# Patient Record
Sex: MALE | Race: WHITE | HISPANIC OR LATINO | Employment: FULL TIME | URBAN - METROPOLITAN AREA
[De-identification: names, ages, dates, MRNs, and addresses within clinical notes are randomized per-mention and may not be internally consistent; named-entity substitution may affect disease eponyms.]

---

## 2019-01-26 ENCOUNTER — APPOINTMENT (OUTPATIENT)
Dept: RADIOLOGY | Facility: MEDICAL CENTER | Age: 29
DRG: 003 | End: 2019-01-26
Payer: COMMERCIAL

## 2019-01-26 ENCOUNTER — APPOINTMENT (OUTPATIENT)
Dept: RADIOLOGY | Facility: MEDICAL CENTER | Age: 29
DRG: 003 | End: 2019-01-26
Attending: SURGERY
Payer: COMMERCIAL

## 2019-01-26 ENCOUNTER — HOSPITAL ENCOUNTER (INPATIENT)
Facility: MEDICAL CENTER | Age: 29
LOS: 26 days | DRG: 003 | End: 2019-02-21
Attending: EMERGENCY MEDICINE | Admitting: SURGERY
Payer: COMMERCIAL

## 2019-01-26 DIAGNOSIS — T14.90XA TRAUMA: ICD-10-CM

## 2019-01-26 DIAGNOSIS — S06.5XAA SUBDURAL HEMATOMA (HCC): ICD-10-CM

## 2019-01-26 PROBLEM — J95.821 ACUTE RESPIRATORY FAILURE FOLLOWING TRAUMA AND SURGERY (HCC): Status: ACTIVE | Noted: 2019-01-26

## 2019-01-26 PROBLEM — Z53.09 CONTRAINDICATION TO DEEP VEIN THROMBOSIS (DVT) PROPHYLAXIS: Status: ACTIVE | Noted: 2019-01-26

## 2019-01-26 PROBLEM — E87.20 LACTIC ACIDOSIS: Status: ACTIVE | Noted: 2019-01-26

## 2019-01-26 PROBLEM — T17.908A ASPIRATION INTO AIRWAY: Status: ACTIVE | Noted: 2019-01-26

## 2019-01-26 LAB
ABO GROUP BLD: NORMAL
ACTION RANGE TRIGGERED IACRT: NO
ACTION RANGE TRIGGERED IACRT: YES
ALBUMIN SERPL BCP-MCNC: 4.6 G/DL (ref 3.2–4.9)
ALBUMIN/GLOB SERPL: 1.7 G/DL
ALP SERPL-CCNC: 58 U/L (ref 30–99)
ALT SERPL-CCNC: 31 U/L (ref 2–50)
ANION GAP SERPL CALC-SCNC: 17 MMOL/L (ref 0–11.9)
ANION GAP SERPL CALC-SCNC: 8 MMOL/L (ref 0–11.9)
APTT PPP: 24.3 SEC (ref 24.7–36)
AST SERPL-CCNC: 43 U/L (ref 12–45)
BASE EXCESS BLDA CALC-SCNC: -5 MMOL/L (ref -4–3)
BASE EXCESS BLDA CALC-SCNC: -5 MMOL/L (ref -4–3)
BASE EXCESS BLDA CALC-SCNC: -6 MMOL/L (ref -4–3)
BILIRUB SERPL-MCNC: 1.3 MG/DL (ref 0.1–1.5)
BLD GP AB SCN SERPL QL: NORMAL
BODY TEMPERATURE: ABNORMAL CENTIGRADE
BODY TEMPERATURE: ABNORMAL DEGREES
BODY TEMPERATURE: ABNORMAL DEGREES
BUN SERPL-MCNC: 17 MG/DL (ref 8–22)
BUN SERPL-MCNC: 22 MG/DL (ref 8–22)
CALCIUM SERPL-MCNC: 8 MG/DL (ref 8.5–10.5)
CALCIUM SERPL-MCNC: 8.3 MG/DL (ref 8.5–10.5)
CFT BLD TEG: 4.6 MIN (ref 5–10)
CHLORIDE SERPL-SCNC: 107 MMOL/L (ref 96–112)
CHLORIDE SERPL-SCNC: 109 MMOL/L (ref 96–112)
CLOT ANGLE BLD TEG: 68 DEGREES (ref 53–72)
CLOT LYSIS 30M P MA LENFR BLD TEG: 0 % (ref 0–8)
CO2 BLDA-SCNC: 21 MMOL/L (ref 20–33)
CO2 BLDA-SCNC: 23 MMOL/L (ref 20–33)
CO2 SERPL-SCNC: 16 MMOL/L (ref 20–33)
CO2 SERPL-SCNC: 21 MMOL/L (ref 20–33)
CREAT SERPL-MCNC: 0.86 MG/DL (ref 0.5–1.4)
CREAT SERPL-MCNC: 0.99 MG/DL (ref 0.5–1.4)
CT.EXTRINSIC BLD ROTEM: 1.6 MIN (ref 1–3)
ERYTHROCYTE [DISTWIDTH] IN BLOOD BY AUTOMATED COUNT: 42.7 FL (ref 35.9–50)
ETHANOL BLD-MCNC: 0.01 G/DL
GLOBULIN SER CALC-MCNC: 2.7 G/DL (ref 1.9–3.5)
GLUCOSE BLD-MCNC: 100 MG/DL (ref 65–99)
GLUCOSE BLD-MCNC: 121 MG/DL (ref 65–99)
GLUCOSE SERPL-MCNC: 119 MG/DL (ref 65–99)
GLUCOSE SERPL-MCNC: 182 MG/DL (ref 65–99)
HCO3 BLDA-SCNC: 20.1 MMOL/L (ref 17–25)
HCO3 BLDA-SCNC: 21 MMOL/L (ref 17–25)
HCO3 BLDA-SCNC: 21.8 MMOL/L (ref 17–25)
HCT VFR BLD AUTO: 43.9 % (ref 42–52)
HGB BLD-MCNC: 14.5 G/DL (ref 14–18)
HOROWITZ INDEX BLDA+IHG-RTO: 295 MM[HG]
HOROWITZ INDEX BLDA+IHG-RTO: 65 MM[HG]
INR PPP: 1.08 (ref 0.87–1.13)
INST. QUALIFIED PATIENT IIQPT: YES
INST. QUALIFIED PATIENT IIQPT: YES
LACTATE BLD-SCNC: 1.6 MMOL/L (ref 0.5–2)
LACTATE BLD-SCNC: 3.9 MMOL/L (ref 0.5–2)
MCF BLD TEG: 70 MM (ref 50–70)
MCH RBC QN AUTO: 30.7 PG (ref 27–33)
MCHC RBC AUTO-ENTMCNC: 33 G/DL (ref 33.7–35.3)
MCV RBC AUTO: 93 FL (ref 81.4–97.8)
O2/TOTAL GAS SETTING VFR VENT: 60 %
O2/TOTAL GAS SETTING VFR VENT: 60 %
PA AA BLD-ACNC: 26.1 %
PA ADP BLD-ACNC: 34.2 %
PCO2 BLDA: 37.4 MMHG (ref 26–37)
PCO2 BLDA: 43.9 MMHG (ref 26–37)
PCO2 BLDA: 47.5 MMHG (ref 26–37)
PCO2 TEMP ADJ BLDA: 37.2 MMHG (ref 26–37)
PCO2 TEMP ADJ BLDA: 43.9 MMHG (ref 26–37)
PH BLDA: 7.26 [PH] (ref 7.4–7.5)
PH BLDA: 7.3 [PH] (ref 7.4–7.5)
PH BLDA: 7.34 [PH] (ref 7.4–7.5)
PH TEMP ADJ BLDA: 7.3 [PH] (ref 7.4–7.5)
PH TEMP ADJ BLDA: 7.34 [PH] (ref 7.4–7.5)
PLATELET # BLD AUTO: 311 K/UL (ref 164–446)
PMV BLD AUTO: 9.7 FL (ref 9–12.9)
PO2 BLDA: 177 MMHG (ref 64–87)
PO2 BLDA: 39 MMHG (ref 64–87)
PO2 BLDA: 41.1 MMHG (ref 64–87)
PO2 TEMP ADJ BLDA: 176 MMHG (ref 64–87)
PO2 TEMP ADJ BLDA: 39 MMHG (ref 64–87)
POTASSIUM SERPL-SCNC: 4.3 MMOL/L (ref 3.6–5.5)
POTASSIUM SERPL-SCNC: 4.7 MMOL/L (ref 3.6–5.5)
PROT SERPL-MCNC: 7.3 G/DL (ref 6–8.2)
PROTHROMBIN TIME: 14.1 SEC (ref 12–14.6)
RBC # BLD AUTO: 4.72 M/UL (ref 4.7–6.1)
RH BLD: NORMAL
SAO2 % BLDA: 100 % (ref 93–99)
SAO2 % BLDA: 66 % (ref 93–99)
SAO2 % BLDA: 68 % (ref 93–99)
SODIUM SERPL-SCNC: 138 MMOL/L (ref 135–145)
SODIUM SERPL-SCNC: 140 MMOL/L (ref 135–145)
SPECIMEN DRAWN FROM PATIENT: ABNORMAL
SPECIMEN DRAWN FROM PATIENT: ABNORMAL
TEG ALGORITHM TGALG: ABNORMAL
TRIGL SERPL-MCNC: 103 MG/DL (ref 0–149)
WBC # BLD AUTO: 21.7 K/UL (ref 4.8–10.8)

## 2019-01-26 PROCEDURE — 4A103BD MONITORING OF INTRACRANIAL PRESSURE, PERCUTANEOUS APPROACH: ICD-10-PCS | Performed by: NEUROLOGICAL SURGERY

## 2019-01-26 PROCEDURE — 500246 HCHG CATH, ICP SUBDURAL: Performed by: NEUROLOGICAL SURGERY

## 2019-01-26 PROCEDURE — 86850 RBC ANTIBODY SCREEN: CPT

## 2019-01-26 PROCEDURE — 00H032Z INSERTION OF MONITORING DEVICE INTO BRAIN, PERCUTANEOUS APPROACH: ICD-10-PCS | Performed by: NEUROLOGICAL SURGERY

## 2019-01-26 PROCEDURE — A9270 NON-COVERED ITEM OR SERVICE: HCPCS | Performed by: NEUROLOGICAL SURGERY

## 2019-01-26 PROCEDURE — 72170 X-RAY EXAM OF PELVIS: CPT

## 2019-01-26 PROCEDURE — 160041 HCHG SURGERY MINUTES - EA ADDL 1 MIN LEVEL 4: Performed by: NEUROLOGICAL SURGERY

## 2019-01-26 PROCEDURE — 73560 X-RAY EXAM OF KNEE 1 OR 2: CPT | Mod: LT

## 2019-01-26 PROCEDURE — 72125 CT NECK SPINE W/O DYE: CPT

## 2019-01-26 PROCEDURE — 83605 ASSAY OF LACTIC ACID: CPT

## 2019-01-26 PROCEDURE — 85384 FIBRINOGEN ACTIVITY: CPT

## 2019-01-26 PROCEDURE — 700102 HCHG RX REV CODE 250 W/ 637 OVERRIDE(OP): Performed by: NEUROLOGICAL SURGERY

## 2019-01-26 PROCEDURE — 700111 HCHG RX REV CODE 636 W/ 250 OVERRIDE (IP): Performed by: SURGERY

## 2019-01-26 PROCEDURE — 501445 HCHG STAPLER, SKIN DISP: Performed by: NEUROLOGICAL SURGERY

## 2019-01-26 PROCEDURE — 85347 COAGULATION TIME ACTIVATED: CPT

## 2019-01-26 PROCEDURE — 500075 HCHG BLADE, CLIPPER NEURO: Performed by: NEUROLOGICAL SURGERY

## 2019-01-26 PROCEDURE — 700102 HCHG RX REV CODE 250 W/ 637 OVERRIDE(OP): Performed by: NURSE PRACTITIONER

## 2019-01-26 PROCEDURE — 85576 BLOOD PLATELET AGGREGATION: CPT

## 2019-01-26 PROCEDURE — 82962 GLUCOSE BLOOD TEST: CPT

## 2019-01-26 PROCEDURE — A9270 NON-COVERED ITEM OR SERVICE: HCPCS

## 2019-01-26 PROCEDURE — G0390 TRAUMA RESPONS W/HOSP CRITI: HCPCS

## 2019-01-26 PROCEDURE — 80307 DRUG TEST PRSMV CHEM ANLYZR: CPT

## 2019-01-26 PROCEDURE — 501838 HCHG SUTURE GENERAL: Performed by: NEUROLOGICAL SURGERY

## 2019-01-26 PROCEDURE — 80048 BASIC METABOLIC PNL TOTAL CA: CPT

## 2019-01-26 PROCEDURE — 700111 HCHG RX REV CODE 636 W/ 250 OVERRIDE (IP): Performed by: NEUROLOGICAL SURGERY

## 2019-01-26 PROCEDURE — 700117 HCHG RX CONTRAST REV CODE 255: Performed by: EMERGENCY MEDICINE

## 2019-01-26 PROCEDURE — 85730 THROMBOPLASTIN TIME PARTIAL: CPT

## 2019-01-26 PROCEDURE — 00C40ZZ EXTIRPATION OF MATTER FROM INTRACRANIAL SUBDURAL SPACE, OPEN APPROACH: ICD-10-PCS | Performed by: NEUROLOGICAL SURGERY

## 2019-01-26 PROCEDURE — 70450 CT HEAD/BRAIN W/O DYE: CPT

## 2019-01-26 PROCEDURE — 110454 HCHG SHELL REV 250: Performed by: NEUROLOGICAL SURGERY

## 2019-01-26 PROCEDURE — 80053 COMPREHEN METABOLIC PANEL: CPT

## 2019-01-26 PROCEDURE — 700102 HCHG RX REV CODE 250 W/ 637 OVERRIDE(OP)

## 2019-01-26 PROCEDURE — 99291 CRITICAL CARE FIRST HOUR: CPT

## 2019-01-26 PROCEDURE — 160048 HCHG OR STATISTICAL LEVEL 1-5: Performed by: NEUROLOGICAL SURGERY

## 2019-01-26 PROCEDURE — 160009 HCHG ANES TIME/MIN: Performed by: NEUROLOGICAL SURGERY

## 2019-01-26 PROCEDURE — 5A1955Z RESPIRATORY VENTILATION, GREATER THAN 96 CONSECUTIVE HOURS: ICD-10-PCS | Performed by: SURGERY

## 2019-01-26 PROCEDURE — 71260 CT THORAX DX C+: CPT

## 2019-01-26 PROCEDURE — 700105 HCHG RX REV CODE 258: Performed by: NURSE PRACTITIONER

## 2019-01-26 PROCEDURE — 37799 UNLISTED PX VASCULAR SURGERY: CPT

## 2019-01-26 PROCEDURE — 770022 HCHG ROOM/CARE - ICU (200)

## 2019-01-26 PROCEDURE — 00U20JZ SUPPLEMENT DURA MATER WITH SYNTHETIC SUBSTITUTE, OPEN APPROACH: ICD-10-PCS | Performed by: NEUROLOGICAL SURGERY

## 2019-01-26 PROCEDURE — 84478 ASSAY OF TRIGLYCERIDES: CPT

## 2019-01-26 PROCEDURE — 700111 HCHG RX REV CODE 636 W/ 250 OVERRIDE (IP): Performed by: NURSE PRACTITIONER

## 2019-01-26 PROCEDURE — 73590 X-RAY EXAM OF LOWER LEG: CPT | Mod: LT

## 2019-01-26 PROCEDURE — 700101 HCHG RX REV CODE 250

## 2019-01-26 PROCEDURE — 85027 COMPLETE CBC AUTOMATED: CPT

## 2019-01-26 PROCEDURE — 94760 N-INVAS EAR/PLS OXIMETRY 1: CPT

## 2019-01-26 PROCEDURE — 303105 HCHG CATHETER EXTRA

## 2019-01-26 PROCEDURE — 160029 HCHG SURGERY MINUTES - 1ST 30 MINS LEVEL 4: Performed by: NEUROLOGICAL SURGERY

## 2019-01-26 PROCEDURE — 82803 BLOOD GASES ANY COMBINATION: CPT

## 2019-01-26 PROCEDURE — 51702 INSERT TEMP BLADDER CATH: CPT

## 2019-01-26 PROCEDURE — 94002 VENT MGMT INPAT INIT DAY: CPT

## 2019-01-26 PROCEDURE — 500889 HCHG PACK, NEURO: Performed by: NEUROLOGICAL SURGERY

## 2019-01-26 PROCEDURE — 86901 BLOOD TYPING SEROLOGIC RH(D): CPT

## 2019-01-26 PROCEDURE — 71045 X-RAY EXAM CHEST 1 VIEW: CPT

## 2019-01-26 PROCEDURE — 86900 BLOOD TYPING SEROLOGIC ABO: CPT

## 2019-01-26 PROCEDURE — 85610 PROTHROMBIN TIME: CPT

## 2019-01-26 PROCEDURE — 700111 HCHG RX REV CODE 636 W/ 250 OVERRIDE (IP)

## 2019-01-26 PROCEDURE — 72131 CT LUMBAR SPINE W/O DYE: CPT

## 2019-01-26 PROCEDURE — 72128 CT CHEST SPINE W/O DYE: CPT

## 2019-01-26 PROCEDURE — 700105 HCHG RX REV CODE 258: Performed by: NEUROLOGICAL SURGERY

## 2019-01-26 PROCEDURE — 500440 HCHG DRESSING, STERILE ROLL (KERLIX): Performed by: NEUROLOGICAL SURGERY

## 2019-01-26 DEVICE — GRAFT DURAMATRIX ONLAY PLUS 3IN X 3IN OR 7.5CM X 7.5CM: Type: IMPLANTABLE DEVICE | Status: FUNCTIONAL

## 2019-01-26 RX ORDER — AMOXICILLIN 250 MG
1 CAPSULE ORAL NIGHTLY
Status: DISCONTINUED | OUTPATIENT
Start: 2019-01-26 | End: 2019-01-27

## 2019-01-26 RX ORDER — AMOXICILLIN 250 MG
1 CAPSULE ORAL
Status: DISCONTINUED | OUTPATIENT
Start: 2019-01-26 | End: 2019-01-26

## 2019-01-26 RX ORDER — BUPIVACAINE HYDROCHLORIDE AND EPINEPHRINE 5; 5 MG/ML; UG/ML
INJECTION, SOLUTION EPIDURAL; INTRACAUDAL; PERINEURAL
Status: DISCONTINUED | OUTPATIENT
Start: 2019-01-26 | End: 2019-01-26 | Stop reason: HOSPADM

## 2019-01-26 RX ORDER — FAMOTIDINE 20 MG/1
20 TABLET, FILM COATED ORAL 2 TIMES DAILY
Status: DISCONTINUED | OUTPATIENT
Start: 2019-01-26 | End: 2019-01-27

## 2019-01-26 RX ORDER — ACETAMINOPHEN 500 MG
1000 TABLET ORAL EVERY 6 HOURS
Status: DISCONTINUED | OUTPATIENT
Start: 2019-01-26 | End: 2019-01-26

## 2019-01-26 RX ORDER — DEXTROSE MONOHYDRATE 25 G/50ML
25 INJECTION, SOLUTION INTRAVENOUS
Status: DISCONTINUED | OUTPATIENT
Start: 2019-01-26 | End: 2019-02-11

## 2019-01-26 RX ORDER — ACETAMINOPHEN 500 MG
1000 TABLET ORAL EVERY 6 HOURS
Status: DISCONTINUED | OUTPATIENT
Start: 2019-01-27 | End: 2019-01-26

## 2019-01-26 RX ORDER — BISACODYL 10 MG
10 SUPPOSITORY, RECTAL RECTAL
Status: DISCONTINUED | OUTPATIENT
Start: 2019-01-26 | End: 2019-01-26

## 2019-01-26 RX ORDER — POLYETHYLENE GLYCOL 3350 17 G/17G
1 POWDER, FOR SOLUTION ORAL 2 TIMES DAILY PRN
Status: DISCONTINUED | OUTPATIENT
Start: 2019-01-26 | End: 2019-01-27

## 2019-01-26 RX ORDER — ONDANSETRON 2 MG/ML
4 INJECTION INTRAMUSCULAR; INTRAVENOUS EVERY 4 HOURS PRN
Status: DISCONTINUED | OUTPATIENT
Start: 2019-01-26 | End: 2019-02-21 | Stop reason: HOSPADM

## 2019-01-26 RX ORDER — CEFAZOLIN SODIUM 1 G/3ML
1 INJECTION, POWDER, FOR SOLUTION INTRAMUSCULAR; INTRAVENOUS
Status: DISCONTINUED | OUTPATIENT
Start: 2019-01-26 | End: 2019-01-31

## 2019-01-26 RX ORDER — DEXAMETHASONE SODIUM PHOSPHATE 4 MG/ML
4 INJECTION, SOLUTION INTRA-ARTICULAR; INTRALESIONAL; INTRAMUSCULAR; INTRAVENOUS; SOFT TISSUE
Status: DISCONTINUED | OUTPATIENT
Start: 2019-01-26 | End: 2019-02-06

## 2019-01-26 RX ORDER — DOCUSATE SODIUM 100 MG/1
100 CAPSULE, LIQUID FILLED ORAL 2 TIMES DAILY
Status: DISCONTINUED | OUTPATIENT
Start: 2019-01-26 | End: 2019-01-26

## 2019-01-26 RX ORDER — BISACODYL 10 MG
10 SUPPOSITORY, RECTAL RECTAL
Status: DISCONTINUED | OUTPATIENT
Start: 2019-01-26 | End: 2019-02-21 | Stop reason: HOSPADM

## 2019-01-26 RX ORDER — OXYCODONE HYDROCHLORIDE 5 MG/1
2.5 TABLET ORAL
Status: DISCONTINUED | OUTPATIENT
Start: 2019-01-26 | End: 2019-01-27

## 2019-01-26 RX ORDER — AMOXICILLIN 250 MG
1 CAPSULE ORAL
Status: DISCONTINUED | OUTPATIENT
Start: 2019-01-26 | End: 2019-01-27

## 2019-01-26 RX ORDER — ENEMA 19; 7 G/133ML; G/133ML
1 ENEMA RECTAL
Status: DISCONTINUED | OUTPATIENT
Start: 2019-01-26 | End: 2019-01-26

## 2019-01-26 RX ORDER — POLYETHYLENE GLYCOL 3350 17 G/17G
1 POWDER, FOR SOLUTION ORAL 2 TIMES DAILY
Status: DISCONTINUED | OUTPATIENT
Start: 2019-01-26 | End: 2019-01-26

## 2019-01-26 RX ORDER — MORPHINE SULFATE 4 MG/ML
1-4 INJECTION, SOLUTION INTRAMUSCULAR; INTRAVENOUS
Status: DISCONTINUED | OUTPATIENT
Start: 2019-01-26 | End: 2019-01-28

## 2019-01-26 RX ORDER — 3% SODIUM CHLORIDE 3 G/100ML
500 INJECTION, SOLUTION INTRAVENOUS CONTINUOUS
Status: DISCONTINUED | OUTPATIENT
Start: 2019-01-26 | End: 2019-02-06

## 2019-01-26 RX ORDER — ONDANSETRON 2 MG/ML
4 INJECTION INTRAMUSCULAR; INTRAVENOUS EVERY 4 HOURS PRN
Status: DISCONTINUED | OUTPATIENT
Start: 2019-01-26 | End: 2019-01-26

## 2019-01-26 RX ORDER — HYDRALAZINE HYDROCHLORIDE 20 MG/ML
10 INJECTION INTRAMUSCULAR; INTRAVENOUS
Status: DISCONTINUED | OUTPATIENT
Start: 2019-01-26 | End: 2019-02-17

## 2019-01-26 RX ORDER — DOCUSATE SODIUM 50 MG/5ML
100 LIQUID ORAL 2 TIMES DAILY
Status: DISCONTINUED | OUTPATIENT
Start: 2019-01-26 | End: 2019-02-21 | Stop reason: HOSPADM

## 2019-01-26 RX ORDER — LABETALOL HYDROCHLORIDE 5 MG/ML
10 INJECTION, SOLUTION INTRAVENOUS
Status: DISCONTINUED | OUTPATIENT
Start: 2019-01-26 | End: 2019-01-26

## 2019-01-26 RX ORDER — MORPHINE SULFATE 10 MG/ML
2 INJECTION, SOLUTION INTRAMUSCULAR; INTRAVENOUS
Status: DISCONTINUED | OUTPATIENT
Start: 2019-01-26 | End: 2019-01-28

## 2019-01-26 RX ORDER — OXYCODONE HYDROCHLORIDE 5 MG/1
5 TABLET ORAL
Status: DISCONTINUED | OUTPATIENT
Start: 2019-01-26 | End: 2019-01-27

## 2019-01-26 RX ORDER — LORAZEPAM 2 MG/ML
INJECTION INTRAMUSCULAR
Status: COMPLETED
Start: 2019-01-26 | End: 2019-01-26

## 2019-01-26 RX ORDER — LORAZEPAM 2 MG/ML
1 INJECTION INTRAMUSCULAR
Status: DISCONTINUED | OUTPATIENT
Start: 2019-01-26 | End: 2019-02-06

## 2019-01-26 RX ORDER — SCOLOPAMINE TRANSDERMAL SYSTEM 1 MG/1
1 PATCH, EXTENDED RELEASE TRANSDERMAL
Status: DISCONTINUED | OUTPATIENT
Start: 2019-01-26 | End: 2019-02-06

## 2019-01-26 RX ORDER — CLONIDINE HYDROCHLORIDE 0.1 MG/1
0.1 TABLET ORAL EVERY 4 HOURS PRN
Status: DISCONTINUED | OUTPATIENT
Start: 2019-01-26 | End: 2019-01-27

## 2019-01-26 RX ORDER — SODIUM CHLORIDE 9 MG/ML
INJECTION, SOLUTION INTRAVENOUS CONTINUOUS
Status: DISCONTINUED | OUTPATIENT
Start: 2019-01-26 | End: 2019-02-18

## 2019-01-26 RX ORDER — AMOXICILLIN 250 MG
1 CAPSULE ORAL NIGHTLY
Status: DISCONTINUED | OUTPATIENT
Start: 2019-01-26 | End: 2019-01-26

## 2019-01-26 RX ORDER — ACETAMINOPHEN 500 MG
1000 TABLET ORAL EVERY 6 HOURS
Status: DISPENSED | OUTPATIENT
Start: 2019-01-26 | End: 2019-01-31

## 2019-01-26 RX ORDER — ENEMA 19; 7 G/133ML; G/133ML
1 ENEMA RECTAL
Status: DISCONTINUED | OUTPATIENT
Start: 2019-01-26 | End: 2019-02-21 | Stop reason: HOSPADM

## 2019-01-26 RX ADMIN — ACETAMINOPHEN 1000 MG: 500 TABLET ORAL at 23:03

## 2019-01-26 RX ADMIN — SODIUM CHLORIDE 500 MG: 9 INJECTION, SOLUTION INTRAVENOUS at 18:07

## 2019-01-26 RX ADMIN — MORPHINE SULFATE 2 MG: 10 INJECTION INTRAVENOUS at 22:16

## 2019-01-26 RX ADMIN — FAMOTIDINE 20 MG: 10 INJECTION INTRAVENOUS at 18:03

## 2019-01-26 RX ADMIN — IOHEXOL 100 ML: 350 INJECTION, SOLUTION INTRAVENOUS at 14:13

## 2019-01-26 RX ADMIN — SODIUM CHLORIDE: 9 INJECTION, SOLUTION INTRAVENOUS at 23:53

## 2019-01-26 RX ADMIN — PROPOFOL 60 MCG/KG/MIN: 10 INJECTION, EMULSION INTRAVENOUS at 22:16

## 2019-01-26 RX ADMIN — SODIUM CHLORIDE: 9 INJECTION, SOLUTION INTRAVENOUS at 14:55

## 2019-01-26 RX ADMIN — SODIUM CHLORIDE 500 ML: 234 INJECTION, SOLUTION INTRAVENOUS at 18:26

## 2019-01-26 RX ADMIN — LORAZEPAM 1 MG: 2 INJECTION INTRAMUSCULAR; INTRAVENOUS at 17:45

## 2019-01-26 RX ADMIN — PROPOFOL 40 MCG/KG/MIN: 10 INJECTION, EMULSION INTRAVENOUS at 14:42

## 2019-01-26 RX ADMIN — SODIUM CHLORIDE 500 MG: 9 INJECTION, SOLUTION INTRAVENOUS at 15:12

## 2019-01-26 RX ADMIN — PROPOFOL 60 MCG/KG/MIN: 10 INJECTION, EMULSION INTRAVENOUS at 18:02

## 2019-01-26 NOTE — ASSESSMENT & PLAN NOTE
Left sided holohemispheric subdural hematoma measuring approximately 9.4 mm in maximum thickness. 10 mm of left-to-right midline shift.   1/26 To OR for emergent craniotomy and evacuation of hematoma.   1/28 CT x 2 stable  2/2 MRI brainstem complete   2/19 Follow up CT head with partial herniation of the left hemisphere, herniation appears somewhat decreased  2/25 Repeat heat CT  Tentative bone flap replacement on Wednesday 2/27/19  On Ivelisse Huntley MD. Neurosurgery.

## 2019-01-26 NOTE — ASSESSMENT & PLAN NOTE
Systemic anticoagulation contraindicated secondary to elevated bleeding risk.  1/27 and 2/4  Trauma screening bilateral lower extremity venous duplex negative for above knee DVT.  1/29 Pharmacological DVT prophalxis, Lovenox initiated.

## 2019-01-26 NOTE — PROGRESS NOTES
Father of patient called RN. Mother and father of the patient are traveling in [place] and will be on a flight back to [place] today.     Father of patient Eliazar Childress [phone].   Mother of patient Brenda Abarca [phone].     RN updated father that patient will be emergently going to surgery.
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
## 2019-01-26 NOTE — PROGRESS NOTES
Patient arrived to S-ICU on monitor. Patient transferred to trauma bed utilizing c-spine/log roll precautions. Dr. Huntley at bedside. Per Dr. Huntley he will be taking the patient to the OR for an emergent crani.     2 RN skin assessment completed. Blanchable redness noted to bilateral knees. No other skin concerns noted at this time. Mepilex placed on sacrum.

## 2019-01-26 NOTE — ASSESSMENT & PLAN NOTE
Intubated in field  2/3 Perc trach / APRV mode  / Folan  2/14 Tolerating T- Piece   2/16 Downsize trach to 6.0 cuffless, non fenestrated.    2/21 Potential decannulation   Capping trials
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
## 2019-01-26 NOTE — CARE PLAN
Problem: Knowledge Deficit  Goal: Knowledge of disease process/condition, treatment plan, diagnostic tests, and medications will improve    Intervention: Explain information regarding disease process/condition, treatment plan, diagnostic tests, and medications and document in education  Updated father on POC. Encouraged father to call back with any and all questions.       Problem: Pain Management  Goal: Pain level will decrease to patient's comfort goal    Intervention: Follow pain managment plan developed in collaboration with patient and Interdisciplinary Team  Assessed for pain and medicated per the MAR.

## 2019-01-26 NOTE — ASSESSMENT & PLAN NOTE
[place] Witnessed seizure like activity. GCS 3. No evidence of acute trauma.  Trauma Red Activation.   Mor Roa MD, Trauma/General Surgery.

## 2019-01-26 NOTE — ASSESSMENT & PLAN NOTE
Admission imaging with bilateral, left greater than right medial dependent consolidation could be due to aspiration pneumonitis or atelectasis.  Aggressive pulmonary hygiene and serial chest radiography.

## 2019-01-27 ENCOUNTER — APPOINTMENT (OUTPATIENT)
Dept: RADIOLOGY | Facility: MEDICAL CENTER | Age: 29
DRG: 003 | End: 2019-01-27
Attending: NEUROLOGICAL SURGERY
Payer: COMMERCIAL

## 2019-01-27 ENCOUNTER — APPOINTMENT (OUTPATIENT)
Dept: RADIOLOGY | Facility: MEDICAL CENTER | Age: 29
DRG: 003 | End: 2019-01-27
Attending: NURSE PRACTITIONER
Payer: COMMERCIAL

## 2019-01-27 ENCOUNTER — APPOINTMENT (OUTPATIENT)
Dept: RADIOLOGY | Facility: MEDICAL CENTER | Age: 29
DRG: 003 | End: 2019-01-27
Attending: SURGERY
Payer: COMMERCIAL

## 2019-01-27 LAB
ABO GROUP BLD: NORMAL
ACTION RANGE TRIGGERED IACRT: NO
ALBUMIN SERPL BCP-MCNC: 3.6 G/DL (ref 3.2–4.9)
ALBUMIN/GLOB SERPL: 1.7 G/DL
ALP SERPL-CCNC: 43 U/L (ref 30–99)
ALT SERPL-CCNC: 23 U/L (ref 2–50)
ANION GAP SERPL CALC-SCNC: 10 MMOL/L (ref 0–11.9)
ANION GAP SERPL CALC-SCNC: 6 MMOL/L (ref 0–11.9)
ANION GAP SERPL CALC-SCNC: 8 MMOL/L (ref 0–11.9)
ANION GAP SERPL CALC-SCNC: 9 MMOL/L (ref 0–11.9)
AST SERPL-CCNC: 23 U/L (ref 12–45)
BASE EXCESS BLDA CALC-SCNC: 2 MMOL/L (ref -4–3)
BASOPHILS # BLD AUTO: 0.1 % (ref 0–1.8)
BASOPHILS # BLD: 0.01 K/UL (ref 0–0.12)
BILIRUB SERPL-MCNC: 1.2 MG/DL (ref 0.1–1.5)
BODY TEMPERATURE: ABNORMAL DEGREES
BUN SERPL-MCNC: 14 MG/DL (ref 8–22)
BUN SERPL-MCNC: 14 MG/DL (ref 8–22)
BUN SERPL-MCNC: 15 MG/DL (ref 8–22)
BUN SERPL-MCNC: 16 MG/DL (ref 8–22)
CALCIUM SERPL-MCNC: 7.2 MG/DL (ref 8.5–10.5)
CALCIUM SERPL-MCNC: 7.9 MG/DL (ref 8.5–10.5)
CALCIUM SERPL-MCNC: 8 MG/DL (ref 8.5–10.5)
CALCIUM SERPL-MCNC: 8.2 MG/DL (ref 8.5–10.5)
CFT BLD TEG: 5.5 MIN (ref 5–10)
CHLORIDE SERPL-SCNC: 109 MMOL/L (ref 96–112)
CHLORIDE SERPL-SCNC: 110 MMOL/L (ref 96–112)
CHLORIDE SERPL-SCNC: 111 MMOL/L (ref 96–112)
CHLORIDE SERPL-SCNC: 118 MMOL/L (ref 96–112)
CLOT ANGLE BLD TEG: 68.1 DEGREES (ref 53–72)
CLOT LYSIS 30M P MA LENFR BLD TEG: 0.5 % (ref 0–8)
CO2 BLDA-SCNC: 27 MMOL/L (ref 20–33)
CO2 SERPL-SCNC: 22 MMOL/L (ref 20–33)
CO2 SERPL-SCNC: 23 MMOL/L (ref 20–33)
CREAT SERPL-MCNC: 0.71 MG/DL (ref 0.5–1.4)
CREAT SERPL-MCNC: 0.71 MG/DL (ref 0.5–1.4)
CREAT SERPL-MCNC: 0.77 MG/DL (ref 0.5–1.4)
CREAT SERPL-MCNC: 0.78 MG/DL (ref 0.5–1.4)
CT.EXTRINSIC BLD ROTEM: 1.5 MIN (ref 1–3)
EOSINOPHIL # BLD AUTO: 0 K/UL (ref 0–0.51)
EOSINOPHIL NFR BLD: 0 % (ref 0–6.9)
ERYTHROCYTE [DISTWIDTH] IN BLOOD BY AUTOMATED COUNT: 42.7 FL (ref 35.9–50)
GLOBULIN SER CALC-MCNC: 2.1 G/DL (ref 1.9–3.5)
GLUCOSE BLD-MCNC: 121 MG/DL (ref 65–99)
GLUCOSE BLD-MCNC: 91 MG/DL (ref 65–99)
GLUCOSE BLD-MCNC: 94 MG/DL (ref 65–99)
GLUCOSE BLD-MCNC: 94 MG/DL (ref 65–99)
GLUCOSE BLD-MCNC: 98 MG/DL (ref 65–99)
GLUCOSE SERPL-MCNC: 110 MG/DL (ref 65–99)
GLUCOSE SERPL-MCNC: 114 MG/DL (ref 65–99)
GLUCOSE SERPL-MCNC: 122 MG/DL (ref 65–99)
GLUCOSE SERPL-MCNC: 136 MG/DL (ref 65–99)
HCO3 BLDA-SCNC: 25.9 MMOL/L (ref 17–25)
HCT VFR BLD AUTO: 33.8 % (ref 42–52)
HGB BLD-MCNC: 11.2 G/DL (ref 14–18)
HOROWITZ INDEX BLDA+IHG-RTO: 437 MM[HG]
IMM GRANULOCYTES # BLD AUTO: 0.08 K/UL (ref 0–0.11)
IMM GRANULOCYTES NFR BLD AUTO: 0.4 % (ref 0–0.9)
INR PPP: 1.2 (ref 0.87–1.13)
INST. QUALIFIED PATIENT IIQPT: YES
LYMPHOCYTES # BLD AUTO: 1.48 K/UL (ref 1–4.8)
LYMPHOCYTES NFR BLD: 7.8 % (ref 22–41)
MCF BLD TEG: 68 MM (ref 50–70)
MCH RBC QN AUTO: 30.5 PG (ref 27–33)
MCHC RBC AUTO-ENTMCNC: 33.1 G/DL (ref 33.7–35.3)
MCV RBC AUTO: 92.1 FL (ref 81.4–97.8)
MONOCYTES # BLD AUTO: 1.77 K/UL (ref 0–0.85)
MONOCYTES NFR BLD AUTO: 9.3 % (ref 0–13.4)
NEUTROPHILS # BLD AUTO: 15.68 K/UL (ref 1.82–7.42)
NEUTROPHILS NFR BLD: 82.4 % (ref 44–72)
NRBC # BLD AUTO: 0 K/UL
NRBC BLD-RTO: 0 /100 WBC
O2/TOTAL GAS SETTING VFR VENT: 30 %
PA AA BLD-ACNC: 0 %
PA ADP BLD-ACNC: 0 %
PCO2 BLDA: 35.3 MMHG (ref 26–37)
PCO2 TEMP ADJ BLDA: 36.5 MMHG (ref 26–37)
PH BLDA: 7.47 [PH] (ref 7.4–7.5)
PH TEMP ADJ BLDA: 7.46 [PH] (ref 7.4–7.5)
PLATELET # BLD AUTO: 287 K/UL (ref 164–446)
PMV BLD AUTO: 9.8 FL (ref 9–12.9)
PO2 BLDA: 131 MMHG (ref 64–87)
PO2 TEMP ADJ BLDA: 135 MMHG (ref 64–87)
POTASSIUM SERPL-SCNC: 3.6 MMOL/L (ref 3.6–5.5)
POTASSIUM SERPL-SCNC: 3.7 MMOL/L (ref 3.6–5.5)
POTASSIUM SERPL-SCNC: 3.8 MMOL/L (ref 3.6–5.5)
POTASSIUM SERPL-SCNC: 4.5 MMOL/L (ref 3.6–5.5)
PROT SERPL-MCNC: 5.7 G/DL (ref 6–8.2)
PROTHROMBIN TIME: 15.4 SEC (ref 12–14.6)
RBC # BLD AUTO: 3.67 M/UL (ref 4.7–6.1)
RH BLD: NORMAL
SAO2 % BLDA: 99 % (ref 93–99)
SODIUM SERPL-SCNC: 141 MMOL/L (ref 135–145)
SODIUM SERPL-SCNC: 141 MMOL/L (ref 135–145)
SODIUM SERPL-SCNC: 143 MMOL/L (ref 135–145)
SODIUM SERPL-SCNC: 147 MMOL/L (ref 135–145)
SPECIMEN DRAWN FROM PATIENT: ABNORMAL
TEG ALGORITHM TGALG: NORMAL
WBC # BLD AUTO: 19 K/UL (ref 4.8–10.8)

## 2019-01-27 PROCEDURE — A9270 NON-COVERED ITEM OR SERVICE: HCPCS | Performed by: SURGERY

## 2019-01-27 PROCEDURE — 70450 CT HEAD/BRAIN W/O DYE: CPT

## 2019-01-27 PROCEDURE — 700102 HCHG RX REV CODE 250 W/ 637 OVERRIDE(OP): Performed by: SURGERY

## 2019-01-27 PROCEDURE — 99291 CRITICAL CARE FIRST HOUR: CPT | Mod: 25 | Performed by: SURGERY

## 2019-01-27 PROCEDURE — 93970 EXTREMITY STUDY: CPT

## 2019-01-27 PROCEDURE — 02HV33Z INSERTION OF INFUSION DEVICE INTO SUPERIOR VENA CAVA, PERCUTANEOUS APPROACH: ICD-10-PCS | Performed by: SURGERY

## 2019-01-27 PROCEDURE — 700105 HCHG RX REV CODE 258: Performed by: NEUROLOGICAL SURGERY

## 2019-01-27 PROCEDURE — 85610 PROTHROMBIN TIME: CPT

## 2019-01-27 PROCEDURE — 36556 INSERT NON-TUNNEL CV CATH: CPT

## 2019-01-27 PROCEDURE — 700111 HCHG RX REV CODE 636 W/ 250 OVERRIDE (IP): Performed by: NEUROLOGICAL SURGERY

## 2019-01-27 PROCEDURE — 71045 X-RAY EXAM CHEST 1 VIEW: CPT

## 2019-01-27 PROCEDURE — 700112 HCHG RX REV CODE 229: Performed by: NEUROLOGICAL SURGERY

## 2019-01-27 PROCEDURE — 700111 HCHG RX REV CODE 636 W/ 250 OVERRIDE (IP): Performed by: NURSE PRACTITIONER

## 2019-01-27 PROCEDURE — 770022 HCHG ROOM/CARE - ICU (200)

## 2019-01-27 PROCEDURE — 700105 HCHG RX REV CODE 258: Performed by: NURSE PRACTITIONER

## 2019-01-27 PROCEDURE — 82962 GLUCOSE BLOOD TEST: CPT

## 2019-01-27 PROCEDURE — 94003 VENT MGMT INPAT SUBQ DAY: CPT

## 2019-01-27 PROCEDURE — 93970 EXTREMITY STUDY: CPT | Mod: 26 | Performed by: SURGERY

## 2019-01-27 PROCEDURE — 85347 COAGULATION TIME ACTIVATED: CPT

## 2019-01-27 PROCEDURE — A9270 NON-COVERED ITEM OR SERVICE: HCPCS | Performed by: NEUROLOGICAL SURGERY

## 2019-01-27 PROCEDURE — 700105 HCHG RX REV CODE 258: Performed by: SURGERY

## 2019-01-27 PROCEDURE — 85576 BLOOD PLATELET AGGREGATION: CPT | Mod: 91

## 2019-01-27 PROCEDURE — 80048 BASIC METABOLIC PNL TOTAL CA: CPT | Mod: 91

## 2019-01-27 PROCEDURE — 80053 COMPREHEN METABOLIC PANEL: CPT

## 2019-01-27 PROCEDURE — 36556 INSERT NON-TUNNEL CV CATH: CPT | Performed by: SURGERY

## 2019-01-27 PROCEDURE — 37799 UNLISTED PX VASCULAR SURGERY: CPT

## 2019-01-27 PROCEDURE — 700102 HCHG RX REV CODE 250 W/ 637 OVERRIDE(OP): Performed by: NEUROLOGICAL SURGERY

## 2019-01-27 PROCEDURE — 700111 HCHG RX REV CODE 636 W/ 250 OVERRIDE (IP): Performed by: SURGERY

## 2019-01-27 PROCEDURE — 85384 FIBRINOGEN ACTIVITY: CPT

## 2019-01-27 PROCEDURE — 82803 BLOOD GASES ANY COMBINATION: CPT

## 2019-01-27 PROCEDURE — 85025 COMPLETE CBC W/AUTO DIFF WBC: CPT

## 2019-01-27 RX ORDER — 3% SODIUM CHLORIDE 3 G/100ML
INJECTION, SOLUTION INTRAVENOUS ONCE
Status: COMPLETED | OUTPATIENT
Start: 2019-01-27 | End: 2019-01-27

## 2019-01-27 RX ORDER — POLYETHYLENE GLYCOL 3350 17 G/17G
1 POWDER, FOR SOLUTION ORAL 2 TIMES DAILY PRN
Status: DISCONTINUED | OUTPATIENT
Start: 2019-01-27 | End: 2019-02-21 | Stop reason: HOSPADM

## 2019-01-27 RX ORDER — SODIUM CHLORIDE 9 MG/ML
1000 INJECTION, SOLUTION INTRAVENOUS ONCE
Status: COMPLETED | OUTPATIENT
Start: 2019-01-27 | End: 2019-01-27

## 2019-01-27 RX ORDER — CEFAZOLIN SODIUM 2 G/100ML
2 INJECTION, SOLUTION INTRAVENOUS EVERY 8 HOURS
Status: DISCONTINUED | OUTPATIENT
Start: 2019-01-27 | End: 2019-02-03

## 2019-01-27 RX ORDER — OXYCODONE HYDROCHLORIDE 5 MG/1
5 TABLET ORAL
Status: DISCONTINUED | OUTPATIENT
Start: 2019-01-27 | End: 2019-02-21 | Stop reason: HOSPADM

## 2019-01-27 RX ORDER — CLONIDINE HYDROCHLORIDE 0.1 MG/1
0.1 TABLET ORAL EVERY 4 HOURS PRN
Status: DISCONTINUED | OUTPATIENT
Start: 2019-01-27 | End: 2019-02-21 | Stop reason: HOSPADM

## 2019-01-27 RX ORDER — FAMOTIDINE 20 MG/1
20 TABLET, FILM COATED ORAL 2 TIMES DAILY
Status: DISCONTINUED | OUTPATIENT
Start: 2019-01-27 | End: 2019-02-03

## 2019-01-27 RX ORDER — AMOXICILLIN 250 MG
1 CAPSULE ORAL
Status: DISCONTINUED | OUTPATIENT
Start: 2019-01-27 | End: 2019-02-21 | Stop reason: HOSPADM

## 2019-01-27 RX ORDER — CEFAZOLIN SODIUM 2 G/100ML
2 INJECTION, SOLUTION INTRAVENOUS EVERY 8 HOURS
Status: DISCONTINUED | OUTPATIENT
Start: 2019-01-27 | End: 2019-01-27

## 2019-01-27 RX ORDER — OXYCODONE HYDROCHLORIDE 5 MG/1
2.5 TABLET ORAL
Status: DISCONTINUED | OUTPATIENT
Start: 2019-01-27 | End: 2019-02-21 | Stop reason: HOSPADM

## 2019-01-27 RX ORDER — AMOXICILLIN 250 MG
1 CAPSULE ORAL NIGHTLY
Status: DISCONTINUED | OUTPATIENT
Start: 2019-01-27 | End: 2019-01-30

## 2019-01-27 RX ADMIN — MORPHINE SULFATE 4 MG: 4 INJECTION INTRAVENOUS at 21:06

## 2019-01-27 RX ADMIN — MORPHINE SULFATE 4 MG: 4 INJECTION INTRAVENOUS at 01:19

## 2019-01-27 RX ADMIN — PROPOFOL 60 MCG/KG/MIN: 10 INJECTION, EMULSION INTRAVENOUS at 17:29

## 2019-01-27 RX ADMIN — MORPHINE SULFATE 4 MG: 4 INJECTION INTRAVENOUS at 15:48

## 2019-01-27 RX ADMIN — ACETAMINOPHEN 1000 MG: 500 TABLET ORAL at 17:17

## 2019-01-27 RX ADMIN — MORPHINE SULFATE 4 MG: 4 INJECTION INTRAVENOUS at 08:49

## 2019-01-27 RX ADMIN — DOCUSATE SODIUM 100 MG: 50 LIQUID ORAL at 05:46

## 2019-01-27 RX ADMIN — PROPOFOL 60 MCG/KG/MIN: 10 INJECTION, EMULSION INTRAVENOUS at 21:05

## 2019-01-27 RX ADMIN — SODIUM CHLORIDE 1000 ML: 9 INJECTION, SOLUTION INTRAVENOUS at 14:03

## 2019-01-27 RX ADMIN — PROPOFOL 70 MCG/KG/MIN: 10 INJECTION, EMULSION INTRAVENOUS at 04:59

## 2019-01-27 RX ADMIN — Medication 1 TABLET: at 21:06

## 2019-01-27 RX ADMIN — OXYCODONE HYDROCHLORIDE 5 MG: 5 TABLET ORAL at 22:28

## 2019-01-27 RX ADMIN — MORPHINE SULFATE 4 MG: 4 INJECTION INTRAVENOUS at 22:46

## 2019-01-27 RX ADMIN — ACETAMINOPHEN 1000 MG: 500 TABLET ORAL at 05:46

## 2019-01-27 RX ADMIN — MORPHINE SULFATE 4 MG: 4 INJECTION INTRAVENOUS at 23:56

## 2019-01-27 RX ADMIN — PROPOFOL 40 MCG/KG/MIN: 10 INJECTION, EMULSION INTRAVENOUS at 12:06

## 2019-01-27 RX ADMIN — FAMOTIDINE 20 MG: 20 TABLET ORAL at 17:17

## 2019-01-27 RX ADMIN — SODIUM CHLORIDE 500 ML: 234 INJECTION, SOLUTION INTRAVENOUS at 14:32

## 2019-01-27 RX ADMIN — MORPHINE SULFATE 4 MG: 4 INJECTION INTRAVENOUS at 11:13

## 2019-01-27 RX ADMIN — MORPHINE SULFATE 4 MG: 4 INJECTION INTRAVENOUS at 12:29

## 2019-01-27 RX ADMIN — SODIUM CHLORIDE 250 ML: 3 INJECTION, SOLUTION INTRAVENOUS at 14:12

## 2019-01-27 RX ADMIN — PROPOFOL 60 MCG/KG/MIN: 10 INJECTION, EMULSION INTRAVENOUS at 01:20

## 2019-01-27 RX ADMIN — SODIUM CHLORIDE 500 MG: 9 INJECTION, SOLUTION INTRAVENOUS at 17:44

## 2019-01-27 RX ADMIN — DOCUSATE SODIUM 100 MG: 50 LIQUID ORAL at 17:17

## 2019-01-27 RX ADMIN — MORPHINE SULFATE 4 MG: 4 INJECTION INTRAVENOUS at 03:39

## 2019-01-27 RX ADMIN — MORPHINE SULFATE 4 MG: 4 INJECTION INTRAVENOUS at 17:29

## 2019-01-27 RX ADMIN — ACETAMINOPHEN 1000 MG: 500 TABLET ORAL at 11:30

## 2019-01-27 RX ADMIN — SODIUM CHLORIDE: 9 INJECTION, SOLUTION INTRAVENOUS at 21:06

## 2019-01-27 RX ADMIN — ACETAMINOPHEN 1000 MG: 500 TABLET ORAL at 23:17

## 2019-01-27 RX ADMIN — PROPOFOL 60 MCG/KG/MIN: 10 INJECTION, EMULSION INTRAVENOUS at 08:35

## 2019-01-27 RX ADMIN — FAMOTIDINE 20 MG: 10 INJECTION INTRAVENOUS at 05:46

## 2019-01-27 RX ADMIN — CEFAZOLIN SODIUM 2 G: 2 INJECTION, SOLUTION INTRAVENOUS at 21:06

## 2019-01-27 RX ADMIN — CEFAZOLIN SODIUM 2 G: 2 INJECTION, SOLUTION INTRAVENOUS at 13:27

## 2019-01-27 RX ADMIN — SODIUM CHLORIDE 500 MG: 9 INJECTION, SOLUTION INTRAVENOUS at 05:46
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
## 2019-01-27 NOTE — PROGRESS NOTES
Patient returned from OR on monitor. Patient settled. Crani site dressing clean, dry, intact. SMITA drain present. Kymberly monitor zeroed.     Skin assessed. No changes.

## 2019-01-27 NOTE — PROGRESS NOTES
Neurosurgery Progress Note    Subjective:  Remains intubated, sedated    Exam:  Bandage c/d/i  Left scalp full not tense  ICP 0-16 mm Hg      BP  Min: 101/70  Max: 162/83  Pulse  Av.2  Min: 58  Max: 100  Resp  Av.1  Min: 18  Max: 38  Temp  Av.7 °C (98.1 °F)  Min: 36.7 °C (98 °F)  Max: 36.7 °C (98.1 °F)  Monitored Temp 2  Av.6 °C (99.7 °F)  Min: 36.7 °C (98.1 °F)  Max: 38.4 °C (101.1 °F)  SpO2  Av.5 %  Min: 97 %  Max: 100 %    ICP  Av.9 MM HG  Min: 0 MM HG  Max: 16 MM HG    Recent Labs      19   1333  19   0540   WBC  21.7*  19.0*   RBC  4.72  3.67*   HEMOGLOBIN  14.5  11.2*   HEMATOCRIT  43.9  33.8*   MCV  93.0  92.1   MCH  30.7  30.5   MCHC  33.0*  33.1*   RDW  42.7  42.7   PLATELETCT  311  287   MPV  9.7  9.8     Recent Labs      19   1825  19   2340  19   0540   SODIUM  138  141  143   POTASSIUM  4.7  4.5  3.8   CHLORIDE  109  109  111   CO2  21  22  23   GLUCOSE  119*  136*  122*   BUN  17  14  14   CREATININE  0.86  0.78  0.71   CALCIUM  8.0*  8.0*  7.9*     Recent Labs      19   1333  19   0540   APTT  24.3*   --    INR  1.08  1.20*     Recent Labs      19   1333   REACTMIN  4.6*   CLOTKINET  1.6   CLOTANGL  68.0   MAXCLOTS  70.0   MUQ96ZNT  0.0   PRCINADP  34.2   PRCINAA  26.1       Intake/Output       19 0700 - 19 0659 19 07 - 19 0659      3273-7518 0380-0078 Total 3087-8871 9883-2785 Total       Intake    P.O.  --  0 0  --  -- --    P.O. -- 0 0 -- -- --    I.V.  500  1640.5 2140.5  303.7  -- 303.7    Pre-Hospital Volume 500 -- 500 -- -- --    Trauma Resuscitation Volume 0 -- 0 -- -- --    Cardene Volume -- 0 0 -- -- --    Propofol Volume -- 344.9 344.9 56.7 -- 56.7    Volume (mL) (NS infusion) -- 1200 1200 200 -- 200    Volume (mL) (dextrose 50% (D50W) injection 25 mL) -- 0 0 -- -- --    Volume (mL) (3% sodium chloride (HYPERTONIC SALINE) 500mL infusion 500 mL) -- 95.7 95.7 47 -- 47    Blood  0  -- 0  --   -- --    PRBC Total Volume (Non-Barcoded) 0 -- 0 -- -- --    FFP Total Volume (Non-Barcoded) 0 -- 0 -- -- --    Platelets Total Volume (Non-Barcoded) 0 -- 0 -- -- --    Cryoprecipitate (Pooled) Total Volume (Non-Barcoded) 0 -- 0 -- -- --    Cryoprecipitate (Single) Total Volume (Non-Barcoded) 0 -- 0 -- -- --    Other  30  110 140  --  -- --    Medications (PO/Enteral Liquids) 30 110 140 -- -- --    IV Piggyback  --  293.3 293.3  --  -- --    Volume (mL) (levETIRAcetam (KEPPRA) 500 mg in  mL IVPB) -- 100 100 -- -- --    Volume (mL) (levETIRAcetam (KEPPRA) 500 mg in  mL IVPB) -- 193.3 193.3 -- -- --    Volume (mL) (sodium chloride 200 mEq in bottle/bag empty 50 mL ivpb) -- 0 0 -- -- --    Total Intake 530 2043.9 2573.9 303.7 -- 303.7       Output    Urine  1100  865 1965  --  -- --    Urine Void (mL) 600 -- 600 -- -- --    Output (mL) (Urethral Catheter 16 Fr.)  -- -- --    Drains  60  140 200  --  -- --    Output (mL) (Closed/Suction Drain Medial Scalp Long Arcos 10 Fr.) 60 140 200 -- -- --    Other  0  -- 0  --  -- --    Pre-Hospital Output 0 -- 0 -- -- --    Trauma Resuscitation Output 0 -- 0 -- -- --    Stool  --  -- --  --  -- --    Number of Times Stooled 0 x 0 x 0 x -- -- --    Emesis/NG output  100  50 150  --  -- --    Output (mL) (Enteral Tube 01/26/19 Orogastric 16 Fr. Oral) 100 50 150 -- -- --    Blood  15  -- 15  --  -- --    Est. Blood Loss (mL) 15 -- 15 -- -- --    Total Output 1275 1055 2330 -- -- --       Net I/O     -745 988.9 243.9 303.7 -- 303.7            Intake/Output Summary (Last 24 hours) at 01/27/19 1050  Last data filed at 01/27/19 0800   Gross per 24 hour   Intake          2877.54 ml   Output             2330 ml   Net           547.54 ml            • ceFAZolin  2 g Q8HRS   • Pharmacy  1 Each PHARMACY TO DOSE   • Respiratory Care per Protocol   Continuous RT   • Pharmacy Consult Request  1 Each PHARMACY TO DOSE   • NS   Continuous   • morphine injection  1-4 mg Q  HOUR PRN   • MD Alert...PROPOFOL CRITICAL CARE PROTOCOL  1 Each PRN   • famotidine  20 mg BID    Or   • famotidine  20 mg BID   • insulin regular  1-6 Units Q6HRS    And   • glucose 4 g  16 g Q15 MIN PRN    And   • dextrose 50%  25 mL Q15 MIN PRN   • propofol  0-80 mcg/kg/min Continuous   • Pharmacy Consult Request  1 Each PHARMACY TO DOSE   • oxyCODONE immediate-release  2.5 mg Q3HRS PRN   • oxyCODONE immediate-release  5 mg Q3HRS PRN   • MD ALERT...DO NOT ADMINISTER NSAIDS or ASPIRIN unless ORDERED By Neurosurgery  1 Each PRN   • ondansetron  4 mg Q4HRS PRN   • dexamethasone  4 mg Once PRN   • scopolamine  1 Patch Q72HRS PRN   • hydrALAZINE  10 mg Q HOUR PRN   • cloNIDine  0.1 mg Q4HRS PRN   • niCARdipine infusion  0-15 mg/hr Continuous   • levETIRAcetam (KEPPRA) IV  500 mg Q12HRS   • senna-docusate  1 Tab Nightly   • senna-docusate  1 Tab Q24HRS PRN   • polyethylene glycol/lytes  1 Packet BID PRN   • magnesium hydroxide  30 mL QDAY PRN   • bisacodyl  10 mg Q24HRS PRN   • fleet  1 Each Once PRN   • morphine injection  2 mg Q3HRS PRN   • ceFAZolin  1 g Intra-Op Once PRN   • 3% sodium chloride  500 mL Continuous   • sodium chloride 23.4% ivpb  200 mEq Q6HRS PRN   • acetaminophen  1,000 mg Q6HRS   • docusate sodium 100mg/10mL  100 mg BID   • LORazepam  1 mg Q2HRS PRN       Assessment and Plan:  Hospital day #1 L [facility]  POD #1 left hemicraniectomy [facility] evacuation  Prophylactic anticoagulation: no         Start date/time: tbd  ICPs controlled at this time  Continue medical management

## 2019-01-27 NOTE — PROGRESS NOTES
Radiology called up report a small apical pneumo noted on X-Ray s/p central line placement. MD aware.

## 2019-01-27 NOTE — CARE PLAN
Problem: Venous Thromboembolism (VTW)/Deep Vein Thrombosis (DVT) Prevention:  Goal: Patient will participate in Venous Thrombosis (VTE)/Deep Vein Thrombosis (DVT)Prevention Measures    Intervention: Ensure patient wears graduated elastic stockings (CLAU hose) and/or SCDs, if ordered, when in bed or chair (Remove at least once per shift for skin check)  Pt will wear SCDs for extent of time in ICU except when performing bedbath/skin check

## 2019-01-27 NOTE — PROGRESS NOTES
MD at bedside to emergently place a central line. Time out called. Consent signed for emergent procedure and on chart. ICP elevated during procedure. Patient returned to resting position and ICP normalized. Medicated per the MAR.

## 2019-01-27 NOTE — PROCEDURES
"Central line Op Note    Date of operation: 1/27/2019    Preoperative diagnosis: acute respiratory failure (518.81)    Postoperative diagnosis: acute respiratory failure (518.81)    Operation performed: Insertion of right internal jugular triple lumen catheter placement.    Surgeon: Dr. Cruz    Anesthesia: local anesthesia, Intravenous analgesia and sedation    Indications: The patient is a 28 y.o. male. Placement of a central line was performed in the SICU    Procedure: Following informed consent, the patient was properly identified and optimally positioned in bed. Maximal barrier precautions were employed. A\" time out\" was initiated. The correct patient, procedure, and operative site was verified. The patient's allergy status was assessed. Procedural modifications were made as required.    The right neck was prepped with chlorhexidine and draped into a sterile field  The patient was placed in a shallow Trendelenburg position. The internal jugular vein was accessed percutaneously and a wire advanced without resistance. A previously flushed triple lumen catheter was passed using sterile Selldinger technique and secured to the skin with silk sutures. All of the ports flushed and aspirated freely. The site was cleaned. An antibacterial disk was placed about the catheter exit site and dressed with a transparent sterile occlusive dressing.    The patient tolerated the procedure well. There were no apparent immediate complications. A stat portable chest radiograph was ordered.    "

## 2019-01-27 NOTE — PROGRESS NOTES
[facility]  Onsite Evalutation    Referral # [id number]    Date 1/26/19 @ 17:30    Thank you for the referral of this patient. A chart review has been completed to determine suitability for organ donation.     Donation is an option.  - Upon meeting criteria for brain death, this patient will be a potential candidate for organ donation, pending further evaluation.   - This patient may meet the criteria for DCD (donation after circulatory death). Further evaluation will be needed should the family decide to withdraw support.   - This patient has been located on an organ donor registry and is a first person authorized donor in the event of their death. A copy of their document of gift has been placed in the hospital chart, it is a legally binding document.     [facility] will continue to follow this case. Guidelines for the management of a potential organ donor have been provided for use at the physician's discretion. Please call us immediately with any problems, questions, or significant changes in the patient's status. Please call us immediately with any plans for brain death evaluation, family meetings or plans to withdraw care.     Organ donation should not be mentioned without prior collaboration with [facility] so that the conversation can be a planned, one-time coordinated event with the health care team.     Call [phone]) with any immediate needs.     Thank you for your continued support of organ and tissue donation.

## 2019-01-27 NOTE — CARE PLAN
Problem: Infection  Goal: Will remain free from infection    Intervention: Implement standard precautions and perform hand washing before and after patient contact  Standard precautions and hand hygiene performed before and after every patient interaction

## 2019-01-27 NOTE — PROGRESS NOTES
Anesthesia post op check    Patient seen and evaluated.  Case discussed with managing RN and Dr Huntley.  Patient remains intubated and sedated with propofol.  The plan is to wean the vent and continue close monitoring of ICPs.  Anesthesia will sign off this patient but remain immediately available should any needs arise.    Naeem Tomas MD

## 2019-01-27 NOTE — OR SURGEON
Immediate Post OP Note    PreOp Diagnosis: CHI/subdural hematoma  PostOp Diagnosis: left CHI/subdural hematoma  Procedure(s):  CRANIOTOMY EVACUATION OF HEMATOMA - Wound Class: Clean    Surgeon(s):  Jazlyn Huntley M.D.    Anesthesiologist/Type of Anesthesia:  Anesthesiologist: Shlomo Carpio M.D./General    Surgical Staff:  Circulator: Joao Vinson R.N.  Scrub Person: Mason Reyes Assist: Ap Edwards P.A.-C.    Specimens removed if any:      Assistants: Ap Edwards PA-C,  Specimen: nil    Estimated Blood Loss: 200 cc    Findings: n/a    Complications: nil      1/26/2019 5:31 PM Jazlyn Huntley M.D.

## 2019-01-27 NOTE — PROGRESS NOTES
"Trauma Progress Note 1/26/2019 8:05 PM    Briefly, this is a 28 y.o. male with subdural hematoma, post-op craniotomy evacuation of hematoma.  Approximate resuscitation given to this point includes: 0 U PRBC, 0 U FFP, 0 U platelets and 0 L crystalloid.    /70   Pulse 82   Temp 36.7 °C (98.1 °F) (Greer)   Resp (!) 23   Ht 1.702 m (5' 7\")   Wt 77.1 kg (170 lb)   SpO2 100%   BMI 26.63 kg/m²     Hemoglobin: 14.5 g/dL  Hematocrit: 43.9 %    Lactic Acid: From 3.9 mmol/L to 1.6 mmol/L.    Arterial Blood Gas:  Recent Labs      01/26/19   1333  01/26/19   1528  01/26/19   1809   [id number]  7.26*   --    --    [id number]  47.5*   --    --    [id number]  41.1*   --    --    [id number]  66.0*   --    --    ARTHCO3  21   --    --    ARTBE  -6*   --    --    ISTATAPH   --   7.303*  7.338*   ISTATAPCO2   --   43.9*  37.4*   ISTATAPO2   --   39*  177*   ISTATATCO2   --   23  21   [id number]   --   68*  100*   ISTATARTHCO3   --   21.8  20.1   ISTATARTBE   --   -5*  -5*   ISTATTEMP   --   98.6 F  98.4 F   ISTATFIO2   --   60  60   ISTATSPEC   --   Arterial  Arterial   ISTATAPHTC   --   7.303*  7.340*   [id number]   --   39*  176*         Recent Labs      01/26/19   1333   APTT  24.3*   INR  1.08      Recent Labs      01/26/19   1333   REACTMIN  4.6*   CLOTKINET  1.6   CLOTANGL  68.0   MAXCLOTS  70.0   [id number]  0.0   PRCINADP  34.2   PRCINAA  26.1         Urine Output: 1100cc since admission    Assessment:   Post-op crani  Full ventilator support, sedated on propofol  Pupils pinpoint, withdraws to pain x4 extremities  ICP -1    End points of resuscitation improving?: Yes    Additional plans:   Neuro checks q1h  Repeat CT head pending  3% gtts with target Na 150-155  23% PRN for ICU greater than 20 for 5 min    "

## 2019-01-27 NOTE — THERAPY
Speech Therapy Contact Note:  Spoke with RN regarding this pt. Per RN, pt currently on vent and not appropriate for swallow or cognitive evaluation at this time. SLP to follow as pt is appropriate.
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
## 2019-01-27 NOTE — PROGRESS NOTES
2 RN skin check performed. Abrasions noted to knees and left shin. Sacral mepilex in place. mepilex placed under C-collar bilaterally. No other new significant findings

## 2019-01-28 ENCOUNTER — APPOINTMENT (OUTPATIENT)
Dept: RADIOLOGY | Facility: MEDICAL CENTER | Age: 29
DRG: 003 | End: 2019-01-28
Attending: NURSE PRACTITIONER
Payer: COMMERCIAL

## 2019-01-28 ENCOUNTER — APPOINTMENT (OUTPATIENT)
Dept: RADIOLOGY | Facility: MEDICAL CENTER | Age: 29
DRG: 003 | End: 2019-01-28
Attending: NEUROLOGICAL SURGERY
Payer: COMMERCIAL

## 2019-01-28 ENCOUNTER — APPOINTMENT (OUTPATIENT)
Dept: RADIOLOGY | Facility: MEDICAL CENTER | Age: 29
DRG: 003 | End: 2019-01-28
Attending: FAMILY MEDICINE
Payer: COMMERCIAL

## 2019-01-28 PROBLEM — E87.20 LACTIC ACIDOSIS: Status: RESOLVED | Noted: 2019-01-26 | Resolved: 2019-01-28

## 2019-01-28 LAB
ACTION RANGE TRIGGERED IACRT: NO
ALBUMIN SERPL BCP-MCNC: 3.2 G/DL (ref 3.2–4.9)
ALBUMIN/GLOB SERPL: 1.5 G/DL
ALP SERPL-CCNC: 37 U/L (ref 30–99)
ALT SERPL-CCNC: 15 U/L (ref 2–50)
ANION GAP SERPL CALC-SCNC: 6 MMOL/L (ref 0–11.9)
ANION GAP SERPL CALC-SCNC: 6 MMOL/L (ref 0–11.9)
ANION GAP SERPL CALC-SCNC: 8 MMOL/L (ref 0–11.9)
AST SERPL-CCNC: 20 U/L (ref 12–45)
BASE EXCESS BLDA CALC-SCNC: -2 MMOL/L (ref -4–3)
BASOPHILS # BLD AUTO: 0.3 % (ref 0–1.8)
BASOPHILS # BLD: 0.04 K/UL (ref 0–0.12)
BILIRUB SERPL-MCNC: 0.6 MG/DL (ref 0.1–1.5)
BODY TEMPERATURE: ABNORMAL DEGREES
BUN SERPL-MCNC: 12 MG/DL (ref 8–22)
BUN SERPL-MCNC: 15 MG/DL (ref 8–22)
BUN SERPL-MCNC: 16 MG/DL (ref 8–22)
CALCIUM SERPL-MCNC: 7.5 MG/DL (ref 8.5–10.5)
CALCIUM SERPL-MCNC: 7.6 MG/DL (ref 8.5–10.5)
CALCIUM SERPL-MCNC: 8.1 MG/DL (ref 8.5–10.5)
CHLORIDE SERPL-SCNC: 113 MMOL/L (ref 96–112)
CHLORIDE SERPL-SCNC: 116 MMOL/L (ref 96–112)
CHLORIDE SERPL-SCNC: 117 MMOL/L (ref 96–112)
CO2 BLDA-SCNC: 22 MMOL/L (ref 20–33)
CO2 SERPL-SCNC: 22 MMOL/L (ref 20–33)
CO2 SERPL-SCNC: 23 MMOL/L (ref 20–33)
CO2 SERPL-SCNC: 24 MMOL/L (ref 20–33)
CREAT SERPL-MCNC: 0.7 MG/DL (ref 0.5–1.4)
CREAT SERPL-MCNC: 0.71 MG/DL (ref 0.5–1.4)
CREAT SERPL-MCNC: 0.72 MG/DL (ref 0.5–1.4)
CRP SERPL HS-MCNC: 10.52 MG/DL (ref 0–0.75)
EOSINOPHIL # BLD AUTO: 0.04 K/UL (ref 0–0.51)
EOSINOPHIL NFR BLD: 0.3 % (ref 0–6.9)
ERYTHROCYTE [DISTWIDTH] IN BLOOD BY AUTOMATED COUNT: 47.1 FL (ref 35.9–50)
GLOBULIN SER CALC-MCNC: 2.1 G/DL (ref 1.9–3.5)
GLUCOSE BLD-MCNC: 100 MG/DL (ref 65–99)
GLUCOSE BLD-MCNC: 121 MG/DL (ref 65–99)
GLUCOSE BLD-MCNC: 88 MG/DL (ref 65–99)
GLUCOSE SERPL-MCNC: 105 MG/DL (ref 65–99)
GLUCOSE SERPL-MCNC: 113 MG/DL (ref 65–99)
GLUCOSE SERPL-MCNC: 118 MG/DL (ref 65–99)
HCO3 BLDA-SCNC: 21.4 MMOL/L (ref 17–25)
HCT VFR BLD AUTO: 31.6 % (ref 42–52)
HGB BLD-MCNC: 10 G/DL (ref 14–18)
HOROWITZ INDEX BLDA+IHG-RTO: 347 MM[HG]
IMM GRANULOCYTES # BLD AUTO: 0.08 K/UL (ref 0–0.11)
IMM GRANULOCYTES NFR BLD AUTO: 0.5 % (ref 0–0.9)
INST. QUALIFIED PATIENT IIQPT: YES
LACTATE BLD-SCNC: 0.9 MMOL/L (ref 0.5–2)
LYMPHOCYTES # BLD AUTO: 2.43 K/UL (ref 1–4.8)
LYMPHOCYTES NFR BLD: 16.3 % (ref 22–41)
MAGNESIUM SERPL-MCNC: 2.1 MG/DL (ref 1.5–2.5)
MCH RBC QN AUTO: 30.7 PG (ref 27–33)
MCHC RBC AUTO-ENTMCNC: 31.6 G/DL (ref 33.7–35.3)
MCV RBC AUTO: 96.9 FL (ref 81.4–97.8)
MONOCYTES # BLD AUTO: 1.29 K/UL (ref 0–0.85)
MONOCYTES NFR BLD AUTO: 8.6 % (ref 0–13.4)
NEUTROPHILS # BLD AUTO: 11.06 K/UL (ref 1.82–7.42)
NEUTROPHILS NFR BLD: 74 % (ref 44–72)
NRBC # BLD AUTO: 0 K/UL
NRBC BLD-RTO: 0 /100 WBC
O2/TOTAL GAS SETTING VFR VENT: 30 %
PCO2 BLDA: 32 MMHG (ref 26–37)
PCO2 TEMP ADJ BLDA: 34.8 MMHG (ref 26–37)
PH BLDA: 7.43 [PH] (ref 7.4–7.5)
PH TEMP ADJ BLDA: 7.4 [PH] (ref 7.4–7.5)
PHOSPHATE SERPL-MCNC: 1.5 MG/DL (ref 2.5–4.5)
PLATELET # BLD AUTO: 227 K/UL (ref 164–446)
PMV BLD AUTO: 10 FL (ref 9–12.9)
PO2 BLDA: 104 MMHG (ref 64–87)
PO2 TEMP ADJ BLDA: 116 MMHG (ref 64–87)
POTASSIUM SERPL-SCNC: 3.4 MMOL/L (ref 3.6–5.5)
POTASSIUM SERPL-SCNC: 3.4 MMOL/L (ref 3.6–5.5)
POTASSIUM SERPL-SCNC: 3.5 MMOL/L (ref 3.6–5.5)
PREALB SERPL-MCNC: 22 MG/DL (ref 18–38)
PROT SERPL-MCNC: 5.3 G/DL (ref 6–8.2)
RBC # BLD AUTO: 3.26 M/UL (ref 4.7–6.1)
SAO2 % BLDA: 98 % (ref 93–99)
SODIUM SERPL-SCNC: 143 MMOL/L (ref 135–145)
SODIUM SERPL-SCNC: 146 MMOL/L (ref 135–145)
SODIUM SERPL-SCNC: 146 MMOL/L (ref 135–145)
SPECIMEN DRAWN FROM PATIENT: ABNORMAL
TRIGL SERPL-MCNC: 331 MG/DL (ref 0–149)
WBC # BLD AUTO: 14.9 K/UL (ref 4.8–10.8)

## 2019-01-28 PROCEDURE — 83735 ASSAY OF MAGNESIUM: CPT

## 2019-01-28 PROCEDURE — 700105 HCHG RX REV CODE 258: Performed by: NEUROLOGICAL SURGERY

## 2019-01-28 PROCEDURE — 700111 HCHG RX REV CODE 636 W/ 250 OVERRIDE (IP): Performed by: NEUROLOGICAL SURGERY

## 2019-01-28 PROCEDURE — 82962 GLUCOSE BLOOD TEST: CPT | Mod: 91

## 2019-01-28 PROCEDURE — 70450 CT HEAD/BRAIN W/O DYE: CPT

## 2019-01-28 PROCEDURE — 95951 EEG: CPT | Mod: 52 | Performed by: PSYCHIATRY & NEUROLOGY

## 2019-01-28 PROCEDURE — 700111 HCHG RX REV CODE 636 W/ 250 OVERRIDE (IP): Performed by: NURSE PRACTITIONER

## 2019-01-28 PROCEDURE — 700112 HCHG RX REV CODE 229: Performed by: NEUROLOGICAL SURGERY

## 2019-01-28 PROCEDURE — 95951 EEG: CPT | Mod: 26,52 | Performed by: PSYCHIATRY & NEUROLOGY

## 2019-01-28 PROCEDURE — 71045 X-RAY EXAM CHEST 1 VIEW: CPT

## 2019-01-28 PROCEDURE — 84134 ASSAY OF PREALBUMIN: CPT | Mod: 91

## 2019-01-28 PROCEDURE — 82803 BLOOD GASES ANY COMBINATION: CPT

## 2019-01-28 PROCEDURE — 770022 HCHG ROOM/CARE - ICU (200)

## 2019-01-28 PROCEDURE — 302154 K THERMIA BLANKET 24X60: Performed by: SURGERY

## 2019-01-28 PROCEDURE — 700102 HCHG RX REV CODE 250 W/ 637 OVERRIDE(OP): Performed by: SURGERY

## 2019-01-28 PROCEDURE — 84478 ASSAY OF TRIGLYCERIDES: CPT

## 2019-01-28 PROCEDURE — 700102 HCHG RX REV CODE 250 W/ 637 OVERRIDE(OP): Performed by: NEUROLOGICAL SURGERY

## 2019-01-28 PROCEDURE — 85025 COMPLETE CBC W/AUTO DIFF WBC: CPT

## 2019-01-28 PROCEDURE — 700111 HCHG RX REV CODE 636 W/ 250 OVERRIDE (IP): Performed by: SURGERY

## 2019-01-28 PROCEDURE — 4A10X4Z MONITORING OF CENTRAL NERVOUS ELECTRICAL ACTIVITY, EXTERNAL APPROACH: ICD-10-PCS | Performed by: PSYCHIATRY & NEUROLOGY

## 2019-01-28 PROCEDURE — 99292 CRITICAL CARE ADDL 30 MIN: CPT | Performed by: SURGERY

## 2019-01-28 PROCEDURE — 80053 COMPREHEN METABOLIC PANEL: CPT

## 2019-01-28 PROCEDURE — A9270 NON-COVERED ITEM OR SERVICE: HCPCS | Performed by: NEUROLOGICAL SURGERY

## 2019-01-28 PROCEDURE — 302132 K THERMIA MOTOR: Performed by: SURGERY

## 2019-01-28 PROCEDURE — 84100 ASSAY OF PHOSPHORUS: CPT

## 2019-01-28 PROCEDURE — 80048 BASIC METABOLIC PNL TOTAL CA: CPT | Mod: 91

## 2019-01-28 PROCEDURE — 99291 CRITICAL CARE FIRST HOUR: CPT | Performed by: SURGERY

## 2019-01-28 PROCEDURE — 83605 ASSAY OF LACTIC ACID: CPT

## 2019-01-28 PROCEDURE — 700105 HCHG RX REV CODE 258: Performed by: NURSE PRACTITIONER

## 2019-01-28 PROCEDURE — 700101 HCHG RX REV CODE 250: Performed by: SURGERY

## 2019-01-28 PROCEDURE — 700105 HCHG RX REV CODE 258: Performed by: SURGERY

## 2019-01-28 PROCEDURE — 700101 HCHG RX REV CODE 250: Performed by: NEUROLOGICAL SURGERY

## 2019-01-28 PROCEDURE — 94003 VENT MGMT INPAT SUBQ DAY: CPT

## 2019-01-28 PROCEDURE — 86140 C-REACTIVE PROTEIN: CPT | Mod: 91

## 2019-01-28 PROCEDURE — 37799 UNLISTED PX VASCULAR SURGERY: CPT

## 2019-01-28 PROCEDURE — A9270 NON-COVERED ITEM OR SERVICE: HCPCS | Performed by: SURGERY

## 2019-01-28 RX ORDER — MORPHINE SULFATE 4 MG/ML
4 INJECTION, SOLUTION INTRAMUSCULAR; INTRAVENOUS
Status: DISCONTINUED | OUTPATIENT
Start: 2019-01-28 | End: 2019-02-16

## 2019-01-28 RX ORDER — MORPHINE SULFATE 4 MG/ML
2 INJECTION, SOLUTION INTRAMUSCULAR; INTRAVENOUS
Status: DISCONTINUED | OUTPATIENT
Start: 2019-01-28 | End: 2019-02-17

## 2019-01-28 RX ADMIN — CEFAZOLIN SODIUM 2 G: 2 INJECTION, SOLUTION INTRAVENOUS at 21:37

## 2019-01-28 RX ADMIN — SODIUM CHLORIDE: 234 INJECTION, SOLUTION INTRAVENOUS at 21:22

## 2019-01-28 RX ADMIN — PROPOFOL 70 MCG/KG/MIN: 10 INJECTION, EMULSION INTRAVENOUS at 03:53

## 2019-01-28 RX ADMIN — POTASSIUM PHOSPHATE, MONOBASIC AND POTASSIUM PHOSPHATE, DIBASIC 30 MMOL: 224; 236 INJECTION, SOLUTION INTRAVENOUS at 14:05

## 2019-01-28 RX ADMIN — CLONIDINE HYDROCHLORIDE 0.1 MG: 0.1 TABLET ORAL at 17:23

## 2019-01-28 RX ADMIN — ACETAMINOPHEN 1000 MG: 500 TABLET ORAL at 04:59

## 2019-01-28 RX ADMIN — Medication 1 TABLET: at 21:43

## 2019-01-28 RX ADMIN — MORPHINE SULFATE 4 MG: 4 INJECTION INTRAVENOUS at 03:04

## 2019-01-28 RX ADMIN — MORPHINE SULFATE 4 MG: 4 INJECTION INTRAVENOUS at 05:11

## 2019-01-28 RX ADMIN — SODIUM CHLORIDE 500 ML: 234 INJECTION, SOLUTION INTRAVENOUS at 08:40

## 2019-01-28 RX ADMIN — SODIUM CHLORIDE 500 MG: 9 INJECTION, SOLUTION INTRAVENOUS at 04:59

## 2019-01-28 RX ADMIN — HYDRALAZINE HYDROCHLORIDE 10 MG: 20 INJECTION INTRAMUSCULAR; INTRAVENOUS at 11:06

## 2019-01-28 RX ADMIN — HYDRALAZINE HYDROCHLORIDE 10 MG: 20 INJECTION INTRAMUSCULAR; INTRAVENOUS at 03:04

## 2019-01-28 RX ADMIN — ACETAMINOPHEN 1000 MG: 500 TABLET ORAL at 11:50

## 2019-01-28 RX ADMIN — CEFAZOLIN SODIUM 2 G: 2 INJECTION, SOLUTION INTRAVENOUS at 14:13

## 2019-01-28 RX ADMIN — PROPOFOL 70 MCG/KG/MIN: 10 INJECTION, EMULSION INTRAVENOUS at 08:43

## 2019-01-28 RX ADMIN — SODIUM CHLORIDE: 9 INJECTION, SOLUTION INTRAVENOUS at 21:25

## 2019-01-28 RX ADMIN — SODIUM CHLORIDE 5 MG/HR: 9 INJECTION, SOLUTION INTRAVENOUS at 12:00

## 2019-01-28 RX ADMIN — PROPOFOL 60 MCG/KG/MIN: 10 INJECTION, EMULSION INTRAVENOUS at 00:25

## 2019-01-28 RX ADMIN — FAMOTIDINE 20 MG: 20 TABLET ORAL at 04:59

## 2019-01-28 RX ADMIN — PROPOFOL 30 MCG/KG/MIN: 10 INJECTION, EMULSION INTRAVENOUS at 19:24

## 2019-01-28 RX ADMIN — PROPOFOL 50 MCG/KG/MIN: 10 INJECTION, EMULSION INTRAVENOUS at 13:11

## 2019-01-28 RX ADMIN — DOCUSATE SODIUM 100 MG: 50 LIQUID ORAL at 04:59

## 2019-01-28 RX ADMIN — PROPOFOL 70 MCG/KG/MIN: 10 INJECTION, EMULSION INTRAVENOUS at 06:22

## 2019-01-28 RX ADMIN — SODIUM CHLORIDE 500 MG: 9 INJECTION, SOLUTION INTRAVENOUS at 18:11

## 2019-01-28 RX ADMIN — CLONIDINE HYDROCHLORIDE 0.1 MG: 0.1 TABLET ORAL at 11:48

## 2019-01-28 RX ADMIN — HYDRALAZINE HYDROCHLORIDE 10 MG: 20 INJECTION INTRAMUSCULAR; INTRAVENOUS at 04:59

## 2019-01-28 RX ADMIN — MORPHINE SULFATE 4 MG: 4 INJECTION INTRAVENOUS at 10:00

## 2019-01-28 RX ADMIN — SODIUM CHLORIDE: 9 INJECTION, SOLUTION INTRAVENOUS at 08:41

## 2019-01-28 RX ADMIN — ACETAMINOPHEN 1000 MG: 500 TABLET ORAL at 17:25

## 2019-01-28 RX ADMIN — FAMOTIDINE 20 MG: 20 TABLET ORAL at 17:25

## 2019-01-28 RX ADMIN — Medication 2 MG/HR: at 12:15

## 2019-01-28 RX ADMIN — DOCUSATE SODIUM 100 MG: 50 LIQUID ORAL at 17:25

## 2019-01-28 RX ADMIN — HYDRALAZINE HYDROCHLORIDE 10 MG: 20 INJECTION INTRAMUSCULAR; INTRAVENOUS at 16:52

## 2019-01-28 RX ADMIN — CEFAZOLIN SODIUM 2 G: 2 INJECTION, SOLUTION INTRAVENOUS at 04:59

## 2019-01-28 RX ADMIN — MORPHINE SULFATE 4 MG: 4 INJECTION INTRAVENOUS at 11:51

## 2019-01-28 NOTE — PROGRESS NOTES
2 RN skin assessment completed. C- Collar padded with Mepilex, heels floated on pillows. Knee bruising has progressed to boggy, blanching, redness. Per friends he was falling on his knees all day while trying to learn how to snowboard.

## 2019-01-28 NOTE — CARE PLAN
Problem: Pain Management  Goal: Pain level will decrease to patient's comfort goal  Outcome: PROGRESSING AS EXPECTED  Pain assessed q1h and PRN. Pt medicated per MAR. Non-pharmacologic measures utilized.     Problem: Skin Integrity  Goal: Risk for impaired skin integrity will decrease  Outcome: PROGRESSING AS EXPECTED  Q2H turns, heels floated, lines checked. Appropriate interventions in place.
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
## 2019-01-28 NOTE — DIETARY
Nutrition services: Day 2 of admit.  Propofol rate decreased and now at 18.1 ml/hr.  Will advance to full goal Impact 1.5 @ 60 ml/hr. Will monitor propofol infusion daily and make adjustments to nutritional provision as appropriate.

## 2019-01-28 NOTE — PROGRESS NOTES
Neurosurgery Progress Note    Subjective:  POD #1 seen with Dr. Yuko TORRES this am stable  ICP 13  Na+ 146  BP: Systolic 130s    Exam:  Sedated with propofol  -limited exam  Pupils 1-2mm: Sluggish  Drain 5cc    Pulse  Av.5  Min: 80  Max: 111  Resp  Av.2  Min: 0  Max: 30  Monitored Temp 2  Av.2 °C (100.7 °F)  Min: 34.7 °C (94.5 °F)  Max: 39.1 °C (102.4 °F)  SpO2  Av.7 %  Min: 95 %  Max: 100 %    ICP  Av.7 MM HG  Min: 7 MM HG  Max: 17 MM HG    Recent Labs      19   1333  19   0540  19   0535   WBC  21.7*  19.0*  14.9*   RBC  4.72  3.67*  3.26*   HEMOGLOBIN  14.5  11.2*  10.0*   HEMATOCRIT  43.9  33.8*  31.6*   MCV  93.0  92.1  96.9   MCH  30.7  30.5  30.7   MCHC  33.0*  33.1*  31.6*   RDW  42.7  42.7  47.1   PLATELETCT  311  287  227   MPV  9.7  9.8  10.0     Recent Labs      19   1735  19   2340  19   0535   SODIUM  147*  146*  146*   POTASSIUM  3.6  3.4*  3.4*   CHLORIDE  118*  117*  116*   CO2  23  23  24   GLUCOSE  110*  105*  118*   BUN  16  16  15   CREATININE  0.71  0.72  0.71   CALCIUM  7.2*  7.6*  7.5*     Recent Labs      19   1333  19   0540   APTT  24.3*   --    INR  1.08  1.20*     Recent Labs      19   1333  19   1345   REACTMIN  4.6*  5.5   CLOTKINET  1.6  1.5   CLOTANGL  68.0  68.1   MAXCLOTS  70.0  68.0   [id number]  0.0  0.5   PRCINADP  34.2  0.0   PRCINAA  26.1  0.0       Intake/Output       19 0700 - 19 0659 19 - 19 Total  Total       Intake    I.V.  2019.9  1816.8 3836.6  --  -- --    Propofol Volume 275.2 327.3 602.5 -- -- --    Volume (mL) (NS infusion) 1200 1191.7 2391.7 -- -- --    Volume (mL) (3% sodium chloride (HYPERTONIC SALINE) 500mL infusion 500 mL) 294.7 297.8 592.5 -- -- --    Volume (mL) (3% sodium chloride (HYPERTONIC SALINE) 500mL infusion) 250 -- 250 -- -- --    Other  460  120 580  --  -- --    Medications (PO/Enteral  Liquids) 460 120 580 -- -- --    NG/GT  50  480 530  --  -- --    Intake (mL) (Enteral Tube 01/27/19 Cortrak - Gastric Left nare) 50 480 530 -- -- --    IV Piggyback  1050  400 1450  --  -- --    Volume (mL) (NS (BOLUS) infusion 1,000 mL) 1000 -- 1000 -- -- --    Volume (mL) (levETIRAcetam (KEPPRA) 500 mg in  mL IVPB) -- 100 100 -- -- --    Volume (mL) (ceFAZolin in dextrose (ANCEF) IVPB premix 2 g) 50 300 350 -- -- --    Total Intake 3579.9 2816.8 6396.6 -- -- --       Output    Urine  459  640 1099  --  -- --    Output (mL) (Urethral Catheter 16 Fr.)  -- -- --    Drains  40  5 45  --  -- --    Output (mL) (Closed/Suction Drain Medial Scalp Long Arcos 10 Fr.) 40 5 45 -- -- --    Other  --  60 60  --  -- --    Other -- 60 60 -- -- --    Stool  --  -- --  --  -- --    Number of Times Stooled -- 0 x 0 x -- -- --    Total Output  -- -- --       Net I/O     3080.9 2111.8 5192.6 -- -- --            Intake/Output Summary (Last 24 hours) at 01/28/19 0834  Last data filed at 01/28/19 0600   Gross per 24 hour   Intake          6062.97 ml   Output             1121 ml   Net          4941.97 ml            • ceFAZolin  2 g Q8HRS   • Pharmacy  1 Each PHARMACY TO DOSE   • famotidine  20 mg BID    Or   • famotidine  20 mg BID   • senna-docusate  1 Tab Nightly   • cloNIDine  0.1 mg Q4HRS PRN   • magnesium hydroxide  30 mL QDAY PRN   • oxyCODONE immediate-release  2.5 mg Q3HRS PRN   • oxyCODONE immediate-release  5 mg Q3HRS PRN   • polyethylene glycol/lytes  1 Packet BID PRN   • senna-docusate  1 Tab Q24HRS PRN   • Respiratory Care per Protocol   Continuous RT   • NS   Continuous   • morphine injection  1-4 mg Q HOUR PRN   • MD Alert...PROPOFOL CRITICAL CARE PROTOCOL  1 Each PRN   • insulin regular  1-6 Units Q6HRS    And   • glucose 4 g  16 g Q15 MIN PRN    And   • dextrose 50%  25 mL Q15 MIN PRN   • propofol  0-80 mcg/kg/min Continuous   • Pharmacy Consult Request  1 Each PHARMACY TO DOSE   • MD  ALERT...DO NOT ADMINISTER NSAIDS or ASPIRIN unless ORDERED By Neurosurgery  1 Each PRN   • ondansetron  4 mg Q4HRS PRN   • dexamethasone  4 mg Once PRN   • scopolamine  1 Patch Q72HRS PRN   • hydrALAZINE  10 mg Q HOUR PRN   • niCARdipine infusion  0-15 mg/hr Continuous   • levETIRAcetam (KEPPRA) IV  500 mg Q12HRS   • bisacodyl  10 mg Q24HRS PRN   • fleet  1 Each Once PRN   • morphine injection  2 mg Q3HRS PRN   • ceFAZolin  1 g Intra-Op Once PRN   • 3% sodium chloride  500 mL Continuous   • sodium chloride 23.4% ivpb  200 mEq Q6HRS PRN   • acetaminophen  1,000 mg Q6HRS   • docusate sodium 100mg/10mL  100 mg BID   • LORazepam  1 mg Q2HRS PRN       Assessment and Plan:  Hospital day #3  POD #2  Prophylactic anticoagulation: no    Plan;  1. D/C drain today  2. Wean sedation as tolerated  3. Repeat CT Wednesday  4. Lovenox 40mg/ Day tomorrow.

## 2019-01-28 NOTE — CARE PLAN
Problem: Pain Management  Goal: Pain level will decrease to patient's comfort goal    Intervention: Follow pain managment plan developed in collaboration with patient and Interdisciplinary Team  Assessed for pain and medicated per the MAR.       Problem: Skin Integrity  Goal: Risk for impaired skin integrity will decrease    Intervention: Assess risk factors for impaired skin integrity and/or pressure ulcers  Assessed. Appropriate preventative measures in place at this time.

## 2019-01-28 NOTE — CARE PLAN
Problem: Ventilation Defect:  Goal: Ability to achieve and maintain unassisted ventilation or tolerate decreased levels of ventilator support    Intervention: Support and monitor invasive and noninvasive mechanical ventilation  Adult Ventilation Update    Total Vent Days: 3    Patient Lines/Drains/Airways Status    Active Airway     Name: Placement date: Placement time: Site: Days:    Airway ETT Oral 7.5       Oral   3                Barriers to Wean: Intracranial Hypertension (01/27/19 0623)    Cough: Productive (01/28/19 0234)  Sputum Amount: Small (01/28/19 0234)  Sputum Color: Clear (01/28/19 0234)  Sputum Consistency: Thin (01/28/19 0234)    Mobility  Level of Mobility: Level I (01/27/19 2000)  Reason Not Mobilized: Bed rest (01/27/19 2000)  Mobilization Comments: ICP monitor (01/26/19 2000)    Events/Summary/Plan: no vent changes made
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
## 2019-01-29 ENCOUNTER — APPOINTMENT (OUTPATIENT)
Dept: RADIOLOGY | Facility: MEDICAL CENTER | Age: 29
DRG: 003 | End: 2019-01-29
Attending: NURSE PRACTITIONER
Payer: COMMERCIAL

## 2019-01-29 PROBLEM — S27.0XXA PNEUMOTHORAX, TRAUMATIC: Status: ACTIVE | Noted: 2019-01-29

## 2019-01-29 LAB
ACTION RANGE TRIGGERED IACRT: NO
ALBUMIN SERPL BCP-MCNC: 2.9 G/DL (ref 3.2–4.9)
ALBUMIN/GLOB SERPL: 1.5 G/DL
ALP SERPL-CCNC: 39 U/L (ref 30–99)
ALT SERPL-CCNC: 14 U/L (ref 2–50)
ANION GAP SERPL CALC-SCNC: 6 MMOL/L (ref 0–11.9)
ANION GAP SERPL CALC-SCNC: 7 MMOL/L (ref 0–11.9)
ANION GAP SERPL CALC-SCNC: 7 MMOL/L (ref 0–11.9)
ANION GAP SERPL CALC-SCNC: 8 MMOL/L (ref 0–11.9)
AST SERPL-CCNC: 26 U/L (ref 12–45)
BASE EXCESS BLDA CALC-SCNC: -1 MMOL/L (ref -4–3)
BASOPHILS # BLD AUTO: 0.5 % (ref 0–1.8)
BASOPHILS # BLD: 0.06 K/UL (ref 0–0.12)
BILIRUB SERPL-MCNC: 0.6 MG/DL (ref 0.1–1.5)
BODY TEMPERATURE: ABNORMAL DEGREES
BUN SERPL-MCNC: 11 MG/DL (ref 8–22)
BUN SERPL-MCNC: 13 MG/DL (ref 8–22)
BUN SERPL-MCNC: 13 MG/DL (ref 8–22)
BUN SERPL-MCNC: 14 MG/DL (ref 8–22)
CALCIUM SERPL-MCNC: 7.4 MG/DL (ref 8.5–10.5)
CALCIUM SERPL-MCNC: 7.6 MG/DL (ref 8.5–10.5)
CALCIUM SERPL-MCNC: 7.9 MG/DL (ref 8.5–10.5)
CALCIUM SERPL-MCNC: 7.9 MG/DL (ref 8.5–10.5)
CHLORIDE SERPL-SCNC: 114 MMOL/L (ref 96–112)
CHLORIDE SERPL-SCNC: 115 MMOL/L (ref 96–112)
CHLORIDE SERPL-SCNC: 118 MMOL/L (ref 96–112)
CHLORIDE SERPL-SCNC: 119 MMOL/L (ref 96–112)
CO2 BLDA-SCNC: 24 MMOL/L (ref 20–33)
CO2 SERPL-SCNC: 22 MMOL/L (ref 20–33)
CO2 SERPL-SCNC: 23 MMOL/L (ref 20–33)
CO2 SERPL-SCNC: 23 MMOL/L (ref 20–33)
CO2 SERPL-SCNC: 24 MMOL/L (ref 20–33)
CREAT SERPL-MCNC: 0.55 MG/DL (ref 0.5–1.4)
CREAT SERPL-MCNC: 0.56 MG/DL (ref 0.5–1.4)
CREAT SERPL-MCNC: 0.58 MG/DL (ref 0.5–1.4)
CREAT SERPL-MCNC: 0.59 MG/DL (ref 0.5–1.4)
EOSINOPHIL # BLD AUTO: 0.06 K/UL (ref 0–0.51)
EOSINOPHIL NFR BLD: 0.5 % (ref 0–6.9)
ERYTHROCYTE [DISTWIDTH] IN BLOOD BY AUTOMATED COUNT: 47.5 FL (ref 35.9–50)
GLOBULIN SER CALC-MCNC: 2 G/DL (ref 1.9–3.5)
GLUCOSE SERPL-MCNC: 111 MG/DL (ref 65–99)
GLUCOSE SERPL-MCNC: 116 MG/DL (ref 65–99)
GLUCOSE SERPL-MCNC: 119 MG/DL (ref 65–99)
GLUCOSE SERPL-MCNC: 130 MG/DL (ref 65–99)
HCO3 BLDA-SCNC: 22.8 MMOL/L (ref 17–25)
HCT VFR BLD AUTO: 27.9 % (ref 42–52)
HGB BLD-MCNC: 8.6 G/DL (ref 14–18)
HOROWITZ INDEX BLDA+IHG-RTO: 227 MM[HG]
IMM GRANULOCYTES # BLD AUTO: 0.06 K/UL (ref 0–0.11)
IMM GRANULOCYTES NFR BLD AUTO: 0.5 % (ref 0–0.9)
INR PPP: 1.19 (ref 0.87–1.13)
INST. QUALIFIED PATIENT IIQPT: YES
LYMPHOCYTES # BLD AUTO: 2.34 K/UL (ref 1–4.8)
LYMPHOCYTES NFR BLD: 18.9 % (ref 22–41)
MCH RBC QN AUTO: 30 PG (ref 27–33)
MCHC RBC AUTO-ENTMCNC: 30.8 G/DL (ref 33.7–35.3)
MCV RBC AUTO: 97.2 FL (ref 81.4–97.8)
MONOCYTES # BLD AUTO: 1.08 K/UL (ref 0–0.85)
MONOCYTES NFR BLD AUTO: 8.7 % (ref 0–13.4)
NEUTROPHILS # BLD AUTO: 8.8 K/UL (ref 1.82–7.42)
NEUTROPHILS NFR BLD: 70.9 % (ref 44–72)
NRBC # BLD AUTO: 0 K/UL
NRBC BLD-RTO: 0 /100 WBC
O2/TOTAL GAS SETTING VFR VENT: 60 %
PCO2 BLDA: 32.8 MMHG (ref 26–37)
PCO2 TEMP ADJ BLDA: 34.1 MMHG (ref 26–37)
PH BLDA: 7.45 [PH] (ref 7.4–7.5)
PH TEMP ADJ BLDA: 7.44 [PH] (ref 7.4–7.5)
PLATELET # BLD AUTO: 212 K/UL (ref 164–446)
PMV BLD AUTO: 10.4 FL (ref 9–12.9)
PO2 BLDA: 136 MMHG (ref 64–87)
PO2 TEMP ADJ BLDA: 142 MMHG (ref 64–87)
POTASSIUM SERPL-SCNC: 3.3 MMOL/L (ref 3.6–5.5)
POTASSIUM SERPL-SCNC: 3.5 MMOL/L (ref 3.6–5.5)
POTASSIUM SERPL-SCNC: 3.6 MMOL/L (ref 3.6–5.5)
POTASSIUM SERPL-SCNC: 3.9 MMOL/L (ref 3.6–5.5)
PROT SERPL-MCNC: 4.9 G/DL (ref 6–8.2)
PROTHROMBIN TIME: 15.2 SEC (ref 12–14.6)
RBC # BLD AUTO: 2.87 M/UL (ref 4.7–6.1)
SAO2 % BLDA: 99 % (ref 93–99)
SODIUM SERPL-SCNC: 144 MMOL/L (ref 135–145)
SODIUM SERPL-SCNC: 145 MMOL/L (ref 135–145)
SODIUM SERPL-SCNC: 148 MMOL/L (ref 135–145)
SODIUM SERPL-SCNC: 149 MMOL/L (ref 135–145)
SPECIMEN DRAWN FROM PATIENT: ABNORMAL
WBC # BLD AUTO: 12.4 K/UL (ref 4.8–10.8)

## 2019-01-29 PROCEDURE — 700111 HCHG RX REV CODE 636 W/ 250 OVERRIDE (IP): Performed by: SURGERY

## 2019-01-29 PROCEDURE — 700105 HCHG RX REV CODE 258: Performed by: NEUROLOGICAL SURGERY

## 2019-01-29 PROCEDURE — 700102 HCHG RX REV CODE 250 W/ 637 OVERRIDE(OP): Performed by: SURGERY

## 2019-01-29 PROCEDURE — 85610 PROTHROMBIN TIME: CPT

## 2019-01-29 PROCEDURE — 700112 HCHG RX REV CODE 229: Performed by: NEUROLOGICAL SURGERY

## 2019-01-29 PROCEDURE — A9270 NON-COVERED ITEM OR SERVICE: HCPCS | Performed by: NEUROLOGICAL SURGERY

## 2019-01-29 PROCEDURE — 700101 HCHG RX REV CODE 250: Performed by: NEUROLOGICAL SURGERY

## 2019-01-29 PROCEDURE — 37799 UNLISTED PX VASCULAR SURGERY: CPT

## 2019-01-29 PROCEDURE — 700105 HCHG RX REV CODE 258: Performed by: SURGERY

## 2019-01-29 PROCEDURE — 85025 COMPLETE CBC W/AUTO DIFF WBC: CPT

## 2019-01-29 PROCEDURE — 700111 HCHG RX REV CODE 636 W/ 250 OVERRIDE (IP): Performed by: NEUROLOGICAL SURGERY

## 2019-01-29 PROCEDURE — 94003 VENT MGMT INPAT SUBQ DAY: CPT

## 2019-01-29 PROCEDURE — 700102 HCHG RX REV CODE 250 W/ 637 OVERRIDE(OP): Performed by: NEUROLOGICAL SURGERY

## 2019-01-29 PROCEDURE — 700111 HCHG RX REV CODE 636 W/ 250 OVERRIDE (IP): Performed by: PHYSICIAN ASSISTANT

## 2019-01-29 PROCEDURE — 82803 BLOOD GASES ANY COMBINATION: CPT

## 2019-01-29 PROCEDURE — A9270 NON-COVERED ITEM OR SERVICE: HCPCS | Performed by: SURGERY

## 2019-01-29 PROCEDURE — 80053 COMPREHEN METABOLIC PANEL: CPT

## 2019-01-29 PROCEDURE — 93005 ELECTROCARDIOGRAM TRACING: CPT | Performed by: SURGERY

## 2019-01-29 PROCEDURE — 770022 HCHG ROOM/CARE - ICU (200)

## 2019-01-29 PROCEDURE — 71045 X-RAY EXAM CHEST 1 VIEW: CPT

## 2019-01-29 PROCEDURE — 99291 CRITICAL CARE FIRST HOUR: CPT | Performed by: SURGERY

## 2019-01-29 PROCEDURE — 80048 BASIC METABOLIC PNL TOTAL CA: CPT | Mod: 91

## 2019-01-29 RX ORDER — POTASSIUM CHLORIDE 29.8 MG/ML
40 INJECTION INTRAVENOUS ONCE
Status: COMPLETED | OUTPATIENT
Start: 2019-01-29 | End: 2019-01-29

## 2019-01-29 RX ORDER — SODIUM CHLORIDE 9 MG/ML
500 INJECTION, SOLUTION INTRAVENOUS ONCE
Status: COMPLETED | OUTPATIENT
Start: 2019-01-29 | End: 2019-01-29

## 2019-01-29 RX ADMIN — SODIUM CHLORIDE 500 MG: 9 INJECTION, SOLUTION INTRAVENOUS at 17:37

## 2019-01-29 RX ADMIN — PROPOFOL 20 MCG/KG/MIN: 10 INJECTION, EMULSION INTRAVENOUS at 13:15

## 2019-01-29 RX ADMIN — SODIUM CHLORIDE 500 MG: 9 INJECTION, SOLUTION INTRAVENOUS at 05:52

## 2019-01-29 RX ADMIN — Medication 1 TABLET: at 23:44

## 2019-01-29 RX ADMIN — SODIUM CHLORIDE 500 ML: 234 INJECTION, SOLUTION INTRAVENOUS at 06:57

## 2019-01-29 RX ADMIN — ACETAMINOPHEN 1000 MG: 500 TABLET ORAL at 01:45

## 2019-01-29 RX ADMIN — PROPOFOL 30 MCG/KG/MIN: 10 INJECTION, EMULSION INTRAVENOUS at 05:00

## 2019-01-29 RX ADMIN — SODIUM CHLORIDE 500 ML: 234 INJECTION, SOLUTION INTRAVENOUS at 15:53

## 2019-01-29 RX ADMIN — SODIUM CHLORIDE 500 ML: 9 INJECTION, SOLUTION INTRAVENOUS at 23:45

## 2019-01-29 RX ADMIN — ACETAMINOPHEN 1000 MG: 500 TABLET ORAL at 11:05

## 2019-01-29 RX ADMIN — PROPOFOL 20 MCG/KG/MIN: 10 INJECTION, EMULSION INTRAVENOUS at 02:15

## 2019-01-29 RX ADMIN — ENOXAPARIN SODIUM 40 MG: 100 INJECTION SUBCUTANEOUS at 05:51

## 2019-01-29 RX ADMIN — MORPHINE SULFATE 4 MG: 4 INJECTION INTRAVENOUS at 21:17

## 2019-01-29 RX ADMIN — PROPOFOL 80 MCG/KG/MIN: 10 INJECTION, EMULSION INTRAVENOUS at 21:58

## 2019-01-29 RX ADMIN — DOCUSATE SODIUM 100 MG: 50 LIQUID ORAL at 17:42

## 2019-01-29 RX ADMIN — SODIUM CHLORIDE 5 MG/HR: 9 INJECTION, SOLUTION INTRAVENOUS at 21:52

## 2019-01-29 RX ADMIN — POTASSIUM CHLORIDE 40 MEQ: 29.8 INJECTION, SOLUTION INTRAVENOUS at 11:05

## 2019-01-29 RX ADMIN — CEFAZOLIN SODIUM 2 G: 2 INJECTION, SOLUTION INTRAVENOUS at 14:12

## 2019-01-29 RX ADMIN — FAMOTIDINE 20 MG: 20 TABLET ORAL at 17:38

## 2019-01-29 RX ADMIN — CEFAZOLIN SODIUM 2 G: 2 INJECTION, SOLUTION INTRAVENOUS at 05:59

## 2019-01-29 RX ADMIN — FAMOTIDINE 20 MG: 20 TABLET ORAL at 05:51

## 2019-01-29 RX ADMIN — Medication 2 MG/HR: at 09:27

## 2019-01-29 RX ADMIN — ACETAMINOPHEN 1000 MG: 500 TABLET ORAL at 17:38

## 2019-01-29 RX ADMIN — CEFAZOLIN SODIUM 2 G: 2 INJECTION, SOLUTION INTRAVENOUS at 21:39

## 2019-01-29 RX ADMIN — ACETAMINOPHEN 1000 MG: 500 TABLET ORAL at 05:51

## 2019-01-29 RX ADMIN — HYDRALAZINE HYDROCHLORIDE 10 MG: 20 INJECTION INTRAMUSCULAR; INTRAVENOUS at 21:06

## 2019-01-29 RX ADMIN — LORAZEPAM 1 MG: 2 INJECTION INTRAMUSCULAR; INTRAVENOUS at 22:03

## 2019-01-29 RX ADMIN — DOCUSATE SODIUM 100 MG: 50 LIQUID ORAL at 05:51

## 2019-01-29 NOTE — CONSULTS
[facility] [place]-Continued evaluation    Referral # [id number]        Thank you for the referral of this patient. A chart review has been completed to determine suitability for organ donation.      Donation is an option, DO NOT MENTION ORGAN DONATION    - Upon meeting criteria for brain death, this patient will be a potential candidate for organ donation, pending further evaluation.   - This patient may meet the criteria for DCD (donation after circulatory death). Further evaluation will be needed should the family decide to withdraw support.   - This patient has been located on an organ donor registry and is a first person authorized donor in the event of their death. A copy of their document of gift has been placed in the hospital chart, it is a legally binding document.      Donor [facility] will continue to follow this case. Guidelines for the management of a potential organ donor have been provided for use at the physician's discretion. Please call us immediately with any problems, questions, or significant changes in the patient's status. Please call us immediately with any plans for brain death evaluation, family meetings or plans to withdraw care.      Organ donation should not be mentioned without prior collaboration with Donor [facility] so that the conversation can be a planned, one-time coordinated event with the health care team.      Call [phone] with any immediate needs.      Thank you for your continued support of organ and tissue donation.    Ana Rosa Mcmillan, MSN, RN  Clinical

## 2019-01-29 NOTE — CARE PLAN
Problem: Nutritional:  Goal: Nutrition support tolerated and meeting greater than 85% of estimated needs  Outcome: MET Date Met: 01/29/19

## 2019-01-29 NOTE — PROGRESS NOTES
Neurosurgery Progress Note    Subjective:  Sedated  EEG was ok    Exam:  Flexes on left  Nothing on R  DONALD 3mm  Decompression full  Incision CDI  Na 146    Pulse  Av.8  Min: 76  Max: 119  Resp  Av  Min: 0  Max: 65  Monitored Temp 2  Av °C (102.2 °F)  Min: 38.2 °C (100.8 °F)  Max: 39.6 °C (103.3 °F)  SpO2  Av %  Min: 90 %  Max: 100 %    ICP  Av.1 MM HG  Min: 6 MM HG  Max: 13 MM HG  CPP   Av.9  Min: 56  Max: 85    Recent Labs      19   1333  19   0540  19   0535   WBC  21.7*  19.0*  14.9*   RBC  4.72  3.67*  3.26*   HEMOGLOBIN  14.5  11.2*  10.0*   HEMATOCRIT  43.9  33.8*  31.6*   MCV  93.0  92.1  96.9   MCH  30.7  30.5  30.7   MCHC  33.0*  33.1*  31.6*   RDW  42.7  42.7  47.1   PLATELETCT  311  287  227   MPV  9.7  9.8  10.0     Recent Labs      19   0535  19   1210  19   2329   SODIUM  146*  143  144   POTASSIUM  3.4*  3.5*  3.5*   CHLORIDE  116*  113*  114*   CO2  24  22  23   GLUCOSE  118*  113*  130*   BUN  15  12  11   CREATININE  0.71  0.70  0.59   CALCIUM  7.5*  8.1*  7.9*     Recent Labs      19   1333  19   0540   APTT  24.3*   --    INR  1.08  1.20*     Recent Labs      19   1333  19   1345   REACTMIN  4.6*  5.5   CLOTKINET  1.6  1.5   CLOTANGL  68.0  68.1   MAXCLOTS  70.0  68.0   FED05DFB  0.0  0.5   PRCINADP  34.2  0.0   PRCINAA  26.1  0.0       Intake/Output       19 0700 - 19 0659 19 0700 - 19 659 Total  Total       Intake    P.O.  --  0 0  --  -- --    P.O. -- 0 0 -- -- --    I.V.  2068.5  1319.2 3387.7  --  -- --    Cardene Volume 55 -- 55 -- -- --    Propofol Volume 304.3 111.2 415.5 -- -- --    IV Volume (Morphine) 12 8 20 -- -- --    Volume (mL) (NS infusion) 5535 421 5016 -- -- --    Volume (mL) (3% sodium chloride (HYPERTONIC SALINE) 500mL infusion 500 mL) 497.2 400 897.2 -- -- --    Other  150  -- 150  --  -- --    Medications (PO/Enteral  Liquids) 150 -- 150 -- -- --    NG/GT  670  600 1270  --  -- --    Intake (mL) (Enteral Tube 01/27/19 Cortrak - Gastric Left nare)  -- -- --    IV Piggyback  526.3  -- 526.3  --  -- --    Volume (mL) (levETIRAcetam (KEPPRA) 500 mg in  mL IVPB) 100 -- 100 -- -- --    Volume (mL) (ceFAZolin in dextrose (ANCEF) IVPB premix 2 g) 100 -- 100 -- -- --    Volume (mL) (potassium phosphates 30 mmol in  mL ivpb) 326.3 -- 326.3 -- -- --    Total Intake 3414.8 1919.2 5334 -- -- --       Output    Urine  1570  1325 2895  --  -- --    Output (mL) (Urethral Catheter 16 Fr.) 1570 1325 2895 -- -- --    Total Output 1570 1325 2895 -- -- --       Net I/O     1844.8 594.2 2439 -- -- --            Intake/Output Summary (Last 24 hours) at 01/29/19 0646  Last data filed at 01/29/19 0400   Gross per 24 hour   Intake          5333.96 ml   Output             2895 ml   Net          2438.96 ml            • enoxaparin (LOVENOX) injection  40 mg DAILY   • morphine   Continuous   • morphine injection  2 mg Q HOUR PRN    Or   • morphine injection  4 mg Q HOUR PRN   • ceFAZolin  2 g Q8HRS   • Pharmacy  1 Each PHARMACY TO DOSE   • famotidine  20 mg BID    Or   • famotidine  20 mg BID   • senna-docusate  1 Tab Nightly   • cloNIDine  0.1 mg Q4HRS PRN   • magnesium hydroxide  30 mL QDAY PRN   • oxyCODONE immediate-release  2.5 mg Q3HRS PRN   • oxyCODONE immediate-release  5 mg Q3HRS PRN   • polyethylene glycol/lytes  1 Packet BID PRN   • senna-docusate  1 Tab Q24HRS PRN   • Respiratory Care per Protocol   Continuous RT   • NS   Continuous   • insulin regular  1-6 Units Q6HRS    And   • glucose 4 g  16 g Q15 MIN PRN    And   • dextrose 50%  25 mL Q15 MIN PRN   • propofol  0-80 mcg/kg/min Continuous   • Pharmacy Consult Request  1 Each PHARMACY TO DOSE   • MD ALERT...DO NOT ADMINISTER NSAIDS or ASPIRIN unless ORDERED By Neurosurgery  1 Each PRN   • ondansetron  4 mg Q4HRS PRN   • dexamethasone  4 mg Once PRN   • scopolamine  1  Patch Q72HRS PRN   • hydrALAZINE  10 mg Q HOUR PRN   • niCARdipine infusion  0-15 mg/hr Continuous   • levETIRAcetam (KEPPRA) IV  500 mg Q12HRS   • bisacodyl  10 mg Q24HRS PRN   • fleet  1 Each Once PRN   • ceFAZolin  1 g Intra-Op Once PRN   • 3% sodium chloride  500 mL Continuous   • sodium chloride 23.4% ivpb  200 mEq Q6HRS PRN   • acetaminophen  1,000 mg Q6HRS   • docusate sodium 100mg/10mL  100 mg BID   • LORazepam  1 mg Q2HRS PRN       Assessment and Plan:  Hospital day # 4 POD# 3  On lovenox    Plan:  Allow to wake as tolerated  CT WED

## 2019-01-29 NOTE — CARE PLAN
Problem: Safety  Goal: Will remain free from injury  Safety precautions in place    Problem: Skin Integrity  Goal: Risk for impaired skin integrity will decrease    Intervention: Assess risk factors for impaired skin integrity and/or pressure ulcers  Repositioned every 2 hours as tolerated and as ICP tolerates

## 2019-01-29 NOTE — CARE PLAN
Problem: Pain Management  Goal: Pain level will decrease to patient's comfort goal  Pt medicated per MAR and active MD order, documentation in flow sheets.     Problem: Skin Integrity  Goal: Risk for impaired skin integrity will decrease  Q2 hour turns pillows in use for support and positioning.

## 2019-01-29 NOTE — DISCHARGE PLANNING
Care Transition Team Assessment    In the case of an emergency, pt's legal NOK is Brenda Abarca (Mother) [phone] or Eliazar Abarca (Father) [phone]    LSW met with pt's parents at bedside and obtained the information used in this assessment. Pt verified accuracy of facesheet. Pt lives in an apt in [place]. Prior to current hospitalization, pt was completely independent in ADLS/IADLS. Pt drives and is able to attend necessary MD appointments. Pt is employed by [employer] and  has no financial concerns. Pt has a good support system. Pt denies any hx of substance use and denies any dx of mh.     Pt's family was unable to locate pt's wallet and phone. LSW looked in pt's chart drawer and located the items. LSW provided wallet and phone to pt's parents. Pt's parents are here from [place] and are staying at [facility].         Information Source  Orientation : Unable to Assess  Information Given By: Parent  Informant's Name:  (Hervebarbara and Eliazar)  Who is responsible for making decisions for patient? : Legal next of kin  Name(s) of Primary Decision Maker:  (Eliazar and Brenda)    Readmission Evaluation  Is this a readmission?: No    Elopement Risk  Legal Hold: No  Ambulatory or Self Mobile in Wheelchair: No-Not an Elopement Risk  Elopement Risk: Not at Risk for Elopement         Discharge Preparedness  What is your plan after discharge?: Uncertain - pending medical team collaboration  What are your discharge supports?: Parent, Sibling  Prior Functional Level: Ambulatory, Drives Self, Independent with Activities of Daily Living, Independent with Medication Management  Difficulity with ADLs: Bathing, Brushing teeth, Dressing, Eating, Toileting, Walking  Difficulity with IADLs: Cooking, Driving, Keeping track of finances, Laundry, Managing medication, Shopping, Using the telephone or computer    Functional Assesment  Prior Functional Level: Ambulatory, Drives Self, Independent with Activities of Daily Living,  Independent with Medication Management    Finances  Financial Barriers to Discharge: No  Prescription Coverage: Yes              Advance Directive  Advance Directive?: Clinically incapacitated / Inappropriate         Psychological Assessment  History of Substance Abuse: None  History of Psychiatric Problems: No  Non-compliant with Treatment: No  Newly Diagnosed Illness: Yes    Discharge Risks or Barriers  Discharge risks or barriers?: Post-acute placement / services, Complex medical needs  Patient risk factors: Complex medical needs    Anticipated Discharge Information  Anticipated discharge disposition: Acute rehab, Discharge needs currently unknown

## 2019-01-29 NOTE — CARE PLAN
Problem: Pain Management  Goal: Pain level will decrease to patient's comfort goal    Intervention: Follow pain managment plan developed in collaboration with patient and Interdisciplinary Team  Pain assessed every two hours and PRN.  Alternative interventions implemented if needed to reduce pain level.  PRN medications also an intervention if needed to reduce pain.  Will continue to assess and monitor.

## 2019-01-29 NOTE — PROGRESS NOTES
2 RN skin assessment completed  - L crani site stapled open to air scant drainage noted.   - Skin assessed under C collar with no areas of concern mepilex in use for protection.-   - Posterior head assessed with no concerns waffle pillow in use  - Bilateral knees red and blanching   - Mepilex on saccrum

## 2019-01-29 NOTE — CARE PLAN
Problem: Skin Integrity  Goal: Risk for impaired skin integrity will decrease    Intervention: Assess risk factors for impaired skin integrity and/or pressure ulcers  Skin assessed at beginning of shift and every shift. Nutrition evaluated. Moisturizer assessed . Sensory assessed. Activity assessed. Mobility addressed with  family. And friction and shear addressed. Richard score completed and documented.  Will continue to assess and monitor

## 2019-01-29 NOTE — PROGRESS NOTES
Trauma / Surgical Daily Progress Note    Date of Service  1/29/2019    Chief Complaint  28 y.o. male admitted 1/26/2019 with Trauma    Interval Events  GCS 6-7T.  Intermittent right leg moving now  2mm pupils, sluggish  ICP 7  Titrate oxygen  TF goal  Ancef for bolt.    Stop finger sticks  dvt screen negative on 1/27  Supplement K      Review of Systems  Review of Systems   Unable to perform ROS: Intubated        Vital Signs for last 24 hours  Pulse:  [] 79  Resp:  [0-65] 0  SpO2:  [90 %-100 %] 100 %    Hemodynamic parameters for last 24 hours       Respiratory Data     Respiration: (!) 0, Pulse Oximetry: 100 %     Work Of Breathing / Effort: Vented  RUL Breath Sounds: Clear, RML Breath Sounds: Clear, RLL Breath Sounds: Diminished, STACI Breath Sounds: Clear, LLL Breath Sounds: Diminished    Physical Exam  Physical Exam   HENT:   craniotomy   Eyes: Pupils are equal, round, and reactive to light.   Cardiovascular: Regular rhythm.    Pulmonary/Chest: He has no wheezes. He has no rales.   Abdominal: Soft. He exhibits no distension.   Neurological: GCS eye subscore is 2. GCS verbal subscore is 1. GCS motor subscore is 4.   Skin: Skin is warm and dry. He is not diaphoretic.       Laboratory  Recent Results (from the past 24 hour(s))   BASIC METABOLIC PANEL    Collection Time: 01/28/19 12:10 PM   Result Value Ref Range    Sodium 143 135 - 145 mmol/L    Potassium 3.5 (L) 3.6 - 5.5 mmol/L    Chloride 113 (H) 96 - 112 mmol/L    Co2 22 20 - 33 mmol/L    Glucose 113 (H) 65 - 99 mg/dL    Bun 12 8 - 22 mg/dL    Creatinine 0.70 0.50 - 1.40 mg/dL    Calcium 8.1 (L) 8.5 - 10.5 mg/dL    Anion Gap 8.0 0.0 - 11.9   ESTIMATED GFR    Collection Time: 01/28/19 12:10 PM   Result Value Ref Range    GFR If African American >60 >60 mL/min/1.73 m 2    GFR If Non African American >60 >60 mL/min/1.73 m 2   ACCU-CHEK GLUCOSE    Collection Time: 01/28/19  2:22 PM   Result Value Ref Range    Glucose - Accu-Ck 100 (H) 65 - 99 mg/dL   ACCU-CHEK  GLUCOSE    Collection Time: 01/28/19  5:54 PM   Result Value Ref Range    Glucose - Accu-Ck 121 (H) 65 - 99 mg/dL   Basic Metabolic Panel    Collection Time: 01/28/19 11:29 PM   Result Value Ref Range    Sodium 144 135 - 145 mmol/L    Potassium 3.5 (L) 3.6 - 5.5 mmol/L    Chloride 114 (H) 96 - 112 mmol/L    Co2 23 20 - 33 mmol/L    Glucose 130 (H) 65 - 99 mg/dL    Bun 11 8 - 22 mg/dL    Creatinine 0.59 0.50 - 1.40 mg/dL    Calcium 7.9 (L) 8.5 - 10.5 mg/dL    Anion Gap 7.0 0.0 - 11.9   LACTIC ACID    Collection Time: 01/28/19 11:29 PM   Result Value Ref Range    Lactic Acid 0.9 0.5 - 2.0 mmol/L   ESTIMATED GFR    Collection Time: 01/28/19 11:29 PM   Result Value Ref Range    GFR If African American >60 >60 mL/min/1.73 m 2    GFR If Non African American >60 >60 mL/min/1.73 m 2   ISTAT ARTERIAL BLOOD GAS    Collection Time: 01/29/19  5:10 AM   Result Value Ref Range    Ph 7.449 7.400 - 7.500    Pco2 32.8 26.0 - 37.0 mmHg    Po2 136 (H) 64 - 87 mmHg    Tco2 24 20 - 33 mmol/L    S02 99 93 - 99 %    Hco3 22.8 17.0 - 25.0 mmol/L    BE -1 -4 - 3 mmol/L    Body Temp 100.2 F degrees    O2 Therapy 60 %    iPF Ratio 227     Ph Temp Sri 7.436 7.400 - 7.500    Pco2 Temp Co 34.1 26.0 - 37.0 mmHg    Po2 Temp Cor 142 (H) 64 - 87 mmHg    Specimen Arterial     Action Range Triggered NO     Inst. Qualified Patient YES    Prothrombin Time (INR)    Collection Time: 01/29/19  6:06 AM   Result Value Ref Range    PT 15.2 (H) 12.0 - 14.6 sec    INR 1.19 (H) 0.87 - 1.13   Comp Metabolic Panel (CMP): Tomorrow AM    Collection Time: 01/29/19  6:06 AM   Result Value Ref Range    Sodium 145 135 - 145 mmol/L    Potassium 3.6 3.6 - 5.5 mmol/L    Chloride 115 (H) 96 - 112 mmol/L    Co2 24 20 - 33 mmol/L    Anion Gap 6.0 0.0 - 11.9    Glucose 116 (H) 65 - 99 mg/dL    Bun 13 8 - 22 mg/dL    Creatinine 0.55 0.50 - 1.40 mg/dL    Calcium 7.9 (L) 8.5 - 10.5 mg/dL    AST(SGOT) 26 12 - 45 U/L    ALT(SGPT) 14 2 - 50 U/L    Alkaline Phosphatase 39 30 - 99  U/L    Total Bilirubin 0.6 0.1 - 1.5 mg/dL    Albumin 2.9 (L) 3.2 - 4.9 g/dL    Total Protein 4.9 (L) 6.0 - 8.2 g/dL    Globulin 2.0 1.9 - 3.5 g/dL    A-G Ratio 1.5 g/dL   CBC with Differential: Tomorrow AM    Collection Time: 01/29/19  6:06 AM   Result Value Ref Range    WBC 12.4 (H) 4.8 - 10.8 K/uL    RBC 2.87 (L) 4.70 - 6.10 M/uL    Hemoglobin 8.6 (L) 14.0 - 18.0 g/dL    Hematocrit 27.9 (L) 42.0 - 52.0 %    MCV 97.2 81.4 - 97.8 fL    MCH 30.0 27.0 - 33.0 pg    MCHC 30.8 (L) 33.7 - 35.3 g/dL    RDW 47.5 35.9 - 50.0 fL    Platelet Count 212 164 - 446 K/uL    MPV 10.4 9.0 - 12.9 fL    Neutrophils-Polys 70.90 44.00 - 72.00 %    Lymphocytes 18.90 (L) 22.00 - 41.00 %    Monocytes 8.70 0.00 - 13.40 %    Eosinophils 0.50 0.00 - 6.90 %    Basophils 0.50 0.00 - 1.80 %    Immature Granulocytes 0.50 0.00 - 0.90 %    Nucleated RBC 0.00 /100 WBC    Neutrophils (Absolute) 8.80 (H) 1.82 - 7.42 K/uL    Lymphs (Absolute) 2.34 1.00 - 4.80 K/uL    Monos (Absolute) 1.08 (H) 0.00 - 0.85 K/uL    Eos (Absolute) 0.06 0.00 - 0.51 K/uL    Baso (Absolute) 0.06 0.00 - 0.12 K/uL    Immature Granulocytes (abs) 0.06 0.00 - 0.11 K/uL    NRBC (Absolute) 0.00 K/uL   ESTIMATED GFR    Collection Time: 01/29/19  6:06 AM   Result Value Ref Range    GFR If African American >60 >60 mL/min/1.73 m 2    GFR If Non African American >60 >60 mL/min/1.73 m 2       Fluids    Intake/Output Summary (Last 24 hours) at 01/29/19 1037  Last data filed at 01/29/19 0800   Gross per 24 hour   Intake          5145.52 ml   Output             3050 ml   Net          2095.52 ml       Core Measures & Quality Metrics  Labs reviewed, Medications reviewed and Radiology images reviewed  Greer catheter: Critically Ill - Requiring Accurate Measurement of Urinary Output      DVT Prophylaxis: Enoxaparin (Lovenox)  DVT prophylaxis - mechanical: SCDs  Ulcer prophylaxis: Yes        JOHN Score  ETOH Screening    Assessment/Plan  Acute respiratory failure following trauma and surgery  (HCC)- (present on admission)   Assessment & Plan    Intubated in field  1/27 aggressive management of a head injury/ICP precludes weaning protocol  Continue full mechanical ventilatory support.   Ventilator bundle     Subdural hematoma (HCC)- (present on admission)   Assessment & Plan    Left sided holohemispheric subdural hematoma measuring approximately 9.4 mm in maximum thickness. There is approximately 10 mm of left-to-right midline shift. There is probably mild left sided hemispheric edema.  1/26 To OR for emergent craniotomy and evacuation of hematoma.   1/28 CT x2 stable  Trending ICP  Continue Keppra  HTS protocol  Head of bed elevation  Optimize p.o. 2/PCO2  Aggressive sedation/analgesia.  1/30 Plan for repeat head CT per neurosurgery  Jazlyn Huntley MD. Neurosurgery.     Aspiration into airway- (present on admission)   Assessment & Plan    Admission imaging with bilateral, left greater than right medial dependent consolidation could be due to aspiration pneumonitis or atelectasis.  Aggressive pulmonary hygiene and serial chest radiography.     Contraindication to deep vein thrombosis (DVT) prophylaxis- (present on admission)   Assessment & Plan    Systemic anticoagulation contraindicated secondary to elevated bleeding risk.  1/27 no evidence of DVT on surveillance ultrasound     Trauma- (present on admission)   Assessment & Plan    [place] Witnessed seizure like activity. GCS 3. No evidence of acute trauma.  Trauma Red Activation.  Mor Roa MD, Trauma/General Surgery.         CRITICAL CARE TIME EXCLUDING PROCEDURES: 40   Minutes managing hypertonic saline drip, propofol drip, mechanical ventilation, continuous tube feeds.

## 2019-01-29 NOTE — THERAPY
SPEECH PATHOLOGY CONTACT NOTE:  Orders received for cognitive evaluation.  Spoke with RN, and patient remains intubated at this time.  Will cancel orders.  Please reconsult SLP once patient is appropriate for evaluation.

## 2019-01-29 NOTE — PROGRESS NOTES
"Trauma Progress Note     Briefly, this is a 28 y.o. male with a subdural hematoma    /70   Pulse (!) 119   Temp 36.7 °C (98.1 °F) (Greer)   Resp (!) 11   Ht 1.702 m (5' 7.01\") Comment: copied from last entry  Wt 79.8 kg (175 lb 14.8 oz)   SpO2 96%   BMI 27.55 kg/m²       Intake/Output Summary (Last 24 hours) at 01/28/19 2250  Last data filed at 01/28/19 1826   Gross per 24 hour   Intake          5442.85 ml   Output             2140 ml   Net          3302.85 ml       Lab Results   Component Value Date/Time    WBC 14.9 (H) 01/28/2019 05:35 AM    RBC 3.26 (L) 01/28/2019 05:35 AM    HEMOGLOBIN 10.0 (L) 01/28/2019 05:35 AM    HEMATOCRIT 31.6 (L) 01/28/2019 05:35 AM    MCV 96.9 01/28/2019 05:35 AM    MCH 30.7 01/28/2019 05:35 AM    MCHC 31.6 (L) 01/28/2019 05:35 AM    MPV 10.0 01/28/2019 05:35 AM    NEUTSPOLYS 74.00 (H) 01/28/2019 05:35 AM    LYMPHOCYTES 16.30 (L) 01/28/2019 05:35 AM    MONOCYTES 8.60 01/28/2019 05:35 AM    EOSINOPHILS 0.30 01/28/2019 05:35 AM    BASOPHILS 0.30 01/28/2019 05:35 AM        Lab Results   Component Value Date/Time    SODIUM 143 01/28/2019 12:10 PM    POTASSIUM 3.5 (L) 01/28/2019 12:10 PM    CHLORIDE 113 (H) 01/28/2019 12:10 PM    CO2 22 01/28/2019 12:10 PM    GLUCOSE 113 (H) 01/28/2019 12:10 PM    BUN 12 01/28/2019 12:10 PM    CREATININE 0.70 01/28/2019 12:10 PM      Assessment:  Pupils 1mm, ICP 6-10   Neuro exam unchanged, right remains flaccid  BP maintained within parameters  On 3% at 50cc/hr  Full ventilator support, sedated on propofol  Neurosurgical recommendations reviewed    Plan:  Repeat CT Wednesday per neurosurgery  Continue neuro checks, 3%, and ICP management  No change to current plan of care  "

## 2019-01-30 ENCOUNTER — APPOINTMENT (OUTPATIENT)
Dept: RADIOLOGY | Facility: MEDICAL CENTER | Age: 29
DRG: 003 | End: 2019-01-30
Attending: PHYSICIAN ASSISTANT
Payer: COMMERCIAL

## 2019-01-30 ENCOUNTER — HOSPITAL ENCOUNTER (INPATIENT)
Facility: REHABILITATION | Age: 29
End: 2019-01-30
Attending: PHYSICAL MEDICINE & REHABILITATION | Admitting: PHYSICAL MEDICINE & REHABILITATION
Payer: COMMERCIAL

## 2019-01-30 ENCOUNTER — APPOINTMENT (OUTPATIENT)
Dept: RADIOLOGY | Facility: MEDICAL CENTER | Age: 29
DRG: 003 | End: 2019-01-30
Attending: NURSE PRACTITIONER
Payer: COMMERCIAL

## 2019-01-30 LAB
ACTION RANGE TRIGGERED IACRT: NO
ACTION RANGE TRIGGERED IACRT: NO
ANION GAP SERPL CALC-SCNC: 10 MMOL/L (ref 0–11.9)
ANION GAP SERPL CALC-SCNC: 7 MMOL/L (ref 0–11.9)
ANION GAP SERPL CALC-SCNC: 8 MMOL/L (ref 0–11.9)
ANION GAP SERPL CALC-SCNC: 9 MMOL/L (ref 0–11.9)
BASE EXCESS BLDA CALC-SCNC: -5 MMOL/L (ref -4–3)
BASE EXCESS BLDA CALC-SCNC: -5 MMOL/L (ref -4–3)
BODY TEMPERATURE: ABNORMAL DEGREES
BODY TEMPERATURE: ABNORMAL DEGREES
BUN SERPL-MCNC: 14 MG/DL (ref 8–22)
BUN SERPL-MCNC: 17 MG/DL (ref 8–22)
BUN SERPL-MCNC: 18 MG/DL (ref 8–22)
BUN SERPL-MCNC: 19 MG/DL (ref 8–22)
CALCIUM SERPL-MCNC: 7.3 MG/DL (ref 8.5–10.5)
CALCIUM SERPL-MCNC: 8.1 MG/DL (ref 8.5–10.5)
CALCIUM SERPL-MCNC: 8.2 MG/DL (ref 8.5–10.5)
CALCIUM SERPL-MCNC: 8.5 MG/DL (ref 8.5–10.5)
CHLORIDE SERPL-SCNC: 117 MMOL/L (ref 96–112)
CHLORIDE SERPL-SCNC: 121 MMOL/L (ref 96–112)
CHLORIDE SERPL-SCNC: 123 MMOL/L (ref 96–112)
CHLORIDE SERPL-SCNC: 123 MMOL/L (ref 96–112)
CO2 BLDA-SCNC: 19 MMOL/L (ref 20–33)
CO2 BLDA-SCNC: 20 MMOL/L (ref 20–33)
CO2 SERPL-SCNC: 20 MMOL/L (ref 20–33)
CO2 SERPL-SCNC: 21 MMOL/L (ref 20–33)
CO2 SERPL-SCNC: 22 MMOL/L (ref 20–33)
CO2 SERPL-SCNC: 23 MMOL/L (ref 20–33)
CREAT SERPL-MCNC: 0.69 MG/DL (ref 0.5–1.4)
CREAT SERPL-MCNC: 0.71 MG/DL (ref 0.5–1.4)
CREAT SERPL-MCNC: 0.74 MG/DL (ref 0.5–1.4)
CREAT SERPL-MCNC: 1.01 MG/DL (ref 0.5–1.4)
EKG IMPRESSION: NORMAL
ERYTHROCYTE [DISTWIDTH] IN BLOOD BY AUTOMATED COUNT: 48.2 FL (ref 35.9–50)
GLUCOSE SERPL-MCNC: 114 MG/DL (ref 65–99)
GLUCOSE SERPL-MCNC: 119 MG/DL (ref 65–99)
GLUCOSE SERPL-MCNC: 123 MG/DL (ref 65–99)
GLUCOSE SERPL-MCNC: 124 MG/DL (ref 65–99)
HCO3 BLDA-SCNC: 18.6 MMOL/L (ref 17–25)
HCO3 BLDA-SCNC: 19.3 MMOL/L (ref 17–25)
HCT VFR BLD AUTO: 26 % (ref 42–52)
HGB BLD-MCNC: 8.1 G/DL (ref 14–18)
HOROWITZ INDEX BLDA+IHG-RTO: 67 MM[HG]
HOROWITZ INDEX BLDA+IHG-RTO: 73 MM[HG]
INR PPP: 1.13 (ref 0.87–1.13)
INST. QUALIFIED PATIENT IIQPT: YES
INST. QUALIFIED PATIENT IIQPT: YES
MCH RBC QN AUTO: 30.5 PG (ref 27–33)
MCHC RBC AUTO-ENTMCNC: 31.2 G/DL (ref 33.7–35.3)
MCV RBC AUTO: 97.7 FL (ref 81.4–97.8)
O2/TOTAL GAS SETTING VFR VENT: 100 %
O2/TOTAL GAS SETTING VFR VENT: 90 %
PCO2 BLDA: 29 MMHG (ref 26–37)
PCO2 BLDA: 32.3 MMHG (ref 26–37)
PCO2 TEMP ADJ BLDA: 30 MMHG (ref 26–37)
PCO2 TEMP ADJ BLDA: 34.7 MMHG (ref 26–37)
PH BLDA: 7.38 [PH] (ref 7.4–7.5)
PH BLDA: 7.41 [PH] (ref 7.4–7.5)
PH TEMP ADJ BLDA: 7.36 [PH] (ref 7.4–7.5)
PH TEMP ADJ BLDA: 7.4 [PH] (ref 7.4–7.5)
PLATELET # BLD AUTO: 312 K/UL (ref 164–446)
PMV BLD AUTO: 9.9 FL (ref 9–12.9)
PO2 BLDA: 60 MMHG (ref 64–87)
PO2 BLDA: 73 MMHG (ref 64–87)
PO2 TEMP ADJ BLDA: 68 MMHG (ref 64–87)
PO2 TEMP ADJ BLDA: 77 MMHG (ref 64–87)
POTASSIUM SERPL-SCNC: 3.3 MMOL/L (ref 3.6–5.5)
POTASSIUM SERPL-SCNC: 3.6 MMOL/L (ref 3.6–5.5)
POTASSIUM SERPL-SCNC: 3.7 MMOL/L (ref 3.6–5.5)
POTASSIUM SERPL-SCNC: 4.2 MMOL/L (ref 3.6–5.5)
PROTHROMBIN TIME: 14.6 SEC (ref 12–14.6)
RBC # BLD AUTO: 2.66 M/UL (ref 4.7–6.1)
SAO2 % BLDA: 91 % (ref 93–99)
SAO2 % BLDA: 95 % (ref 93–99)
SODIUM SERPL-SCNC: 147 MMOL/L (ref 135–145)
SODIUM SERPL-SCNC: 150 MMOL/L (ref 135–145)
SODIUM SERPL-SCNC: 153 MMOL/L (ref 135–145)
SODIUM SERPL-SCNC: 154 MMOL/L (ref 135–145)
SP GR UR STRIP.AUTO: <=1.005
SPECIMEN DRAWN FROM PATIENT: ABNORMAL
SPECIMEN DRAWN FROM PATIENT: ABNORMAL
WBC # BLD AUTO: 16.5 K/UL (ref 4.8–10.8)

## 2019-01-30 PROCEDURE — A9270 NON-COVERED ITEM OR SERVICE: HCPCS | Performed by: NEUROLOGICAL SURGERY

## 2019-01-30 PROCEDURE — 81002 URINALYSIS NONAUTO W/O SCOPE: CPT

## 2019-01-30 PROCEDURE — 80048 BASIC METABOLIC PNL TOTAL CA: CPT

## 2019-01-30 PROCEDURE — 700105 HCHG RX REV CODE 258: Performed by: NEUROLOGICAL SURGERY

## 2019-01-30 PROCEDURE — 700102 HCHG RX REV CODE 250 W/ 637 OVERRIDE(OP): Performed by: NEUROLOGICAL SURGERY

## 2019-01-30 PROCEDURE — A9270 NON-COVERED ITEM OR SERVICE: HCPCS | Performed by: SURGERY

## 2019-01-30 PROCEDURE — 82803 BLOOD GASES ANY COMBINATION: CPT | Mod: 91

## 2019-01-30 PROCEDURE — 85027 COMPLETE CBC AUTOMATED: CPT

## 2019-01-30 PROCEDURE — 700111 HCHG RX REV CODE 636 W/ 250 OVERRIDE (IP): Performed by: PHYSICIAN ASSISTANT

## 2019-01-30 PROCEDURE — 700111 HCHG RX REV CODE 636 W/ 250 OVERRIDE (IP): Performed by: SURGERY

## 2019-01-30 PROCEDURE — 700102 HCHG RX REV CODE 250 W/ 637 OVERRIDE(OP): Performed by: SURGERY

## 2019-01-30 PROCEDURE — 93010 ELECTROCARDIOGRAM REPORT: CPT | Performed by: INTERNAL MEDICINE

## 2019-01-30 PROCEDURE — 85610 PROTHROMBIN TIME: CPT

## 2019-01-30 PROCEDURE — 700112 HCHG RX REV CODE 229: Performed by: NEUROLOGICAL SURGERY

## 2019-01-30 PROCEDURE — 99222 1ST HOSP IP/OBS MODERATE 55: CPT | Performed by: PHYSICAL MEDICINE & REHABILITATION

## 2019-01-30 PROCEDURE — 70450 CT HEAD/BRAIN W/O DYE: CPT

## 2019-01-30 PROCEDURE — 700111 HCHG RX REV CODE 636 W/ 250 OVERRIDE (IP): Performed by: NEUROLOGICAL SURGERY

## 2019-01-30 PROCEDURE — 99291 CRITICAL CARE FIRST HOUR: CPT | Performed by: SURGERY

## 2019-01-30 PROCEDURE — 94003 VENT MGMT INPAT SUBQ DAY: CPT

## 2019-01-30 PROCEDURE — 770022 HCHG ROOM/CARE - ICU (200)

## 2019-01-30 PROCEDURE — 37799 UNLISTED PX VASCULAR SURGERY: CPT

## 2019-01-30 PROCEDURE — 71045 X-RAY EXAM CHEST 1 VIEW: CPT

## 2019-01-30 RX ORDER — AMOXICILLIN 250 MG
2 CAPSULE ORAL
Status: DISCONTINUED | OUTPATIENT
Start: 2019-01-31 | End: 2019-02-21 | Stop reason: HOSPADM

## 2019-01-30 RX ORDER — LEVETIRACETAM 500 MG/1
500 TABLET ORAL 2 TIMES DAILY
Status: COMPLETED | OUTPATIENT
Start: 2019-01-30 | End: 2019-02-02

## 2019-01-30 RX ORDER — POLYETHYLENE GLYCOL 3350 17 G/17G
1 POWDER, FOR SOLUTION ORAL
Status: DISCONTINUED | OUTPATIENT
Start: 2019-01-30 | End: 2019-02-21 | Stop reason: HOSPADM

## 2019-01-30 RX ADMIN — ACETAMINOPHEN 1000 MG: 500 TABLET ORAL at 17:42

## 2019-01-30 RX ADMIN — DOCUSATE SODIUM 100 MG: 50 LIQUID ORAL at 17:42

## 2019-01-30 RX ADMIN — PROPOFOL 20 MCG/KG/MIN: 10 INJECTION, EMULSION INTRAVENOUS at 09:58

## 2019-01-30 RX ADMIN — CEFAZOLIN SODIUM 2 G: 2 INJECTION, SOLUTION INTRAVENOUS at 22:36

## 2019-01-30 RX ADMIN — SODIUM CHLORIDE 500 ML: 234 INJECTION, SOLUTION INTRAVENOUS at 09:58

## 2019-01-30 RX ADMIN — ACETAMINOPHEN 1000 MG: 500 TABLET ORAL at 05:23

## 2019-01-30 RX ADMIN — Medication 2 MG/HR: at 14:12

## 2019-01-30 RX ADMIN — DOCUSATE SODIUM 100 MG: 50 LIQUID ORAL at 05:22

## 2019-01-30 RX ADMIN — PROPOFOL 60 MCG/KG/MIN: 10 INJECTION, EMULSION INTRAVENOUS at 03:45

## 2019-01-30 RX ADMIN — FAMOTIDINE 20 MG: 20 TABLET ORAL at 17:42

## 2019-01-30 RX ADMIN — CEFAZOLIN SODIUM 2 G: 2 INJECTION, SOLUTION INTRAVENOUS at 05:22

## 2019-01-30 RX ADMIN — ENOXAPARIN SODIUM 40 MG: 100 INJECTION SUBCUTANEOUS at 05:23

## 2019-01-30 RX ADMIN — LEVETIRACETAM 500 MG: 500 TABLET, FILM COATED ORAL at 17:42

## 2019-01-30 RX ADMIN — POLYETHYLENE GLYCOL 3350 1 PACKET: 17 POWDER, FOR SOLUTION ORAL at 17:41

## 2019-01-30 RX ADMIN — CEFAZOLIN SODIUM 2 G: 2 INJECTION, SOLUTION INTRAVENOUS at 14:09

## 2019-01-30 RX ADMIN — SODIUM CHLORIDE 500 ML: 234 INJECTION, SOLUTION INTRAVENOUS at 19:26

## 2019-01-30 RX ADMIN — ACETAMINOPHEN 1000 MG: 500 TABLET ORAL at 12:14

## 2019-01-30 RX ADMIN — SODIUM CHLORIDE 500 MG: 9 INJECTION, SOLUTION INTRAVENOUS at 05:23

## 2019-01-30 RX ADMIN — SODIUM CHLORIDE: 234 INJECTION, SOLUTION INTRAVENOUS at 01:15

## 2019-01-30 RX ADMIN — PROPOFOL 80 MCG/KG/MIN: 10 INJECTION, EMULSION INTRAVENOUS at 01:00

## 2019-01-30 RX ADMIN — FAMOTIDINE 20 MG: 20 TABLET ORAL at 05:23

## 2019-01-30 NOTE — CARE PLAN
Problem: Safety  Goal: Will remain free from injury    Intervention: Provide assistance with mobility  Safety precautions in place      Problem: Skin Integrity  Goal: Risk for impaired skin integrity will decrease    Intervention: Assess risk factors for impaired skin integrity and/or pressure ulcers  Repositioned as tolerated, ICP's unstable

## 2019-01-30 NOTE — PROGRESS NOTES
Spoke to Dr Crawford to inform of pts urine output of 5ml over last 2 hours, pt sustaining HR in 130s and increase in patients Blood pressure.Orders received for 500 NS bolus. Order read back to and confirmed by .

## 2019-01-30 NOTE — DISCHARGE PLANNING
PMR referral fro [facility]    TBI vented unknown discharge plan at this time. RPG to consult per protocol.

## 2019-01-30 NOTE — CARE PLAN
Problem: Ventilation Defect:  Goal: Ability to achieve and maintain unassisted ventilation or tolerate decreased levels of ventilator support    Intervention: Support and monitor invasive and noninvasive mechanical ventilation  Adult Ventilation Update    Total Vent Days: 5    Patient Lines/Drains/Airways Status    Active Airway     Name: Placement date: Placement time: Site: Days:    Airway ETT Oral 7.5       Oral   5              Barriers to Wean: Intracranial Hypertension;Evidence of active shock,hemmorhage or sepsis (01/28/19 1446)    Cough: Productive (01/30/19 0000)  Sputum Amount: Small (01/30/19 0000)  Sputum Color: Tan;Clear (01/30/19 0000)  Sputum Consistency: Thick (01/30/19 0000)    Mobility  Activity Performed: Unable to mobilize (01/29/19 2000)  Reason Not Mobilized: Unstable condition (01/29/19 2000)      Events/Summary/Plan: Increased vt to 500, fio2 to 100% (01/30/19 0220)

## 2019-01-30 NOTE — PROGRESS NOTES
Trauma / Surgical Daily Progress Note    Date of Service  1/30/2019    Chief Complaint  28 y.o. male admitted 1/26/2019 with Trauma    Interval Events  GCS 7t  CT: residual edema, icp in for now  Tmax 101.9  Tf goal  Reduce IVF  Anchonorio donohuex       Review of Systems  Review of Systems     Vital Signs for last 24 hours  Pulse:  [] 104  Resp:  [0-29] 25  SpO2:  [89 %-100 %] 100 %    Hemodynamic parameters for last 24 hours       Respiratory Data     Respiration: (!) 25, Pulse Oximetry: 100 %     Work Of Breathing / Effort: Vented  RUL Breath Sounds: Clear, RML Breath Sounds: Clear, RLL Breath Sounds: Diminished, STACI Breath Sounds: Clear, LLL Breath Sounds: Diminished    Physical Exam  Physical Exam   HENT:   craniotomy   Eyes: Pupils are equal, round, and reactive to light.   Cardiovascular: Regular rhythm.    Pulmonary/Chest: He has no wheezes. He has no rales.   Abdominal: Soft. He exhibits no distension.   Neurological: GCS eye subscore is 2. GCS verbal subscore is 1. GCS motor subscore is 4.   Skin: Skin is warm and dry. He is not diaphoretic.       Laboratory  Recent Results (from the past 24 hour(s))   Basic Metabolic Panel    Collection Time: 01/29/19 11:40 AM   Result Value Ref Range    Sodium 149 (H) 135 - 145 mmol/L    Potassium 3.3 (L) 3.6 - 5.5 mmol/L    Chloride 118 (H) 96 - 112 mmol/L    Co2 23 20 - 33 mmol/L    Glucose 111 (H) 65 - 99 mg/dL    Bun 14 8 - 22 mg/dL    Creatinine 0.56 0.50 - 1.40 mg/dL    Calcium 7.4 (L) 8.5 - 10.5 mg/dL    Anion Gap 8.0 0.0 - 11.9   ESTIMATED GFR    Collection Time: 01/29/19 11:40 AM   Result Value Ref Range    GFR If African American >60 >60 mL/min/1.73 m 2    GFR If Non African American >60 >60 mL/min/1.73 m 2   Basic Metabolic Panel    Collection Time: 01/29/19  5:40 PM   Result Value Ref Range    Sodium 148 (H) 135 - 145 mmol/L    Potassium 3.9 3.6 - 5.5 mmol/L    Chloride 119 (H) 96 - 112 mmol/L    Co2 22 20 - 33 mmol/L    Glucose 119 (H) 65 - 99 mg/dL     Bun 13 8 - 22 mg/dL    Creatinine 0.58 0.50 - 1.40 mg/dL    Calcium 7.6 (L) 8.5 - 10.5 mg/dL    Anion Gap 7.0 0.0 - 11.9   ESTIMATED GFR    Collection Time: 19  5:40 PM   Result Value Ref Range    GFR If African American >60 >60 mL/min/1.73 m 2    GFR If Non African American >60 >60 mL/min/1.73 m 2   EKG    Collection Time: 19 10:07 PM   Result Value Ref Range    Report       [facility]    Test Date:  2019  Pt Name:    TEN HARRIS       Department: 19  MRN:        [id number]                      Room:       [facility]  Gender:     Male                         Technician: DILCIA  :        1990                   Requested By:ASHTYN CORREA  Order #:    [id number]                    Reading MD: Alfonso Flores MD    Measurements  Intervals                                Axis  Rate:       126                          P:          68  MS:         100                          QRS:        17  QRSD:       76                           T:          -49  QT:         312  QTc:        452    Interpretive Statements  SINUS TACHYCARDIA  BORDERLINE REPOLARIZATION ABNORMALITY  No previous ECG available for comparison    Electronically Signed On 2019 6:30:30 PST by Alfonso Flores MD     Basic Metabolic Panel    Collection Time: 19 11:15 PM   Result Value Ref Range    Sodium 147 (H) 135 - 145 mmol/L    Potassium 4.2 3.6 - 5.5 mmol/L    Chloride 117 (H) 96 - 112 mmol/L    Co2 20 20 - 33 mmol/L    Glucose 124 (H) 65 - 99 mg/dL    Bun 14 8 - 22 mg/dL    Creatinine 0.69 0.50 - 1.40 mg/dL    Calcium 8.5 8.5 - 10.5 mg/dL    Anion Gap 10.0 0.0 - 11.9   ESTIMATED GFR    Collection Time: 19 11:15 PM   Result Value Ref Range    GFR If African American >60 >60 mL/min/1.73 m 2    GFR If Non African American >60 >60 mL/min/1.73 m 2   ISTAT ARTERIAL BLOOD GAS    Collection Time: 19  2:20 AM   Result Value Ref Range    Ph 7.383 (L) 7.400 - 7.500    Pco2 32.3 26.0 - 37.0 mmHg    Po2 60 (L) 64  - 87 mmHg    Tco2 20 20 - 33 mmol/L    S02 91 (L) 93 - 99 %    Hco3 19.3 17.0 - 25.0 mmol/L    BE -5 (L) -4 - 3 mmol/L    Body Temp 101.5 F degrees    O2 Therapy 90 %    iPF Ratio 67     Ph Temp Sri 7.359 (L) 7.400 - 7.500    Pco2 Temp Co 34.7 26.0 - 37.0 mmHg    Po2 Temp Cor 68 64 - 87 mmHg    Specimen Arterial     Action Range Triggered NO     Inst. Qualified Patient YES    ISTAT ARTERIAL BLOOD GAS    Collection Time: 01/30/19  5:04 AM   Result Value Ref Range    Ph 7.414 7.400 - 7.500    Pco2 29.0 26.0 - 37.0 mmHg    Po2 73 64 - 87 mmHg    Tco2 19 (L) 20 - 33 mmol/L    S02 95 93 - 99 %    Hco3 18.6 17.0 - 25.0 mmol/L    BE -5 (L) -4 - 3 mmol/L    Body Temp 100.0 F degrees    O2 Therapy 100 %    iPF Ratio 73     Ph Temp Sri 7.403 7.400 - 7.500    Pco2 Temp Co 30.0 26.0 - 37.0 mmHg    Po2 Temp Cor 77 64 - 87 mmHg    Specimen Arterial     Action Range Triggered NO     Inst. Qualified Patient YES    Prothrombin Time (INR)    Collection Time: 01/30/19  5:20 AM   Result Value Ref Range    PT 14.6 12.0 - 14.6 sec    INR 1.13 0.87 - 1.13   CBC without Differential    Collection Time: 01/30/19  5:20 AM   Result Value Ref Range    WBC 16.5 (H) 4.8 - 10.8 K/uL    RBC 2.66 (L) 4.70 - 6.10 M/uL    Hemoglobin 8.1 (L) 14.0 - 18.0 g/dL    Hematocrit 26.0 (L) 42.0 - 52.0 %    MCV 97.7 81.4 - 97.8 fL    MCH 30.5 27.0 - 33.0 pg    MCHC 31.2 (L) 33.7 - 35.3 g/dL    RDW 48.2 35.9 - 50.0 fL    Platelet Count 312 164 - 446 K/uL    MPV 9.9 9.0 - 12.9 fL   Basic Metabolic Panel    Collection Time: 01/30/19  5:20 AM   Result Value Ref Range    Sodium 150 (H) 135 - 145 mmol/L    Potassium 3.7 3.6 - 5.5 mmol/L    Chloride 121 (H) 96 - 112 mmol/L    Co2 21 20 - 33 mmol/L    Glucose 119 (H) 65 - 99 mg/dL    Bun 19 8 - 22 mg/dL    Creatinine 1.01 0.50 - 1.40 mg/dL    Calcium 8.2 (L) 8.5 - 10.5 mg/dL    Anion Gap 8.0 0.0 - 11.9   ESTIMATED GFR    Collection Time: 01/30/19  5:20 AM   Result Value Ref Range    GFR If  >60 >60  mL/min/1.73 m 2    GFR If Non African American >60 >60 mL/min/1.73 m 2   SPECIFIC GRAVITY    Collection Time: 01/30/19  6:30 AM   Result Value Ref Range    Specific Gravity <=1.005 <1.035       Fluids    Intake/Output Summary (Last 24 hours) at 01/30/19 0800  Last data filed at 01/30/19 0600   Gross per 24 hour   Intake          2914.55 ml   Output             2945 ml   Net           -30.45 ml       Core Measures & Quality Metrics  Labs reviewed, Medications reviewed and Radiology images reviewed  Greer catheter: Critically Ill - Requiring Accurate Measurement of Urinary Output      DVT Prophylaxis: Enoxaparin (Lovenox)  DVT prophylaxis - mechanical: SCDs  Ulcer prophylaxis: Yes        JOHN Score  ETOH Screening    Assessment/Plan  Acute respiratory failure following trauma and surgery (HCC)- (present on admission)   Assessment & Plan    Intubated in field  1/27 aggressive management of a head injury/ICP precludes weaning protocol  Continue full mechanical ventilatory support.   Ventilator bundle     Subdural hematoma (HCC)- (present on admission)   Assessment & Plan    Left sided holohemispheric subdural hematoma measuring approximately 9.4 mm in maximum thickness. There is approximately 10 mm of left-to-right midline shift. There is probably mild left sided hemispheric edema.  1/26 To OR for emergent craniotomy and evacuation of hematoma.   1/28 CT x2 stable  Trending ICP  Continue Keppra  HTS protocol  Head of bed elevation  Optimize p.o. 2/PCO2  Aggressive sedation/analgesia.  1/30 Plan for repeat head CT per neurosurgery  Jazlyn Huntley MD. Neurosurgery.     Aspiration into airway- (present on admission)   Assessment & Plan    Admission imaging with bilateral, left greater than right medial dependent consolidation could be due to aspiration pneumonitis or atelectasis.  Aggressive pulmonary hygiene and serial chest radiography.     Contraindication to deep vein thrombosis (DVT) prophylaxis- (present on admission)    Assessment & Plan    Systemic anticoagulation contraindicated secondary to elevated bleeding risk.  1/27 no evidence of DVT on surveillance ultrasound     Pneumothorax, traumatic- (present on admission)   Assessment & Plan    Small right apical  Observation      Trauma- (present on admission)   Assessment & Plan    [place] Witnessed seizure like activity. GCS 3. No evidence of acute trauma.  Trauma Red Activation.  Mor Roa MD, Trauma/General Surgery.         Discussed patient condition with Family, RN, RT, Pharmacy and Dietary.  CRITICAL CARE TIME EXCLUDING PROCEDURES: 38    Minutes managing propofol drip titration, hypertonic drip, mechanical ventilation and adjust

## 2019-01-30 NOTE — PROGRESS NOTES
Neurosurgery Progress Note    Subjective:  Sedated  EEG was ok  CT overnight unchanged, still swollen    Exam:  Flexes on left  Nothing on R  DONALD 3mm  Decompression full  Incision CDI  Na 150    ICP labile overnight, related to billings catheter failure, improved this morning    Pulse  Av  Min: 69  Max: 137  Resp  Av.4  Min: 0  Max: 29  Monitored Temp 2  Av.4 °C (101.1 °F)  Min: 37.6 °C (99.7 °F)  Max: 38.8 °C (101.8 °F)  SpO2  Av.5 %  Min: 89 %  Max: 100 %    ICP  Avg: 10.3 MM HG  Min: 6 MM HG  Max: 25 MM HG  CPP   Av.1  Min: 68  Max: 100    Recent Labs      19   0535  19   0606  19   0520   WBC  14.9*  12.4*  16.5*   RBC  3.26*  2.87*  2.66*   HEMOGLOBIN  10.0*  8.6*  8.1*   HEMATOCRIT  31.6*  27.9*  26.0*   MCV  96.9  97.2  97.7   MCH  30.7  30.0  30.5   MCHC  31.6*  30.8*  31.2*   RDW  47.1  47.5  48.2   PLATELETCT  227  212  312   MPV  10.0  10.4  9.9     Recent Labs      19   1740  19   2315  19   0520   SODIUM  148*  147*  150*   POTASSIUM  3.9  4.2  3.7   CHLORIDE  119*  117*  121*   CO2  22  20  21   GLUCOSE  119*  124*  119*   BUN  13  14  19   CREATININE  0.58  0.69  1.01   CALCIUM  7.6*  8.5  8.2*     Recent Labs      19   0606  19   0520   INR  1.19*  1.13     Recent Labs      19   1345   REACTMIN  5.5   CLOTKINET  1.5   CLOTANGL  68.1   MAXCLOTS  68.0   [id number]  0.5   PRCINADP  0.0   PRCINAA  0.0       Intake/Output       19 0700 - 19 0659 19 0700 - 19 0659      7410-3055 9853-2236 Total 9497-4365 5497-1314 Total       Intake    P.O.  --  0 0  --  -- --    P.O. -- 0 0 -- -- --    I.V.  1402.2  853.8 2256  2364  -- 2364    Propofol Volume 167.3 13.8 181.1 -- -- --    IV Volume (Morphine) 12 -- 12 4 -- 4    Volume (mL) (NS infusion) 600 -- 600 2000 -- 2000    Volume (mL) (3% sodium chloride (HYPERTONIC SALINE) 500mL infusion 500 mL)  360 -- 360    Volume (mL) (potassium chloride in water  (KCL) ivpb **Administer in ICU only** 40 mEq) 22.9 -- 22.9 -- -- --    NG/GT  120  840 960  --  -- --    Intake (mL) (Enteral Tube 01/27/19 Cortrak - Gastric Left nare) 120 840 960 -- -- --    IV Piggyback  453.3  -- 453.3  --  -- --    Volume (mL) (levETIRAcetam (KEPPRA) 500 mg in  mL IVPB) 253.3 -- 253.3 -- -- --    Volume (mL) (ceFAZolin in dextrose (ANCEF) IVPB premix 2 g) 200 -- 200 -- -- --    Total Intake 1975.5 1693.8 3669.3 2364 -- 2364       Output    Urine  565  2455 3020  1100  -- 1100    Output (mL) ([REMOVED] Urethral Catheter 16 Fr.) 565 155 720 -- -- --    Output (mL) (Urethral Catheter 18 Fr.) -- 2300 2300 1100 -- 1100    Total Output 565 2455 3020 1100 -- 1100       Net I/O     1410.5 -761.2 649.3 1264 -- 1264            Intake/Output Summary (Last 24 hours) at 01/30/19 0851  Last data filed at 01/30/19 0800   Gross per 24 hour   Intake          5278.55 ml   Output             4045 ml   Net          1233.55 ml            • enoxaparin (LOVENOX) injection  40 mg DAILY   • morphine   Continuous   • morphine injection  2 mg Q HOUR PRN    Or   • morphine injection  4 mg Q HOUR PRN   • ceFAZolin  2 g Q8HRS   • Pharmacy  1 Each PHARMACY TO DOSE   • famotidine  20 mg BID    Or   • famotidine  20 mg BID   • senna-docusate  1 Tab Nightly   • cloNIDine  0.1 mg Q4HRS PRN   • magnesium hydroxide  30 mL QDAY PRN   • oxyCODONE immediate-release  2.5 mg Q3HRS PRN   • oxyCODONE immediate-release  5 mg Q3HRS PRN   • polyethylene glycol/lytes  1 Packet BID PRN   • senna-docusate  1 Tab Q24HRS PRN   • Respiratory Care per Protocol   Continuous RT   • NS   Continuous   • glucose 4 g  16 g Q15 MIN PRN    And   • dextrose 50%  25 mL Q15 MIN PRN   • propofol  0-80 mcg/kg/min Continuous   • Pharmacy Consult Request  1 Each PHARMACY TO DOSE   • MD ALERT...DO NOT ADMINISTER NSAIDS or ASPIRIN unless ORDERED By Neurosurgery  1 Each PRN   • ondansetron  4 mg Q4HRS PRN   • dexamethasone  4 mg Once PRN   • scopolamine  1  Patch Q72HRS PRN   • hydrALAZINE  10 mg Q HOUR PRN   • niCARdipine infusion  0-15 mg/hr Continuous   • levETIRAcetam (KEPPRA) IV  500 mg Q12HRS   • bisacodyl  10 mg Q24HRS PRN   • fleet  1 Each Once PRN   • ceFAZolin  1 g Intra-Op Once PRN   • 3% sodium chloride  500 mL Continuous   • sodium chloride 23.4% ivpb  200 mEq Q6HRS PRN   • acetaminophen  1,000 mg Q6HRS   • docusate sodium 100mg/10mL  100 mg BID   • LORazepam  1 mg Q2HRS PRN       Assessment and Plan:  Hospital day # 5 POD# 4  On lovenox    Plan:  Allow to wake as tolerated  Repeat CT monday

## 2019-01-30 NOTE — CARE PLAN
Problem: Safety  Goal: Will remain free from injury  Bed in low locked position, room near nurses station,     Problem: Pain Management  Goal: Pain level will decrease to patient's comfort goal  Pt medicated per MAR and active MD order. Pain assessment documented in flowsheets.

## 2019-01-30 NOTE — CARE PLAN
Problem: Ventilation Defect:  Goal: Ability to achieve and maintain unassisted ventilation or tolerate decreased levels of ventilator support    Intervention: Support and monitor invasive and noninvasive mechanical ventilation  Vent Day #4    APVCMV  RR 22  Vt 400  PEEP 8  FiO2 40%  Intervention: Monitor ventilator weaning response  No SBTs  Intervention: Perform ventilator associated pneumonia prevention interventions  VAP flowsheet  Intervention: Manage ventilation therapy by monitoring diagnostic test results  ABGs

## 2019-01-31 ENCOUNTER — APPOINTMENT (OUTPATIENT)
Dept: RADIOLOGY | Facility: MEDICAL CENTER | Age: 29
DRG: 003 | End: 2019-01-31
Attending: NURSE PRACTITIONER
Payer: COMMERCIAL

## 2019-01-31 ENCOUNTER — APPOINTMENT (OUTPATIENT)
Dept: RADIOLOGY | Facility: MEDICAL CENTER | Age: 29
DRG: 003 | End: 2019-01-31
Attending: PHYSICAL MEDICINE & REHABILITATION
Payer: COMMERCIAL

## 2019-01-31 LAB
ANION GAP SERPL CALC-SCNC: 5 MMOL/L (ref 0–11.9)
ANION GAP SERPL CALC-SCNC: 5 MMOL/L (ref 0–11.9)
ANION GAP SERPL CALC-SCNC: 8 MMOL/L (ref 0–11.9)
ANION GAP SERPL CALC-SCNC: 9 MMOL/L (ref 0–11.9)
BUN SERPL-MCNC: 18 MG/DL (ref 8–22)
BUN SERPL-MCNC: 19 MG/DL (ref 8–22)
BUN SERPL-MCNC: 19 MG/DL (ref 8–22)
BUN SERPL-MCNC: 23 MG/DL (ref 8–22)
CALCIUM SERPL-MCNC: 7.8 MG/DL (ref 8.5–10.5)
CALCIUM SERPL-MCNC: 7.9 MG/DL (ref 8.5–10.5)
CALCIUM SERPL-MCNC: 8.1 MG/DL (ref 8.5–10.5)
CALCIUM SERPL-MCNC: 8.4 MG/DL (ref 8.5–10.5)
CHLORIDE SERPL-SCNC: 122 MMOL/L (ref 96–112)
CHLORIDE SERPL-SCNC: 123 MMOL/L (ref 96–112)
CHLORIDE SERPL-SCNC: 125 MMOL/L (ref 96–112)
CHLORIDE SERPL-SCNC: 125 MMOL/L (ref 96–112)
CO2 SERPL-SCNC: 21 MMOL/L (ref 20–33)
CO2 SERPL-SCNC: 23 MMOL/L (ref 20–33)
CO2 SERPL-SCNC: 23 MMOL/L (ref 20–33)
CO2 SERPL-SCNC: 24 MMOL/L (ref 20–33)
CREAT SERPL-MCNC: 0.64 MG/DL (ref 0.5–1.4)
CREAT SERPL-MCNC: 0.66 MG/DL (ref 0.5–1.4)
CREAT SERPL-MCNC: 0.71 MG/DL (ref 0.5–1.4)
CREAT SERPL-MCNC: 0.74 MG/DL (ref 0.5–1.4)
ERYTHROCYTE [DISTWIDTH] IN BLOOD BY AUTOMATED COUNT: 48.7 FL (ref 35.9–50)
GLUCOSE SERPL-MCNC: 118 MG/DL (ref 65–99)
GLUCOSE SERPL-MCNC: 136 MG/DL (ref 65–99)
GLUCOSE SERPL-MCNC: 138 MG/DL (ref 65–99)
GLUCOSE SERPL-MCNC: 140 MG/DL (ref 65–99)
HCT VFR BLD AUTO: 25.8 % (ref 42–52)
HGB BLD-MCNC: 7.9 G/DL (ref 14–18)
MCH RBC QN AUTO: 30.2 PG (ref 27–33)
MCHC RBC AUTO-ENTMCNC: 30.6 G/DL (ref 33.7–35.3)
MCV RBC AUTO: 98.5 FL (ref 81.4–97.8)
PLATELET # BLD AUTO: 249 K/UL (ref 164–446)
PMV BLD AUTO: 10 FL (ref 9–12.9)
POTASSIUM SERPL-SCNC: 3.3 MMOL/L (ref 3.6–5.5)
POTASSIUM SERPL-SCNC: 3.3 MMOL/L (ref 3.6–5.5)
POTASSIUM SERPL-SCNC: 3.5 MMOL/L (ref 3.6–5.5)
POTASSIUM SERPL-SCNC: 3.8 MMOL/L (ref 3.6–5.5)
RBC # BLD AUTO: 2.62 M/UL (ref 4.7–6.1)
SODIUM SERPL-SCNC: 150 MMOL/L (ref 135–145)
SODIUM SERPL-SCNC: 154 MMOL/L (ref 135–145)
SODIUM SERPL-SCNC: 154 MMOL/L (ref 135–145)
SODIUM SERPL-SCNC: 155 MMOL/L (ref 135–145)
WBC # BLD AUTO: 11.6 K/UL (ref 4.8–10.8)

## 2019-01-31 PROCEDURE — 71045 X-RAY EXAM CHEST 1 VIEW: CPT

## 2019-01-31 PROCEDURE — 700111 HCHG RX REV CODE 636 W/ 250 OVERRIDE (IP): Performed by: NEUROLOGICAL SURGERY

## 2019-01-31 PROCEDURE — 700102 HCHG RX REV CODE 250 W/ 637 OVERRIDE(OP): Performed by: SURGERY

## 2019-01-31 PROCEDURE — A9270 NON-COVERED ITEM OR SERVICE: HCPCS | Performed by: SURGERY

## 2019-01-31 PROCEDURE — 700102 HCHG RX REV CODE 250 W/ 637 OVERRIDE(OP): Performed by: PHYSICAL MEDICINE & REHABILITATION

## 2019-01-31 PROCEDURE — 74018 RADEX ABDOMEN 1 VIEW: CPT

## 2019-01-31 PROCEDURE — 700102 HCHG RX REV CODE 250 W/ 637 OVERRIDE(OP): Performed by: NEUROLOGICAL SURGERY

## 2019-01-31 PROCEDURE — A9270 NON-COVERED ITEM OR SERVICE: HCPCS | Performed by: PHYSICAL MEDICINE & REHABILITATION

## 2019-01-31 PROCEDURE — 770022 HCHG ROOM/CARE - ICU (200)

## 2019-01-31 PROCEDURE — 700112 HCHG RX REV CODE 229: Performed by: NEUROLOGICAL SURGERY

## 2019-01-31 PROCEDURE — 94003 VENT MGMT INPAT SUBQ DAY: CPT

## 2019-01-31 PROCEDURE — 700111 HCHG RX REV CODE 636 W/ 250 OVERRIDE (IP): Performed by: SURGERY

## 2019-01-31 PROCEDURE — 99233 SBSQ HOSP IP/OBS HIGH 50: CPT | Performed by: PHYSICAL MEDICINE & REHABILITATION

## 2019-01-31 PROCEDURE — A9270 NON-COVERED ITEM OR SERVICE: HCPCS | Performed by: NEUROLOGICAL SURGERY

## 2019-01-31 PROCEDURE — 700111 HCHG RX REV CODE 636 W/ 250 OVERRIDE (IP): Performed by: PHYSICIAN ASSISTANT

## 2019-01-31 PROCEDURE — 80048 BASIC METABOLIC PNL TOTAL CA: CPT | Mod: 91

## 2019-01-31 PROCEDURE — 85027 COMPLETE CBC AUTOMATED: CPT

## 2019-01-31 PROCEDURE — 700105 HCHG RX REV CODE 258: Performed by: NEUROLOGICAL SURGERY

## 2019-01-31 PROCEDURE — 99291 CRITICAL CARE FIRST HOUR: CPT | Performed by: SURGERY

## 2019-01-31 RX ORDER — PROPRANOLOL HYDROCHLORIDE 10 MG/1
10 TABLET ORAL EVERY 8 HOURS
Status: DISCONTINUED | OUTPATIENT
Start: 2019-01-31 | End: 2019-02-21 | Stop reason: HOSPADM

## 2019-01-31 RX ORDER — ACETAMINOPHEN 325 MG/1
650 TABLET ORAL EVERY 4 HOURS PRN
Status: DISCONTINUED | OUTPATIENT
Start: 2019-01-31 | End: 2019-02-21 | Stop reason: HOSPADM

## 2019-01-31 RX ORDER — POTASSIUM CHLORIDE 29.8 MG/ML
40 INJECTION INTRAVENOUS ONCE
Status: COMPLETED | OUTPATIENT
Start: 2019-01-31 | End: 2019-01-31

## 2019-01-31 RX ADMIN — ACETAMINOPHEN 650 MG: 325 TABLET, FILM COATED ORAL at 18:41

## 2019-01-31 RX ADMIN — Medication 2 MG/HR: at 19:32

## 2019-01-31 RX ADMIN — FAMOTIDINE 20 MG: 20 TABLET ORAL at 05:07

## 2019-01-31 RX ADMIN — PROPOFOL 5 MCG/KG/MIN: 10 INJECTION, EMULSION INTRAVENOUS at 15:01

## 2019-01-31 RX ADMIN — ACETAMINOPHEN 650 MG: 325 TABLET, FILM COATED ORAL at 11:10

## 2019-01-31 RX ADMIN — SODIUM CHLORIDE 500 ML: 234 INJECTION, SOLUTION INTRAVENOUS at 04:23

## 2019-01-31 RX ADMIN — ACETAMINOPHEN 1000 MG: 500 TABLET ORAL at 05:07

## 2019-01-31 RX ADMIN — CEFAZOLIN SODIUM 2 G: 2 INJECTION, SOLUTION INTRAVENOUS at 21:44

## 2019-01-31 RX ADMIN — POTASSIUM CHLORIDE 40 MEQ: 29.8 INJECTION, SOLUTION INTRAVENOUS at 11:14

## 2019-01-31 RX ADMIN — LEVETIRACETAM 500 MG: 500 TABLET, FILM COATED ORAL at 18:29

## 2019-01-31 RX ADMIN — POLYETHYLENE GLYCOL 3350 1 PACKET: 17 POWDER, FOR SOLUTION ORAL at 12:00

## 2019-01-31 RX ADMIN — PROPRANOLOL HYDROCHLORIDE 10 MG: 10 TABLET ORAL at 21:41

## 2019-01-31 RX ADMIN — PROPRANOLOL HYDROCHLORIDE 10 MG: 10 TABLET ORAL at 14:44

## 2019-01-31 RX ADMIN — DOCUSATE SODIUM 100 MG: 50 LIQUID ORAL at 19:43

## 2019-01-31 RX ADMIN — Medication 1 TABLET: at 05:07

## 2019-01-31 RX ADMIN — CEFAZOLIN SODIUM 2 G: 2 INJECTION, SOLUTION INTRAVENOUS at 14:44

## 2019-01-31 RX ADMIN — LEVETIRACETAM 500 MG: 500 TABLET, FILM COATED ORAL at 05:07

## 2019-01-31 RX ADMIN — CEFAZOLIN SODIUM 2 G: 2 INJECTION, SOLUTION INTRAVENOUS at 05:07

## 2019-01-31 RX ADMIN — PROPOFOL 15 MCG/KG/MIN: 10 INJECTION, EMULSION INTRAVENOUS at 00:12

## 2019-01-31 RX ADMIN — ENOXAPARIN SODIUM 40 MG: 100 INJECTION SUBCUTANEOUS at 05:07

## 2019-01-31 RX ADMIN — FAMOTIDINE 20 MG: 20 TABLET ORAL at 18:00

## 2019-01-31 RX ADMIN — DOCUSATE SODIUM 100 MG: 50 LIQUID ORAL at 05:07

## 2019-01-31 RX ADMIN — SENNOSIDES AND DOCUSATE SODIUM 2 TABLET: 8.6; 5 TABLET ORAL at 11:11

## 2019-01-31 RX ADMIN — ACETAMINOPHEN 1000 MG: 500 TABLET ORAL at 00:08

## 2019-01-31 NOTE — CARE PLAN
Problem: Ventilation Defect:  Goal: Ability to achieve and maintain unassisted ventilation or tolerate decreased levels of ventilator support    Intervention: Support and monitor invasive and noninvasive mechanical ventilation  Vent Day #5    APVCMV  RR 22  Vt 500  PEEP 10  FiO2 60%  Intervention: Monitor ventilator weaning response  No SBTs  Intervention: Perform ventilator associated pneumonia prevention interventions  VAP flowsheet  Intervention: Manage ventilation therapy by monitoring diagnostic test results  ABGs

## 2019-01-31 NOTE — CARE PLAN
Problem: Traumatic Brain Injury  Goal: Optimal care of the traumatic brain injury patient    Intervention: Vital signs and neuro checks per order  Q 1 hour neuro checks implemented.   Intervention: Minimize stimulation, noise and lights  Decrease stimulation in order to decrease ICPS. Environment kept quiet.

## 2019-01-31 NOTE — CONSULTS
Physical Medicine and Rehabilitation Consultation         Initial Consult      Date of Consultation: 1/30/2019  Consulting provider: Heber Brown D.O.  Reason for consultation: TBI, assess for acute inpatient rehab appropriateness  Consulting physician Brielle Isabel    Chief complaint: TBI    HPI: The patient is a 28 y.o. male with no significant past medical history  presented on 1/26/2019  1:33 PM [place] found to have left-sided holohemispheric subdural hematoma measuring approximately 9.4 mm at maximal thickness, 10 mm left to right midline shift.  He was noted to have seizure-like activity, no trauma was witnessed.   On 1/26 patient went OR for emergent craniotomy and evacuation of hematoma, CT on 1/28 showed stable size of hematoma.  Patient was started on Keppra 500 mg every 12 hours for seizure prophylaxis    Patient's hospitalization was complicated by acute respiratory failure.  Patient was intubated at the scene to allow for aggressive management of head injury.  He is currently requiring full ventilation.    Patient appears to have developed aspiration pneumonia/pneumonitis, left greater than right medial dependent consolidation.    unable to finish interview with pt because of cognitive status      ROS:   unable to be obtained due to cognitive status.      PMH:  No past medical history     PSH:  Past Surgical History:   Procedure Laterality Date   • CRANIOTOMY Left 1/26/2019    Procedure: CRANIOTOMY EVACUATION OF HEMATOMA;  Surgeon: Jazlyn Huntley M.D.;  [facility]: SURGERY [facility];  Service: Neurosurgery       FHX:  No family history of TBI, maternal grand father had right sided CVA    Medications:  Current Facility-Administered Medications   Medication Dose   • levETIRAcetam (KEPPRA) tablet 500 mg  500 mg   • enoxaparin (LOVENOX) inj 40 mg  40 mg   • morphine 1 mg/mL in 50 mL NS (Continuous Infusion)     • morphine (pf) 4 mg/ml injection 2 mg  2 mg     Or   • morphine (pf) 4 mg/ml injection 4 mg  4 mg   • ceFAZolin in dextrose (ANCEF) IVPB premix 2 g  2 g   • Pharmacy Consult: Enteral tube insertion - review meds/change route/product selection  1 Each   • famotidine (PEPCID) tablet 20 mg  20 mg    Or   • famotidine (PEPCID) injection 20 mg  20 mg   • senna-docusate (PERICOLACE or SENOKOT S) 8.6-50 MG per tablet 1 Tab  1 Tab   • cloNIDine (CATAPRES) tablet 0.1 mg  0.1 mg   • magnesium hydroxide (MILK OF MAGNESIA) suspension 30 mL  30 mL   • oxyCODONE immediate-release (ROXICODONE) tablet 2.5 mg  2.5 mg   • oxyCODONE immediate-release (ROXICODONE) tablet 5 mg  5 mg   • polyethylene glycol/lytes (MIRALAX) PACKET 1 Packet  1 Packet   • senna-docusate (PERICOLACE or SENOKOT S) 8.6-50 MG per tablet 1 Tab  1 Tab   • Respiratory Care per Protocol     • NS infusion     • glucose 4 g chewable tablet 16 g  16 g    And   • dextrose 50% (D50W) injection 25 mL  25 mL   • propofol (DIPRIVAN) injection  0-80 mcg/kg/min   • Pharmacy Consult Request ...Pain Management Review 1 Each  1 Each   • MD ALERT...DO NOT ADMINISTER NSAIDS or ASPIRIN unless ORDERED By Neurosurgery 1 Each  1 Each   • ondansetron (ZOFRAN) syringe/vial injection 4 mg  4 mg   • dexamethasone (DECADRON) injection 4 mg  4 mg   • scopolamine (TRANSDERM-SCOP) patch 1 Patch  1 Patch   • hydrALAZINE (APRESOLINE) injection 10 mg  10 mg   • niCARdipine (CARDENE) 25 mg in  mL Infusion  0-15 mg/hr   • bisacodyl (DULCOLAX) suppository 10 mg  10 mg   • fleet enema 133 mL  1 Each   • ceFAZolin (ANCEF) injection 1 g  1 g   • 3% sodium chloride (HYPERTONIC SALINE) 500mL infusion 500 mL  500 mL   • sodium chloride 200 mEq in bottle/bag empty 50 mL ivpb  200 mEq   • acetaminophen (TYLENOL) tablet 1,000 mg  1,000 mg   • docusate sodium 100mg/10mL (COLACE) solution 100 mg  100 mg   • LORazepam (ATIVAN) injection 1 mg  1 mg       Allergies:  No Known Allergies    Social Hx:  Pre-morbidly, this patient lived in [place],  "family lives in [facility], and an apartment  Employment: Working for [employer]  Tobacco: none  Alcohol: +  Drugs: none    Prior level of function:   Independent    Current level of function:  Pending evaluation once cleared by neurosrugery    Physical Exam:  Vitals: Blood pressure 133/70, pulse 79, temperature 36.7 °C (98.1 °F), temperature source Greer, resp. rate 18, height 1.702 m (5' 7.01\"), weight 81.4 kg (179 lb 7.3 oz), SpO2 100 %.  Gen: Lethargic, sedated  HEENT: s/p left craini, PERRLA, moist mucous membranes  Cardio: RRR, no mumurs  Pulm: Coarse breath sounds bilaterally, with normal respiratory effort  Abd: Soft NTND, active bowel sounds,   Ext: 1-2+ peripheral edema and bilateral lower extremity up to knee.    Mental status:       Motor:  Moving bilateral lower extremities to pain, left upper extremity spontaneously, not moving right upper extremity    Sensory:   Responding to pain stimuli in all 4 extremities    DTRs: 3+ in right  biceps, triceps, brachioradialis, 3+ in right patellar and achilles tendons  Multiple beats of clonus on right ankle  + babinski right  +  Seay right    Tone: Increasing tone in right ankle      Labs:  Recent Labs      01/28/19   0535  01/29/19   0606  01/30/19   0520   RBC  3.26*  2.87*  2.66*   HEMOGLOBIN  10.0*  8.6*  8.1*   HEMATOCRIT  31.6*  27.9*  26.0*   PLATELETCT  227  212  312   PROTHROMBTM   --   15.2*  14.6   INR   --   1.19*  1.13     Recent Labs      01/29/19   2315  01/30/19   0520  01/30/19   1210   SODIUM  147*  150*  154*   POTASSIUM  4.2  3.7  3.3*   CHLORIDE  117*  121*  123*   CO2  20  21  22   GLUCOSE  124*  119*  114*   BUN  14  19  17   CREATININE  0.69  1.01  0.74   CALCIUM  8.5  8.2*  7.3*     Recent Results (from the past 24 hour(s))   Basic Metabolic Panel    Collection Time: 01/29/19  5:40 PM   Result Value Ref Range    Sodium 148 (H) 135 - 145 mmol/L    Potassium 3.9 3.6 - 5.5 mmol/L    Chloride 119 (H) 96 - 112 mmol/L    Co2 22 20 - 33 mmol/L "    Glucose 119 (H) 65 - 99 mg/dL    Bun 13 8 - 22 mg/dL    Creatinine 0.58 0.50 - 1.40 mg/dL    Calcium 7.6 (L) 8.5 - 10.5 mg/dL    Anion Gap 7.0 0.0 - 11.9   ESTIMATED GFR    Collection Time: 19  5:40 PM   Result Value Ref Range    GFR If African American >60 >60 mL/min/1.73 m 2    GFR If Non African American >60 >60 mL/min/1.73 m 2   EKG    Collection Time: 19 10:07 PM   Result Value Ref Range    Report       [facility]    Test Date:  2019  Pt Name:    TEN HARRIS       Department: 19  MRN:        [id number]                      Room:       [facility]  Gender:     Male                         Technician: DILCIA  :        1990                   Requested By:ASHTYN CORREA  Order #:    [id number]                    Reading MD: Alfonso Flores MD    Measurements  Intervals                                Axis  Rate:       126                          P:          68  SC:         100                          QRS:        17  QRSD:       76                           T:          -49  QT:         312  QTc:        452    Interpretive Statements  SINUS TACHYCARDIA  BORDERLINE REPOLARIZATION ABNORMALITY  No previous ECG available for comparison    Electronically Signed On 2019 6:30:30 PST by Alfonso Flores MD     Basic Metabolic Panel    Collection Time: 19 11:15 PM   Result Value Ref Range    Sodium 147 (H) 135 - 145 mmol/L    Potassium 4.2 3.6 - 5.5 mmol/L    Chloride 117 (H) 96 - 112 mmol/L    Co2 20 20 - 33 mmol/L    Glucose 124 (H) 65 - 99 mg/dL    Bun 14 8 - 22 mg/dL    Creatinine 0.69 0.50 - 1.40 mg/dL    Calcium 8.5 8.5 - 10.5 mg/dL    Anion Gap 10.0 0.0 - 11.9   ESTIMATED GFR    Collection Time: 19 11:15 PM   Result Value Ref Range    GFR If African American >60 >60 mL/min/1.73 m 2    GFR If Non African American >60 >60 mL/min/1.73 m 2   ISTAT ARTERIAL BLOOD GAS    Collection Time: 19  2:20 AM   Result Value Ref Range    Ph 7.383 (L) 7.400 - 7.500    Pco2 32.3  26.0 - 37.0 mmHg    Po2 60 (L) 64 - 87 mmHg    Tco2 20 20 - 33 mmol/L    S02 91 (L) 93 - 99 %    Hco3 19.3 17.0 - 25.0 mmol/L    BE -5 (L) -4 - 3 mmol/L    Body Temp 101.5 F degrees    O2 Therapy 90 %    iPF Ratio 67     Ph Temp Sri 7.359 (L) 7.400 - 7.500    Pco2 Temp Co 34.7 26.0 - 37.0 mmHg    Po2 Temp Cor 68 64 - 87 mmHg    Specimen Arterial     Action Range Triggered NO     Inst. Qualified Patient YES    ISTAT ARTERIAL BLOOD GAS    Collection Time: 01/30/19  5:04 AM   Result Value Ref Range    Ph 7.414 7.400 - 7.500    Pco2 29.0 26.0 - 37.0 mmHg    Po2 73 64 - 87 mmHg    Tco2 19 (L) 20 - 33 mmol/L    S02 95 93 - 99 %    Hco3 18.6 17.0 - 25.0 mmol/L    BE -5 (L) -4 - 3 mmol/L    Body Temp 100.0 F degrees    O2 Therapy 100 %    iPF Ratio 73     Ph Temp Sri 7.403 7.400 - 7.500    Pco2 Temp Co 30.0 26.0 - 37.0 mmHg    Po2 Temp Cor 77 64 - 87 mmHg    Specimen Arterial     Action Range Triggered NO     Inst. Qualified Patient YES    Prothrombin Time (INR)    Collection Time: 01/30/19  5:20 AM   Result Value Ref Range    PT 14.6 12.0 - 14.6 sec    INR 1.13 0.87 - 1.13   CBC without Differential    Collection Time: 01/30/19  5:20 AM   Result Value Ref Range    WBC 16.5 (H) 4.8 - 10.8 K/uL    RBC 2.66 (L) 4.70 - 6.10 M/uL    Hemoglobin 8.1 (L) 14.0 - 18.0 g/dL    Hematocrit 26.0 (L) 42.0 - 52.0 %    MCV 97.7 81.4 - 97.8 fL    MCH 30.5 27.0 - 33.0 pg    MCHC 31.2 (L) 33.7 - 35.3 g/dL    RDW 48.2 35.9 - 50.0 fL    Platelet Count 312 164 - 446 K/uL    MPV 9.9 9.0 - 12.9 fL   Basic Metabolic Panel    Collection Time: 01/30/19  5:20 AM   Result Value Ref Range    Sodium 150 (H) 135 - 145 mmol/L    Potassium 3.7 3.6 - 5.5 mmol/L    Chloride 121 (H) 96 - 112 mmol/L    Co2 21 20 - 33 mmol/L    Glucose 119 (H) 65 - 99 mg/dL    Bun 19 8 - 22 mg/dL    Creatinine 1.01 0.50 - 1.40 mg/dL    Calcium 8.2 (L) 8.5 - 10.5 mg/dL    Anion Gap 8.0 0.0 - 11.9   ESTIMATED GFR    Collection Time: 01/30/19  5:20 AM   Result Value Ref Range     GFR If African American >60 >60 mL/min/1.73 m 2    GFR If Non African American >60 >60 mL/min/1.73 m 2   SPECIFIC GRAVITY    Collection Time: 01/30/19  6:30 AM   Result Value Ref Range    Specific Gravity <=1.005 <1.035   Basic Metabolic Panel    Collection Time: 01/30/19 12:10 PM   Result Value Ref Range    Sodium 154 (H) 135 - 145 mmol/L    Potassium 3.3 (L) 3.6 - 5.5 mmol/L    Chloride 123 (H) 96 - 112 mmol/L    Co2 22 20 - 33 mmol/L    Glucose 114 (H) 65 - 99 mg/dL    Bun 17 8 - 22 mg/dL    Creatinine 0.74 0.50 - 1.40 mg/dL    Calcium 7.3 (L) 8.5 - 10.5 mg/dL    Anion Gap 9.0 0.0 - 11.9   ESTIMATED GFR    Collection Time: 01/30/19 12:10 PM   Result Value Ref Range    GFR If African American >60 >60 mL/min/1.73 m 2    GFR If Non African American >60 >60 mL/min/1.73 m 2       ASSESSMENT:  Subdural hematoma: left-sided holohemispheric subdural hematoma measuring approximately 9.4 mm at maximal thickness, 10 mm left to right midline shift.  No witnessed trauma, multiple repeated falls during day of skiing, witnessed seizure-like activity.  -Continue with 3% saline with goal of decreasing ICP  -Initially decrease sedation/propofol, if patient does not began to improve alertness consider starting amantadine 100 mg every morning q. noon given tomorrow will be 5 days from injury.    SZ: Reported witnessed shaking activity likely early posttraumatic seizure associated with subdural hematoma, no increased risk for late posttraumatic seizures.   -As long as no more seizure activity would recommend discontinuing Keppra 500 mg every 12 hours after 7 days    Sympathetic storming: Patient is at high risk for sympathetic storming  - Recommend starting propranolol 10 mg every 8 hours  -Recommend using morphine IV for sympathetic storming/increased heart rate/increased blood pressure    Agitation:  -Ativan as needed should only be used for seizure, if it is being used for agitation please change to Seroquel 25 mg every 6 hours as  needed.   -Start propanolol 10 mg every 8 hours    Spasticity: Increasing spasticity and tone in the right ankle   -Start PRAFO on right ankle on for 2 hours off for 2 hours  -Continue with prevalon boot on the left ankle    Insomnia: Commonly altered after traumatic brain injury  -Recommend starting trazodone nightly, 50 mg    Pain:  -Recommend decreasing opioid administration for pain  -Continue with Tylenol 1000 mg every 6 hours, monitor LFTs next  Neurogenic bowel:    Neurogenic bladder: Overactive detrusor  -Greer given current cognitive status    Neurogenic bowel:  -No BM since admission  -Start MiraLAX daily  -Increase senna to 2 tablets q. Noon    DVT prophylaxis:  -Continue with Lovenox 40 mg daily    Skin: At high risk for pressure ulcer formation  -Please turn patient every 2 hours, monitor skin every 8 hours    Rehabilitation: Impaired ADLs and mobility  -Recommend PT and OT consult once cleared by neurosurgery to get out of bed, out of bed activity has been shown to improve alertness/awareness  -Prolonged discussion with family about prognosis expectations.    Discussed with pt and family, summarized hospitalization and care, options for next step of care    Discussed with team about recommendations     Heber Brown D.O.  Physical Medicine and Rehabilitation

## 2019-01-31 NOTE — PROGRESS NOTES
Neurosurgery Progress Note    Subjective:  Seen with Dr. Yuko Valdivia at bedside, updated  Sedated  EEG was ok  CT Wed, still swollen    Exam:  Flexes on left > right    DONALD 3mm  Decompression full  Incision CDI  Na 150    ICP improved    Pulse  Av.7  Min: 72  Max: 83  Resp  Av.5  Min: 18  Max: 25  Monitored Temp 2  Av.1 °C (100.6 °F)  Min: 37.7 °C (99.9 °F)  Max: 38.5 °C (101.3 °F)  SpO2  Av.7 %  Min: 97 %  Max: 100 %    ICP  Av MM HG  Min: 4 MM HG  Max: 14 MM HG  CPP   Av.4  Min: 71  Max: 89    Recent Labs      19   0606  19   0520  19   0530   WBC  12.4*  16.5*  11.6*   RBC  2.87*  2.66*  2.62*   HEMOGLOBIN  8.6*  8.1*  7.9*   HEMATOCRIT  27.9*  26.0*  25.8*   MCV  97.2  97.7  98.5*   MCH  30.0  30.5  30.2   MCHC  30.8*  31.2*  30.6*   RDW  47.5  48.2  48.7   PLATELETCT  212  312  249   MPV  10.4  9.9  10.0     Recent Labs      19   1735  19   0005  19   0530   SODIUM  153*  154*  150*   POTASSIUM  3.6  3.3*  3.3*   CHLORIDE  123*  125*  122*   CO2  23  24  23   GLUCOSE  123*  118*  136*   BUN  18  19  18   CREATININE  0.71  0.66  0.71   CALCIUM  8.1*  7.8*  8.4*     Recent Labs      19   0606  19   0520   INR  1.19*  1.13           Intake/Output       19 07 - 19 0659 19 - 1959       Total  Total       Intake    I.V.  3111.1  2332.1 5443.2  --  -- --    Propofol Volume 419.1 87.2 506.4 -- -- --    IV Volume (Morphine) 12 24 36 -- -- --    Volume (mL) (NS infusion) 2200 1035 3235 -- -- --    Volume (mL) (3% sodium chloride (HYPERTONIC SALINE) 500mL infusion 500 mL) 480 1185.8 1665.8 -- -- --    Other  --  290 290  --  -- --    Medications (PO/Enteral Liquids) -- 110 110 -- -- --    Flush / Irrigation Volume (Cortrak) -- 180 180 -- -- --    NG/GT  120  720 840  --  -- --    Intake (mL) (Enteral Tube 19 Cortrak - Gastric Left nare) 120 720 840 -- -- --    IV  Piggyback  400  200 600  --  -- --    Volume (mL) (ceFAZolin in dextrose (ANCEF) IVPB premix 2 g) 400 200 600 -- -- --    Total Intake 3631.1 3542.1 7173.2 -- -- --       Output    Urine  2750  1635 4385  --  -- --    Output (mL) (Urethral Catheter 18 Fr.) 2750 1635 4385 -- -- --    Stool  --  -- --  --  -- --    Number of Times Stooled -- 0 x 0 x -- -- --    Total Output 2750 1635 4385 -- -- --       Net I/O     881.1 1907.1 2788.2 -- -- --            Intake/Output Summary (Last 24 hours) at 01/31/19 0800  Last data filed at 01/31/19 0600   Gross per 24 hour   Intake          4809.18 ml   Output             3285 ml   Net          1524.18 ml            • levETIRAcetam  500 mg BID   • senna-docusate  2 Tab DAILY AT NOON   • polyethylene glycol/lytes  1 Packet DAILY AT NOON   • enoxaparin (LOVENOX) injection  40 mg DAILY   • morphine   Continuous   • morphine injection  2 mg Q HOUR PRN    Or   • morphine injection  4 mg Q HOUR PRN   • ceFAZolin  2 g Q8HRS   • Pharmacy  1 Each PHARMACY TO DOSE   • famotidine  20 mg BID   • cloNIDine  0.1 mg Q4HRS PRN   • magnesium hydroxide  30 mL QDAY PRN   • oxyCODONE immediate-release  2.5 mg Q3HRS PRN   • oxyCODONE immediate-release  5 mg Q3HRS PRN   • polyethylene glycol/lytes  1 Packet BID PRN   • senna-docusate  1 Tab Q24HRS PRN   • Respiratory Care per Protocol   Continuous RT   • NS   Continuous   • dextrose 50%  25 mL Q15 MIN PRN   • propofol  0-80 mcg/kg/min Continuous   • Pharmacy Consult Request  1 Each PHARMACY TO DOSE   • MD ALERT...DO NOT ADMINISTER NSAIDS or ASPIRIN unless ORDERED By Neurosurgery  1 Each PRN   • ondansetron  4 mg Q4HRS PRN   • dexamethasone  4 mg Once PRN   • scopolamine  1 Patch Q72HRS PRN   • hydrALAZINE  10 mg Q HOUR PRN   • niCARdipine infusion  0-15 mg/hr Continuous   • bisacodyl  10 mg Q24HRS PRN   • fleet  1 Each Once PRN   • ceFAZolin  1 g Intra-Op Once PRN   • 3% sodium chloride  500 mL Continuous   • sodium chloride 23.4% ivpb  200 mEq Q6HRS  PRN   • acetaminophen  1,000 mg Q6HRS   • docusate sodium 100mg/10mL  100 mg BID   • LORazepam  1 mg Q2HRS PRN       Assessment and Plan:  Hospital day # 6 POD# 5  On lovenox    Plan:  Allow to wake as tolerated  Remove ICP monitor Friday, then get MRI brain and cervical  Repeat CT monday

## 2019-01-31 NOTE — PROGRESS NOTES
Physical Medicine and Rehabilitation Consultation         Follow up Consult      Date of Consultation: 1/31/2019  Consulting provider: Heber Brown D.O.  Reason for consultation: TBI, assess for acute inpatient rehab appropriateness      Chief complaint: TBI    S:   Family is not at bedside today.  Interval history is achieved through discussion with staff, and chart review    RN reporting patient has started to move right hand, no elbow flexion and wrist extension, tolerating decrease in propofol with ICP value of 8-10, no significant increased with even deep suctioning.    Patient is still responding to pain in other 3 extremities,  Not opening eyes or responding to verbal commands    T-max 38.5, using cooling measures, previously on Tylenol 1 g 4 times a day which stopped early this morning.  No tachycardia      ROS:   unable to be obtained due to cognitive status.      Medications:  Current Facility-Administered Medications   Medication Dose   • acetaminophen (TYLENOL) tablet 650 mg  650 mg   • propranolol (INDERAL) tablet 10 mg  10 mg   • potassium chloride in water (KCL) ivpb **Administer in ICU only** 40 mEq  40 mEq   • levETIRAcetam (KEPPRA) tablet 500 mg  500 mg   • senna-docusate (PERICOLACE or SENOKOT S) 8.6-50 MG per tablet 2 Tab  2 Tab   • polyethylene glycol/lytes (MIRALAX) PACKET 1 Packet  1 Packet   • enoxaparin (LOVENOX) inj 40 mg  40 mg   • morphine 1 mg/mL in 50 mL NS (Continuous Infusion)     • morphine (pf) 4 mg/ml injection 2 mg  2 mg    Or   • morphine (pf) 4 mg/ml injection 4 mg  4 mg   • ceFAZolin in dextrose (ANCEF) IVPB premix 2 g  2 g   • Pharmacy Consult: Enteral tube insertion - review meds/change route/product selection  1 Each   • famotidine (PEPCID) tablet 20 mg  20 mg   • cloNIDine (CATAPRES) tablet 0.1 mg  0.1 mg   • magnesium hydroxide (MILK OF MAGNESIA) suspension 30 mL  30 mL   • oxyCODONE immediate-release (ROXICODONE) tablet 2.5 mg  2.5 mg   •  "oxyCODONE immediate-release (ROXICODONE) tablet 5 mg  5 mg   • polyethylene glycol/lytes (MIRALAX) PACKET 1 Packet  1 Packet   • senna-docusate (PERICOLACE or SENOKOT S) 8.6-50 MG per tablet 1 Tab  1 Tab   • Respiratory Care per Protocol     • NS infusion     • dextrose 50% (D50W) injection 25 mL  25 mL   • propofol (DIPRIVAN) injection  0-80 mcg/kg/min   • Pharmacy Consult Request ...Pain Management Review 1 Each  1 Each   • MD ALERT...DO NOT ADMINISTER NSAIDS or ASPIRIN unless ORDERED By Neurosurgery 1 Each  1 Each   • ondansetron (ZOFRAN) syringe/vial injection 4 mg  4 mg   • dexamethasone (DECADRON) injection 4 mg  4 mg   • scopolamine (TRANSDERM-SCOP) patch 1 Patch  1 Patch   • hydrALAZINE (APRESOLINE) injection 10 mg  10 mg   • niCARdipine (CARDENE) 25 mg in  mL Infusion  0-15 mg/hr   • bisacodyl (DULCOLAX) suppository 10 mg  10 mg   • fleet enema 133 mL  1 Each   • 3% sodium chloride (HYPERTONIC SALINE) 500mL infusion 500 mL  500 mL   • sodium chloride 200 mEq in bottle/bag empty 50 mL ivpb  200 mEq   • docusate sodium 100mg/10mL (COLACE) solution 100 mg  100 mg   • LORazepam (ATIVAN) injection 1 mg  1 mg       Physical Exam:  Vitals: Blood pressure 133/70, pulse 70, temperature 36.7 °C (98.1 °F), temperature source Greer, resp. rate (!) 24, height 1.702 m (5' 7.01\"), weight 87.5 kg (192 lb 14.4 oz), SpO2 97 %.  Gen:  Body mass index is 30.21 kg/m².  HEENT: Sluggish pupils, moist mucous membranes  Cardio: RRR, no mumurs  Pulm: CTAB, with normal respiratory effort, endotracheal intubation  Abd: Soft NTND, active bowel sounds,   Ext: No peripheral edema.   Tone present in right lower extremity, decreased range of motion of ankle difficulty with getting to neutral    Motor:   Moving left upper extremity, withdrawal to pain, moving bilateral lower extremities no withdrawal to pain.  Moves right hand with painful stimuli over the fourth and fifth digit, no movement or withdrawal of wrist or " elbow.      Sensory:   Painful sensation appears to be intact in left upper extremity and bilateral lower extremity unclear if present in anywhere other than fourth and fifth digits of right upper extreme    DTRs: 3+ in right biceps, triceps, brachioradialis, 3+ in right patellar and achilles tendons  + clonus at right ankles  + babinski right  + Seay right      Labs:  Recent Labs      01/29/19   0606  01/30/19   0520  01/31/19   0530   RBC  2.87*  2.66*  2.62*   HEMOGLOBIN  8.6*  8.1*  7.9*   HEMATOCRIT  27.9*  26.0*  25.8*   PLATELETCT  212  312  249   PROTHROMBTM  15.2*  14.6   --    INR  1.19*  1.13   --      Recent Labs      01/30/19   1735  01/31/19   0005  01/31/19   0530   SODIUM  153*  154*  150*   POTASSIUM  3.6  3.3*  3.3*   CHLORIDE  123*  125*  122*   CO2  23  24  23   GLUCOSE  123*  118*  136*   BUN  18  19  18   CREATININE  0.71  0.66  0.71   CALCIUM  8.1*  7.8*  8.4*     Recent Results (from the past 24 hour(s))   Basic Metabolic Panel    Collection Time: 01/30/19  5:35 PM   Result Value Ref Range    Sodium 153 (H) 135 - 145 mmol/L    Potassium 3.6 3.6 - 5.5 mmol/L    Chloride 123 (H) 96 - 112 mmol/L    Co2 23 20 - 33 mmol/L    Glucose 123 (H) 65 - 99 mg/dL    Bun 18 8 - 22 mg/dL    Creatinine 0.71 0.50 - 1.40 mg/dL    Calcium 8.1 (L) 8.5 - 10.5 mg/dL    Anion Gap 7.0 0.0 - 11.9   ESTIMATED GFR    Collection Time: 01/30/19  5:35 PM   Result Value Ref Range    GFR If African American >60 >60 mL/min/1.73 m 2    GFR If Non African American >60 >60 mL/min/1.73 m 2   Basic Metabolic Panel    Collection Time: 01/31/19 12:05 AM   Result Value Ref Range    Sodium 154 (H) 135 - 145 mmol/L    Potassium 3.3 (L) 3.6 - 5.5 mmol/L    Chloride 125 (H) 96 - 112 mmol/L    Co2 24 20 - 33 mmol/L    Glucose 118 (H) 65 - 99 mg/dL    Bun 19 8 - 22 mg/dL    Creatinine 0.66 0.50 - 1.40 mg/dL    Calcium 7.8 (L) 8.5 - 10.5 mg/dL    Anion Gap 5.0 0.0 - 11.9   ESTIMATED GFR    Collection Time: 01/31/19 12:05 AM   Result  Value Ref Range    GFR If African American >60 >60 mL/min/1.73 m 2    GFR If Non African American >60 >60 mL/min/1.73 m 2   CBC without Differential    Collection Time: 01/31/19  5:30 AM   Result Value Ref Range    WBC 11.6 (H) 4.8 - 10.8 K/uL    RBC 2.62 (L) 4.70 - 6.10 M/uL    Hemoglobin 7.9 (L) 14.0 - 18.0 g/dL    Hematocrit 25.8 (L) 42.0 - 52.0 %    MCV 98.5 (H) 81.4 - 97.8 fL    MCH 30.2 27.0 - 33.0 pg    MCHC 30.6 (L) 33.7 - 35.3 g/dL    RDW 48.7 35.9 - 50.0 fL    Platelet Count 249 164 - 446 K/uL    MPV 10.0 9.0 - 12.9 fL   Basic Metabolic Panel    Collection Time: 01/31/19  5:30 AM   Result Value Ref Range    Sodium 150 (H) 135 - 145 mmol/L    Potassium 3.3 (L) 3.6 - 5.5 mmol/L    Chloride 122 (H) 96 - 112 mmol/L    Co2 23 20 - 33 mmol/L    Glucose 136 (H) 65 - 99 mg/dL    Bun 18 8 - 22 mg/dL    Creatinine 0.71 0.50 - 1.40 mg/dL    Calcium 8.4 (L) 8.5 - 10.5 mg/dL    Anion Gap 5.0 0.0 - 11.9   ESTIMATED GFR    Collection Time: 01/31/19  5:30 AM   Result Value Ref Range    GFR If African American >60 >60 mL/min/1.73 m 2    GFR If Non African American >60 >60 mL/min/1.73 m 2         ASSESSMENT:  TBI:  -Continue to decrease propofol, as long as ICPs tolerate, currently at 10, but expect more interaction at low dose of propofol.  He will likely require neuro stimulants tomorrow.  -Avoid using benzodiazepines/Haldol for agitation, this is been shown to decrease neurological recovery  -Consider DC hypertonic saline, monitor sodium in a.m., if goal is to continue sodium greater than 150 can only start sodium tablets through NG tube    Agitation: If he shows agitation  -Consider starting Seroquel 25 mg every 6 hours as needed for agitation    Seizure: Episode of seizure like activity witnessed at scene, early posttraumatic seizure, no increased risk of seizure activity in the long run  -Continue Ativan as needed for seizure  -Continue Keppra for total of 7 days for seizure prophylaxis    Fever: WBC within normal  limits, no elevation in heart rate most likely synthetic storming  -Start propanolol 10 mg every 8 hours, monitor heart rate  -Check CBC in a.m.    Dysphagia:   Patient may require PEG tube placement    Bladder: Neurogenic bladder:  -Continue Greer    Bowel: Neurogenic bowel, lack of management of neurogenic bowel can result in severe constipation dilation of colon and rupture.  No BM since admission  -Check KUB  -Patient will likely need full bowel cleanout, with magnesium citrate and fleets enema    Tone/spasticity: Right lower extremity increased spasticity would recommend starting PRAFO on for 2 hours off for 2-hour  -Recommend range of motion of right hand if begins to be difficult to open would recommend resting hand splint    Rehab: impaired adl    Discussed with team about recommendations     Patient was seen for 35 minutes on unit/floor of which > 50% of time was spent on counseling and coordination of care regarding the above, including prognosis, risk reduction, benefits of treatment, and options for next stage of care.      Heber Brown D.O.

## 2019-01-31 NOTE — CARE PLAN
Problem: Skin Integrity  Goal: Risk for impaired skin integrity will decrease  Richard skin risk assessment and complete skin assessment completed at beginning of shift. Pt is turned and repositioned q2h and PRN. Appropriate wound protocols in place.      Problem: Traumatic Brain Injury  Goal: Optimal care of the traumatic brain injury patient  Baseline neuro assessment completed with day shift RN. Q1 hour neuro checks in place. Continuous ICP monitoring in place. HOB elevated 30 degrees. Tube feeding at goal.

## 2019-01-31 NOTE — PROGRESS NOTES
2 RN skin assessment completed. New blanching redness noted on lateral and posterior L ankle. Mepilex lite placed to pad areas.

## 2019-01-31 NOTE — PROGRESS NOTES
Trauma / Surgical Daily Progress Note    Date of Service  1/31/2019    Chief Complaint  28 y.o. male admitted 1/26/2019 with Trauma    Interval Events  GCS7T moving right hand now  Mechanical ventilation  ICP 7 to 10, decreasing propofol  Ancef for bolt  Tmax 101.3 , increase tylenol  Propranalol q8  Candidate for lap G  Taper propofol then amantdine   Tf goal  HTS drip         Review of Systems  Review of Systems     Vital Signs for last 24 hours  Pulse:  [62-83] 62  Resp:  [20-24] 23  BP: (142)/(76) 142/76  SpO2:  [96 %-100 %] 97 %    Hemodynamic parameters for last 24 hours       Respiratory Data     Respiration: (!) 23, Pulse Oximetry: 97 %     Work Of Breathing / Effort: Vented  RUL Breath Sounds: Clear, RML Breath Sounds: Diminished, RLL Breath Sounds: Diminished, STACI Breath Sounds: Clear, LLL Breath Sounds: Diminished    Physical Exam  Physical Exam   HENT:   craniotomy   Eyes: Pupils are equal, round, and reactive to light.   Cardiovascular: Regular rhythm.    Pulmonary/Chest: He has no wheezes. He has no rales.   Abdominal: Soft. He exhibits no distension.   Neurological: GCS eye subscore is 2. GCS verbal subscore is 1. GCS motor subscore is 4.   Skin: Skin is warm and dry. He is not diaphoretic.       Laboratory  Recent Results (from the past 24 hour(s))   Basic Metabolic Panel    Collection Time: 01/31/19 12:05 AM   Result Value Ref Range    Sodium 154 (H) 135 - 145 mmol/L    Potassium 3.3 (L) 3.6 - 5.5 mmol/L    Chloride 125 (H) 96 - 112 mmol/L    Co2 24 20 - 33 mmol/L    Glucose 118 (H) 65 - 99 mg/dL    Bun 19 8 - 22 mg/dL    Creatinine 0.66 0.50 - 1.40 mg/dL    Calcium 7.8 (L) 8.5 - 10.5 mg/dL    Anion Gap 5.0 0.0 - 11.9   ESTIMATED GFR    Collection Time: 01/31/19 12:05 AM   Result Value Ref Range    GFR If African American >60 >60 mL/min/1.73 m 2    GFR If Non African American >60 >60 mL/min/1.73 m 2   CBC without Differential    Collection Time: 01/31/19  5:30 AM   Result Value Ref Range    WBC 11.6  (H) 4.8 - 10.8 K/uL    RBC 2.62 (L) 4.70 - 6.10 M/uL    Hemoglobin 7.9 (L) 14.0 - 18.0 g/dL    Hematocrit 25.8 (L) 42.0 - 52.0 %    MCV 98.5 (H) 81.4 - 97.8 fL    MCH 30.2 27.0 - 33.0 pg    MCHC 30.6 (L) 33.7 - 35.3 g/dL    RDW 48.7 35.9 - 50.0 fL    Platelet Count 249 164 - 446 K/uL    MPV 10.0 9.0 - 12.9 fL   Basic Metabolic Panel    Collection Time: 01/31/19  5:30 AM   Result Value Ref Range    Sodium 150 (H) 135 - 145 mmol/L    Potassium 3.3 (L) 3.6 - 5.5 mmol/L    Chloride 122 (H) 96 - 112 mmol/L    Co2 23 20 - 33 mmol/L    Glucose 136 (H) 65 - 99 mg/dL    Bun 18 8 - 22 mg/dL    Creatinine 0.71 0.50 - 1.40 mg/dL    Calcium 8.4 (L) 8.5 - 10.5 mg/dL    Anion Gap 5.0 0.0 - 11.9   ESTIMATED GFR    Collection Time: 01/31/19  5:30 AM   Result Value Ref Range    GFR If African American >60 >60 mL/min/1.73 m 2    GFR If Non African American >60 >60 mL/min/1.73 m 2   Basic Metabolic Panel    Collection Time: 01/31/19 12:23 PM   Result Value Ref Range    Sodium 155 (H) 135 - 145 mmol/L    Potassium 3.5 (L) 3.6 - 5.5 mmol/L    Chloride 125 (H) 96 - 112 mmol/L    Co2 21 20 - 33 mmol/L    Glucose 140 (H) 65 - 99 mg/dL    Bun 19 8 - 22 mg/dL    Creatinine 0.74 0.50 - 1.40 mg/dL    Calcium 8.1 (L) 8.5 - 10.5 mg/dL    Anion Gap 9.0 0.0 - 11.9   ESTIMATED GFR    Collection Time: 01/31/19 12:23 PM   Result Value Ref Range    GFR If African American >60 >60 mL/min/1.73 m 2    GFR If Non African American >60 >60 mL/min/1.73 m 2   Basic Metabolic Panel    Collection Time: 01/31/19  6:27 PM   Result Value Ref Range    Sodium 154 (H) 135 - 145 mmol/L    Potassium 3.8 3.6 - 5.5 mmol/L    Chloride 123 (H) 96 - 112 mmol/L    Co2 23 20 - 33 mmol/L    Glucose 138 (H) 65 - 99 mg/dL    Bun 23 (H) 8 - 22 mg/dL    Creatinine 0.64 0.50 - 1.40 mg/dL    Calcium 7.9 (L) 8.5 - 10.5 mg/dL    Anion Gap 8.0 0.0 - 11.9   ESTIMATED GFR    Collection Time: 01/31/19  6:27 PM   Result Value Ref Range    GFR If African American >60 >60 mL/min/1.73 m 2     GFR If Non African American >60 >60 mL/min/1.73 m 2       Fluids    Intake/Output Summary (Last 24 hours) at 01/31/19 2213  Last data filed at 01/31/19 2200   Gross per 24 hour   Intake          4360.21 ml   Output             2795 ml   Net          1565.21 ml       Core Measures & Quality Metrics  Labs reviewed, Medications reviewed and Radiology images reviewed  Greer catheter: Critically Ill - Requiring Accurate Measurement of Urinary Output      DVT Prophylaxis: Enoxaparin (Lovenox)  DVT prophylaxis - mechanical: SCDs  Ulcer prophylaxis: Yes        JOHN Score  ETOH Screening    Assessment/Plan  Acute respiratory failure following trauma and surgery (HCC)- (present on admission)   Assessment & Plan    Intubated in field  1/27 aggressive management of a head injury/ICP precludes weaning protocol  Continue full mechanical ventilatory support.   Ventilator bundle  1/31:  Consider tracheostomy soon     Subdural hematoma (HCC)- (present on admission)   Assessment & Plan    Left sided holohemispheric subdural hematoma measuring approximately 9.4 mm in maximum thickness. There is approximately 10 mm of left-to-right midline shift. There is probably mild left sided hemispheric edema.  1/26 To OR for emergent craniotomy and evacuation of hematoma.   1/28 CT x2 stable  Trending ICP  Continue Keppra  HTS protocol  Head of bed elevation  Optimize p.o. 2/PCO2  Aggressive sedation/analgesia.  1/31 MRI brain and neck planned next 24-48 hours.  Aggressive support if brain stem ok  Jazlyn Huntley MD. Neurosurgery.     Aspiration into airway- (present on admission)   Assessment & Plan    Admission imaging with bilateral, left greater than right medial dependent consolidation could be due to aspiration pneumonitis or atelectasis.  Aggressive pulmonary hygiene and serial chest radiography.     Contraindication to deep vein thrombosis (DVT) prophylaxis- (present on admission)   Assessment & Plan    Systemic anticoagulation  contraindicated secondary to elevated bleeding risk.  1/27 no evidence of DVT on surveillance ultrasound     Pneumothorax, traumatic- (present on admission)   Assessment & Plan    Small right apical  Observation      Trauma- (present on admission)   Assessment & Plan    [place]. Witnessed seizure like activity. GCS 3. No evidence of acute trauma.  Trauma Red Activation.  Mor Roa MD, Trauma/General Surgery.         Discussed patient condition with RN, RT, Pharmacy and Dietary.  CRITICAL CARE TIME EXCLUDING PROCEDURES: 40    Minutes managing mechanical ventilation, propofol drip titration, HTS titration, continuous tube feeds.

## 2019-01-31 NOTE — PROGRESS NOTES
2 RN skin assessment completed. Redness noted on lateral and posterior ankle. Mepilex in place. Redness noted on sacral region. Mepilex in place. Q 2 hour repositioning implemented. Pillows used to support elbows. Heel float boat boots on. Waffle cushion in place. Mepilex under c-collar.

## 2019-01-31 NOTE — CARE PLAN
Problem: Ventilation Defect:  Goal: Ability to achieve and maintain unassisted ventilation or tolerate decreased levels of ventilator support    Intervention: Manage ventilation therapy by monitoring diagnostic test results  Adult Ventilation Update    Total Vent Days: 6    Patient Lines/Drains/Airways Status    Active Airway     Name: Placement date: Placement time: Site: Days:    Airway ETT Oral 7.5       Oral                    Plateau Pressure (Q Shift): 22 (01/30/19 1117)  Static Compliance (ml / cm H2O): 44 (01/30/19 1912)    Patient failed trials because of Barriers to Wean: Intracranial Hypertension;FiO2 >60% or PEEP >10 CM H2O;No Order;Other (Comments) (01/30/19 0645)  Barriers to SBT    Length of Weaning Trial      Sputum/Suction   Cough: Strong;Productive (01/31/19 0000)  Sputum Amount: Small (01/31/19 0000)  Sputum Color: White;Tan (01/31/19 0000)  Sputum Consistency: Thick (01/31/19 0000)    Mobility  Level of Mobility: Level I (01/30/19 2000)  Activity Performed: Unable to mobilize (01/30/19 2000)  Assistance / Tolerance: Assistance of Two or More (01/29/19 0800)  Pt Calls for Assistance: No (01/26/19 2000)  Staff Present for Mobilization: RN (01/26/19 2000)  Gait: Unable to Ambulate (01/27/19 0400)  Reason Not Mobilized: Bed rest (01/30/19 2231)  Mobilization Comments: ICP monitor/ unstable head injury/no bone flap  (01/30/19 0800)    Events/Summary/Plan: Fio2 40% (01/30/19 2231)

## 2019-02-01 ENCOUNTER — APPOINTMENT (OUTPATIENT)
Dept: RADIOLOGY | Facility: MEDICAL CENTER | Age: 29
DRG: 003 | End: 2019-02-01
Attending: NURSE PRACTITIONER
Payer: COMMERCIAL

## 2019-02-01 ENCOUNTER — APPOINTMENT (OUTPATIENT)
Dept: RADIOLOGY | Facility: MEDICAL CENTER | Age: 29
DRG: 003 | End: 2019-02-01
Attending: SURGERY
Payer: COMMERCIAL

## 2019-02-01 LAB
ANION GAP SERPL CALC-SCNC: 10 MMOL/L (ref 0–11.9)
ANION GAP SERPL CALC-SCNC: 6 MMOL/L (ref 0–11.9)
ANION GAP SERPL CALC-SCNC: 7 MMOL/L (ref 0–11.9)
ANION GAP SERPL CALC-SCNC: 8 MMOL/L (ref 0–11.9)
BUN SERPL-MCNC: 23 MG/DL (ref 8–22)
BUN SERPL-MCNC: 25 MG/DL (ref 8–22)
BUN SERPL-MCNC: 29 MG/DL (ref 8–22)
BUN SERPL-MCNC: 29 MG/DL (ref 8–22)
CALCIUM SERPL-MCNC: 7.7 MG/DL (ref 8.5–10.5)
CALCIUM SERPL-MCNC: 8 MG/DL (ref 8.5–10.5)
CALCIUM SERPL-MCNC: 8 MG/DL (ref 8.5–10.5)
CALCIUM SERPL-MCNC: 8.2 MG/DL (ref 8.5–10.5)
CHLORIDE SERPL-SCNC: 118 MMOL/L (ref 96–112)
CHLORIDE SERPL-SCNC: 118 MMOL/L (ref 96–112)
CHLORIDE SERPL-SCNC: 119 MMOL/L (ref 96–112)
CHLORIDE SERPL-SCNC: 119 MMOL/L (ref 96–112)
CO2 SERPL-SCNC: 22 MMOL/L (ref 20–33)
CO2 SERPL-SCNC: 23 MMOL/L (ref 20–33)
CO2 SERPL-SCNC: 23 MMOL/L (ref 20–33)
CO2 SERPL-SCNC: 24 MMOL/L (ref 20–33)
CREAT SERPL-MCNC: 0.66 MG/DL (ref 0.5–1.4)
CREAT SERPL-MCNC: 0.71 MG/DL (ref 0.5–1.4)
CREAT SERPL-MCNC: 0.72 MG/DL (ref 0.5–1.4)
CREAT SERPL-MCNC: 0.78 MG/DL (ref 0.5–1.4)
ERYTHROCYTE [DISTWIDTH] IN BLOOD BY AUTOMATED COUNT: 48.1 FL (ref 35.9–50)
GLUCOSE SERPL-MCNC: 116 MG/DL (ref 65–99)
GLUCOSE SERPL-MCNC: 131 MG/DL (ref 65–99)
GLUCOSE SERPL-MCNC: 147 MG/DL (ref 65–99)
GLUCOSE SERPL-MCNC: 149 MG/DL (ref 65–99)
HCT VFR BLD AUTO: 26.5 % (ref 42–52)
HGB BLD-MCNC: 8.2 G/DL (ref 14–18)
MCH RBC QN AUTO: 30.5 PG (ref 27–33)
MCHC RBC AUTO-ENTMCNC: 30.9 G/DL (ref 33.7–35.3)
MCV RBC AUTO: 98.5 FL (ref 81.4–97.8)
PLATELET # BLD AUTO: 281 K/UL (ref 164–446)
PMV BLD AUTO: 10.1 FL (ref 9–12.9)
POTASSIUM SERPL-SCNC: 3.6 MMOL/L (ref 3.6–5.5)
POTASSIUM SERPL-SCNC: 3.6 MMOL/L (ref 3.6–5.5)
POTASSIUM SERPL-SCNC: 3.7 MMOL/L (ref 3.6–5.5)
POTASSIUM SERPL-SCNC: 3.7 MMOL/L (ref 3.6–5.5)
RBC # BLD AUTO: 2.69 M/UL (ref 4.7–6.1)
SODIUM SERPL-SCNC: 148 MMOL/L (ref 135–145)
SODIUM SERPL-SCNC: 149 MMOL/L (ref 135–145)
SODIUM SERPL-SCNC: 149 MMOL/L (ref 135–145)
SODIUM SERPL-SCNC: 151 MMOL/L (ref 135–145)
WBC # BLD AUTO: 12.8 K/UL (ref 4.8–10.8)

## 2019-02-01 PROCEDURE — 700111 HCHG RX REV CODE 636 W/ 250 OVERRIDE (IP): Performed by: PHYSICIAN ASSISTANT

## 2019-02-01 PROCEDURE — A9270 NON-COVERED ITEM OR SERVICE: HCPCS | Performed by: SURGERY

## 2019-02-01 PROCEDURE — 85027 COMPLETE CBC AUTOMATED: CPT

## 2019-02-01 PROCEDURE — 80048 BASIC METABOLIC PNL TOTAL CA: CPT | Mod: 91

## 2019-02-01 PROCEDURE — 99291 CRITICAL CARE FIRST HOUR: CPT | Performed by: SURGERY

## 2019-02-01 PROCEDURE — 770022 HCHG ROOM/CARE - ICU (200)

## 2019-02-01 PROCEDURE — 71045 X-RAY EXAM CHEST 1 VIEW: CPT

## 2019-02-01 PROCEDURE — 700112 HCHG RX REV CODE 229: Performed by: NEUROLOGICAL SURGERY

## 2019-02-01 PROCEDURE — 700111 HCHG RX REV CODE 636 W/ 250 OVERRIDE (IP): Performed by: NEUROLOGICAL SURGERY

## 2019-02-01 PROCEDURE — 700102 HCHG RX REV CODE 250 W/ 637 OVERRIDE(OP): Performed by: PHYSICAL MEDICINE & REHABILITATION

## 2019-02-01 PROCEDURE — 94003 VENT MGMT INPAT SUBQ DAY: CPT

## 2019-02-01 PROCEDURE — 700102 HCHG RX REV CODE 250 W/ 637 OVERRIDE(OP): Performed by: SURGERY

## 2019-02-01 PROCEDURE — 00P0X2Z REMOVAL OF MONITORING DEVICE FROM BRAIN, EXTERNAL APPROACH: ICD-10-PCS | Performed by: NEUROLOGICAL SURGERY

## 2019-02-01 PROCEDURE — 700105 HCHG RX REV CODE 258: Performed by: NEUROLOGICAL SURGERY

## 2019-02-01 PROCEDURE — 99233 SBSQ HOSP IP/OBS HIGH 50: CPT | Performed by: PHYSICAL MEDICINE & REHABILITATION

## 2019-02-01 PROCEDURE — A9270 NON-COVERED ITEM OR SERVICE: HCPCS | Performed by: PHYSICAL MEDICINE & REHABILITATION

## 2019-02-01 PROCEDURE — A9270 NON-COVERED ITEM OR SERVICE: HCPCS | Performed by: NEUROLOGICAL SURGERY

## 2019-02-01 PROCEDURE — 700111 HCHG RX REV CODE 636 W/ 250 OVERRIDE (IP): Performed by: SURGERY

## 2019-02-01 RX ADMIN — SENNOSIDES AND DOCUSATE SODIUM 2 TABLET: 8.6; 5 TABLET ORAL at 12:56

## 2019-02-01 RX ADMIN — PROPRANOLOL HYDROCHLORIDE 10 MG: 10 TABLET ORAL at 05:00

## 2019-02-01 RX ADMIN — ONDANSETRON 4 MG: 2 INJECTION INTRAMUSCULAR; INTRAVENOUS at 11:03

## 2019-02-01 RX ADMIN — LEVETIRACETAM 500 MG: 500 TABLET, FILM COATED ORAL at 05:00

## 2019-02-01 RX ADMIN — FAMOTIDINE 20 MG: 20 TABLET ORAL at 17:18

## 2019-02-01 RX ADMIN — CEFAZOLIN SODIUM 2 G: 2 INJECTION, SOLUTION INTRAVENOUS at 21:51

## 2019-02-01 RX ADMIN — POLYETHYLENE GLYCOL 3350 1 PACKET: 17 POWDER, FOR SOLUTION ORAL at 12:56

## 2019-02-01 RX ADMIN — CEFAZOLIN SODIUM 2 G: 2 INJECTION, SOLUTION INTRAVENOUS at 14:51

## 2019-02-01 RX ADMIN — PROPRANOLOL HYDROCHLORIDE 10 MG: 10 TABLET ORAL at 21:57

## 2019-02-01 RX ADMIN — Medication 2 MG/HR: at 17:19

## 2019-02-01 RX ADMIN — LEVETIRACETAM 500 MG: 500 TABLET, FILM COATED ORAL at 17:18

## 2019-02-01 RX ADMIN — POLYETHYLENE GLYCOL 3350 1 PACKET: 17 POWDER, FOR SOLUTION ORAL at 04:59

## 2019-02-01 RX ADMIN — DOCUSATE SODIUM 100 MG: 50 LIQUID ORAL at 17:18

## 2019-02-01 RX ADMIN — PROPRANOLOL HYDROCHLORIDE 10 MG: 10 TABLET ORAL at 14:51

## 2019-02-01 RX ADMIN — ACETAMINOPHEN 650 MG: 325 TABLET, FILM COATED ORAL at 11:03

## 2019-02-01 RX ADMIN — FAMOTIDINE 20 MG: 20 TABLET ORAL at 05:00

## 2019-02-01 RX ADMIN — DOCUSATE SODIUM 100 MG: 50 LIQUID ORAL at 05:00

## 2019-02-01 RX ADMIN — CEFAZOLIN SODIUM 2 G: 2 INJECTION, SOLUTION INTRAVENOUS at 05:00

## 2019-02-01 RX ADMIN — SODIUM CHLORIDE 500 ML: 234 INJECTION, SOLUTION INTRAVENOUS at 20:40

## 2019-02-01 RX ADMIN — ENOXAPARIN SODIUM 40 MG: 100 INJECTION SUBCUTANEOUS at 05:00

## 2019-02-01 RX ADMIN — SODIUM CHLORIDE 500 ML: 234 INJECTION, SOLUTION INTRAVENOUS at 00:25

## 2019-02-01 NOTE — PROGRESS NOTES
Dr. Huntley at bedside. Removed [place] and discontinued ICP monitor. New orders received for patient. MRI ordered for today, and Q2 neuro checks during day shift, Q4 neuro checks during night shift.

## 2019-02-01 NOTE — PROGRESS NOTES
Trauma / Surgical Daily Progress Note    Date of Service  2/1/2019    Chief Complaint  28 y.o. male admitted 1/26/2019 with Trauma    Interval Events  Sedation lifted   Opens eyes   Begin SBT/ wean   Fluid balance positive   Critical  risk for deterioration     Review of Systems  Review of Systems   Unable to perform ROS: Mental status change        Vital Signs for last 24 hours  Pulse:  [58-76] 71  Resp:  [18-23] 22  BP: (142)/(76) 142/76  SpO2:  [93 %-100 %] 93 %    Hemodynamic parameters for last 24 hours       Respiratory Data     Respiration: (!) 22, Pulse Oximetry: 93 %     Work Of Breathing / Effort: Vented  RUL Breath Sounds: Clear After Suction, RML Breath Sounds: Diminished, RLL Breath Sounds: Diminished, STACI Breath Sounds: Clear After Suction, LLL Breath Sounds: Diminished    Physical Exam  Physical Exam   Constitutional: He appears well-developed and well-nourished.   HENT:   craniotomy   Eyes: Pupils are equal, round, and reactive to light.   Neck: No JVD present. No tracheal deviation present. No thyromegaly present.   Cardiovascular: Normal rate and regular rhythm.    Pulmonary/Chest: Effort normal. He has no wheezes. He has no rales.   Full vent    Abdominal: Soft. He exhibits no distension. There is no tenderness.   Musculoskeletal: He exhibits edema.   Lymphadenopathy:     He has no cervical adenopathy.   Neurological: No cranial nerve deficit. GCS eye subscore is 2. GCS verbal subscore is 1. GCS motor subscore is 4.   Skin: Skin is warm and dry. He is not diaphoretic.   Nursing note and vitals reviewed.      Laboratory  Recent Results (from the past 24 hour(s))   Basic Metabolic Panel    Collection Time: 01/31/19  6:27 PM   Result Value Ref Range    Sodium 154 (H) 135 - 145 mmol/L    Potassium 3.8 3.6 - 5.5 mmol/L    Chloride 123 (H) 96 - 112 mmol/L    Co2 23 20 - 33 mmol/L    Glucose 138 (H) 65 - 99 mg/dL    Bun 23 (H) 8 - 22 mg/dL    Creatinine 0.64 0.50 - 1.40 mg/dL    Calcium 7.9 (L) 8.5 -  10.5 mg/dL    Anion Gap 8.0 0.0 - 11.9   ESTIMATED GFR    Collection Time: 01/31/19  6:27 PM   Result Value Ref Range    GFR If African American >60 >60 mL/min/1.73 m 2    GFR If Non African American >60 >60 mL/min/1.73 m 2   Basic Metabolic Panel    Collection Time: 01/31/19 11:45 PM   Result Value Ref Range    Sodium 149 (H) 135 - 145 mmol/L    Potassium 3.7 3.6 - 5.5 mmol/L    Chloride 119 (H) 96 - 112 mmol/L    Co2 23 20 - 33 mmol/L    Glucose 131 (H) 65 - 99 mg/dL    Bun 23 (H) 8 - 22 mg/dL    Creatinine 0.72 0.50 - 1.40 mg/dL    Calcium 8.2 (L) 8.5 - 10.5 mg/dL    Anion Gap 7.0 0.0 - 11.9   ESTIMATED GFR    Collection Time: 01/31/19 11:45 PM   Result Value Ref Range    GFR If African American >60 >60 mL/min/1.73 m 2    GFR If Non African American >60 >60 mL/min/1.73 m 2   CBC without Differential    Collection Time: 02/01/19  5:35 AM   Result Value Ref Range    WBC 12.8 (H) 4.8 - 10.8 K/uL    RBC 2.69 (L) 4.70 - 6.10 M/uL    Hemoglobin 8.2 (L) 14.0 - 18.0 g/dL    Hematocrit 26.5 (L) 42.0 - 52.0 %    MCV 98.5 (H) 81.4 - 97.8 fL    MCH 30.5 27.0 - 33.0 pg    MCHC 30.9 (L) 33.7 - 35.3 g/dL    RDW 48.1 35.9 - 50.0 fL    Platelet Count 281 164 - 446 K/uL    MPV 10.1 9.0 - 12.9 fL   Basic Metabolic Panel    Collection Time: 02/01/19  5:35 AM   Result Value Ref Range    Sodium 149 (H) 135 - 145 mmol/L    Potassium 3.6 3.6 - 5.5 mmol/L    Chloride 118 (H) 96 - 112 mmol/L    Co2 23 20 - 33 mmol/L    Glucose 149 (H) 65 - 99 mg/dL    Bun 25 (H) 8 - 22 mg/dL    Creatinine 0.78 0.50 - 1.40 mg/dL    Calcium 8.0 (L) 8.5 - 10.5 mg/dL    Anion Gap 8.0 0.0 - 11.9   ESTIMATED GFR    Collection Time: 02/01/19  5:35 AM   Result Value Ref Range    GFR If African American >60 >60 mL/min/1.73 m 2    GFR If Non African American >60 >60 mL/min/1.73 m 2   Basic Metabolic Panel    Collection Time: 02/01/19 12:00 PM   Result Value Ref Range    Sodium 151 (H) 135 - 145 mmol/L    Potassium 3.6 3.6 - 5.5 mmol/L    Chloride 119 (H) 96 - 112  mmol/L    Co2 22 20 - 33 mmol/L    Glucose 147 (H) 65 - 99 mg/dL    Bun 29 (H) 8 - 22 mg/dL    Creatinine 0.66 0.50 - 1.40 mg/dL    Calcium 7.7 (L) 8.5 - 10.5 mg/dL    Anion Gap 10.0 0.0 - 11.9   ESTIMATED GFR    Collection Time: 02/01/19 12:00 PM   Result Value Ref Range    GFR If African American >60 >60 mL/min/1.73 m 2    GFR If Non African American >60 >60 mL/min/1.73 m 2       Fluids    Intake/Output Summary (Last 24 hours) at 02/01/19 1338  Last data filed at 02/01/19 1000   Gross per 24 hour   Intake          2923.04 ml   Output             2080 ml   Net           843.04 ml       Core Measures & Quality Metrics  Labs reviewed, Medications reviewed and Radiology images reviewed  Greer catheter: Critically Ill - Requiring Accurate Measurement of Urinary Output      DVT Prophylaxis: Enoxaparin (Lovenox)  DVT prophylaxis - mechanical: SCDs  Ulcer prophylaxis: Yes        JOHN Score  ETOH Screening    Assessment/Plan  Acute respiratory failure following trauma and surgery (HCC)- (present on admission)   Assessment & Plan    Intubated in field  1/27 aggressive management of a head injury/ICP precludes weaning protocol  Continue full mechanical ventilatory support.   Ventilator bundle  1/31:  Consider tracheostomy soon     Subdural hematoma (HCC)- (present on admission)   Assessment & Plan    Left sided holohemispheric subdural hematoma measuring approximately 9.4 mm in maximum thickness. There is approximately 10 mm of left-to-right midline shift. There is probably mild left sided hemispheric edema.  1/26 To OR for emergent craniotomy and evacuation of hematoma.   1/28 CT x2 stable  Trending ICP  Continue Keppra  HTS protocol  Head of bed elevation  Optimize p.o. 2/PCO2  Aggressive sedation/analgesia.  1/31 MRI brain and neck planned next 24-48 hours.  Aggressive support if brain stem ok  Jazlyn Huntley MD. Neurosurgery.     Aspiration into airway- (present on admission)   Assessment & Plan    Admission imaging with  bilateral, left greater than right medial dependent consolidation could be due to aspiration pneumonitis or atelectasis.  Aggressive pulmonary hygiene and serial chest radiography.     Contraindication to deep vein thrombosis (DVT) prophylaxis- (present on admission)   Assessment & Plan    Systemic anticoagulation contraindicated secondary to elevated bleeding risk.  1/27 no evidence of DVT on surveillance ultrasound     Pneumothorax, traumatic- (present on admission)   Assessment & Plan    Small right apical  Observation      Trauma- (present on admission)   Assessment & Plan    [place]. Witnessed seizure like activity. GCS 3. No evidence of acute trauma.  Trauma Red Activation.  Mor Roa MD, Trauma/General Surgery.         Discussed patient condition with Family, RN, RT, Pharmacy and Patient.  CRITICAL CARE TIME EXCLUDING PROCEDURES: 55    Minutes  I independently reviewed pertinent clinical lab tests from the last 48 hours and ordered additional follow up clinical lab tests.  I independently reviewed pertinent radiographic images and the radiologist's reports from the last 48 hours and ordered additional follow up radiographic studies.  I reviewed the details of the available patient records and documentation by consulting physicians in EPIC up to today, summated the information, and utilized the information as warranted in today's medical decision making for this patient.  I personally evaluated the patient condition at bedside and discussed the daily plan(s) with available nursing staff, dieticians, social workers, pharmacists on rounds.  I am actively managing this patient's mechanical ventilation and directly involved in the adjustment of multiple settings (FiO2, PEEP, tidal volume, and minute ventilation) based on my personal bedside evaluation of this patient's radiographic, and laboratory findings and clinical changes throughout the day.

## 2019-02-01 NOTE — PROGRESS NOTES
Physical Medicine and Rehabilitation Consultation         Follow up Consult      Date of Consultation: 2/1/2019  Consulting provider: Heber Brown D.O.  Reason for consultation: assess for acute inpatient rehab appropriateness      Chief complaint: TBI    S:   Nurse reports was able to wean from propofol overnight.  No increase in ICP.  He is still having episodes of fever, they are using cooling measures at this time as well as standing Tylenol.  Propanolol was started yesterday.  He continues to move his right hand more, only to pain over the fourth and fifth digit.    He is scheduled for MRI of brain and C-spine today.  He has not undergone spontaneous breathing trials    Bowel: No BM since admission, KUB done this morning    ROS:   unable to be obtained due to cognitive status.      Medications:  Current Facility-Administered Medications   Medication Dose   • acetaminophen (TYLENOL) tablet 650 mg  650 mg   • propranolol (INDERAL) tablet 10 mg  10 mg   • levETIRAcetam (KEPPRA) tablet 500 mg  500 mg   • senna-docusate (PERICOLACE or SENOKOT S) 8.6-50 MG per tablet 2 Tab  2 Tab   • polyethylene glycol/lytes (MIRALAX) PACKET 1 Packet  1 Packet   • enoxaparin (LOVENOX) inj 40 mg  40 mg   • morphine 1 mg/mL in 50 mL NS (Continuous Infusion)     • morphine (pf) 4 mg/ml injection 2 mg  2 mg    Or   • morphine (pf) 4 mg/ml injection 4 mg  4 mg   • ceFAZolin in dextrose (ANCEF) IVPB premix 2 g  2 g   • Pharmacy Consult: Enteral tube insertion - review meds/change route/product selection  1 Each   • famotidine (PEPCID) tablet 20 mg  20 mg   • cloNIDine (CATAPRES) tablet 0.1 mg  0.1 mg   • magnesium hydroxide (MILK OF MAGNESIA) suspension 30 mL  30 mL   • oxyCODONE immediate-release (ROXICODONE) tablet 2.5 mg  2.5 mg   • oxyCODONE immediate-release (ROXICODONE) tablet 5 mg  5 mg   • polyethylene glycol/lytes (MIRALAX) PACKET 1 Packet  1 Packet   • senna-docusate (PERICOLACE or SENOKOT S) 8.6-50 MG  "per tablet 1 Tab  1 Tab   • Respiratory Care per Protocol     • NS infusion     • dextrose 50% (D50W) injection 25 mL  25 mL   • propofol (DIPRIVAN) injection  0-80 mcg/kg/min   • Pharmacy Consult Request ...Pain Management Review 1 Each  1 Each   • MD ALERT...DO NOT ADMINISTER NSAIDS or ASPIRIN unless ORDERED By Neurosurgery 1 Each  1 Each   • ondansetron (ZOFRAN) syringe/vial injection 4 mg  4 mg   • dexamethasone (DECADRON) injection 4 mg  4 mg   • scopolamine (TRANSDERM-SCOP) patch 1 Patch  1 Patch   • hydrALAZINE (APRESOLINE) injection 10 mg  10 mg   • niCARdipine (CARDENE) 25 mg in  mL Infusion  0-15 mg/hr   • bisacodyl (DULCOLAX) suppository 10 mg  10 mg   • fleet enema 133 mL  1 Each   • 3% sodium chloride (HYPERTONIC SALINE) 500mL infusion 500 mL  500 mL   • sodium chloride 200 mEq in bottle/bag empty 50 mL ivpb  200 mEq   • docusate sodium 100mg/10mL (COLACE) solution 100 mg  100 mg   • LORazepam (ATIVAN) injection 1 mg  1 mg       Physical Exam:  Vitals: Blood pressure 142/76, pulse (!) 59, temperature 36.7 °C (98.1 °F), temperature source Greer, resp. rate (!) 22, height 1.702 m (5' 7.01\"), weight 87.5 kg (192 lb 14.4 oz), SpO2 96 %.  Gen: Body mass index is 30.21 kg/m².  Lethargic  HEENT:  moist mucous membranes, ET tube in place, opening eyes to pain, not responding to verbal commands  Cardio: RRR, no mumurs  Pulm: course breath sounds bilaterally, on vent  Abd: Soft NTND, active bowel sounds,   Ext: No peripheral edema. \+dorsal pedalis pulses bilaterally.  MMT: Moving left upper extremity and bilateral lower extremity to pain.  Moving right hand to pain over the fourth and fifth digit    Mental status: Opening eyes to pain not responding to verbal commands, localized response to pain    Labs:  Recent Labs      01/30/19   0520  01/31/19   0530  02/01/19   0535   RBC  2.66*  2.62*  2.69*   HEMOGLOBIN  8.1*  7.9*  8.2*   HEMATOCRIT  26.0*  25.8*  26.5*   PLATELETCT  312  249  281   PROTHROMBTM  " 14.6   --    --    INR  1.13   --    --      Recent Labs      01/31/19   2345  02/01/19   0535  02/01/19   1200   SODIUM  149*  149*  151*   POTASSIUM  3.7  3.6  3.6   CHLORIDE  119*  118*  119*   CO2  23  23  22   GLUCOSE  131*  149*  147*   BUN  23*  25*  29*   CREATININE  0.72  0.78  0.66   CALCIUM  8.2*  8.0*  7.7*     Recent Results (from the past 24 hour(s))   Basic Metabolic Panel    Collection Time: 01/31/19  6:27 PM   Result Value Ref Range    Sodium 154 (H) 135 - 145 mmol/L    Potassium 3.8 3.6 - 5.5 mmol/L    Chloride 123 (H) 96 - 112 mmol/L    Co2 23 20 - 33 mmol/L    Glucose 138 (H) 65 - 99 mg/dL    Bun 23 (H) 8 - 22 mg/dL    Creatinine 0.64 0.50 - 1.40 mg/dL    Calcium 7.9 (L) 8.5 - 10.5 mg/dL    Anion Gap 8.0 0.0 - 11.9   ESTIMATED GFR    Collection Time: 01/31/19  6:27 PM   Result Value Ref Range    GFR If African American >60 >60 mL/min/1.73 m 2    GFR If Non African American >60 >60 mL/min/1.73 m 2   Basic Metabolic Panel    Collection Time: 01/31/19 11:45 PM   Result Value Ref Range    Sodium 149 (H) 135 - 145 mmol/L    Potassium 3.7 3.6 - 5.5 mmol/L    Chloride 119 (H) 96 - 112 mmol/L    Co2 23 20 - 33 mmol/L    Glucose 131 (H) 65 - 99 mg/dL    Bun 23 (H) 8 - 22 mg/dL    Creatinine 0.72 0.50 - 1.40 mg/dL    Calcium 8.2 (L) 8.5 - 10.5 mg/dL    Anion Gap 7.0 0.0 - 11.9   ESTIMATED GFR    Collection Time: 01/31/19 11:45 PM   Result Value Ref Range    GFR If African American >60 >60 mL/min/1.73 m 2    GFR If Non African American >60 >60 mL/min/1.73 m 2   CBC without Differential    Collection Time: 02/01/19  5:35 AM   Result Value Ref Range    WBC 12.8 (H) 4.8 - 10.8 K/uL    RBC 2.69 (L) 4.70 - 6.10 M/uL    Hemoglobin 8.2 (L) 14.0 - 18.0 g/dL    Hematocrit 26.5 (L) 42.0 - 52.0 %    MCV 98.5 (H) 81.4 - 97.8 fL    MCH 30.5 27.0 - 33.0 pg    MCHC 30.9 (L) 33.7 - 35.3 g/dL    RDW 48.1 35.9 - 50.0 fL    Platelet Count 281 164 - 446 K/uL    MPV 10.1 9.0 - 12.9 fL   Basic Metabolic Panel    Collection Time:  02/01/19  5:35 AM   Result Value Ref Range    Sodium 149 (H) 135 - 145 mmol/L    Potassium 3.6 3.6 - 5.5 mmol/L    Chloride 118 (H) 96 - 112 mmol/L    Co2 23 20 - 33 mmol/L    Glucose 149 (H) 65 - 99 mg/dL    Bun 25 (H) 8 - 22 mg/dL    Creatinine 0.78 0.50 - 1.40 mg/dL    Calcium 8.0 (L) 8.5 - 10.5 mg/dL    Anion Gap 8.0 0.0 - 11.9   ESTIMATED GFR    Collection Time: 02/01/19  5:35 AM   Result Value Ref Range    GFR If African American >60 >60 mL/min/1.73 m 2    GFR If Non African American >60 >60 mL/min/1.73 m 2   Basic Metabolic Panel    Collection Time: 02/01/19 12:00 PM   Result Value Ref Range    Sodium 151 (H) 135 - 145 mmol/L    Potassium 3.6 3.6 - 5.5 mmol/L    Chloride 119 (H) 96 - 112 mmol/L    Co2 22 20 - 33 mmol/L    Glucose 147 (H) 65 - 99 mg/dL    Bun 29 (H) 8 - 22 mg/dL    Creatinine 0.66 0.50 - 1.40 mg/dL    Calcium 7.7 (L) 8.5 - 10.5 mg/dL    Anion Gap 10.0 0.0 - 11.9   ESTIMATED GFR    Collection Time: 02/01/19 12:00 PM   Result Value Ref Range    GFR If African American >60 >60 mL/min/1.73 m 2    GFR If Non African American >60 >60 mL/min/1.73 m 2       ASSESSMENT:  TBI:   - propofol DC'd on 1/3, ICP stable, bolt removed by neurosurgery  -Keppra 500 mg twice daily  -Plan MRI of brain and C-spine  -Start amantadine 100 mg every morning and q. noon to help as neuro stimulant.  If signs of sympathetic storming worsen, DC amantadine  -Once cleared by neurosurgery, consult PT OT for early mobilization and stimulation during the day as this is been shown to improve alertness    Fever: sympathetic storming vs infection  -Continue propanolol 10 mg every 8 hours, if he continues fevers, without other signs of infection, consider increasing dose to 20 mg every 8 hours monitor heart rate  -On ceftezole and 2 g every 8 hours    Neurogenic bowel: KUB from 2/1 personally evaluated shows no significant stool load  -Increase senna 3 tablets at noon  -Continue MiraLAX 1 packet daily  -Continue Colace 100 mg twice  daily  -If developing loose stool on above program DC Colace    Neurogenic bladder: Given current cognitive status would recommend continuing with Greer.  If patient becomes more alert would recommend discontinuing Greer and starting timed void every 3 hours during the day and every 4 hours at night    DVT prophylaxis:  -Continue Lovenox 40 mg daily    Rehab: Impaired ADLs  -Recommend PT and OT consult for early mobilization as soon as cleared by neurosurgery    Patient was seen for 35 minutes on unit/floor of which > 50% of time was spent on counseling and coordination of care regarding the above, including prognosis, risk reduction, benefits of treatment, and options for next stage of care.      Discussed with team about recommendations     Heber Brown D.O.

## 2019-02-01 NOTE — PROGRESS NOTES
Neurosurgery Progress Note    Subjective:  Seen with Dr. Huntley  Off Sedation this am  Opens eyes to pain  EEG was ok  CT Wed, still swollen    Exam:  Flexes on left > right    DONALD 3mm  Decompression full  Incision CDI  Na 149    ICP 8-10    BP  Min: 142/76  Max: 142/76  Pulse  Av.9  Min: 62  Max: 76  Resp  Av.1  Min: 18  Max: 24  Monitored Temp 2  Av.3 °C (100.9 °F)  Min: 37.9 °C (100.2 °F)  Max: 38.7 °C (101.7 °F)  SpO2  Av.2 %  Min: 94 %  Max: 100 %    ICP  Av.8 MM HG  Min: -2 MM HG  Max: 10 MM HG  CPP   Av.2  Min: 89  Max: 98    Recent Labs      19   0520  19   0530  19   0535   WBC  16.5*  11.6*  12.8*   RBC  2.66*  2.62*  2.69*   HEMOGLOBIN  8.1*  7.9*  8.2*   HEMATOCRIT  26.0*  25.8*  26.5*   MCV  97.7  98.5*  98.5*   MCH  30.5  30.2  30.5   MCHC  31.2*  30.6*  30.9*   RDW  48.2  48.7  48.1   PLATELETCT  312  249  281   MPV  9.9  10.0  10.1     Recent Labs      19   1827  19   2345  19   0535   SODIUM  154*  149*  149*   POTASSIUM  3.8  3.7  3.6   CHLORIDE  123*  119*  118*   CO2  23  23  23   GLUCOSE  138*  131*  149*   BUN  23*  23*  25*   CREATININE  0.64  0.72  0.78   CALCIUM  7.9*  8.2*  8.0*     Recent Labs      19   0520   INR  1.13           Intake/Output       19 07 - 19 0659 19 - 19 0659       Total [id number] Total       Intake    P.O.  30  -- 30  --  -- --    P.O. 30 -- 30 -- -- --    I.V.  1017.4  467.2 1484.6  96  -- 96    Propofol Volume 44.2 27.4 71.6 -- -- --    IV Volume (Morphine) 24 24 48 4 -- 4    Volume (mL) (potassium chloride in water (KCL) ivpb **Administer in ICU only** 40 mEq) 119.2 -- 119.2 -- -- --    Volume (mL) (NS infusion) 440 360 800 60 -- 60    Volume (mL) (3% sodium chloride (HYPERTONIC SALINE) 500mL infusion 500 mL) 390 55.8 445.8 32 -- 32    Other  90  330 420  --  -- --    Medications (PO/Enteral Liquids) -- 120 120 -- -- --    Flush /  Irrigation Volume (Cortrak) 90 210 300 -- -- --    NG/GT  720  720 1440  120  -- 120    Intake (mL) (Enteral Tube 01/27/19 Cortrak - Gastric Left nare)  120 -- 120    IV Piggyback  100  200 300  --  -- --    Volume (mL) (ceFAZolin in dextrose (ANCEF) IVPB premix 2 g) 100 200 300 -- -- --    Total Intake 1957.4 1717.2 3674.6 216 -- 216       Output    Urine  1100  1205 2305  150  -- 150    Output (mL) (Urethral Catheter 18 Fr.) 1100 1205 2305 150 -- 150    Stool  --  -- --  --  -- --    Number of Times Stooled 0 x 0 x 0 x 0 x -- 0 x    Total Output 1100 1205 2305 150 -- 150       Net I/O     857.4 512.2 1369.6 66 -- 66            Intake/Output Summary (Last 24 hours) at 02/01/19 0829  Last data filed at 02/01/19 0800   Gross per 24 hour   Intake          3502.81 ml   Output             2205 ml   Net          1297.81 ml            • acetaminophen  650 mg Q4HRS PRN   • propranolol  10 mg Q8HRS   • levETIRAcetam  500 mg BID   • senna-docusate  2 Tab DAILY AT NOON   • polyethylene glycol/lytes  1 Packet DAILY AT NOON   • enoxaparin (LOVENOX) injection  40 mg DAILY   • morphine   Continuous   • morphine injection  2 mg Q HOUR PRN    Or   • morphine injection  4 mg Q HOUR PRN   • ceFAZolin  2 g Q8HRS   • Pharmacy  1 Each PHARMACY TO DOSE   • famotidine  20 mg BID   • cloNIDine  0.1 mg Q4HRS PRN   • magnesium hydroxide  30 mL QDAY PRN   • oxyCODONE immediate-release  2.5 mg Q3HRS PRN   • oxyCODONE immediate-release  5 mg Q3HRS PRN   • polyethylene glycol/lytes  1 Packet BID PRN   • senna-docusate  1 Tab Q24HRS PRN   • Respiratory Care per Protocol   Continuous RT   • NS   Continuous   • dextrose 50%  25 mL Q15 MIN PRN   • propofol  0-80 mcg/kg/min Continuous   • Pharmacy Consult Request  1 Each PHARMACY TO DOSE   • MD ALERT...DO NOT ADMINISTER NSAIDS or ASPIRIN unless ORDERED By Neurosurgery  1 Each PRN   • ondansetron  4 mg Q4HRS PRN   • dexamethasone  4 mg Once PRN   • scopolamine  1 Patch Q72HRS PRN   •  hydrALAZINE  10 mg Q HOUR PRN   • niCARdipine infusion  0-15 mg/hr Continuous   • bisacodyl  10 mg Q24HRS PRN   • fleet  1 Each Once PRN   • 3% sodium chloride  500 mL Continuous   • sodium chloride 23.4% ivpb  200 mEq Q6HRS PRN   • docusate sodium 100mg/10mL  100 mg BID   • LORazepam  1 mg Q2HRS PRN       Assessment and Plan:  Hospital day # 7 POD# 6  On lovenox    Improved exam: opens eyes     ICP monitor removed. 2-0 stitch placed    Plan:  1. MRI Brain and C-spine  2. Allow to wake as tolerated

## 2019-02-01 NOTE — CARE PLAN
Problem: Ventilation Defect:  Goal: Ability to achieve and maintain unassisted ventilation or tolerate decreased levels of ventilator support    Intervention: Support and monitor invasive and noninvasive mechanical ventilation  Vent Day #6    APVCMV  RR 22  Vt 450  PEEP 8  FiO2 40%  Intervention: Monitor ventilator weaning response  No SBTs  Intervention: Perform ventilator associated pneumonia prevention interventions  VAP flowsheet  Intervention: Manage ventilation therapy by monitoring diagnostic test results  ABGs

## 2019-02-01 NOTE — CARE PLAN
Problem: Ventilation Defect:  Goal: Ability to achieve and maintain unassisted ventilation or tolerate decreased levels of ventilator support    Intervention: Manage ventilation therapy by monitoring diagnostic test results  Adult Ventilation Update    Total Vent Days: 7    Patient Lines/Drains/Airways Status    Active Airway     Name: Placement date: Placement time: Site: Days:    Airway ETT Oral 7.5       Oral                              Plateau Pressure (Q Shift): 22 (01/31/19 0918)  Static Compliance (ml / cm H2O): 39 (02/01/19 0300)    Patient failed trials because of Barriers to Wean: Intracranial Hypertension;No Order;Other (Comments) (01/31/19 0646)  Barriers to SBT    Length of Weaning Trial      Sputum/Suction Strong;Productive (02/01/19 0000)  Sputum Amount: Small (02/01/19 0000)  Sputum Color: White;Tan (02/01/19 0000)  Sputum Consistency: Thick (02/01/19 0000)    Mobility  Level of Mobility: Level I (01/31/19 2000)  Activity Performed: Unable to mobilize (01/31/19 2251)  Assistance / Tolerance: Assistance of Two or More (01/29/19 0800)  Pt Calls for Assistance: No (01/26/19 2000)  Staff Present for Mobilization: RN (01/26/19 2000)  Gait: Unable to Ambulate (01/27/19 0400)  Reason Not Mobilized: Bed rest (01/31/19 2251)  Mobilization Comments: ICP monitoring (01/31/19 0800)    Events/Summary/Plan: Fio2 40% (01/30/19 2231)

## 2019-02-02 ENCOUNTER — APPOINTMENT (OUTPATIENT)
Dept: RADIOLOGY | Facility: MEDICAL CENTER | Age: 29
DRG: 003 | End: 2019-02-02
Attending: PHYSICIAN ASSISTANT
Payer: COMMERCIAL

## 2019-02-02 ENCOUNTER — APPOINTMENT (OUTPATIENT)
Dept: RADIOLOGY | Facility: MEDICAL CENTER | Age: 29
DRG: 003 | End: 2019-02-02
Attending: NURSE PRACTITIONER
Payer: COMMERCIAL

## 2019-02-02 LAB
ANION GAP SERPL CALC-SCNC: 7 MMOL/L (ref 0–11.9)
ANION GAP SERPL CALC-SCNC: 8 MMOL/L (ref 0–11.9)
ANION GAP SERPL CALC-SCNC: 8 MMOL/L (ref 0–11.9)
ANION GAP SERPL CALC-SCNC: 9 MMOL/L (ref 0–11.9)
BUN SERPL-MCNC: 29 MG/DL (ref 8–22)
BUN SERPL-MCNC: 29 MG/DL (ref 8–22)
BUN SERPL-MCNC: 32 MG/DL (ref 8–22)
BUN SERPL-MCNC: 33 MG/DL (ref 8–22)
CALCIUM SERPL-MCNC: 7.7 MG/DL (ref 8.5–10.5)
CALCIUM SERPL-MCNC: 7.8 MG/DL (ref 8.5–10.5)
CALCIUM SERPL-MCNC: 8 MG/DL (ref 8.5–10.5)
CALCIUM SERPL-MCNC: 8 MG/DL (ref 8.5–10.5)
CHLORIDE SERPL-SCNC: 118 MMOL/L (ref 96–112)
CHLORIDE SERPL-SCNC: 118 MMOL/L (ref 96–112)
CHLORIDE SERPL-SCNC: 119 MMOL/L (ref 96–112)
CHLORIDE SERPL-SCNC: 120 MMOL/L (ref 96–112)
CO2 SERPL-SCNC: 22 MMOL/L (ref 20–33)
CO2 SERPL-SCNC: 23 MMOL/L (ref 20–33)
CREAT SERPL-MCNC: 0.63 MG/DL (ref 0.5–1.4)
CREAT SERPL-MCNC: 0.65 MG/DL (ref 0.5–1.4)
CREAT SERPL-MCNC: 0.7 MG/DL (ref 0.5–1.4)
CREAT SERPL-MCNC: 0.74 MG/DL (ref 0.5–1.4)
GLUCOSE SERPL-MCNC: 120 MG/DL (ref 65–99)
GLUCOSE SERPL-MCNC: 126 MG/DL (ref 65–99)
GLUCOSE SERPL-MCNC: 126 MG/DL (ref 65–99)
GLUCOSE SERPL-MCNC: 147 MG/DL (ref 65–99)
POTASSIUM SERPL-SCNC: 3.3 MMOL/L (ref 3.6–5.5)
POTASSIUM SERPL-SCNC: 3.5 MMOL/L (ref 3.6–5.5)
POTASSIUM SERPL-SCNC: 3.5 MMOL/L (ref 3.6–5.5)
POTASSIUM SERPL-SCNC: 3.6 MMOL/L (ref 3.6–5.5)
SODIUM SERPL-SCNC: 148 MMOL/L (ref 135–145)
SODIUM SERPL-SCNC: 148 MMOL/L (ref 135–145)
SODIUM SERPL-SCNC: 151 MMOL/L (ref 135–145)
SODIUM SERPL-SCNC: 151 MMOL/L (ref 135–145)

## 2019-02-02 PROCEDURE — 71045 X-RAY EXAM CHEST 1 VIEW: CPT

## 2019-02-02 PROCEDURE — 700111 HCHG RX REV CODE 636 W/ 250 OVERRIDE (IP): Performed by: PHYSICIAN ASSISTANT

## 2019-02-02 PROCEDURE — 700102 HCHG RX REV CODE 250 W/ 637 OVERRIDE(OP): Performed by: SURGERY

## 2019-02-02 PROCEDURE — 700111 HCHG RX REV CODE 636 W/ 250 OVERRIDE (IP): Performed by: NEUROLOGICAL SURGERY

## 2019-02-02 PROCEDURE — A9270 NON-COVERED ITEM OR SERVICE: HCPCS | Performed by: SURGERY

## 2019-02-02 PROCEDURE — 700102 HCHG RX REV CODE 250 W/ 637 OVERRIDE(OP): Performed by: PHYSICAL MEDICINE & REHABILITATION

## 2019-02-02 PROCEDURE — 94150 VITAL CAPACITY TEST: CPT

## 2019-02-02 PROCEDURE — 94003 VENT MGMT INPAT SUBQ DAY: CPT

## 2019-02-02 PROCEDURE — 70551 MRI BRAIN STEM W/O DYE: CPT

## 2019-02-02 PROCEDURE — 700112 HCHG RX REV CODE 229: Performed by: NEUROLOGICAL SURGERY

## 2019-02-02 PROCEDURE — A9270 NON-COVERED ITEM OR SERVICE: HCPCS | Performed by: NEUROLOGICAL SURGERY

## 2019-02-02 PROCEDURE — 770022 HCHG ROOM/CARE - ICU (200)

## 2019-02-02 PROCEDURE — 72141 MRI NECK SPINE W/O DYE: CPT

## 2019-02-02 PROCEDURE — 99291 CRITICAL CARE FIRST HOUR: CPT | Performed by: SURGERY

## 2019-02-02 PROCEDURE — 700105 HCHG RX REV CODE 258: Performed by: NEUROLOGICAL SURGERY

## 2019-02-02 PROCEDURE — 700102 HCHG RX REV CODE 250 W/ 637 OVERRIDE(OP): Performed by: NEUROLOGICAL SURGERY

## 2019-02-02 PROCEDURE — A9270 NON-COVERED ITEM OR SERVICE: HCPCS | Performed by: PHYSICAL MEDICINE & REHABILITATION

## 2019-02-02 PROCEDURE — 80048 BASIC METABOLIC PNL TOTAL CA: CPT | Mod: 91

## 2019-02-02 RX ADMIN — CEFAZOLIN SODIUM 2 G: 2 INJECTION, SOLUTION INTRAVENOUS at 05:33

## 2019-02-02 RX ADMIN — ENOXAPARIN SODIUM 40 MG: 100 INJECTION SUBCUTANEOUS at 05:46

## 2019-02-02 RX ADMIN — PROPRANOLOL HYDROCHLORIDE 10 MG: 10 TABLET ORAL at 13:47

## 2019-02-02 RX ADMIN — SENNOSIDES AND DOCUSATE SODIUM 2 TABLET: 8.6; 5 TABLET ORAL at 13:01

## 2019-02-02 RX ADMIN — PROPRANOLOL HYDROCHLORIDE 10 MG: 10 TABLET ORAL at 22:02

## 2019-02-02 RX ADMIN — MAGNESIUM HYDROXIDE 30 ML: 400 SUSPENSION ORAL at 13:01

## 2019-02-02 RX ADMIN — PROPRANOLOL HYDROCHLORIDE 10 MG: 10 TABLET ORAL at 05:47

## 2019-02-02 RX ADMIN — SODIUM CHLORIDE 500 ML: 234 INJECTION, SOLUTION INTRAVENOUS at 09:47

## 2019-02-02 RX ADMIN — CEFAZOLIN SODIUM 2 G: 2 INJECTION, SOLUTION INTRAVENOUS at 22:02

## 2019-02-02 RX ADMIN — FAMOTIDINE 20 MG: 20 TABLET ORAL at 17:25

## 2019-02-02 RX ADMIN — CEFAZOLIN SODIUM 2 G: 2 INJECTION, SOLUTION INTRAVENOUS at 13:47

## 2019-02-02 RX ADMIN — BISACODYL 10 MG: 10 SUPPOSITORY RECTAL at 22:15

## 2019-02-02 RX ADMIN — LEVETIRACETAM 500 MG: 500 TABLET, FILM COATED ORAL at 05:46

## 2019-02-02 RX ADMIN — POLYETHYLENE GLYCOL 3350 1 PACKET: 17 POWDER, FOR SOLUTION ORAL at 13:01

## 2019-02-02 RX ADMIN — FAMOTIDINE 20 MG: 20 TABLET ORAL at 05:46

## 2019-02-02 RX ADMIN — SODIUM CHLORIDE 500 ML: 234 INJECTION, SOLUTION INTRAVENOUS at 21:51

## 2019-02-02 RX ADMIN — DOCUSATE SODIUM 100 MG: 50 LIQUID ORAL at 17:25

## 2019-02-02 RX ADMIN — DOCUSATE SODIUM 100 MG: 50 LIQUID ORAL at 05:46

## 2019-02-02 ASSESSMENT — COGNITIVE AND FUNCTIONAL STATUS - GENERAL
WALKING IN HOSPITAL ROOM: TOTAL
HELP NEEDED FOR BATHING: TOTAL
SUGGESTED CMS G CODE MODIFIER MOBILITY: CN
MOBILITY SCORE: 6
MOVING TO AND FROM BED TO CHAIR: UNABLE
DAILY ACTIVITIY SCORE: 6
MOVING FROM LYING ON BACK TO SITTING ON SIDE OF FLAT BED: UNABLE
SUGGESTED CMS G CODE MODIFIER DAILY ACTIVITY: CN
CLIMB 3 TO 5 STEPS WITH RAILING: TOTAL
STANDING UP FROM CHAIR USING ARMS: TOTAL
TURNING FROM BACK TO SIDE WHILE IN FLAT BAD: UNABLE
DRESSING REGULAR UPPER BODY CLOTHING: TOTAL
EATING MEALS: TOTAL
PERSONAL GROOMING: TOTAL
DRESSING REGULAR LOWER BODY CLOTHING: TOTAL
TOILETING: TOTAL

## 2019-02-02 ASSESSMENT — PATIENT HEALTH QUESTIONNAIRE - PHQ9
1. LITTLE INTEREST OR PLEASURE IN DOING THINGS: NOT AT ALL
SUM OF ALL RESPONSES TO PHQ9 QUESTIONS 1 AND 2: 0
2. FEELING DOWN, DEPRESSED, IRRITABLE, OR HOPELESS: NOT AT ALL

## 2019-02-02 ASSESSMENT — LIFESTYLE VARIABLES: ALCOHOL_USE: NO

## 2019-02-02 ASSESSMENT — PULMONARY FUNCTION TESTS: FVC: 0

## 2019-02-02 NOTE — PROGRESS NOTES
Neurosurgery Progress Note    Subjective:  D/W RN--no new issues.      Exam:  Opening eyes.  PERRL.  w/d    Pulse  Av.1  Min: 55  Max: 71  Resp  Av.3  Min: 0  Max: 23  Monitored Temp 2  Av.7 °C (99.8 °F)  Min: 37.4 °C (99.3 °F)  Max: 38.1 °C (100.6 °F)  SpO2  Av.2 %  Min: 92 %  Max: 98 %    No Data Recorded    Recent Labs      19   0530  19   0535   WBC  11.6*  12.8*   RBC  2.62*  2.69*   HEMOGLOBIN  7.9*  8.2*   HEMATOCRIT  25.8*  26.5*   MCV  98.5*  98.5*   MCH  30.2  30.5   MCHC  30.6*  30.9*   RDW  48.7  48.1   PLATELETCT  249  281   MPV  10.0  10.1     Recent Labs      19   1810  19   0010  19   0600   SODIUM  148*  148*  148*   POTASSIUM  3.7  3.5*  3.5*   CHLORIDE  118*  118*  118*   CO2  24  23  22   GLUCOSE  116*  120*  147*   BUN  29*  29*  29*   CREATININE  0.71  0.70  0.74   CALCIUM  8.0*  8.0*  8.0*               Intake/Output       19 07 - 19 0659 19 07 - 19 0659       Total 1900-0659 Total       Intake    I.V.  616  730.5 1346.5  156.5  -- 156.5    IV Volume (Morphine) 24 24 48 4 -- 4    Volume (mL) (NS infusion) 360 310 670 60 -- 60    Volume (mL) (3% sodium chloride (HYPERTONIC SALINE) 500mL infusion 500 mL) 232 396.5 628.5 92.5 -- 92.5    Other  450  180 630  30  -- 30    Medications (PO/Enteral Liquids) 450 180 630 30 -- 30    NG/GT  120  175 295  50  -- 50    Intake (mL) (Enteral Tube 19 Cortrak - Gastric Left nare) 120 175 295 50 -- 50    IV Piggyback  100  200 300  10  -- 10    Volume (mL) (ceFAZolin in dextrose (ANCEF) IVPB premix 2 g) 100 200 300 10 -- 10    Total Intake 1286 1285.5 2571.5 246.5 -- 246.5       Output    Urine  750  655 1405  175  -- 175    Output (mL) (Urethral Catheter 18 Fr.)  175 -- 175    Stool  --  -- --  --  -- --    Number of Times Stooled 0 x -- 0 x -- -- --    Total Output  175 -- 175       Net I/O     536 630.5 1166.5 71.5 -- 71.5             Intake/Output Summary (Last 24 hours) at 02/02/19 0933  Last data filed at 02/02/19 0800   Gross per 24 hour   Intake             2602 ml   Output             1430 ml   Net             1172 ml            • acetaminophen  650 mg Q4HRS PRN   • propranolol  10 mg Q8HRS   • senna-docusate  2 Tab DAILY AT NOON   • polyethylene glycol/lytes  1 Packet DAILY AT NOON   • enoxaparin (LOVENOX) injection  40 mg DAILY   • morphine   Continuous   • morphine injection  2 mg Q HOUR PRN    Or   • morphine injection  4 mg Q HOUR PRN   • ceFAZolin  2 g Q8HRS   • Pharmacy  1 Each PHARMACY TO DOSE   • famotidine  20 mg BID   • cloNIDine  0.1 mg Q4HRS PRN   • magnesium hydroxide  30 mL QDAY PRN   • oxyCODONE immediate-release  2.5 mg Q3HRS PRN   • oxyCODONE immediate-release  5 mg Q3HRS PRN   • polyethylene glycol/lytes  1 Packet BID PRN   • senna-docusate  1 Tab Q24HRS PRN   • Respiratory Care per Protocol   Continuous RT   • NS   Continuous   • dextrose 50%  25 mL Q15 MIN PRN   • propofol  0-80 mcg/kg/min Continuous   • Pharmacy Consult Request  1 Each PHARMACY TO DOSE   • MD ALERT...DO NOT ADMINISTER NSAIDS or ASPIRIN unless ORDERED By Neurosurgery  1 Each PRN   • ondansetron  4 mg Q4HRS PRN   • dexamethasone  4 mg Once PRN   • scopolamine  1 Patch Q72HRS PRN   • hydrALAZINE  10 mg Q HOUR PRN   • niCARdipine infusion  0-15 mg/hr Continuous   • bisacodyl  10 mg Q24HRS PRN   • fleet  1 Each Once PRN   • 3% sodium chloride  500 mL Continuous   • sodium chloride 23.4% ivpb  200 mEq Q6HRS PRN   • docusate sodium 100mg/10mL  100 mg BID   • LORazepam  1 mg Q2HRS PRN       Assessment and Plan:  Neuro improving.  Cont supportive care.

## 2019-02-02 NOTE — PROGRESS NOTES
2 RN skin check, no new areas of concern.  Current documented areas of general bruising; Crani incision-JODY/staples, Mepilex to coccyx, heels blanching     Appropriate interventions in place.

## 2019-02-02 NOTE — PROGRESS NOTES
2 RN head to toe skin assessment completed.  The following areas of concerns:     General bruising; Crani incision-JODY/staples, Mepilex to coccyx, heels blanching    Appropriate interventions in place.

## 2019-02-02 NOTE — CARE PLAN
Problem: Bowel/Gastric:  Goal: Will not experience complications related to bowel motility    Intervention: Implement interventions to promote bowel evacuation if inadequate bowel movements in past 48 hours  Bowel protocol implemented.      Problem: Skin Integrity  Goal: Risk for impaired skin integrity will decrease    Intervention: Assess risk factors for impaired skin integrity and/or pressure ulcers  Patient's skin assessed, q2 hour turns, pillows in use for support, and heel float boot in use.

## 2019-02-02 NOTE — CARE PLAN
Problem: Ventilation Defect:  Goal: Ability to achieve and maintain unassisted ventilation or tolerate decreased levels of ventilator support    Intervention: Support and monitor invasive and noninvasive mechanical ventilation  Adult Ventilation Update    Total Vent Days: 8    Patient Lines/Drains/Airways Status    Active Airway     Name: Placement date: Placement time: Site: Days:    Airway ETT Oral 7.5       Oral   8            #FVC / Vital Capacity (liters) : 0 (02/02/19 0701)  NIF (cm H2O) : 0 (02/02/19 0701)  Rapid Shallow Breathing Index (RR/VT): 53 (02/02/19 0701)    Barriers to SBT Weaning Trial Stopped due to:: Pt weaned for 1 hour and returned to rest settings per protocol (02/02/19 0755)    Sputum/Suction   Cough: Productive (02/02/19 1415)  Sputum Amount: Scant (02/02/19 1415)  Sputum Color: Yellow (02/02/19 1415)  Sputum Consistency: Thick (02/02/19 1415)    Mobility  Activity Performed: Unable to mobilize   Reason Not Mobilized: Bed rest     Events/Summary/Plan: back to cmv after 1 hour on spont per protocol, not following (02/02/19 0755)

## 2019-02-02 NOTE — CARE PLAN
Problem: Skin Integrity  Goal: Risk for impaired skin integrity will decrease    Intervention: Assess risk factors for impaired skin integrity and/or pressure ulcers  Problem: Skin Integrity  Goal: Skin Integrity is maintained or improved  Intervention: Richard Skin Risk Assessment  Richard assessed q shift-12, repositioned q 2hrs  Intervention: TURN EVERY 2 HOURS WHILE ON BEDREST  Pillows for positioning, mattress pressure 22, SCDs in place, Heel Float Boots in Place      Problem: Traumatic Brain Injury  Goal: Optimal care of the traumatic brain injury patient    Intervention: Baseline assessement documented to include neuro assessment  Patient on q2 hr Neuro checks- RUE withdraws, LUE spont purposeful movement, Bilat LE- withdraw; CUAUHTEMOC- 3-L deviated gaze

## 2019-02-02 NOTE — PROGRESS NOTES
2 RN skin assessment  Skin intact, no areas of concern. Left craniotomy site with sutures and old ICP bolt site with sutures.

## 2019-02-02 NOTE — CARE PLAN
Problem: Ventilation Defect:  Goal: Ability to achieve and maintain unassisted ventilation or tolerate decreased levels of ventilator support    Intervention: Manage ventilation therapy by monitoring diagnostic test results  Adult Ventilation Update    Total Vent Days: 8    Patient Lines/Drains/Airways Status    Active Airway     Name: Placement date: Placement time: Site: Days:    Airway ETT Oral 7.5       Oral                     Plateau Pressure (Q Shift): 22 (01/31/19 0918)  Static Compliance (ml / cm H2O): 39 (02/01/19 2234)    Patient failed trials because of Barriers to Wean: Intracranial Hypertension (02/01/19 0629)  Barriers to SBT Weaning Trial Stopped due to:: Abnormal breathing pattern (paradoxical respiratory pattern, use of accessory muscles, etc) (02/01/19 1050)  Length of Weaning Trial Length of Weaning Trial (Hours): 50 minutes (02/01/19 1050)     Sputum/Suction   Cough: Productive (02/02/19 0000)  Sputum Amount: Small (02/02/19 0000)  Sputum Color: Yellow (02/02/19 0000)  Sputum Consistency: Thick (02/02/19 0000)    Mobility  Level of Mobility: Level I (02/01/19 2000)  Activity Performed: Unable to mobilize (02/01/19 2000)  Assistance / Tolerance: Assistance of Two or More (01/29/19 0800)  Pt Calls for Assistance: No (01/26/19 2000)  Staff Present for Mobilization: RN (01/26/19 2000)  Gait: Unable to Ambulate (02/01/19 0800)  Reason Not Mobilized: Bed rest (02/01/19 2000)  Mobilization Comments: Per MD orders (02/01/19 0800)    Events/Summary/Plan: back to cmv after 50 minutes on spont, rayna fair, low vt, rsbi 80's (02/01/19 1050)

## 2019-02-02 NOTE — CARE PLAN
Problem: Bowel/Gastric:  Goal: Normal bowel function is maintained or improved  Outcome: PROGRESSING SLOWER THAN EXPECTED  Patient has not had bowel movement since arrival. Belly is distended, semi-firm with hypoactive bowel sounds. Bowel meds being given scheduled and PRN.    Problem: Fluid Volume:  Goal: Will maintain balanced intake and output  Outcome: PROGRESSING SLOWER THAN EXPECTED  Patient has generalized 2+ edema.

## 2019-02-02 NOTE — CARE PLAN
Problem: Ventilation Defect:  Goal: Ability to achieve and maintain unassisted ventilation or tolerate decreased levels of ventilator support    Intervention: Support and monitor invasive and noninvasive mechanical ventilation  Adult Ventilation Update    Total Vent Days: 7    Patient Lines/Drains/Airways Status    Active Airway     Name: Placement date: Placement time: Site: Days:    Airway ETT Oral 7.5       Oral   7                  Plateau Pressure (Q Shift): 22 (01/31/19 0918)  Static Compliance (ml / cm H2O): 39 (02/01/19 0629)    Patient failed trials because of Barriers to Wean: Intracranial Hypertension (02/01/19 0629)    Sputum/Suction   Cough: Strong;Productive (02/01/19 1200)  Sputum Amount: Small (02/01/19 1200)  Sputum Color: White;Tan (02/01/19 1200)  Sputum Consistency: Thick;Thin (02/01/19 1200)    Mobility  Activity Performed: Unable to mobilize (02/01/19 0800)  Reason Not Mobilized: Bed rest (02/01/19 0800)  Mobilization Comments: Per MD orders (02/01/19 0800)    Events/Summary/Plan: back to cmv after 50 minutes on spont, rayna fair, low vt, rsbi 80's (02/01/19 1050)

## 2019-02-02 NOTE — PROGRESS NOTES
Trauma / Surgical Daily Progress Note    Date of Service  2/2/2019    Chief Complaint  28 y.o. male admitted 1/26/2019 with Trauma    Interval Events  No neurologic improvement.  Continues eye-opening and some spontaneous movements.  Does not follow.  Disconjugate  Respiratory status reasonably stable.  Patient is a candidate for tracheostomy  Planning MRIs today  Continue supportive measures  Reasonably tolerate spontaneous breathing trials  Remains in critical condition and unstable    Review of Systems  Review of Systems   Unable to perform ROS: Mental status change        Vital Signs for last 24 hours  Pulse:  [55-71] 59  Resp:  [0-23] 22  SpO2:  [92 %-98 %] 93 %    Hemodynamic parameters for last 24 hours       Respiratory Data     Respiration: (!) 22, Pulse Oximetry: 93 %     Work Of Breathing / Effort: Mild;Vented  RUL Breath Sounds: Clear, RML Breath Sounds: Diminished, RLL Breath Sounds: Diminished, STACI Breath Sounds: Clear, LLL Breath Sounds: Diminished    Physical Exam  Physical Exam   Constitutional: He appears well-developed and well-nourished.   HENT:   craniotomy   Eyes: Pupils are equal, round, and reactive to light.   Neck: No JVD present. No tracheal deviation present. No thyromegaly present.   Cardiovascular: Normal rate and regular rhythm.    Pulmonary/Chest: Effort normal. He has no wheezes. He has no rales.   Full vent    Abdominal: Soft. He exhibits no distension. There is no tenderness.   Musculoskeletal: He exhibits edema.   Lymphadenopathy:     He has no cervical adenopathy.   Neurological: No cranial nerve deficit. GCS eye subscore is 2. GCS verbal subscore is 1. GCS motor subscore is 4.   Skin: Skin is warm and dry. He is not diaphoretic.   Nursing note and vitals reviewed.      Laboratory  Recent Results (from the past 24 hour(s))   Basic Metabolic Panel    Collection Time: 02/01/19 12:00 PM   Result Value Ref Range    Sodium 151 (H) 135 - 145 mmol/L    Potassium 3.6 3.6 - 5.5 mmol/L     Chloride 119 (H) 96 - 112 mmol/L    Co2 22 20 - 33 mmol/L    Glucose 147 (H) 65 - 99 mg/dL    Bun 29 (H) 8 - 22 mg/dL    Creatinine 0.66 0.50 - 1.40 mg/dL    Calcium 7.7 (L) 8.5 - 10.5 mg/dL    Anion Gap 10.0 0.0 - 11.9   ESTIMATED GFR    Collection Time: 02/01/19 12:00 PM   Result Value Ref Range    GFR If African American >60 >60 mL/min/1.73 m 2    GFR If Non African American >60 >60 mL/min/1.73 m 2   Basic Metabolic Panel    Collection Time: 02/01/19  6:10 PM   Result Value Ref Range    Sodium 148 (H) 135 - 145 mmol/L    Potassium 3.7 3.6 - 5.5 mmol/L    Chloride 118 (H) 96 - 112 mmol/L    Co2 24 20 - 33 mmol/L    Glucose 116 (H) 65 - 99 mg/dL    Bun 29 (H) 8 - 22 mg/dL    Creatinine 0.71 0.50 - 1.40 mg/dL    Calcium 8.0 (L) 8.5 - 10.5 mg/dL    Anion Gap 6.0 0.0 - 11.9   ESTIMATED GFR    Collection Time: 02/01/19  6:10 PM   Result Value Ref Range    GFR If African American >60 >60 mL/min/1.73 m 2    GFR If Non African American >60 >60 mL/min/1.73 m 2   Basic Metabolic Panel    Collection Time: 02/02/19 12:10 AM   Result Value Ref Range    Sodium 148 (H) 135 - 145 mmol/L    Potassium 3.5 (L) 3.6 - 5.5 mmol/L    Chloride 118 (H) 96 - 112 mmol/L    Co2 23 20 - 33 mmol/L    Glucose 120 (H) 65 - 99 mg/dL    Bun 29 (H) 8 - 22 mg/dL    Creatinine 0.70 0.50 - 1.40 mg/dL    Calcium 8.0 (L) 8.5 - 10.5 mg/dL    Anion Gap 7.0 0.0 - 11.9   ESTIMATED GFR    Collection Time: 02/02/19 12:10 AM   Result Value Ref Range    GFR If African American >60 >60 mL/min/1.73 m 2    GFR If Non African American >60 >60 mL/min/1.73 m 2   Basic Metabolic Panel    Collection Time: 02/02/19  6:00 AM   Result Value Ref Range    Sodium 148 (H) 135 - 145 mmol/L    Potassium 3.5 (L) 3.6 - 5.5 mmol/L    Chloride 118 (H) 96 - 112 mmol/L    Co2 22 20 - 33 mmol/L    Glucose 147 (H) 65 - 99 mg/dL    Bun 29 (H) 8 - 22 mg/dL    Creatinine 0.74 0.50 - 1.40 mg/dL    Calcium 8.0 (L) 8.5 - 10.5 mg/dL    Anion Gap 8.0 0.0 - 11.9   ESTIMATED GFR    Collection  Time: 02/02/19  6:00 AM   Result Value Ref Range    GFR If African American >60 >60 mL/min/1.73 m 2    GFR If Non African American >60 >60 mL/min/1.73 m 2       Fluids    Intake/Output Summary (Last 24 hours) at 02/02/19 0858  Last data filed at 02/02/19 0800   Gross per 24 hour   Intake             2602 ml   Output             1430 ml   Net             1172 ml       Core Measures & Quality Metrics  Labs reviewed, Medications reviewed and Radiology images reviewed  Greer catheter: Critically Ill - Requiring Accurate Measurement of Urinary Output      DVT Prophylaxis: Enoxaparin (Lovenox)  DVT prophylaxis - mechanical: SCDs  Ulcer prophylaxis: Yes        JOHN Score  ETOH Screening    Assessment/Plan  Acute respiratory failure following trauma and surgery (HCC)- (present on admission)   Assessment & Plan    Intubated in field  1/27 aggressive management of a head injury/ICP precludes weaning protocol  Continue full mechanical ventilatory support.   Ventilator bundle  1/31:  Consider tracheostomy soon     Subdural hematoma (HCC)- (present on admission)   Assessment & Plan    Left sided holohemispheric subdural hematoma measuring approximately 9.4 mm in maximum thickness. There is approximately 10 mm of left-to-right midline shift. There is probably mild left sided hemispheric edema.  1/26 To OR for emergent craniotomy and evacuation of hematoma.   1/28 CT x2 stable  Trending ICP  Continue Keppra  HTS protocol  Head of bed elevation  Optimize p.o. 2/PCO2  Aggressive sedation/analgesia.  1/31 MRI brain and neck planned next 24-48 hours.  Aggressive support if brain stem ok  Jazlyn Huntley MD. Neurosurgery.     Aspiration into airway- (present on admission)   Assessment & Plan    Admission imaging with bilateral, left greater than right medial dependent consolidation could be due to aspiration pneumonitis or atelectasis.  Aggressive pulmonary hygiene and serial chest radiography.     Contraindication to deep vein thrombosis  (DVT) prophylaxis- (present on admission)   Assessment & Plan    Systemic anticoagulation contraindicated secondary to elevated bleeding risk.  1/27 no evidence of DVT on surveillance ultrasound     Pneumothorax, traumatic- (present on admission)   Assessment & Plan    Small right apical  Observation      Trauma- (present on admission)   Assessment & Plan    [place]. Witnessed seizure like activity. GCS 3. No evidence of acute trauma.  Trauma Red Activation.  Mor Roa MD, Trauma/General Surgery.         Discussed patient condition with RN, RT, Pharmacy and Patient.  CRITICAL CARE TIME EXCLUDING PROCEDURES: 65    Minutes  I independently reviewed pertinent clinical lab tests from the last 48 hours and ordered additional follow up clinical lab tests.  I independently reviewed pertinent radiographic images and the radiologist's reports from the last 48 hours and ordered additional follow up radiographic studies.  I reviewed the details of the available patient records and documentation by consulting physicians in EP[facility] up to today, summated the information, and utilized the information as warranted in today's medical decision making for this patient.  I personally evaluated the patient condition at bedside and discussed the daily plan(s) with available nursing staff, dieticians, social workers, pharmacists on rounds.  I am actively managing this patient's mechanical ventilation and directly involved in the adjustment of multiple settings (FiO2, PEEP, tidal volume, and minute ventilation) based on my personal bedside evaluation of this patient's radiographic, and laboratory findings and clinical changes throughout the day.

## 2019-02-03 ENCOUNTER — APPOINTMENT (OUTPATIENT)
Dept: RADIOLOGY | Facility: MEDICAL CENTER | Age: 29
DRG: 003 | End: 2019-02-03
Attending: SURGERY
Payer: COMMERCIAL

## 2019-02-03 ENCOUNTER — APPOINTMENT (OUTPATIENT)
Dept: RADIOLOGY | Facility: MEDICAL CENTER | Age: 29
DRG: 003 | End: 2019-02-03
Attending: NURSE PRACTITIONER
Payer: COMMERCIAL

## 2019-02-03 LAB
ACTION RANGE TRIGGERED IACRT: NO
ACTION RANGE TRIGGERED IACRT: NO
ANION GAP SERPL CALC-SCNC: 4 MMOL/L (ref 0–11.9)
ANION GAP SERPL CALC-SCNC: 7 MMOL/L (ref 0–11.9)
ANION GAP SERPL CALC-SCNC: 9 MMOL/L (ref 0–11.9)
ANION GAP SERPL CALC-SCNC: 9 MMOL/L (ref 0–11.9)
BASE EXCESS BLDA CALC-SCNC: -1 MMOL/L (ref -4–3)
BASE EXCESS BLDA CALC-SCNC: -5 MMOL/L (ref -4–3)
BODY TEMPERATURE: ABNORMAL DEGREES
BODY TEMPERATURE: ABNORMAL DEGREES
BUN SERPL-MCNC: 25 MG/DL (ref 8–22)
BUN SERPL-MCNC: 29 MG/DL (ref 8–22)
BUN SERPL-MCNC: 29 MG/DL (ref 8–22)
BUN SERPL-MCNC: 30 MG/DL (ref 8–22)
CALCIUM SERPL-MCNC: 7.6 MG/DL (ref 8.5–10.5)
CALCIUM SERPL-MCNC: 7.6 MG/DL (ref 8.5–10.5)
CALCIUM SERPL-MCNC: 7.8 MG/DL (ref 8.5–10.5)
CALCIUM SERPL-MCNC: 7.8 MG/DL (ref 8.5–10.5)
CHLORIDE SERPL-SCNC: 119 MMOL/L (ref 96–112)
CHLORIDE SERPL-SCNC: 119 MMOL/L (ref 96–112)
CHLORIDE SERPL-SCNC: 121 MMOL/L (ref 96–112)
CHLORIDE SERPL-SCNC: 123 MMOL/L (ref 96–112)
CO2 BLDA-SCNC: 21 MMOL/L (ref 20–33)
CO2 BLDA-SCNC: 26 MMOL/L (ref 20–33)
CO2 SERPL-SCNC: 22 MMOL/L (ref 20–33)
CO2 SERPL-SCNC: 23 MMOL/L (ref 20–33)
CO2 SERPL-SCNC: 24 MMOL/L (ref 20–33)
CO2 SERPL-SCNC: 26 MMOL/L (ref 20–33)
CREAT SERPL-MCNC: 0.62 MG/DL (ref 0.5–1.4)
CREAT SERPL-MCNC: 0.63 MG/DL (ref 0.5–1.4)
CREAT SERPL-MCNC: 0.71 MG/DL (ref 0.5–1.4)
CREAT SERPL-MCNC: 0.83 MG/DL (ref 0.5–1.4)
CRP SERPL HS-MCNC: 12.54 MG/DL (ref 0–0.75)
ERYTHROCYTE [DISTWIDTH] IN BLOOD BY AUTOMATED COUNT: 47.1 FL (ref 35.9–50)
GLUCOSE SERPL-MCNC: 118 MG/DL (ref 65–99)
GLUCOSE SERPL-MCNC: 124 MG/DL (ref 65–99)
GLUCOSE SERPL-MCNC: 128 MG/DL (ref 65–99)
GLUCOSE SERPL-MCNC: 131 MG/DL (ref 65–99)
HCO3 BLDA-SCNC: 19.9 MMOL/L (ref 17–25)
HCO3 BLDA-SCNC: 24.3 MMOL/L (ref 17–25)
HCT VFR BLD AUTO: 25.9 % (ref 42–52)
HGB BLD-MCNC: 7.9 G/DL (ref 14–18)
HOROWITZ INDEX BLDA+IHG-RTO: 50 MM[HG]
HOROWITZ INDEX BLDA+IHG-RTO: 75 MM[HG]
INR PPP: 1.19 (ref 0.87–1.13)
INST. QUALIFIED PATIENT IIQPT: YES
INST. QUALIFIED PATIENT IIQPT: YES
MCH RBC QN AUTO: 30.2 PG (ref 27–33)
MCHC RBC AUTO-ENTMCNC: 30.5 G/DL (ref 33.7–35.3)
MCV RBC AUTO: 98.9 FL (ref 81.4–97.8)
O2/TOTAL GAS SETTING VFR VENT: 100 %
O2/TOTAL GAS SETTING VFR VENT: 100 %
PCO2 BLDA: 33.6 MMHG (ref 26–37)
PCO2 BLDA: 45.5 MMHG (ref 26–37)
PCO2 TEMP ADJ BLDA: 36.5 MMHG (ref 26–37)
PCO2 TEMP ADJ BLDA: 49.4 MMHG (ref 26–37)
PH BLDA: 7.34 [PH] (ref 7.4–7.5)
PH BLDA: 7.38 [PH] (ref 7.4–7.5)
PH TEMP ADJ BLDA: 7.31 [PH] (ref 7.4–7.5)
PH TEMP ADJ BLDA: 7.35 [PH] (ref 7.4–7.5)
PLATELET # BLD AUTO: 338 K/UL (ref 164–446)
PMV BLD AUTO: 10.5 FL (ref 9–12.9)
PO2 BLDA: 50 MMHG (ref 64–87)
PO2 BLDA: 75 MMHG (ref 64–87)
PO2 TEMP ADJ BLDA: 57 MMHG (ref 64–87)
PO2 TEMP ADJ BLDA: 85 MMHG (ref 64–87)
POTASSIUM SERPL-SCNC: 3.4 MMOL/L (ref 3.6–5.5)
POTASSIUM SERPL-SCNC: 3.5 MMOL/L (ref 3.6–5.5)
POTASSIUM SERPL-SCNC: 3.7 MMOL/L (ref 3.6–5.5)
POTASSIUM SERPL-SCNC: 3.9 MMOL/L (ref 3.6–5.5)
PREALB SERPL-MCNC: 21 MG/DL (ref 18–38)
PROTHROMBIN TIME: 15.2 SEC (ref 12–14.6)
RBC # BLD AUTO: 2.62 M/UL (ref 4.7–6.1)
SAO2 % BLDA: 85 % (ref 93–99)
SAO2 % BLDA: 94 % (ref 93–99)
SODIUM SERPL-SCNC: 148 MMOL/L (ref 135–145)
SODIUM SERPL-SCNC: 151 MMOL/L (ref 135–145)
SODIUM SERPL-SCNC: 153 MMOL/L (ref 135–145)
SODIUM SERPL-SCNC: 154 MMOL/L (ref 135–145)
SPECIMEN DRAWN FROM PATIENT: ABNORMAL
SPECIMEN DRAWN FROM PATIENT: ABNORMAL
TRIGL SERPL-MCNC: 185 MG/DL (ref 0–149)
WBC # BLD AUTO: 13 K/UL (ref 4.8–10.8)

## 2019-02-03 PROCEDURE — A9270 NON-COVERED ITEM OR SERVICE: HCPCS | Performed by: SURGERY

## 2019-02-03 PROCEDURE — 302977 HCHG BRONCHOSCOPY PROC-THERAPEUTIC

## 2019-02-03 PROCEDURE — 700111 HCHG RX REV CODE 636 W/ 250 OVERRIDE (IP): Performed by: SURGERY

## 2019-02-03 PROCEDURE — 85610 PROTHROMBIN TIME: CPT

## 2019-02-03 PROCEDURE — 0BJ08ZZ INSPECTION OF TRACHEOBRONCHIAL TREE, VIA NATURAL OR ARTIFICIAL OPENING ENDOSCOPIC: ICD-10-PCS | Performed by: SURGERY

## 2019-02-03 PROCEDURE — 84478 ASSAY OF TRIGLYCERIDES: CPT

## 2019-02-03 PROCEDURE — 31600 PLANNED TRACHEOSTOMY: CPT | Performed by: SURGERY

## 2019-02-03 PROCEDURE — 700105 HCHG RX REV CODE 258: Performed by: SURGERY

## 2019-02-03 PROCEDURE — 31600 PLANNED TRACHEOSTOMY: CPT

## 2019-02-03 PROCEDURE — A9270 NON-COVERED ITEM OR SERVICE: HCPCS | Performed by: PHYSICAL MEDICINE & REHABILITATION

## 2019-02-03 PROCEDURE — 700111 HCHG RX REV CODE 636 W/ 250 OVERRIDE (IP): Performed by: PHYSICIAN ASSISTANT

## 2019-02-03 PROCEDURE — 304255 HCHG KIT,PERC TRACHE

## 2019-02-03 PROCEDURE — 94003 VENT MGMT INPAT SUBQ DAY: CPT

## 2019-02-03 PROCEDURE — 700102 HCHG RX REV CODE 250 W/ 637 OVERRIDE(OP): Performed by: SURGERY

## 2019-02-03 PROCEDURE — 700101 HCHG RX REV CODE 250: Performed by: SURGERY

## 2019-02-03 PROCEDURE — 99291 CRITICAL CARE FIRST HOUR: CPT | Mod: 25 | Performed by: SURGERY

## 2019-02-03 PROCEDURE — 700111 HCHG RX REV CODE 636 W/ 250 OVERRIDE (IP): Performed by: NEUROLOGICAL SURGERY

## 2019-02-03 PROCEDURE — 99153 MOD SED SAME PHYS/QHP EA: CPT

## 2019-02-03 PROCEDURE — 94150 VITAL CAPACITY TEST: CPT

## 2019-02-03 PROCEDURE — 94640 AIRWAY INHALATION TREATMENT: CPT

## 2019-02-03 PROCEDURE — 99152 MOD SED SAME PHYS/QHP 5/>YRS: CPT

## 2019-02-03 PROCEDURE — 700105 HCHG RX REV CODE 258: Performed by: NEUROLOGICAL SURGERY

## 2019-02-03 PROCEDURE — 85027 COMPLETE CBC AUTOMATED: CPT

## 2019-02-03 PROCEDURE — 82803 BLOOD GASES ANY COMBINATION: CPT | Mod: 91

## 2019-02-03 PROCEDURE — 0B113F4 BYPASS TRACHEA TO CUTANEOUS WITH TRACHEOSTOMY DEVICE, PERCUTANEOUS APPROACH: ICD-10-PCS | Performed by: SURGERY

## 2019-02-03 PROCEDURE — 71045 X-RAY EXAM CHEST 1 VIEW: CPT

## 2019-02-03 PROCEDURE — A9270 NON-COVERED ITEM OR SERVICE: HCPCS | Performed by: NEUROLOGICAL SURGERY

## 2019-02-03 PROCEDURE — 770022 HCHG ROOM/CARE - ICU (200)

## 2019-02-03 PROCEDURE — 80048 BASIC METABOLIC PNL TOTAL CA: CPT

## 2019-02-03 PROCEDURE — 700112 HCHG RX REV CODE 229: Performed by: NEUROLOGICAL SURGERY

## 2019-02-03 PROCEDURE — 700102 HCHG RX REV CODE 250 W/ 637 OVERRIDE(OP): Performed by: PHYSICAL MEDICINE & REHABILITATION

## 2019-02-03 RX ORDER — VECURONIUM BROMIDE 1 MG/ML
0.1 INJECTION, POWDER, LYOPHILIZED, FOR SOLUTION INTRAVENOUS
Status: DISCONTINUED | OUTPATIENT
Start: 2019-02-03 | End: 2019-02-06

## 2019-02-03 RX ORDER — VECURONIUM BROMIDE 1 MG/ML
10 INJECTION, POWDER, LYOPHILIZED, FOR SOLUTION INTRAVENOUS ONCE
Status: COMPLETED | OUTPATIENT
Start: 2019-02-03 | End: 2019-02-03

## 2019-02-03 RX ORDER — LEVETIRACETAM 500 MG/1
500 TABLET ORAL 2 TIMES DAILY
Status: DISCONTINUED | OUTPATIENT
Start: 2019-02-03 | End: 2019-02-03

## 2019-02-03 RX ORDER — FAMOTIDINE 20 MG/1
20 TABLET, FILM COATED ORAL 2 TIMES DAILY
Status: DISCONTINUED | OUTPATIENT
Start: 2019-02-03 | End: 2019-02-05

## 2019-02-03 RX ORDER — FAMOTIDINE 20 MG/1
20 TABLET, FILM COATED ORAL 2 TIMES DAILY
Status: DISCONTINUED | OUTPATIENT
Start: 2019-02-03 | End: 2019-02-03

## 2019-02-03 RX ORDER — MIDAZOLAM HYDROCHLORIDE 1 MG/ML
2 INJECTION INTRAMUSCULAR; INTRAVENOUS
Status: DISCONTINUED | OUTPATIENT
Start: 2019-02-03 | End: 2019-02-16

## 2019-02-03 RX ORDER — FUROSEMIDE 10 MG/ML
40 INJECTION INTRAMUSCULAR; INTRAVENOUS ONCE
Status: COMPLETED | OUTPATIENT
Start: 2019-02-03 | End: 2019-02-03

## 2019-02-03 RX ORDER — MIDAZOLAM HYDROCHLORIDE 1 MG/ML
5 INJECTION INTRAMUSCULAR; INTRAVENOUS ONCE
Status: COMPLETED | OUTPATIENT
Start: 2019-02-03 | End: 2019-02-03

## 2019-02-03 RX ORDER — VECURONIUM BROMIDE 1 MG/ML
0.1 INJECTION, POWDER, LYOPHILIZED, FOR SOLUTION INTRAVENOUS ONCE
Status: DISCONTINUED | OUTPATIENT
Start: 2019-02-03 | End: 2019-02-03

## 2019-02-03 RX ADMIN — LEVETIRACETAM 500 MG: 500 TABLET, FILM COATED ORAL at 11:23

## 2019-02-03 RX ADMIN — MORPHINE SULFATE 4 MG: 4 INJECTION INTRAVENOUS at 13:10

## 2019-02-03 RX ADMIN — PROPOFOL 50 MCG/KG/MIN: 10 INJECTION, EMULSION INTRAVENOUS at 19:59

## 2019-02-03 RX ADMIN — PROPOFOL 50 MCG/KG/MIN: 10 INJECTION, EMULSION INTRAVENOUS at 23:27

## 2019-02-03 RX ADMIN — LORAZEPAM 1 MG: 2 INJECTION INTRAMUSCULAR; INTRAVENOUS at 13:11

## 2019-02-03 RX ADMIN — MIDAZOLAM HYDROCHLORIDE 5 MG: 1 INJECTION, SOLUTION INTRAMUSCULAR; INTRAVENOUS at 10:36

## 2019-02-03 RX ADMIN — DOCUSATE SODIUM 100 MG: 50 LIQUID ORAL at 06:00

## 2019-02-03 RX ADMIN — EPOPROSTENOL SODIUM 0.05 MCG/KG/MIN: 1.5 INJECTION, POWDER, LYOPHILIZED, FOR SOLUTION INTRAVENOUS at 14:55

## 2019-02-03 RX ADMIN — PROPOFOL 20 MCG/KG/MIN: 10 INJECTION, EMULSION INTRAVENOUS at 14:28

## 2019-02-03 RX ADMIN — FENTANYL CITRATE 50 MCG/HR: 50 INJECTION, SOLUTION INTRAMUSCULAR; INTRAVENOUS at 14:20

## 2019-02-03 RX ADMIN — ACETAMINOPHEN 650 MG: 325 TABLET, FILM COATED ORAL at 20:22

## 2019-02-03 RX ADMIN — MINERAL OIL AND WHITE PETROLATUM 1 APPLICATION: 150; 830 OINTMENT OPHTHALMIC at 14:55

## 2019-02-03 RX ADMIN — CEFAZOLIN SODIUM 2 G: 2 INJECTION, SOLUTION INTRAVENOUS at 05:56

## 2019-02-03 RX ADMIN — SODIUM CHLORIDE 500 MG: 9 INJECTION, SOLUTION INTRAVENOUS at 17:09

## 2019-02-03 RX ADMIN — Medication 50 MG: at 04:35

## 2019-02-03 RX ADMIN — FENTANYL CITRATE 100 MCG: 50 INJECTION, SOLUTION INTRAMUSCULAR; INTRAVENOUS at 10:37

## 2019-02-03 RX ADMIN — FUROSEMIDE 40 MG: 10 INJECTION, SOLUTION INTRAMUSCULAR; INTRAVENOUS at 14:34

## 2019-02-03 RX ADMIN — FAMOTIDINE 20 MG: 10 INJECTION INTRAVENOUS at 17:06

## 2019-02-03 RX ADMIN — VECURONIUM BROMIDE 10 MG: 10 INJECTION, POWDER, LYOPHILIZED, FOR SOLUTION INTRAVENOUS at 14:55

## 2019-02-03 RX ADMIN — PROPRANOLOL HYDROCHLORIDE 10 MG: 10 TABLET ORAL at 14:28

## 2019-02-03 RX ADMIN — SENNOSIDES AND DOCUSATE SODIUM 2 TABLET: 8.6; 5 TABLET ORAL at 11:22

## 2019-02-03 RX ADMIN — MORPHINE SULFATE 4 MG: 4 INJECTION INTRAVENOUS at 15:16

## 2019-02-03 RX ADMIN — EPOPROSTENOL SODIUM 0.05 MCG/KG/MIN: 1.5 INJECTION, POWDER, LYOPHILIZED, FOR SOLUTION INTRAVENOUS at 20:49

## 2019-02-03 RX ADMIN — FAMOTIDINE 20 MG: 20 TABLET ORAL at 05:57

## 2019-02-03 RX ADMIN — VECURONIUM BROMIDE 1 MCG/KG/MIN: 1 INJECTION, POWDER, LYOPHILIZED, FOR SOLUTION INTRAVENOUS at 14:56

## 2019-02-03 RX ADMIN — PROPRANOLOL HYDROCHLORIDE 10 MG: 10 TABLET ORAL at 05:57

## 2019-02-03 RX ADMIN — SODIUM CHLORIDE 500 ML: 234 INJECTION, SOLUTION INTRAVENOUS at 17:00

## 2019-02-03 RX ADMIN — MINERAL OIL AND WHITE PETROLATUM 1 APPLICATION: 150; 830 OINTMENT OPHTHALMIC at 23:25

## 2019-02-03 RX ADMIN — PROPOFOL 80 MCG/KG/MIN: 10 INJECTION, EMULSION INTRAVENOUS at 17:05

## 2019-02-03 RX ADMIN — ENOXAPARIN SODIUM 40 MG: 100 INJECTION SUBCUTANEOUS at 05:57

## 2019-02-03 RX ADMIN — VECURONIUM BROMIDE 10 MG: 10 INJECTION, POWDER, LYOPHILIZED, FOR SOLUTION INTRAVENOUS at 10:36

## 2019-02-03 RX ADMIN — SODIUM CHLORIDE 500 ML: 234 INJECTION, SOLUTION INTRAVENOUS at 07:56

## 2019-02-03 ASSESSMENT — PULMONARY FUNCTION TESTS: FVC: 0

## 2019-02-03 NOTE — PROGRESS NOTES
Neurosurgery Progress Note    Subjective:  Eyes open  Does not folow   PEERL  Decompression full  MR brain- brainstem ok Diffuse  Hemisphere changes  MR c spine ok    Very swollen    Exam:  As above    Pulse  Av.7  Min: 61  Max: 72  Resp  Av  Min: 20  Max: 23  Monitored Temp 2  Av.5 °C (99.5 °F)  Min: 37.3 °C (99.1 °F)  Max: 37.8 °C (100 °F)  SpO2  Av.5 %  Min: 92 %  Max: 100 %    No Data Recorded    Recent Labs      19   0535  19   0550   WBC  12.8*  13.0*   RBC  2.69*  2.62*   HEMOGLOBIN  8.2*  7.9*   HEMATOCRIT  26.5*  25.9*   MCV  98.5*  98.9*   MCH  30.5  30.2   MCHC  30.9*  30.5*   RDW  48.1  47.1   PLATELETCT  281  338   MPV  10.1  10.5     Recent Labs      19   1735  19   0000  19   0550   SODIUM  151*  153*  148*   POTASSIUM  3.3*  3.5*  3.7   CHLORIDE  120*  121*  119*   CO2  23  23  22   GLUCOSE  126*  128*  131*   BUN  33*  30*  29*   CREATININE  0.63  0.71  0.62   CALCIUM  7.7*  7.6*  7.8*     Recent Labs      19   0550   INR  1.19*           Intake/Output       19 0700 - 19 0659 19 07 - 19 0659       Total  Total       Intake    I.V.  956.5  984 1940.5  --  -- --    IV Volume (Morphine) 4 24 28 -- -- --    Volume (mL) (NS infusion) 360 360 720 -- -- --    Volume (mL) (3% sodium chloride (HYPERTONIC SALINE) 500mL infusion 500 mL) 592.5 600 1192.5 -- -- --    Other  270  330 600  --  -- --    Medications (PO/Enteral Liquids) 150 120 270 -- -- --    Flush / Irrigation Volume (Cortrak) 120 210 330 -- -- --    NG/GT  300  470 770  --  -- --    Intake (mL) (Enteral Tube 19 Cortrak - Gastric Left nare) 300 470 770 -- -- --    IV Piggyback  110  240 350  --  -- --    Volume (mL) (ceFAZolin in dextrose (ANCEF) IVPB premix 2 g) 110 240 350 -- -- --    Total Intake 1636.5 2024 3660.5 -- -- --       Output    Urine  850  885 1735  --  -- --    Output (mL) (Urethral Catheter 18 Fr.) 850 885  1735 -- -- --    Stool  --  -- --  --  -- --    Number of Times Stooled -- 1 x 1 x -- -- --    Total Output  -- -- --       Net I/O     786.5 1139 1925.5 -- -- --            Intake/Output Summary (Last 24 hours) at 02/03/19 1009  Last data filed at 02/03/19 0626   Gross per 24 hour   Intake             3204 ml   Output             1410 ml   Net             1794 ml            • vecuronium  10 mg Once   • fentaNYL  100 mcg Once   • midazolam  5 mg Once   • acetaminophen  650 mg Q4HRS PRN   • propranolol  10 mg Q8HRS   • senna-docusate  2 Tab DAILY AT NOON   • polyethylene glycol/lytes  1 Packet DAILY AT NOON   • enoxaparin (LOVENOX) injection  40 mg DAILY   • morphine   Continuous   • morphine injection  2 mg Q HOUR PRN    Or   • morphine injection  4 mg Q HOUR PRN   • Pharmacy  1 Each PHARMACY TO DOSE   • famotidine  20 mg BID   • cloNIDine  0.1 mg Q4HRS PRN   • magnesium hydroxide  30 mL QDAY PRN   • oxyCODONE immediate-release  2.5 mg Q3HRS PRN   • oxyCODONE immediate-release  5 mg Q3HRS PRN   • polyethylene glycol/lytes  1 Packet BID PRN   • senna-docusate  1 Tab Q24HRS PRN   • Respiratory Care per Protocol   Continuous RT   • NS   Continuous   • dextrose 50%  25 mL Q15 MIN PRN   • propofol  0-80 mcg/kg/min Continuous   • Pharmacy Consult Request  1 Each PHARMACY TO DOSE   • MD ALERT...DO NOT ADMINISTER NSAIDS or ASPIRIN unless ORDERED By Neurosurgery  1 Each PRN   • ondansetron  4 mg Q4HRS PRN   • dexamethasone  4 mg Once PRN   • scopolamine  1 Patch Q72HRS PRN   • hydrALAZINE  10 mg Q HOUR PRN   • niCARdipine infusion  0-15 mg/hr Continuous   • bisacodyl  10 mg Q24HRS PRN   • fleet  1 Each Once PRN   • 3% sodium chloride  500 mL Continuous   • sodium chloride 23.4% ivpb  200 mEq Q6HRS PRN   • docusate sodium 100mg/10mL  100 mg BID   • LORazepam  1 mg Q2HRS PRN       Assessment and Plan:  NO changes  D/ c collar

## 2019-02-03 NOTE — PROCEDURES
"Perc Teach Op Note    Date of operation: 2/3/2019    Preoperative diagnosis: acute respiratory failure (518.81) and pulmonary infiltrate (793.1)    Postoperative diagnosis: acute respiratory failure (518.81) and pulmonary infiltrate (793.1)    Operation performed:    Surgeon: Dr. Nolasco    Assistant/endoscopist: Dr. Roa    Anesthesia: local anesthesia, Intravenous analgesia, sedation and pharmacologic restraint     Indications: The patient is a 28 y.o. male with acute respiratory failure (518.81). Percutaneous tracheostomy is carried out in the SICU under  bronchoscopic guidance.    Findings: Bronchoscopic findings included scant secretions       Specimen: none.    Procedure: Following informed consent, the patient was properly identified and optimally positioned in bed.  The patient was preoxygenated with 100% oxygen and placed on a set ventilator rate. A roll was placed between the patient's shoulders and the neck was slightly extended.  A \"time out\" was initiated. The correct patient, procedure and operative site were verified. The patient's allergy status was assessed. Procedural modifications were made as required. local anesthesia, Intravenous analgesia, sedation and pharmacologic restraint  was administered. The surgical site was prepped with chlorhexidine.     Dr Roa  performed the bronchoscopy. Please see dictation for full details. The fiberoptic bronchoscope was advanced through the indwelling endotracheal tube. The tube was withdrawn to the level of the vocal cords under direct vision. The anterior trachea was transilluminated through the end of the endotracheal tube and the surface landmarks for the tracheostomy were established. The scope was withdrawn prior to cannulation of the trachea  to prevent damage to the bronchoscope.    The anterior trachea was cannulated percutaneously midway between the thyroid cartilage and the suprasternal notch. The needle was withdrawn from the angiocatheter and a " wire was passed without resistance under direct bronchoscopic vision. A 1 cm transverse incision was made and the starter dilator was passed. The single graduated dilator was passed over the wire under direct vision. An 8mm cuffed Portex  tracheostomy tube was passed over the wire under direct visualization. The tracheostomy tube was connected to the ventilator circuit and the cuff was inflated. Satisfactory oxygenation and ventilation was observed. The tracheostomy tube was secured to the neck with interrupted sutures. A tracheostomy strap was applied.    The patient tolerated the procedure well and there were no apparent complications.

## 2019-02-03 NOTE — PROGRESS NOTES
2 RN skin check complete. No areas of concern observed. All appropriate preventative measures in place.

## 2019-02-03 NOTE — CARE PLAN
Problem: Ventilation Defect:  Goal: Ability to achieve and maintain unassisted ventilation or tolerate decreased levels of ventilator support    Intervention: Manage ventilation therapy by monitoring diagnostic test results  Adult Ventilation Update    Total Vent Days: 9    Patient Lines/Drains/Airways Status    Active Airway     Name: Placement date: Placement time: Site: Days:    Airway ETT Oral 7.5       Oral                 In the last 24 hours, the patient tolerated SBT for1 hour on settings of 5/8 40%.    #FVC / Vital Capacity (liters) : 0 (02/02/19 0701)  NIF (cm H2O) : 0 (02/02/19 0701)  Rapid Shallow Breathing Index (RR/VT): 53 (02/02/19 0701)  Plateau Pressure (Q Shift): 21 (02/02/19 1836)  Static Compliance (ml / cm H2O): 36 (02/02/19 1836)    Patient failed trials because of Barriers to Wean: Intracranial Hypertension (02/01/19 0629)  Barriers to SBT Weaning Trial Stopped due to:: Pt weaned for 1 hour and returned to rest settings per protocol (02/02/19 0755)  Length of Weaning Trial Length of Weaning Trial (Hours): 50 minutes (02/01/19 1050)    Sputum/Suction   Cough: Productive (02/03/19 0000)  Sputum Amount: Small (02/03/19 0000)  Sputum Color: White;Yellow (02/03/19 0000)  Sputum Consistency: Thick;Thin (02/03/19 0000)    Mobility  Level of Mobility: Level I (02/02/19 2000)  Activity Performed: Unable to mobilize (02/02/19 1600)  Assistance / Tolerance: Assistance of Two or More (02/02/19 2000)  Pt Calls for Assistance: No (02/02/19 2000)  Staff Present for Mobilization: RN (01/26/19 2000)  Gait: Unable to Ambulate (02/01/19 0800)  Reason Not Mobilized: Bed rest (bone flap missing) (02/02/19 1600)  Mobilization Comments: Per MD orders (02/01/19 0800)    Events/Summary/Plan: back to cmv after 1 hour on spont per protocol, not following (02/02/19 0755)

## 2019-02-03 NOTE — CARE PLAN
Problem: Safety  Goal: Will remain free from injury  Q2 restraint checks and turns in place. CMS intact.    Problem: Pain Management  Goal: Pain level will decrease to patient's comfort goal  Pt is intubated/obtunded. This RN assesses pt's nonverbal pain descriptors in order to effectively manage pt's needs. IV morphine drip running continuous, pain assessed Q2 hours. RN repositions to comfort.

## 2019-02-03 NOTE — PROGRESS NOTES
MD Nolasco, MD Roa, RTs, medical student, bedside RN and nursing student at bedside for tracheostomy placement. VSS. Medications given without incident per MD order. No acute events during procedure.

## 2019-02-03 NOTE — PROGRESS NOTES
MD Huntley at bedside. Patient plan of care discussed. Order received to remove C-collar, keep 3% on to decrease cerebral edema. Order received for LSW consult tomorrow to plan next phase of care for patient.

## 2019-02-03 NOTE — PROGRESS NOTES
Trauma / Surgical Daily Progress Note    Date of Service  2/3/2019    Chief Complaint  28 y.o. male admitted 1/26/2019 with Trauma    Interval Events  Percutaneous tracheostomy with bronchoscopy performed at bedside at 10 AM.  At 2 PM patient developed acute hypoxemia refractory to bagging and increasing PEEP on mechanical ventilation  Instituted paralytics, Flolan, Lasix, and ordered CTA angiogram to rule out pulmonary embolism  Prognosis guarded critical deterioration      Review of Systems  Review of Systems   Unable to perform ROS: Mental status change        Vital Signs for last 24 hours  Pulse:  [61-72] 68  Resp:  [20-23] 22  SpO2:  [91 %-100 %] 95 %    Hemodynamic parameters for last 24 hours       Respiratory Data     Respiration: (!) 22, Pulse Oximetry: 95 %     Work Of Breathing / Effort: Vented  RUL Breath Sounds: Clear After Suction, RML Breath Sounds: Diminished, RLL Breath Sounds: Diminished, STACI Breath Sounds: Clear After Suction, LLL Breath Sounds: Diminished    Physical Exam  Physical Exam   Constitutional: He appears well-developed and well-nourished.   HENT:   craniotomy   Eyes: Pupils are equal, round, and reactive to light.   Neck: No JVD present. No tracheal deviation present. No thyromegaly present.   Cardiovascular: Normal rate and regular rhythm.    Pulmonary/Chest: Effort normal. He has no wheezes. He has no rales.   Full vent    Abdominal: Soft. He exhibits no distension. There is no tenderness.   Musculoskeletal: He exhibits edema.   Lymphadenopathy:     He has no cervical adenopathy.   Neurological: No cranial nerve deficit. GCS eye subscore is 2. GCS verbal subscore is 1. GCS motor subscore is 4.   Skin: Skin is warm and dry. He is not diaphoretic.   Nursing note and vitals reviewed.      Laboratory  Recent Results (from the past 24 hour(s))   Basic Metabolic Panel    Collection Time: 02/02/19  5:35 PM   Result Value Ref Range    Sodium 151 (H) 135 - 145 mmol/L    Potassium 3.3 (L) 3.6  - 5.5 mmol/L    Chloride 120 (H) 96 - 112 mmol/L    Co2 23 20 - 33 mmol/L    Glucose 126 (H) 65 - 99 mg/dL    Bun 33 (H) 8 - 22 mg/dL    Creatinine 0.63 0.50 - 1.40 mg/dL    Calcium 7.7 (L) 8.5 - 10.5 mg/dL    Anion Gap 8.0 0.0 - 11.9   ESTIMATED GFR    Collection Time: 02/02/19  5:35 PM   Result Value Ref Range    GFR If African American >60 >60 mL/min/1.73 m 2    GFR If Non African American >60 >60 mL/min/1.73 m 2   Basic Metabolic Panel    Collection Time: 02/03/19 12:00 AM   Result Value Ref Range    Sodium 153 (H) 135 - 145 mmol/L    Potassium 3.5 (L) 3.6 - 5.5 mmol/L    Chloride 121 (H) 96 - 112 mmol/L    Co2 23 20 - 33 mmol/L    Glucose 128 (H) 65 - 99 mg/dL    Bun 30 (H) 8 - 22 mg/dL    Creatinine 0.71 0.50 - 1.40 mg/dL    Calcium 7.6 (L) 8.5 - 10.5 mg/dL    Anion Gap 9.0 0.0 - 11.9   Triglyceride    Collection Time: 02/03/19 12:00 AM   Result Value Ref Range    Triglycerides 185 (H) 0 - 149 mg/dL   ESTIMATED GFR    Collection Time: 02/03/19 12:00 AM   Result Value Ref Range    GFR If African American >60 >60 mL/min/1.73 m 2    GFR If Non African American >60 >60 mL/min/1.73 m 2   CBC without Differential    Collection Time: 02/03/19  5:50 AM   Result Value Ref Range    WBC 13.0 (H) 4.8 - 10.8 K/uL    RBC 2.62 (L) 4.70 - 6.10 M/uL    Hemoglobin 7.9 (L) 14.0 - 18.0 g/dL    Hematocrit 25.9 (L) 42.0 - 52.0 %    MCV 98.9 (H) 81.4 - 97.8 fL    MCH 30.2 27.0 - 33.0 pg    MCHC 30.5 (L) 33.7 - 35.3 g/dL    RDW 47.1 35.9 - 50.0 fL    Platelet Count 338 164 - 446 K/uL    MPV 10.5 9.0 - 12.9 fL   Prothrombin Time (INR)    Collection Time: 02/03/19  5:50 AM   Result Value Ref Range    PT 15.2 (H) 12.0 - 14.6 sec    INR 1.19 (H) 0.87 - 1.13   Basic Metabolic Panel    Collection Time: 02/03/19  5:50 AM   Result Value Ref Range    Sodium 148 (H) 135 - 145 mmol/L    Potassium 3.7 3.6 - 5.5 mmol/L    Chloride 119 (H) 96 - 112 mmol/L    Co2 22 20 - 33 mmol/L    Glucose 131 (H) 65 - 99 mg/dL    Bun 29 (H) 8 - 22 mg/dL     Creatinine 0.62 0.50 - 1.40 mg/dL    Calcium 7.8 (L) 8.5 - 10.5 mg/dL    Anion Gap 7.0 0.0 - 11.9   ESTIMATED GFR    Collection Time: 02/03/19  5:50 AM   Result Value Ref Range    GFR If African American >60 >60 mL/min/1.73 m 2    GFR If Non African American >60 >60 mL/min/1.73 m 2   Basic Metabolic Panel    Collection Time: 02/03/19 12:02 PM   Result Value Ref Range    Sodium 151 (H) 135 - 145 mmol/L    Potassium 3.9 3.6 - 5.5 mmol/L    Chloride 123 (H) 96 - 112 mmol/L    Co2 24 20 - 33 mmol/L    Glucose 118 (H) 65 - 99 mg/dL    Bun 29 (H) 8 - 22 mg/dL    Creatinine 0.63 0.50 - 1.40 mg/dL    Calcium 7.6 (L) 8.5 - 10.5 mg/dL    Anion Gap 4.0 0.0 - 11.9   ESTIMATED GFR    Collection Time: 02/03/19 12:02 PM   Result Value Ref Range    GFR If African American >60 >60 mL/min/1.73 m 2    GFR If Non African American >60 >60 mL/min/1.73 m 2   ISTAT ARTERIAL BLOOD GAS    Collection Time: 02/03/19  1:52 PM   Result Value Ref Range    Ph 7.380 (L) 7.400 - 7.500    Pco2 33.6 26.0 - 37.0 mmHg    Po2 50 (L) 64 - 87 mmHg    Tco2 21 20 - 33 mmol/L    S02 85 (L) 93 - 99 %    Hco3 19.9 17.0 - 25.0 mmol/L    BE -5 (L) -4 - 3 mmol/L    Body Temp 102.0 F degrees    O2 Therapy 100 %    iPF Ratio 50     Ph Temp Sri 7.353 (L) 7.400 - 7.500    Pco2 Temp Co 36.5 26.0 - 37.0 mmHg    Po2 Temp Cor 57 (L) 64 - 87 mmHg    Specimen Arterial     Action Range Triggered NO     Inst. Qualified Patient YES        Fluids    Intake/Output Summary (Last 24 hours) at 02/03/19 1416  Last data filed at 02/03/19 0626   Gross per 24 hour   Intake          2620.67 ml   Output             1185 ml   Net          1435.67 ml       Core Measures & Quality Metrics  Labs reviewed, Medications reviewed and Radiology images reviewed  Greer catheter: Critically Ill - Requiring Accurate Measurement of Urinary Output  Central line in place: Need for access    DVT Prophylaxis: Enoxaparin (Lovenox)  DVT prophylaxis - mechanical: SCDs  Ulcer prophylaxis: Yes        JOHN  Score  ETOH Screening    Assessment/Plan  Acute respiratory failure following trauma and surgery (HCC)- (present on admission)   Assessment & Plan    Intubated in field  1/27 aggressive management of a head injury/ICP precludes weaning protocol  Continue full mechanical ventilatory support.   Ventilator bundle  2/3 trach  Acute deterioration and inability to oxygenate precipitously 2 hours after tracheostomy.  Chest x-ray shows some increased infiltrate in the left lower lobe, patient refractory to bagging and increase PEEP, paralytics ordered, stat Lasix and CTA chest to rule out pulmonary embolism, Flolan     Subdural hematoma (HCC)- (present on admission)   Assessment & Plan    Left sided holohemispheric subdural hematoma measuring approximately 9.4 mm in maximum thickness. There is approximately 10 mm of left-to-right midline shift. There is probably mild left sided hemispheric edema.  1/26 To OR for emergent craniotomy and evacuation of hematoma.   1/28 CT x2 stable  Trending ICP  Continue Kera  HTS protocol  Head of bed elevation  Optimize p.o. 2/PCO2  Aggressive sedation/analgesia.  1/31 MRI brain and neck planned next 24-48 hours.  Aggressive support if brain stem ok  Jazlyn Huntley MD. Neurosurgery.     Aspiration into airway- (present on admission)   Assessment & Plan    Admission imaging with bilateral, left greater than right medial dependent consolidation could be due to aspiration pneumonitis or atelectasis.  Aggressive pulmonary hygiene and serial chest radiography.     Contraindication to deep vein thrombosis (DVT) prophylaxis- (present on admission)   Assessment & Plan    Systemic anticoagulation contraindicated secondary to elevated bleeding risk.  1/27 no evidence of DVT on surveillance ultrasound     Pneumothorax, traumatic- (present on admission)   Assessment & Plan    Small right apical  Observation      Trauma- (present on admission)   Assessment & Plan    [place] Witnessed  seizure like activity. GCS 3. No evidence of acute trauma.  Trauma Red Activation.  Mor Roa MD, Trauma/General Surgery.         Discussed patient condition with RN, RT, Pharmacy and Patient.  CRITICAL CARE TIME EXCLUDING PROCEDURES: 65    Minutes  I independently reviewed pertinent clinical lab tests from the last 48 hours and ordered additional follow up clinical lab tests.  I independently reviewed pertinent radiographic images and the radiologist's reports from the last 48 hours and ordered additional follow up radiographic studies.  I reviewed the details of the available patient records and documentation by consulting physicians in EPIC up to today, summated the information, and utilized the information as warranted in today's medical decision making for this patient.  I personally evaluated the patient condition at bedside and discussed the daily plan(s) with available nursing staff, dieticians, social workers, pharmacists on rounds.  I am actively managing this patient's mechanical ventilation and directly involved in the adjustment of multiple settings (FiO2, PEEP, tidal volume, and minute ventilation) based on my personal bedside evaluation of this patient's radiographic, and laboratory findings and clinical changes throughout the day.

## 2019-02-03 NOTE — PROCEDURES
DATE OF OPERATION: 2/3/2019     PREOPERATIVE DIAGNOSIS: respiratory failure    POSTOPERATIVE DIAGNOSIS: respiratory failure    PROCEDURE PERFORMED: bronchoscopy for percutaneous trach    SURGEON: Mor Roa M.D.    ANESTHESIA: Anesthesia was administered.    INDICATIONS: The patient is a 28 y.o. male with acute respiratory failure and Bronchoscopy for percutanious tracheostojmy  is performed in the ICU.    FINDINGS: tracheostomy placed trachea    SPECIMEN: none    PROCEDURE: Following informed consent, the patient was properly identified and optimally positioned in bed. He was preoxygenated with 100% oxygen and placed on a regular ventilatory rate. Intravenous sedation, analgesia, topical aesthetic, and pharmacologic restraint was administered.    The fiberoptic bronchoscope was advanced through the the tracheostomy tube. The upper airways were suctioned. All airways were evaluated to the sub-segmental level.The airways were systematically and sequentially inspected guidance provide placement percutanious tracheostomy  After completion placemetn confirmed and trachea cleared     The patient tolerated the procedure well. There were no apparent complications.    ____________________________________   Mor Roa M.D.    DD: 2/3/2019  2:15 PM

## 2019-02-04 ENCOUNTER — APPOINTMENT (OUTPATIENT)
Dept: RADIOLOGY | Facility: MEDICAL CENTER | Age: 29
DRG: 003 | End: 2019-02-04
Attending: NURSE PRACTITIONER
Payer: COMMERCIAL

## 2019-02-04 ENCOUNTER — APPOINTMENT (OUTPATIENT)
Dept: RADIOLOGY | Facility: MEDICAL CENTER | Age: 29
DRG: 003 | End: 2019-02-04
Attending: SURGERY
Payer: COMMERCIAL

## 2019-02-04 ENCOUNTER — APPOINTMENT (OUTPATIENT)
Dept: RADIOLOGY | Facility: MEDICAL CENTER | Age: 29
DRG: 003 | End: 2019-02-04
Attending: PHYSICIAN ASSISTANT
Payer: COMMERCIAL

## 2019-02-04 LAB
ACTION RANGE TRIGGERED IACRT: NO
ANION GAP SERPL CALC-SCNC: 4 MMOL/L (ref 0–11.9)
ANION GAP SERPL CALC-SCNC: 4 MMOL/L (ref 0–11.9)
ANION GAP SERPL CALC-SCNC: 8 MMOL/L (ref 0–11.9)
ANION GAP SERPL CALC-SCNC: 8 MMOL/L (ref 0–11.9)
BASE EXCESS BLDA CALC-SCNC: 0 MMOL/L (ref -4–3)
BODY TEMPERATURE: ABNORMAL DEGREES
BUN SERPL-MCNC: 24 MG/DL (ref 8–22)
BUN SERPL-MCNC: 26 MG/DL (ref 8–22)
BUN SERPL-MCNC: 27 MG/DL (ref 8–22)
BUN SERPL-MCNC: 28 MG/DL (ref 8–22)
CALCIUM SERPL-MCNC: 7.2 MG/DL (ref 8.5–10.5)
CALCIUM SERPL-MCNC: 7.3 MG/DL (ref 8.5–10.5)
CALCIUM SERPL-MCNC: 7.7 MG/DL (ref 8.5–10.5)
CALCIUM SERPL-MCNC: 8 MG/DL (ref 8.5–10.5)
CHLORIDE SERPL-SCNC: 118 MMOL/L (ref 96–112)
CHLORIDE SERPL-SCNC: 120 MMOL/L (ref 96–112)
CHLORIDE SERPL-SCNC: 121 MMOL/L (ref 96–112)
CHLORIDE SERPL-SCNC: 124 MMOL/L (ref 96–112)
CO2 BLDA-SCNC: 26 MMOL/L (ref 20–33)
CO2 SERPL-SCNC: 26 MMOL/L (ref 20–33)
CO2 SERPL-SCNC: 27 MMOL/L (ref 20–33)
CO2 SERPL-SCNC: 28 MMOL/L (ref 20–33)
CO2 SERPL-SCNC: 28 MMOL/L (ref 20–33)
CREAT SERPL-MCNC: 0.78 MG/DL (ref 0.5–1.4)
CREAT SERPL-MCNC: 0.79 MG/DL (ref 0.5–1.4)
CREAT SERPL-MCNC: 0.83 MG/DL (ref 0.5–1.4)
CREAT SERPL-MCNC: 0.93 MG/DL (ref 0.5–1.4)
ERYTHROCYTE [DISTWIDTH] IN BLOOD BY AUTOMATED COUNT: 48.8 FL (ref 35.9–50)
GLUCOSE SERPL-MCNC: 114 MG/DL (ref 65–99)
GLUCOSE SERPL-MCNC: 117 MG/DL (ref 65–99)
GLUCOSE SERPL-MCNC: 118 MG/DL (ref 65–99)
GLUCOSE SERPL-MCNC: 119 MG/DL (ref 65–99)
HCO3 BLDA-SCNC: 25 MMOL/L (ref 17–25)
HCT VFR BLD AUTO: 25 % (ref 42–52)
HGB BLD-MCNC: 7.6 G/DL (ref 14–18)
HOROWITZ INDEX BLDA+IHG-RTO: 191 MM[HG]
INR PPP: 1.33 (ref 0.87–1.13)
INST. QUALIFIED PATIENT IIQPT: YES
MAGNESIUM SERPL-MCNC: 2.4 MG/DL (ref 1.5–2.5)
MCH RBC QN AUTO: 30.5 PG (ref 27–33)
MCHC RBC AUTO-ENTMCNC: 30.4 G/DL (ref 33.7–35.3)
MCV RBC AUTO: 100.4 FL (ref 81.4–97.8)
O2/TOTAL GAS SETTING VFR VENT: 100 %
PCO2 BLDA: 41.3 MMHG (ref 26–37)
PCO2 TEMP ADJ BLDA: 43.6 MMHG (ref 26–37)
PH BLDA: 7.39 [PH] (ref 7.4–7.5)
PH TEMP ADJ BLDA: 7.37 [PH] (ref 7.4–7.5)
PHOSPHATE SERPL-MCNC: 4 MG/DL (ref 2.5–4.5)
PLATELET # BLD AUTO: 304 K/UL (ref 164–446)
PMV BLD AUTO: 10.8 FL (ref 9–12.9)
PO2 BLDA: 191 MMHG (ref 64–87)
PO2 TEMP ADJ BLDA: 198 MMHG (ref 64–87)
POTASSIUM SERPL-SCNC: 3.4 MMOL/L (ref 3.6–5.5)
POTASSIUM SERPL-SCNC: 3.6 MMOL/L (ref 3.6–5.5)
POTASSIUM SERPL-SCNC: 3.6 MMOL/L (ref 3.6–5.5)
POTASSIUM SERPL-SCNC: 3.7 MMOL/L (ref 3.6–5.5)
PROTHROMBIN TIME: 16.6 SEC (ref 12–14.6)
RBC # BLD AUTO: 2.49 M/UL (ref 4.7–6.1)
SAO2 % BLDA: 100 % (ref 93–99)
SODIUM SERPL-SCNC: 152 MMOL/L (ref 135–145)
SODIUM SERPL-SCNC: 154 MMOL/L (ref 135–145)
SODIUM SERPL-SCNC: 154 MMOL/L (ref 135–145)
SODIUM SERPL-SCNC: 156 MMOL/L (ref 135–145)
SPECIMEN DRAWN FROM PATIENT: ABNORMAL
WBC # BLD AUTO: 17.6 K/UL (ref 4.8–10.8)

## 2019-02-04 PROCEDURE — A9270 NON-COVERED ITEM OR SERVICE: HCPCS | Performed by: SURGERY

## 2019-02-04 PROCEDURE — 85610 PROTHROMBIN TIME: CPT

## 2019-02-04 PROCEDURE — 700102 HCHG RX REV CODE 250 W/ 637 OVERRIDE(OP): Performed by: SURGERY

## 2019-02-04 PROCEDURE — 700111 HCHG RX REV CODE 636 W/ 250 OVERRIDE (IP): Performed by: SURGERY

## 2019-02-04 PROCEDURE — A9270 NON-COVERED ITEM OR SERVICE: HCPCS | Performed by: NEUROLOGICAL SURGERY

## 2019-02-04 PROCEDURE — 93970 EXTREMITY STUDY: CPT

## 2019-02-04 PROCEDURE — 36600 WITHDRAWAL OF ARTERIAL BLOOD: CPT

## 2019-02-04 PROCEDURE — 71045 X-RAY EXAM CHEST 1 VIEW: CPT

## 2019-02-04 PROCEDURE — 700105 HCHG RX REV CODE 258: Performed by: SURGERY

## 2019-02-04 PROCEDURE — 700112 HCHG RX REV CODE 229: Performed by: NEUROLOGICAL SURGERY

## 2019-02-04 PROCEDURE — 700105 HCHG RX REV CODE 258: Performed by: NEUROLOGICAL SURGERY

## 2019-02-04 PROCEDURE — 770022 HCHG ROOM/CARE - ICU (200)

## 2019-02-04 PROCEDURE — 700101 HCHG RX REV CODE 250: Performed by: SURGERY

## 2019-02-04 PROCEDURE — 83735 ASSAY OF MAGNESIUM: CPT

## 2019-02-04 PROCEDURE — 94770 HCHG CO2 EXPIRED GAS DETERMINATION: CPT

## 2019-02-04 PROCEDURE — 99233 SBSQ HOSP IP/OBS HIGH 50: CPT | Performed by: PHYSICAL MEDICINE & REHABILITATION

## 2019-02-04 PROCEDURE — 85027 COMPLETE CBC AUTOMATED: CPT

## 2019-02-04 PROCEDURE — 94640 AIRWAY INHALATION TREATMENT: CPT

## 2019-02-04 PROCEDURE — 700111 HCHG RX REV CODE 636 W/ 250 OVERRIDE (IP): Performed by: PHYSICIAN ASSISTANT

## 2019-02-04 PROCEDURE — 82803 BLOOD GASES ANY COMBINATION: CPT

## 2019-02-04 PROCEDURE — 84100 ASSAY OF PHOSPHORUS: CPT

## 2019-02-04 PROCEDURE — 94003 VENT MGMT INPAT SUBQ DAY: CPT

## 2019-02-04 PROCEDURE — 80048 BASIC METABOLIC PNL TOTAL CA: CPT | Mod: 91

## 2019-02-04 RX ORDER — POTASSIUM CHLORIDE 29.8 MG/ML
40 INJECTION INTRAVENOUS ONCE
Status: COMPLETED | OUTPATIENT
Start: 2019-02-04 | End: 2019-02-04

## 2019-02-04 RX ORDER — FUROSEMIDE 10 MG/ML
40 INJECTION INTRAMUSCULAR; INTRAVENOUS
Status: DISCONTINUED | OUTPATIENT
Start: 2019-02-04 | End: 2019-02-05

## 2019-02-04 RX ADMIN — ACETAMINOPHEN 650 MG: 325 TABLET, FILM COATED ORAL at 23:19

## 2019-02-04 RX ADMIN — PROPOFOL 50 MCG/KG/MIN: 10 INJECTION, EMULSION INTRAVENOUS at 12:42

## 2019-02-04 RX ADMIN — MIDAZOLAM HYDROCHLORIDE 2 MG: 1 INJECTION, SOLUTION INTRAMUSCULAR; INTRAVENOUS at 23:32

## 2019-02-04 RX ADMIN — PROPOFOL 50 MCG/KG/MIN: 10 INJECTION, EMULSION INTRAVENOUS at 04:06

## 2019-02-04 RX ADMIN — FAMOTIDINE 20 MG: 20 TABLET ORAL at 17:21

## 2019-02-04 RX ADMIN — MINERAL OIL AND WHITE PETROLATUM 1 APPLICATION: 150; 830 OINTMENT OPHTHALMIC at 22:17

## 2019-02-04 RX ADMIN — DOCUSATE SODIUM 100 MG: 50 LIQUID ORAL at 05:39

## 2019-02-04 RX ADMIN — PROPRANOLOL HYDROCHLORIDE 10 MG: 10 TABLET ORAL at 22:17

## 2019-02-04 RX ADMIN — MINERAL OIL AND WHITE PETROLATUM 1 APPLICATION: 150; 830 OINTMENT OPHTHALMIC at 05:48

## 2019-02-04 RX ADMIN — FAMOTIDINE 20 MG: 10 INJECTION INTRAVENOUS at 05:40

## 2019-02-04 RX ADMIN — EPOPROSTENOL SODIUM 0.05 MCG/KG/MIN: 1.5 INJECTION, POWDER, LYOPHILIZED, FOR SOLUTION INTRAVENOUS at 02:28

## 2019-02-04 RX ADMIN — EPOPROSTENOL SODIUM 0.05 MCG/KG/MIN: 1.5 INJECTION, POWDER, LYOPHILIZED, FOR SOLUTION INTRAVENOUS at 20:22

## 2019-02-04 RX ADMIN — PROPOFOL 50 MCG/KG/MIN: 10 INJECTION, EMULSION INTRAVENOUS at 07:48

## 2019-02-04 RX ADMIN — SODIUM CHLORIDE 500 MG: 9 INJECTION, SOLUTION INTRAVENOUS at 20:20

## 2019-02-04 RX ADMIN — ACETAMINOPHEN 650 MG: 325 TABLET, FILM COATED ORAL at 15:43

## 2019-02-04 RX ADMIN — PROPOFOL 50 MCG/KG/MIN: 10 INJECTION, EMULSION INTRAVENOUS at 15:43

## 2019-02-04 RX ADMIN — ENOXAPARIN SODIUM 40 MG: 100 INJECTION SUBCUTANEOUS at 05:39

## 2019-02-04 RX ADMIN — SODIUM CHLORIDE 500 MG: 9 INJECTION, SOLUTION INTRAVENOUS at 05:40

## 2019-02-04 RX ADMIN — FUROSEMIDE 40 MG: 10 INJECTION, SOLUTION INTRAMUSCULAR; INTRAVENOUS at 15:20

## 2019-02-04 RX ADMIN — ACETAMINOPHEN 650 MG: 325 TABLET, FILM COATED ORAL at 04:06

## 2019-02-04 RX ADMIN — ACETAMINOPHEN 650 MG: 325 TABLET, FILM COATED ORAL at 09:55

## 2019-02-04 RX ADMIN — EPOPROSTENOL SODIUM 0.05 MCG/KG/MIN: 1.5 INJECTION, POWDER, LYOPHILIZED, FOR SOLUTION INTRAVENOUS at 08:35

## 2019-02-04 RX ADMIN — MINERAL OIL AND WHITE PETROLATUM 1 APPLICATION: 150; 830 OINTMENT OPHTHALMIC at 15:20

## 2019-02-04 RX ADMIN — EPOPROSTENOL SODIUM 0.05 MCG/KG/MIN: 1.5 INJECTION, POWDER, LYOPHILIZED, FOR SOLUTION INTRAVENOUS at 14:17

## 2019-02-04 RX ADMIN — POTASSIUM CHLORIDE 40 MEQ: 29.8 INJECTION, SOLUTION INTRAVENOUS at 15:35

## 2019-02-04 RX ADMIN — FENTANYL CITRATE 100 MCG: 50 INJECTION, SOLUTION INTRAMUSCULAR; INTRAVENOUS at 12:44

## 2019-02-04 RX ADMIN — MORPHINE SULFATE 4 MG: 4 INJECTION INTRAVENOUS at 23:24

## 2019-02-04 RX ADMIN — PROPRANOLOL HYDROCHLORIDE 10 MG: 10 TABLET ORAL at 15:20

## 2019-02-04 RX ADMIN — SODIUM CHLORIDE 500 ML: 234 INJECTION, SOLUTION INTRAVENOUS at 01:36

## 2019-02-04 NOTE — PROGRESS NOTES
Paged Dr. Huntley's service and updated on delaying patient's head CT until aggressive vent settings and FIO2 requirements decrease.    Spoke with Ap DE LA PAZ to update. No new orders received at this time.

## 2019-02-04 NOTE — PROGRESS NOTES
Neurosurgery Progress Note    Subjective:  Seen with Dr. Huntley  Sedation increased overnight  Eyes do not open today  Does not follow   PEERL  Decompression full  MR brain- brainstem ok Diffuse  Hemisphere changes  MR c spine ok  Extends LUE  Flexes BLE    Very swollen    Exam:  As above    Pulse  Av.8  Min: 59  Max: 120  Resp  Av.1  Min: 8  Max: 54  Monitored Temp 2  Av.3 °C (101 °F)  Min: 37.4 °C (99.3 °F)  Max: 39.2 °C (102.6 °F)  SpO2  Av.1 %  Min: 87 %  Max: 100 %    No Data Recorded    Recent Labs      19   0550  19   0600   WBC  13.0*  17.6*   RBC  2.62*  2.49*   HEMOGLOBIN  7.9*  7.6*   HEMATOCRIT  25.9*  25.0*   MCV  98.9*  100.4*   MCH  30.2  30.5   MCHC  30.5*  30.4*   RDW  47.1  48.8   PLATELETCT  338  304   MPV  10.5  10.8     Recent Labs      19   1803  19   0010  19   0600   SODIUM  154*  154*  156*   POTASSIUM  3.4*  3.7  3.6   CHLORIDE  119*  120*  124*   CO2  26  26  28   GLUCOSE  124*  118*  114*   BUN  25*  27*  28*   CREATININE  0.83  0.93  0.78   CALCIUM  7.8*  7.3*  7.2*     Recent Labs      19   0550  19   0600   INR  1.19*  1.33*           Intake/Output       19 07 - 1959 19 07 - 19 0659       Total [id number] Total       Intake    P.O.  --  0 0  --  -- --    P.O. -- 0 0 -- -- --    I.V.  1100.7  720 1820.7  --  -- --    Vecuronium  Volume 10 -- 10 -- -- --    IV Volume (Morphine) 16 -- 16 -- -- --    Volume (mL) (NS infusion) 360 -- 360 -- -- --    Volume (mL) (3% sodium chloride (HYPERTONIC SALINE) 500mL infusion 500 mL) 714.7 720 1434.7 -- -- --    Other  60  260 320  --  -- --    Medications (PO/Enteral Liquids) -- 230 230 -- -- --    Flush / Irrigation Volume (Cortrak) 60 30 90 -- -- --    NG/GT  120  0 120  --  -- --    Intake (mL) (Enteral Tube 19 Dobhoff - Gastric 18 Fr. Right nare) -- 0 0 -- -- --    Intake (mL) (Enteral Tube 19 Cortrak - Gastric Left  nare) 120 0 120 -- -- --    Total Intake 1280.7 980 2260.7 -- -- --       Output    Urine  4010  815 4825  --  -- --    Output (mL) (Urethral Catheter 18 Fr.) 4010 815 4825 -- -- --    Stool  --  -- --  --  -- --    Number of Times Stooled 0 x 1 x 1 x -- -- --    Emesis/NG output  --  50 50  --  -- --    Output (mL) (Enteral Tube 02/03/19 Dobhoff - Gastric 18 Fr. Right nare) -- 50 50 -- -- --    Output (mL) (Enteral Tube 01/27/19 Cortrak - Gastric Left nare) -- 0 0 -- -- --    Total Output 4010 865 4875 -- -- --       Net I/O     -2729.3 115 -2614.3 -- -- --            Intake/Output Summary (Last 24 hours) at 02/04/19 0817  Last data filed at 02/04/19 0600   Gross per 24 hour   Intake             1932 ml   Output             4695 ml   Net            -2763 ml            • epoprostenol (FLOLAN) 1.5 mg syringe (For INHALATION)  0.01-0.05 mcg/kg/min Continuous   • artificial tear ointment  1 Application Q8HRS   • vecuronium  0.1 mg/kg Q2HRS PRN   • levETIRAcetam (KEPPRA) IV  500 mg Q12HRS   • famotidine  20 mg BID    Or   • famotidine  20 mg BID   • midazolam  2 mg Q HOUR PRN   • vecuronium infusion  0-1.7 mcg/kg/min Continuous   • fentaNYL   Continuous   • acetaminophen  650 mg Q4HRS PRN   • propranolol  10 mg Q8HRS   • senna-docusate  2 Tab DAILY AT NOON   • polyethylene glycol/lytes  1 Packet DAILY AT NOON   • enoxaparin (LOVENOX) injection  40 mg DAILY   • morphine injection  2 mg Q HOUR PRN    Or   • morphine injection  4 mg Q HOUR PRN   • Pharmacy  1 Each PHARMACY TO DOSE   • cloNIDine  0.1 mg Q4HRS PRN   • magnesium hydroxide  30 mL QDAY PRN   • oxyCODONE immediate-release  2.5 mg Q3HRS PRN   • oxyCODONE immediate-release  5 mg Q3HRS PRN   • polyethylene glycol/lytes  1 Packet BID PRN   • senna-docusate  1 Tab Q24HRS PRN   • Respiratory Care per Protocol   Continuous RT   • NS   Continuous   • dextrose 50%  25 mL Q15 MIN PRN   • propofol  0-80 mcg/kg/min Continuous   • Pharmacy Consult Request  1 Each PHARMACY TO  DOSE   • MD ALERT...DO NOT ADMINISTER NSAIDS or ASPIRIN unless ORDERED By Neurosurgery  1 Each PRN   • ondansetron  4 mg Q4HRS PRN   • dexamethasone  4 mg Once PRN   • scopolamine  1 Patch Q72HRS PRN   • hydrALAZINE  10 mg Q HOUR PRN   • niCARdipine infusion  0-15 mg/hr Continuous   • bisacodyl  10 mg Q24HRS PRN   • fleet  1 Each Once PRN   • 3% sodium chloride  500 mL Continuous   • sodium chloride 23.4% ivpb  200 mEq Q6HRS PRN   • docusate sodium 100mg/10mL  100 mg BID   • LORazepam  1 mg Q2HRS PRN       Assessment and Plan:  NO changes  D/ c collar

## 2019-02-04 NOTE — CARE PLAN
Problem: Infection  Goal: Will remain free from infection    Intervention: Implement standard precautions and perform hand washing before and after patient contact  Hand hygiene and standard precautions in place before and after each patient interaction. CHG wipes q shift Maintain in line suction device, chlorhexidine oral care Q2 hrs in place      Problem: Venous Thromboembolism (VTW)/Deep Vein Thrombosis (DVT) Prevention:  Goal: Patient will participate in Venous Thrombosis (VTE)/Deep Vein Thrombosis (DVT)Prevention Measures    Intervention: Ensure patient wears graduated elastic stockings (CLAU hose) and/or SCDs, if ordered, when in bed or chair (Remove at least once per shift for skin check)  SCDs to remain in place for extent of shift except when performing bedbath/skin check

## 2019-02-04 NOTE — CARE PLAN
Problem: Ventilation Defect:  Goal: Ability to achieve and maintain unassisted ventilation or tolerate decreased levels of ventilator support    Intervention: Support and monitor invasive and noninvasive mechanical ventilation  Adult Ventilation Update    Total Vent Days: 10    Patient Lines/Drains/Airways Status    Active Airway     Name: Placement date: Placement time: Site: Days:    Airway Trach Tracheostomy 8.0 02/03/19   1015   Tracheostomy   2              Barriers to Wean: Intracranial Hypertension (02/01/19 0629)    Cough: Productive (02/04/19 0221)  Sputum Amount: Small (02/04/19 0400)  Sputum Color: White;Bloody (02/04/19 0400)  Sputum Consistency: Thin;Thick (02/04/19 0400)    Mobility  Level of Mobility: Level I (02/03/19 2000)  Activity Performed: Unable to mobilize (02/03/19 2000)  Pt Calls for Assistance: No (02/03/19 2000)  Reason Not Mobilized: Unstable condition (02/03/19 2000)  Mobilization Comments: per MD order (02/03/19 2000)    Events/Summary/Plan: new flolan syringe (02/04/19 0221)

## 2019-02-04 NOTE — PROGRESS NOTES
"Page made to Dr. Nolasco to clarify vecuronium gtt being \"held\" during day shift post vecuronium bolus administration.   Clarification with MD regarding preference to resume paralytic gtt and change vent settings, or to continue holding vecuronium gtt and keep vent settings on APRV.   MD clarified order to keep same vent settings, increase sedation and order for increase of fentanyl gtt to 25-100mcg/hr for increased sedation needs for rest at this time.   MD advises to allow adequate sedation and maintain APRV for adequate oxygenation through the night. Will readdress vecuronium gtt in AM rounds.     "

## 2019-02-04 NOTE — PROGRESS NOTES
Spoke with Dr. Nolasco in regard to 1200 Na level of 152 from 156. Dr. Nolasco ordered to restart 3% sodium chloride gtt at 10mL/hr. Order repeated back to Dr. Nolasco.

## 2019-02-04 NOTE — DISCHARGE PLANNING
Received referral for rehabilitation. Reviewed pt's chart. [facility] aware of referral and is following pt.

## 2019-02-04 NOTE — PROGRESS NOTES
2 RN skin check performed. Current findings documented in flowsheets. No new significant findings noted

## 2019-02-04 NOTE — PROGRESS NOTES
Paged Dr Nolasco regarding ventilator and vecuronium concerns. Received new orders to increase sedation as long as hemodynamics tolerates. Initiate vecuronium drip and change ventilator settings according if hemodynamics do not tolerate the higher sedation levels

## 2019-02-04 NOTE — CARE PLAN
Problem: Venous Thromboembolism (VTW)/Deep Vein Thrombosis (DVT) Prevention:  Goal: Patient will participate in Venous Thrombosis (VTE)/Deep Vein Thrombosis (DVT)Prevention Measures    Intervention: Ensure patient wears graduated elastic stockings (CLAU hose) and/or SCDs, if ordered, when in bed or chair (Remove at least once per shift for skin check)  Pt wearing SCDs. SCDs applied appropriately. SCD machine turned on. Skin check performed under SCDs.      Problem: Skin Integrity  Goal: Risk for impaired skin integrity will decrease    Intervention: Implement precautions to protect skin integrity in collaboration with the interdisciplinary team  Appropriate interventions in place to prevent skin breakdown. See charting in flowsheets.

## 2019-02-04 NOTE — CARE PLAN
Problem: Respiratory:  Goal: Respiratory status will improve  Outcome: PROGRESSING SLOWER THAN EXPECTED  Collaborating with RT maintain adequate oxygenation. Patient unable to cough, deep breathe or utilize the IS at this time.

## 2019-02-04 NOTE — PROGRESS NOTES
At bedside with RT; vecuronium push given. Maintaining BIS 40-60 per policy. Monitoring saturations and vitals.

## 2019-02-04 NOTE — PROGRESS NOTES
Physical Medicine and Rehabilitation Consultation         Follow up Consult      Date of Consultation: 2/4/2019  Consulting provider: Heber Brown D.O.  Reason for consultation: TBI, assess for acute inpatient rehab appropriateness      Chief complaint: TBI    S:   S/p ana and bronch on 2/3  Episode yesterday afternoon of acute hypoxemia refractory to bagging, increased PEEP on mechanical ventilation and increased sedation  Parents report that patient's neurologic status changed on Sunday after tracheostomy, prior to this he was opening his eyes to pain, no reported tracking, moving bilateral lower extremities and left upper extremity spontaneously withdrawing right hand pain.     RN reports patient is currently sedated, fevers overnight, requiring high peaks on ventilator setting.  Bowel: Patient's last BM was 2/4  Bladder: billings    ROS:   unable to be obtained due to cognitive status.      Medications:  Current Facility-Administered Medications   Medication Dose   • epoprostenol (FLOLAN) 1.5 mg in glycine diluent for flolan 50 mL for Inhalation  0.01-0.05 mcg/kg/min   • artificial tear ointment (REFRESH,LACRI-LUBE) 1 Application  1 Application   • vecuronium (NORCURON) injection 8.85 mg  0.1 mg/kg   • levETIRAcetam (KEPPRA) 500 mg in  mL IVPB  500 mg   • famotidine (PEPCID) tablet 20 mg  20 mg    Or   • famotidine (PEPCID) injection 20 mg  20 mg   • midazolam (VERSED) 2 MG/2ML injection 2 mg  2 mg   • vecuronium (NORCURON) 60 mg in  mL Infusion  0-1.7 mcg/kg/min   • fentaNYL (SUBLIMAZE) 50 mcg/mL in 50mL (Continuous Infusion)     • acetaminophen (TYLENOL) tablet 650 mg  650 mg   • propranolol (INDERAL) tablet 10 mg  10 mg   • senna-docusate (PERICOLACE or SENOKOT S) 8.6-50 MG per tablet 2 Tab  2 Tab   • polyethylene glycol/lytes (MIRALAX) PACKET 1 Packet  1 Packet   • enoxaparin (LOVENOX) inj 40 mg  40 mg   • morphine (pf) 4 mg/ml injection 2 mg  2 mg    Or   • morphine (pf)  "4 mg/ml injection 4 mg  4 mg   • Pharmacy Consult: Enteral tube insertion - review meds/change route/product selection  1 Each   • cloNIDine (CATAPRES) tablet 0.1 mg  0.1 mg   • magnesium hydroxide (MILK OF MAGNESIA) suspension 30 mL  30 mL   • oxyCODONE immediate-release (ROXICODONE) tablet 2.5 mg  2.5 mg   • oxyCODONE immediate-release (ROXICODONE) tablet 5 mg  5 mg   • polyethylene glycol/lytes (MIRALAX) PACKET 1 Packet  1 Packet   • senna-docusate (PERICOLACE or SENOKOT S) 8.6-50 MG per tablet 1 Tab  1 Tab   • Respiratory Care per Protocol     • NS infusion     • dextrose 50% (D50W) injection 25 mL  25 mL   • propofol (DIPRIVAN) injection  0-80 mcg/kg/min   • Pharmacy Consult Request ...Pain Management Review 1 Each  1 Each   • MD ALERT...DO NOT ADMINISTER NSAIDS or ASPIRIN unless ORDERED By Neurosurgery 1 Each  1 Each   • ondansetron (ZOFRAN) syringe/vial injection 4 mg  4 mg   • dexamethasone (DECADRON) injection 4 mg  4 mg   • scopolamine (TRANSDERM-SCOP) patch 1 Patch  1 Patch   • hydrALAZINE (APRESOLINE) injection 10 mg  10 mg   • niCARdipine (CARDENE) 25 mg in  mL Infusion  0-15 mg/hr   • bisacodyl (DULCOLAX) suppository 10 mg  10 mg   • fleet enema 133 mL  1 Each   • 3% sodium chloride (HYPERTONIC SALINE) 500mL infusion 500 mL  500 mL   • sodium chloride 200 mEq in bottle/bag empty 50 mL ivpb  200 mEq   • docusate sodium 100mg/10mL (COLACE) solution 100 mg  100 mg   • LORazepam (ATIVAN) injection 1 mg  1 mg     Physical Exam: Sedation remove 10 minutes prior to examination  Vitals: Blood pressure 142/76, pulse 81, temperature 36.7 °C (98.1 °F), temperature source Greer, resp. rate (!) 32, height 1.702 m (5' 7.01\"), weight 87.6 kg (193 lb 2 oz), SpO2 100 %.  Gen:  Body mass index is 30.24 kg/m².  Sedated  HEENT:  PERRLA, moist mucous membranes, trach in place,  Cardio: RRR, no mumurs  Pulm: Coarse breath sounds bilaterally, on ventilator  Abd: Soft NTND, hypo-active bowel sounds,  Ext: Diffuse " peripheral edema.     Withdraws to painful stimuli on the right fourth and fifth digit resulting in localization of pain at fingers.  Also has generalized response of extension of left arm.  No withdraw to painful stimuli and lower extremities, not moving lower extremities during exam    Not opening eyes to pain        DTRs: 3+ in bilateral biceps, triceps, brachioradialis, 3+ in bilateral patellar and achilles tendons  No clonus at bilateral ankles  + babinski b/l  + Seay b/l     Tone: Clonus present on bilateral ankles      Labs:  Recent Labs      02/03/19   0550  02/04/19   0600   RBC  2.62*  2.49*   HEMOGLOBIN  7.9*  7.6*   HEMATOCRIT  25.9*  25.0*   PLATELETCT  338  304   PROTHROMBTM  15.2*  16.6*   INR  1.19*  1.33*     Recent Labs      02/03/19   1803  02/04/19   0010  02/04/19   0600   SODIUM  154*  154*  156*   POTASSIUM  3.4*  3.7  3.6   CHLORIDE  119*  120*  124*   CO2  26  26  28   GLUCOSE  124*  118*  114*   BUN  25*  27*  28*   CREATININE  0.83  0.93  0.78   CALCIUM  7.8*  7.3*  7.2*     Recent Results (from the past 24 hour(s))   Basic Metabolic Panel    Collection Time: 02/03/19 12:02 PM   Result Value Ref Range    Sodium 151 (H) 135 - 145 mmol/L    Potassium 3.9 3.6 - 5.5 mmol/L    Chloride 123 (H) 96 - 112 mmol/L    Co2 24 20 - 33 mmol/L    Glucose 118 (H) 65 - 99 mg/dL    Bun 29 (H) 8 - 22 mg/dL    Creatinine 0.63 0.50 - 1.40 mg/dL    Calcium 7.6 (L) 8.5 - 10.5 mg/dL    Anion Gap 4.0 0.0 - 11.9   ESTIMATED GFR    Collection Time: 02/03/19 12:02 PM   Result Value Ref Range    GFR If African American >60 >60 mL/min/1.73 m 2    GFR If Non African American >60 >60 mL/min/1.73 m 2   ISTAT ARTERIAL BLOOD GAS    Collection Time: 02/03/19  1:52 PM   Result Value Ref Range    Ph 7.380 (L) 7.400 - 7.500    Pco2 33.6 26.0 - 37.0 mmHg    Po2 50 (L) 64 - 87 mmHg    Tco2 21 20 - 33 mmol/L    S02 85 (L) 93 - 99 %    Hco3 19.9 17.0 - 25.0 mmol/L    BE -5 (L) -4 - 3 mmol/L    Body Temp 102.0 F degrees    O2  Therapy 100 %    iPF Ratio 50     Ph Temp Sri 7.353 (L) 7.400 - 7.500    Pco2 Temp Co 36.5 26.0 - 37.0 mmHg    Po2 Temp Cor 57 (L) 64 - 87 mmHg    Specimen Arterial     Action Range Triggered NO     Inst. Qualified Patient YES    ISTAT ARTERIAL BLOOD GAS    Collection Time: 02/03/19  5:36 PM   Result Value Ref Range    Ph 7.336 (L) 7.400 - 7.500    Pco2 45.5 (H) 26.0 - 37.0 mmHg    Po2 75 64 - 87 mmHg    Tco2 26 20 - 33 mmol/L    S02 94 93 - 99 %    Hco3 24.3 17.0 - 25.0 mmol/L    BE -1 -4 - 3 mmol/L    Body Temp 102.0 F degrees    O2 Therapy 100 %    iPF Ratio 75     Ph Temp Sri 7.309 (L) 7.400 - 7.500    Pco2 Temp Co 49.4 (H) 26.0 - 37.0 mmHg    Po2 Temp Cor 85 64 - 87 mmHg    Specimen Arterial     Action Range Triggered NO     Inst. Qualified Patient YES    Basic Metabolic Panel    Collection Time: 02/03/19  6:03 PM   Result Value Ref Range    Sodium 154 (H) 135 - 145 mmol/L    Potassium 3.4 (L) 3.6 - 5.5 mmol/L    Chloride 119 (H) 96 - 112 mmol/L    Co2 26 20 - 33 mmol/L    Glucose 124 (H) 65 - 99 mg/dL    Bun 25 (H) 8 - 22 mg/dL    Creatinine 0.83 0.50 - 1.40 mg/dL    Calcium 7.8 (L) 8.5 - 10.5 mg/dL    Anion Gap 9.0 0.0 - 11.9   ESTIMATED GFR    Collection Time: 02/03/19  6:03 PM   Result Value Ref Range    GFR If African American >60 >60 mL/min/1.73 m 2    GFR If Non African American >60 >60 mL/min/1.73 m 2   Basic Metabolic Panel    Collection Time: 02/04/19 12:10 AM   Result Value Ref Range    Sodium 154 (H) 135 - 145 mmol/L    Potassium 3.7 3.6 - 5.5 mmol/L    Chloride 120 (H) 96 - 112 mmol/L    Co2 26 20 - 33 mmol/L    Glucose 118 (H) 65 - 99 mg/dL    Bun 27 (H) 8 - 22 mg/dL    Creatinine 0.93 0.50 - 1.40 mg/dL    Calcium 7.3 (L) 8.5 - 10.5 mg/dL    Anion Gap 8.0 0.0 - 11.9   ESTIMATED GFR    Collection Time: 02/04/19 12:10 AM   Result Value Ref Range    GFR If African American >60 >60 mL/min/1.73 m 2    GFR If Non African American >60 >60 mL/min/1.73 m 2   ISTAT ARTERIAL BLOOD GAS    Collection Time:  02/04/19  5:04 AM   Result Value Ref Range    Ph 7.390 (L) 7.400 - 7.500    Pco2 41.3 (H) 26.0 - 37.0 mmHg    Po2 191 (H) 64 - 87 mmHg    Tco2 26 20 - 33 mmol/L    S02 100 (H) 93 - 99 %    Hco3 25.0 17.0 - 25.0 mmol/L    BE 0 -4 - 3 mmol/L    Body Temp 100.8 F degrees    O2 Therapy 100 %    iPF Ratio 191     Ph Temp Sri 7.372 (L) 7.400 - 7.500    Pco2 Temp Co 43.6 (H) 26.0 - 37.0 mmHg    Po2 Temp Cor 198 (H) 64 - 87 mmHg    Specimen Arterial     Action Range Triggered NO     Inst. Qualified Patient YES    Prothrombin Time (INR)    Collection Time: 02/04/19  6:00 AM   Result Value Ref Range    PT 16.6 (H) 12.0 - 14.6 sec    INR 1.33 (H) 0.87 - 1.13   CBC without Differential    Collection Time: 02/04/19  6:00 AM   Result Value Ref Range    WBC 17.6 (H) 4.8 - 10.8 K/uL    RBC 2.49 (L) 4.70 - 6.10 M/uL    Hemoglobin 7.6 (L) 14.0 - 18.0 g/dL    Hematocrit 25.0 (L) 42.0 - 52.0 %    .4 (H) 81.4 - 97.8 fL    MCH 30.5 27.0 - 33.0 pg    MCHC 30.4 (L) 33.7 - 35.3 g/dL    RDW 48.8 35.9 - 50.0 fL    Platelet Count 304 164 - 446 K/uL    MPV 10.8 9.0 - 12.9 fL   BASIC METABOLIC PANEL    Collection Time: 02/04/19  6:00 AM   Result Value Ref Range    Sodium 156 (HH) 135 - 145 mmol/L    Potassium 3.6 3.6 - 5.5 mmol/L    Chloride 124 (H) 96 - 112 mmol/L    Co2 28 20 - 33 mmol/L    Glucose 114 (H) 65 - 99 mg/dL    Bun 28 (H) 8 - 22 mg/dL    Creatinine 0.78 0.50 - 1.40 mg/dL    Calcium 7.2 (L) 8.5 - 10.5 mg/dL    Anion Gap 4.0 0.0 - 11.9   MAGNESIUM    Collection Time: 02/04/19  6:00 AM   Result Value Ref Range    Magnesium 2.4 1.5 - 2.5 mg/dL   PHOSPHORUS    Collection Time: 02/04/19  6:00 AM   Result Value Ref Range    Phosphorus 4.0 2.5 - 4.5 mg/dL   ESTIMATED GFR    Collection Time: 02/04/19  6:00 AM   Result Value Ref Range    GFR If African American >60 >60 mL/min/1.73 m 2    GFR If Non African American >60 >60 mL/min/1.73 m 2     Chest x-ray 2/4:  Right internal jugular central line is seen terminating at the right  atriocaval junction.   Nasogastric tube terminates below the lower margin of the film within the abdomen.  Otherwise medical device position is stable. The cardiac silhouette appears   within normal limits.  The mediastinal contour appears within normal limits.  The central  pulmonary vasculature appears prominent and indistinct.  The lungs appear well expanded bilaterally.  Diffuse scattered hazy pulmonary parenchymal opacities are seen.  No significant pleural effusions are identified.    MRI of brain 2/2  1.  Unchanged size of LEFT supratentorial brain herniating through the LEFT supratentorial calvarial defect  2.  Extensive cytotoxic edema in the LEFT supratentorial brain also involving to a lesser degree the RIGHT supratentorial brain. This is suspicious for ischemia. Extensive recent seizure activity could have a similar appearance.  3.  Parenchymal hemorrhage along the margin of the herniating brain  4.  BILATERAL supra and infratentorial subdural hematomas as described above    ASSESSMENT:    TBI: s/p left craniectomy  - Na 156  - decreased neurologic status, likely associated with sedation  -Recommend getting repeat CT brain  -If no significant change on imaging, no improvement with continued weaning of sedation recommend checking EEG again.  - continue keprra 500mg q12hr    Fever: TMAX 39.2, WBC elevated to 17,   - given CXR likely PNA, aspiration, was vomiting, large gastric bubble on last CXR  - check CXR in am, if WBC continues to rise consider checking lactic acid.  Follow BAL cultures    Sympathetic storming: History of central fevers  -Continue propanolol 10 mg every 8-hour, may need to increase this dose in the future but given the possibility of infection above would recommend maintaining current dose    Resp: Significant hypoxia and tachycardia, differential includes pulmonary embolism, as well as pneumonia in setting of sympathetic hyperactivity  -Once medically stable agree with CTA of  chest  -Consider starting mucolytic with hypertonic saline every 4 hours with DuoNeb.     Neurogenic bowel: Last BM 2/4, large soft  -Continue with current regiment senna 2 tablets at noon and MiraLAX 1 packet at noon.     DVT ppx: continue with lovenox 40mg daily    Discussed with family/parents who are at bedside, summarized hospitalization and care, options for next step of care    Discussed with team about recommendations during trauma rounds    Patient was seen for 35 minutes on unit/floor of which > 50% of time was spent on counseling and coordination of care regarding the above, including prognosis, risk reduction, benefits of treatment, and options for next stage of care.          Heber Brown D.O.

## 2019-02-04 NOTE — PROGRESS NOTES
Delayed note due to patient care:    Discussed in critical care rounding risk and benefit of taking patient for CTA of chest and follow-up head CT. Due to patient's aggressive vent settings and oxygen requirements, along with sedation, Dr. Nolasco decided to hold off on CT trip for the day and re-assess tomorrow.    RN working with RT to decrease FIO2 requirement as long as patient tolerates.

## 2019-02-04 NOTE — PROGRESS NOTES
Teresa from Lab called with critical result of Na: 156 at 0655. Critical lab result read back to Teresa.   This critical lab result is within parameters established by  for this patient

## 2019-02-04 NOTE — CARE PLAN
Problem: Pain Management  Goal: Pain level will decrease to patient's comfort goal  Outcome: PROGRESSING AS EXPECTED  Pain medications given as needed to maintain a CPOT goal of 0.

## 2019-02-04 NOTE — DIETARY
Nutrition support weekly update:  Day 9 of admit.  27 yo male following a snowboarding accident.  Tube feeding initiated on 1/27. Current TF Impact 1.5 @ 60 ml/hr providing 2160 kcals, 135 g protein, 1109 ml H20/day.     Assessment:  Weight today 87.6 kg is increased 10.6 kg since admit weight of 77.0 kg.  Pt is 13.1 liters fluid positive.     Evaluation:   1. Propofol restarted yesterday.  Now @ 23.1 ml/hr providing 610 kcals/day. Will adjust TF rate.   2. Prealbumin 21 is decreased from 22 last week. CRP 12.54 is increased from 10.52 least week indicating increased inflammation.   3. Perc trach and bronch yesterday  4. Current feeding meeting nutritional needs for healing    Malnutrition risk: na    Recommendations/Plan:  1. Propofol restarted - will decrease Impact 1.5 to 55 ml/hr to provide 1980 kcals, 124 g protein, 1019 ml H20/day  2. When propofol is discontinued advance Impact 1.5 to full goal 60 ml/hr.

## 2019-02-05 ENCOUNTER — APPOINTMENT (OUTPATIENT)
Dept: RADIOLOGY | Facility: MEDICAL CENTER | Age: 29
DRG: 003 | End: 2019-02-05
Attending: PHYSICIAN ASSISTANT
Payer: COMMERCIAL

## 2019-02-05 ENCOUNTER — APPOINTMENT (OUTPATIENT)
Dept: RADIOLOGY | Facility: MEDICAL CENTER | Age: 29
DRG: 003 | End: 2019-02-05
Attending: SURGERY
Payer: COMMERCIAL

## 2019-02-05 ENCOUNTER — APPOINTMENT (OUTPATIENT)
Dept: RADIOLOGY | Facility: MEDICAL CENTER | Age: 29
DRG: 003 | End: 2019-02-05
Attending: NEUROLOGICAL SURGERY
Payer: COMMERCIAL

## 2019-02-05 ENCOUNTER — APPOINTMENT (OUTPATIENT)
Dept: RADIOLOGY | Facility: MEDICAL CENTER | Age: 29
DRG: 003 | End: 2019-02-05
Attending: NURSE PRACTITIONER
Payer: COMMERCIAL

## 2019-02-05 PROBLEM — D72.829 LEUKOCYTOSIS: Status: ACTIVE | Noted: 2019-02-05

## 2019-02-05 PROBLEM — I26.99 PULMONARY EMBOLUS, RIGHT (HCC): Status: ACTIVE | Noted: 2019-02-05

## 2019-02-05 LAB
ACTION RANGE TRIGGERED IACRT: YES
ALBUMIN SERPL BCP-MCNC: 2.8 G/DL (ref 3.2–4.9)
ALBUMIN/GLOB SERPL: 1 G/DL
ALP SERPL-CCNC: 54 U/L (ref 30–99)
ALT SERPL-CCNC: 46 U/L (ref 2–50)
ANION GAP SERPL CALC-SCNC: 6 MMOL/L (ref 0–11.9)
ANION GAP SERPL CALC-SCNC: 8 MMOL/L (ref 0–11.9)
AST SERPL-CCNC: 33 U/L (ref 12–45)
BASE EXCESS BLDA CALC-SCNC: 0 MMOL/L (ref -4–3)
BASOPHILS # BLD AUTO: 0.4 % (ref 0–1.8)
BASOPHILS # BLD: 0.09 K/UL (ref 0–0.12)
BILIRUB SERPL-MCNC: 0.5 MG/DL (ref 0.1–1.5)
BODY TEMPERATURE: ABNORMAL DEGREES
BUN SERPL-MCNC: 27 MG/DL (ref 8–22)
BUN SERPL-MCNC: 28 MG/DL (ref 8–22)
BUN SERPL-MCNC: 29 MG/DL (ref 8–22)
BUN SERPL-MCNC: 29 MG/DL (ref 8–22)
CALCIUM SERPL-MCNC: 7.6 MG/DL (ref 8.5–10.5)
CALCIUM SERPL-MCNC: 7.8 MG/DL (ref 8.5–10.5)
CALCIUM SERPL-MCNC: 7.9 MG/DL (ref 8.5–10.5)
CALCIUM SERPL-MCNC: 7.9 MG/DL (ref 8.5–10.5)
CHLORIDE SERPL-SCNC: 116 MMOL/L (ref 96–112)
CHLORIDE SERPL-SCNC: 117 MMOL/L (ref 96–112)
CHLORIDE SERPL-SCNC: 117 MMOL/L (ref 96–112)
CHLORIDE SERPL-SCNC: 118 MMOL/L (ref 96–112)
CO2 BLDA-SCNC: 28 MMOL/L (ref 20–33)
CO2 SERPL-SCNC: 27 MMOL/L (ref 20–33)
CO2 SERPL-SCNC: 29 MMOL/L (ref 20–33)
CO2 SERPL-SCNC: 30 MMOL/L (ref 20–33)
CO2 SERPL-SCNC: 31 MMOL/L (ref 20–33)
CREAT SERPL-MCNC: 0.74 MG/DL (ref 0.5–1.4)
CREAT SERPL-MCNC: 0.76 MG/DL (ref 0.5–1.4)
CREAT SERPL-MCNC: 0.8 MG/DL (ref 0.5–1.4)
CREAT SERPL-MCNC: 0.83 MG/DL (ref 0.5–1.4)
EOSINOPHIL # BLD AUTO: 0.25 K/UL (ref 0–0.51)
EOSINOPHIL NFR BLD: 1.1 % (ref 0–6.9)
ERYTHROCYTE [DISTWIDTH] IN BLOOD BY AUTOMATED COUNT: 50.6 FL (ref 35.9–50)
GLOBULIN SER CALC-MCNC: 2.7 G/DL (ref 1.9–3.5)
GLUCOSE SERPL-MCNC: 118 MG/DL (ref 65–99)
GLUCOSE SERPL-MCNC: 125 MG/DL (ref 65–99)
GLUCOSE SERPL-MCNC: 126 MG/DL (ref 65–99)
GLUCOSE SERPL-MCNC: 131 MG/DL (ref 65–99)
GRAM STN SPEC: NORMAL
HCO3 BLDA-SCNC: 26.5 MMOL/L (ref 17–25)
HCT VFR BLD AUTO: 25.3 % (ref 42–52)
HGB BLD-MCNC: 7.6 G/DL (ref 14–18)
HOROWITZ INDEX BLDA+IHG-RTO: 92 MM[HG]
IMM GRANULOCYTES # BLD AUTO: 0.4 K/UL (ref 0–0.11)
IMM GRANULOCYTES NFR BLD AUTO: 1.7 % (ref 0–0.9)
INST. QUALIFIED PATIENT IIQPT: YES
LYMPHOCYTES # BLD AUTO: 1.67 K/UL (ref 1–4.8)
LYMPHOCYTES NFR BLD: 7.1 % (ref 22–41)
MCH RBC QN AUTO: 30.8 PG (ref 27–33)
MCHC RBC AUTO-ENTMCNC: 30 G/DL (ref 33.7–35.3)
MCV RBC AUTO: 102.4 FL (ref 81.4–97.8)
MONOCYTES # BLD AUTO: 1.11 K/UL (ref 0–0.85)
MONOCYTES NFR BLD AUTO: 4.7 % (ref 0–13.4)
NEUTROPHILS # BLD AUTO: 20.01 K/UL (ref 1.82–7.42)
NEUTROPHILS NFR BLD: 85 % (ref 44–72)
NRBC # BLD AUTO: 0 K/UL
NRBC BLD-RTO: 0 /100 WBC
O2/TOTAL GAS SETTING VFR VENT: 60 %
PCO2 BLDA: 49.1 MMHG (ref 26–37)
PCO2 TEMP ADJ BLDA: 53.3 MMHG (ref 26–37)
PH BLDA: 7.34 [PH] (ref 7.4–7.5)
PH TEMP ADJ BLDA: 7.31 [PH] (ref 7.4–7.5)
PLATELET # BLD AUTO: 353 K/UL (ref 164–446)
PMV BLD AUTO: 10.9 FL (ref 9–12.9)
PO2 BLDA: 55 MMHG (ref 64–87)
PO2 TEMP ADJ BLDA: 63 MMHG (ref 64–87)
POTASSIUM SERPL-SCNC: 3.4 MMOL/L (ref 3.6–5.5)
POTASSIUM SERPL-SCNC: 3.5 MMOL/L (ref 3.6–5.5)
POTASSIUM SERPL-SCNC: 3.6 MMOL/L (ref 3.6–5.5)
POTASSIUM SERPL-SCNC: 3.7 MMOL/L (ref 3.6–5.5)
PROT SERPL-MCNC: 5.5 G/DL (ref 6–8.2)
RBC # BLD AUTO: 2.47 M/UL (ref 4.7–6.1)
SAO2 % BLDA: 86 % (ref 93–99)
SIGNIFICANT IND 70042: NORMAL
SITE SITE: NORMAL
SODIUM SERPL-SCNC: 152 MMOL/L (ref 135–145)
SODIUM SERPL-SCNC: 152 MMOL/L (ref 135–145)
SODIUM SERPL-SCNC: 153 MMOL/L (ref 135–145)
SODIUM SERPL-SCNC: 154 MMOL/L (ref 135–145)
SOURCE SOURCE: NORMAL
SPECIMEN DRAWN FROM PATIENT: ABNORMAL
WBC # BLD AUTO: 23.5 K/UL (ref 4.8–10.8)

## 2019-02-05 PROCEDURE — 94003 VENT MGMT INPAT SUBQ DAY: CPT

## 2019-02-05 PROCEDURE — 700102 HCHG RX REV CODE 250 W/ 637 OVERRIDE(OP): Performed by: PHYSICAL MEDICINE & REHABILITATION

## 2019-02-05 PROCEDURE — 84134 ASSAY OF PREALBUMIN: CPT

## 2019-02-05 PROCEDURE — 700105 HCHG RX REV CODE 258: Performed by: NEUROLOGICAL SURGERY

## 2019-02-05 PROCEDURE — 99291 CRITICAL CARE FIRST HOUR: CPT | Mod: 25 | Performed by: SURGERY

## 2019-02-05 PROCEDURE — 87040 BLOOD CULTURE FOR BACTERIA: CPT | Mod: 91

## 2019-02-05 PROCEDURE — 700111 HCHG RX REV CODE 636 W/ 250 OVERRIDE (IP): Performed by: SURGERY

## 2019-02-05 PROCEDURE — 700117 HCHG RX CONTRAST REV CODE 255: Performed by: RADIOLOGY

## 2019-02-05 PROCEDURE — 700102 HCHG RX REV CODE 250 W/ 637 OVERRIDE(OP): Performed by: SURGERY

## 2019-02-05 PROCEDURE — 87205 SMEAR GRAM STAIN: CPT

## 2019-02-05 PROCEDURE — 80048 BASIC METABOLIC PNL TOTAL CA: CPT | Mod: 91

## 2019-02-05 PROCEDURE — 87186 SC STD MICRODIL/AGAR DIL: CPT

## 2019-02-05 PROCEDURE — 85025 COMPLETE CBC W/AUTO DIFF WBC: CPT

## 2019-02-05 PROCEDURE — 770022 HCHG ROOM/CARE - ICU (200)

## 2019-02-05 PROCEDURE — 700111 HCHG RX REV CODE 636 W/ 250 OVERRIDE (IP): Performed by: PHYSICIAN ASSISTANT

## 2019-02-05 PROCEDURE — 0B9J8ZX DRAINAGE OF LEFT LOWER LUNG LOBE, VIA NATURAL OR ARTIFICIAL OPENING ENDOSCOPIC, DIAGNOSTIC: ICD-10-PCS | Performed by: SURGERY

## 2019-02-05 PROCEDURE — 80053 COMPREHEN METABOLIC PANEL: CPT

## 2019-02-05 PROCEDURE — 700105 HCHG RX REV CODE 258: Performed by: SURGERY

## 2019-02-05 PROCEDURE — 86140 C-REACTIVE PROTEIN: CPT

## 2019-02-05 PROCEDURE — 06H03DZ INSERTION OF INTRALUMINAL DEVICE INTO INFERIOR VENA CAVA, PERCUTANEOUS APPROACH: ICD-10-PCS | Performed by: RADIOLOGY

## 2019-02-05 PROCEDURE — A9270 NON-COVERED ITEM OR SERVICE: HCPCS | Performed by: SURGERY

## 2019-02-05 PROCEDURE — 82803 BLOOD GASES ANY COMBINATION: CPT

## 2019-02-05 PROCEDURE — 99152 MOD SED SAME PHYS/QHP 5/>YRS: CPT

## 2019-02-05 PROCEDURE — 302978 HCHG BRONCHOSCOPY-DIAGNOSTIC

## 2019-02-05 PROCEDURE — 31624 DX BRONCHOSCOPE/LAVAGE: CPT | Mod: 51 | Performed by: SURGERY

## 2019-02-05 PROCEDURE — 700117 HCHG RX CONTRAST REV CODE 255: Performed by: NEUROLOGICAL SURGERY

## 2019-02-05 PROCEDURE — A9270 NON-COVERED ITEM OR SERVICE: HCPCS | Performed by: NEUROLOGICAL SURGERY

## 2019-02-05 PROCEDURE — 71045 X-RAY EXAM CHEST 1 VIEW: CPT

## 2019-02-05 PROCEDURE — 87070 CULTURE OTHR SPECIMN AEROBIC: CPT

## 2019-02-05 PROCEDURE — 31645 BRNCHSC W/THER ASPIR 1ST: CPT | Performed by: SURGERY

## 2019-02-05 PROCEDURE — 700101 HCHG RX REV CODE 250: Performed by: SURGERY

## 2019-02-05 PROCEDURE — A9270 NON-COVERED ITEM OR SERVICE: HCPCS | Performed by: PHYSICAL MEDICINE & REHABILITATION

## 2019-02-05 PROCEDURE — 87077 CULTURE AEROBIC IDENTIFY: CPT

## 2019-02-05 PROCEDURE — 0B918ZZ DRAINAGE OF TRACHEA, VIA NATURAL OR ARTIFICIAL OPENING ENDOSCOPIC: ICD-10-PCS | Performed by: SURGERY

## 2019-02-05 PROCEDURE — 70450 CT HEAD/BRAIN W/O DYE: CPT

## 2019-02-05 PROCEDURE — C1880 VENA CAVA FILTER: HCPCS

## 2019-02-05 PROCEDURE — 700112 HCHG RX REV CODE 229: Performed by: NEUROLOGICAL SURGERY

## 2019-02-05 PROCEDURE — 36600 WITHDRAWAL OF ARTERIAL BLOOD: CPT

## 2019-02-05 PROCEDURE — 94640 AIRWAY INHALATION TREATMENT: CPT

## 2019-02-05 PROCEDURE — 94770 HCHG CO2 EXPIRED GAS DETERMINATION: CPT

## 2019-02-05 PROCEDURE — 71275 CT ANGIOGRAPHY CHEST: CPT

## 2019-02-05 RX ORDER — LINEZOLID 2 MG/ML
600 INJECTION, SOLUTION INTRAVENOUS EVERY 12 HOURS
Status: DISCONTINUED | OUTPATIENT
Start: 2019-02-05 | End: 2019-02-07

## 2019-02-05 RX ORDER — FAMOTIDINE 20 MG/1
20 TABLET, FILM COATED ORAL 2 TIMES DAILY
Status: DISCONTINUED | OUTPATIENT
Start: 2019-02-05 | End: 2019-02-18

## 2019-02-05 RX ORDER — MIDAZOLAM HYDROCHLORIDE 1 MG/ML
1-5 INJECTION INTRAMUSCULAR; INTRAVENOUS ONCE
Status: COMPLETED | OUTPATIENT
Start: 2019-02-05 | End: 2019-02-05

## 2019-02-05 RX ORDER — VECURONIUM BROMIDE 1 MG/ML
10 INJECTION, POWDER, LYOPHILIZED, FOR SOLUTION INTRAVENOUS ONCE
Status: COMPLETED | OUTPATIENT
Start: 2019-02-05 | End: 2019-02-05

## 2019-02-05 RX ADMIN — CEFEPIME 2 G: 2 INJECTION, POWDER, FOR SOLUTION INTRAVENOUS at 22:21

## 2019-02-05 RX ADMIN — MINERAL OIL AND WHITE PETROLATUM 1 APPLICATION: 150; 830 OINTMENT OPHTHALMIC at 22:21

## 2019-02-05 RX ADMIN — PROPOFOL 10 MCG/KG/MIN: 10 INJECTION, EMULSION INTRAVENOUS at 20:12

## 2019-02-05 RX ADMIN — FAMOTIDINE 20 MG: 20 TABLET ORAL at 06:12

## 2019-02-05 RX ADMIN — EPOPROSTENOL SODIUM 0.05 MCG/KG/MIN: 1.5 INJECTION, POWDER, LYOPHILIZED, FOR SOLUTION INTRAVENOUS at 13:35

## 2019-02-05 RX ADMIN — VECURONIUM BROMIDE 10 MG: 10 INJECTION, POWDER, LYOPHILIZED, FOR SOLUTION INTRAVENOUS at 14:08

## 2019-02-05 RX ADMIN — SODIUM CHLORIDE 500 MG: 9 INJECTION, SOLUTION INTRAVENOUS at 08:53

## 2019-02-05 RX ADMIN — LINEZOLID 600 MG: 600 INJECTION, SOLUTION INTRAVENOUS at 13:08

## 2019-02-05 RX ADMIN — LINEZOLID 600 MG: 600 INJECTION, SOLUTION INTRAVENOUS at 17:55

## 2019-02-05 RX ADMIN — FUROSEMIDE 40 MG: 10 INJECTION, SOLUTION INTRAMUSCULAR; INTRAVENOUS at 06:11

## 2019-02-05 RX ADMIN — CEFEPIME 2 G: 2 INJECTION, POWDER, FOR SOLUTION INTRAVENOUS at 14:45

## 2019-02-05 RX ADMIN — PROPOFOL 60 MCG/KG/MIN: 10 INJECTION, EMULSION INTRAVENOUS at 13:06

## 2019-02-05 RX ADMIN — ACETAMINOPHEN 650 MG: 325 TABLET, FILM COATED ORAL at 22:21

## 2019-02-05 RX ADMIN — MIDAZOLAM HYDROCHLORIDE 2.5 MG: 1 INJECTION, SOLUTION INTRAMUSCULAR; INTRAVENOUS at 14:18

## 2019-02-05 RX ADMIN — ACETAMINOPHEN 650 MG: 325 TABLET, FILM COATED ORAL at 04:07

## 2019-02-05 RX ADMIN — IOHEXOL 48 ML: 300 INJECTION, SOLUTION INTRAVENOUS at 19:33

## 2019-02-05 RX ADMIN — POLYETHYLENE GLYCOL 3350 1 PACKET: 17 POWDER, FOR SOLUTION ORAL at 13:08

## 2019-02-05 RX ADMIN — FENTANYL CITRATE 100 MCG/HR: 50 INJECTION, SOLUTION INTRAMUSCULAR; INTRAVENOUS at 07:53

## 2019-02-05 RX ADMIN — SODIUM CHLORIDE 500 ML: 234 INJECTION, SOLUTION INTRAVENOUS at 10:00

## 2019-02-05 RX ADMIN — EPOPROSTENOL SODIUM 0.05 MCG/KG/MIN: 1.5 INJECTION, POWDER, LYOPHILIZED, FOR SOLUTION INTRAVENOUS at 07:49

## 2019-02-05 RX ADMIN — IOHEXOL 75 ML: 350 INJECTION, SOLUTION INTRAVENOUS at 12:54

## 2019-02-05 RX ADMIN — EPOPROSTENOL SODIUM 0.05 MCG/KG/MIN: 1.5 INJECTION, POWDER, LYOPHILIZED, FOR SOLUTION INTRAVENOUS at 20:14

## 2019-02-05 RX ADMIN — ACETAMINOPHEN 650 MG: 325 TABLET, FILM COATED ORAL at 13:15

## 2019-02-05 RX ADMIN — MINERAL OIL AND WHITE PETROLATUM 1 APPLICATION: 150; 830 OINTMENT OPHTHALMIC at 06:12

## 2019-02-05 RX ADMIN — MINERAL OIL AND WHITE PETROLATUM 1 APPLICATION: 150; 830 OINTMENT OPHTHALMIC at 13:18

## 2019-02-05 RX ADMIN — FAMOTIDINE 20 MG: 10 INJECTION INTRAVENOUS at 17:36

## 2019-02-05 RX ADMIN — PROPOFOL 60 MCG/KG/MIN: 10 INJECTION, EMULSION INTRAVENOUS at 10:04

## 2019-02-05 RX ADMIN — FENTANYL CITRATE 50 MCG: 50 INJECTION, SOLUTION INTRAMUSCULAR; INTRAVENOUS at 14:17

## 2019-02-05 RX ADMIN — POLYETHYLENE GLYCOL 3350 1 PACKET: 17 POWDER, FOR SOLUTION ORAL at 06:12

## 2019-02-05 RX ADMIN — PROPOFOL 60 MCG/KG/MIN: 10 INJECTION, EMULSION INTRAVENOUS at 06:29

## 2019-02-05 RX ADMIN — EPOPROSTENOL SODIUM 0.05 MCG/KG/MIN: 1.5 INJECTION, POWDER, LYOPHILIZED, FOR SOLUTION INTRAVENOUS at 02:14

## 2019-02-05 RX ADMIN — ENOXAPARIN SODIUM 40 MG: 100 INJECTION SUBCUTANEOUS at 07:16

## 2019-02-05 RX ADMIN — PROPRANOLOL HYDROCHLORIDE 10 MG: 10 TABLET ORAL at 13:17

## 2019-02-05 RX ADMIN — DOCUSATE SODIUM 100 MG: 50 LIQUID ORAL at 06:11

## 2019-02-05 RX ADMIN — SODIUM CHLORIDE 500 MG: 9 INJECTION, SOLUTION INTRAVENOUS at 17:30

## 2019-02-05 RX ADMIN — PROPOFOL 60 MCG/KG/MIN: 10 INJECTION, EMULSION INTRAVENOUS at 16:27

## 2019-02-05 RX ADMIN — PROPRANOLOL HYDROCHLORIDE 10 MG: 10 TABLET ORAL at 22:21

## 2019-02-05 NOTE — PROGRESS NOTES
Bedside procedure: Bronch by Dr. Brown and Carlos from RT. Medications provided. Vitals stable and patient monitored throughout procedure. No complications or changes to patient status during procedure.

## 2019-02-05 NOTE — PROGRESS NOTES
Spoke with Dr. Huntley via phone and provided CT results. Scans reviewed by both MD and PA. Course of care continued.

## 2019-02-05 NOTE — CARE PLAN
Problem: Ventilation Defect:  Goal: Ability to achieve and maintain unassisted ventilation or tolerate decreased levels of ventilator support    Intervention: Support and monitor invasive and noninvasive mechanical ventilation  Vent Day #10    APRV  P High 24  P Low 0  T High 4.5 seconds  T Low 0.5 second    FiO2 70%  Intervention: Monitor ventilator weaning response  No SBTs  Intervention: Perform ventilator associated pneumonia prevention interventions  VAP flowsheet  Intervention: Manage ventilation therapy by monitoring diagnostic test results  ABGs

## 2019-02-05 NOTE — PROGRESS NOTES
Attempt to contact Dr. Huntley to update Dr. Huntley on head CT results. Unable to get through with answering service.

## 2019-02-05 NOTE — CARE PLAN
Problem: Ventilation Defect:  Goal: Ability to achieve and maintain unassisted ventilation or tolerate decreased levels of ventilator support  Outcome: PROGRESSING AS EXPECTED    Intervention: Support and monitor invasive and noninvasive mechanical ventilation  Adult Ventilation Update    Total Vent Days: 11    Patient Lines/Drains/Airways Status    Active Airway     Name: Placement date: Placement time: Site: Days:    Airway Trach Tracheostomy 8.0 02/03/19   1015   Tracheostomy   3                  #FVC / Vital Capacity (liters) : 0 (02/03/19 0840)  NIF (cm H2O) : 0 (02/03/19 0840)  Rapid Shallow Breathing Index (RR/VT): 60 (02/03/19 0840)  Plateau Pressure (Q Shift):  (APRV) (02/04/19 1413)  Static Compliance (ml / cm H2O): 56.2 (02/05/19 0214)    Patient failed trials because of Barriers to Wean: No Order;FiO2 >60% or PEEP >10 CM H2O;Other (Comments) (APRV) (02/04/19 0653)      3 day post trache.  The patient is currently in SLOW wean according to protocol.      Sputum/Suction   Cough: Strong;Productive (02/05/19 0214)  Sputum Amount: Moderate (02/05/19 0214)  Sputum Color: Clear;White (02/05/19 0214)  Sputum Consistency: Thin (02/05/19 0214)    Mobility  Level of Mobility: Level I (02/04/19 1900)  Activity Performed: Unable to mobilize (02/04/19 1900)  Assistance / Tolerance: Assistance of Two or More (02/04/19 1900)  Pt Calls for Assistance: No (02/03/19 2000)  Staff Present for Mobilization: RN (02/02/19 2000)  Gait: Unable to Ambulate (02/04/19 2300)  Reason Not Mobilized: Unstable condition (02/04/19 1900)  Mobilization Comments: per MD order; high PEEP  (02/04/19 0800)    Events/Summary/Plan: No changes overnight.

## 2019-02-05 NOTE — DIETARY
Nutrition services: day 10 of admit.  Propofol infusion increased now at 31.9 ml/hr providing 734 kcals/day.  Will change TF formula to Peptamen VHP @ 85 ml/hr to provide 1800 kcals, 167 g protein/ 1440 ml H20/day to decrease calories while meeting protein needs.

## 2019-02-05 NOTE — PROGRESS NOTES
Neurosurgery Progress Note    Subjective:      Eyes do not open today  Does not follow   PEERL  Decompression full  MR brain- brainstem ok Diffuse  Hemisphere changes  MR c spine ok  Extends LUE  Flexes BLE  Na 153, 3% restarted  Craniectomy site is still swollen    Exam:  As above    BP  Min: 114/64  Max: 114/64  Pulse  Av.7  Min: 78  Max: 97  Resp  Av  Min: 13  Max: 44  Monitored Temp 2  Av.5 °C (101.3 °F)  Min: 37.8 °C (100 °F)  Max: 39.1 °C (102.4 °F)  SpO2  Av.3 %  Min: 97 %  Max: 100 %    No Data Recorded    Recent Labs      19   0550  19   0600  19   0545   WBC  13.0*  17.6*  23.5*   RBC  2.62*  2.49*  2.47*   HEMOGLOBIN  7.9*  7.6*  7.6*   HEMATOCRIT  25.9*  25.0*  25.3*   MCV  98.9*  100.4*  102.4*   MCH  30.2  30.5  30.8   MCHC  30.5*  30.4*  30.0*   RDW  47.1  48.8  50.6*   PLATELETCT  338  304  353   MPV  10.5  10.8  10.9     Recent Labs      19   1740  19   0020  19   0545   SODIUM  154*  152*  153*   POTASSIUM  3.6  3.6  3.7   CHLORIDE  118*  117*  118*   CO2  28  29  27   GLUCOSE  119*  118*  126*   BUN  24*  27*  29*   CREATININE  0.79  0.83  0.80   CALCIUM  8.0*  7.9*  7.9*     Recent Labs      19   0550  19   0600   INR  1.19*  1.33*           Intake/Output       19 - 1959 19 - 19 Total  Total       Intake    I.V.  881.8  499.6 1381.3  --  -- --    Volume (mL) (potassium chloride in water (KCL) ivpb **Administer in ICU only** 40 mEq) 60.4 39.6 100 -- -- --    Volume (mL) (NS infusion)  -- -- --    Volume (mL) (3% sodium chloride (HYPERTONIC SALINE) 500mL infusion 500 mL) 101.3 100 201.3 -- -- --    Other  270  -- 270  --  -- --    Medications (PO/Enteral Liquids) 210 -- 210 -- -- --    Flush / Irrigation Volume (Cortrak) 60 -- 60 -- -- --    NG/GT  110  605 715  --  -- --    Intake (mL) (Enteral Tube 19 Cortrak - Gastric Left  nare) 110 605 715 -- -- --    Total Intake 1261.8 1104.6 2366.3 -- -- --       Output    Urine  2800  1415 4215  --  -- --    Output (mL) (Urethral Catheter 18 Fr.) 2800 1415 4215 -- -- --    Stool  0  -- 0  --  -- --    Number of Times Stooled 0 x -- 0 x -- -- --    Measurable Stool (mL) 0 -- 0 -- -- --    Emesis/NG output  50  -- 50  --  -- --    Output (mL) (Enteral Tube 02/03/19 Dobhoff - Gastric 18 Fr. Right nare) 50 -- 50 -- -- --    Output (mL) (Enteral Tube 01/27/19 Cortrak - Gastric Left nare) 0 -- 0 -- -- --    Total Output 2850 1415 4265 -- -- --       Net I/O     -1588.3 -310.4 -1898.7 -- -- --            Intake/Output Summary (Last 24 hours) at 02/05/19 0810  Last data filed at 02/05/19 0600   Gross per 24 hour   Intake          1867.33 ml   Output             4065 ml   Net         -2197.67 ml            • furosemide  40 mg Q DAY   • epoprostenol (FLOLAN) 1.5 mg syringe (For INHALATION)  0.01-0.05 mcg/kg/min Continuous   • artificial tear ointment  1 Application Q8HRS   • vecuronium  0.1 mg/kg Q2HRS PRN   • levETIRAcetam (KEPPRA) IV  500 mg Q12HRS   • famotidine  20 mg BID    Or   • famotidine  20 mg BID   • midazolam  2 mg Q HOUR PRN   • vecuronium infusion  0-1.7 mcg/kg/min Continuous   • fentaNYL   Continuous   • acetaminophen  650 mg Q4HRS PRN   • propranolol  10 mg Q8HRS   • senna-docusate  2 Tab DAILY AT NOON   • polyethylene glycol/lytes  1 Packet DAILY AT NOON   • enoxaparin (LOVENOX) injection  40 mg DAILY   • morphine injection  2 mg Q HOUR PRN    Or   • morphine injection  4 mg Q HOUR PRN   • Pharmacy  1 Each PHARMACY TO DOSE   • cloNIDine  0.1 mg Q4HRS PRN   • magnesium hydroxide  30 mL QDAY PRN   • oxyCODONE immediate-release  2.5 mg Q3HRS PRN   • oxyCODONE immediate-release  5 mg Q3HRS PRN   • polyethylene glycol/lytes  1 Packet BID PRN   • senna-docusate  1 Tab Q24HRS PRN   • Respiratory Care per Protocol   Continuous RT   • NS   Continuous   • dextrose 50%  25 mL Q15 MIN PRN   • propofol   0-80 mcg/kg/min Continuous   • Pharmacy Consult Request  1 Each PHARMACY TO DOSE   • MD ALERT...DO NOT ADMINISTER NSAIDS or ASPIRIN unless ORDERED By Neurosurgery  1 Each PRN   • ondansetron  4 mg Q4HRS PRN   • dexamethasone  4 mg Once PRN   • scopolamine  1 Patch Q72HRS PRN   • hydrALAZINE  10 mg Q HOUR PRN   • niCARdipine infusion  0-15 mg/hr Continuous   • bisacodyl  10 mg Q24HRS PRN   • fleet  1 Each Once PRN   • 3% sodium chloride  500 mL Continuous   • sodium chloride 23.4% ivpb  200 mEq Q6HRS PRN   • docusate sodium 100mg/10mL  100 mg BID   • LORazepam  1 mg Q2HRS PRN       Assessment and Plan:  POD#10 HD #10  On Lovenox      NO changes, reduce sedation as tolerated

## 2019-02-05 NOTE — PROGRESS NOTES
Attempt at paging Dr. Huntley to updated on head CT results. Received answering machine of practice. Will attempt again.

## 2019-02-05 NOTE — PROGRESS NOTES
2 RN skin check. No areas of concern noted.     Appropriate interventions in place to prevent skin breakdown. Foot drop boot switched to opposite extremity q 2hr. Opposite extremity then placed in heel float boot. Pt repositioned q2hr with use of pillows to support repositioning. Crani site clean, dry and intact and open to air.

## 2019-02-06 ENCOUNTER — APPOINTMENT (OUTPATIENT)
Dept: RADIOLOGY | Facility: MEDICAL CENTER | Age: 29
DRG: 003 | End: 2019-02-06
Attending: NURSE PRACTITIONER
Payer: COMMERCIAL

## 2019-02-06 PROBLEM — E87.6 HYPOKALEMIA: Status: ACTIVE | Noted: 2019-02-06

## 2019-02-06 PROBLEM — D62 ANEMIA ASSOCIATED WITH ACUTE BLOOD LOSS: Status: ACTIVE | Noted: 2019-02-06

## 2019-02-06 LAB
ACTION RANGE TRIGGERED IACRT: NO
ALBUMIN SERPL BCP-MCNC: 2.8 G/DL (ref 3.2–4.9)
ALBUMIN/GLOB SERPL: 1 G/DL
ALP SERPL-CCNC: 55 U/L (ref 30–99)
ALT SERPL-CCNC: 38 U/L (ref 2–50)
ANION GAP SERPL CALC-SCNC: 4 MMOL/L (ref 0–11.9)
ANION GAP SERPL CALC-SCNC: 4 MMOL/L (ref 0–11.9)
ANION GAP SERPL CALC-SCNC: 5 MMOL/L (ref 0–11.9)
ANION GAP SERPL CALC-SCNC: 5 MMOL/L (ref 0–11.9)
AST SERPL-CCNC: 25 U/L (ref 12–45)
BASE EXCESS BLDA CALC-SCNC: 6 MMOL/L (ref -4–3)
BASOPHILS # BLD AUTO: 0.3 % (ref 0–1.8)
BASOPHILS # BLD: 0.06 K/UL (ref 0–0.12)
BILIRUB SERPL-MCNC: 0.5 MG/DL (ref 0.1–1.5)
BODY TEMPERATURE: ABNORMAL DEGREES
BUN SERPL-MCNC: 26 MG/DL (ref 8–22)
BUN SERPL-MCNC: 27 MG/DL (ref 8–22)
BUN SERPL-MCNC: 27 MG/DL (ref 8–22)
BUN SERPL-MCNC: 28 MG/DL (ref 8–22)
CALCIUM SERPL-MCNC: 7.5 MG/DL (ref 8.5–10.5)
CALCIUM SERPL-MCNC: 7.6 MG/DL (ref 8.5–10.5)
CALCIUM SERPL-MCNC: 7.7 MG/DL (ref 8.5–10.5)
CALCIUM SERPL-MCNC: 7.7 MG/DL (ref 8.5–10.5)
CHLORIDE SERPL-SCNC: 115 MMOL/L (ref 96–112)
CHLORIDE SERPL-SCNC: 115 MMOL/L (ref 96–112)
CHLORIDE SERPL-SCNC: 116 MMOL/L (ref 96–112)
CHLORIDE SERPL-SCNC: 116 MMOL/L (ref 96–112)
CO2 BLDA-SCNC: 30 MMOL/L (ref 20–33)
CO2 SERPL-SCNC: 29 MMOL/L (ref 20–33)
CO2 SERPL-SCNC: 30 MMOL/L (ref 20–33)
CREAT SERPL-MCNC: 0.6 MG/DL (ref 0.5–1.4)
CREAT SERPL-MCNC: 0.69 MG/DL (ref 0.5–1.4)
CREAT SERPL-MCNC: 0.76 MG/DL (ref 0.5–1.4)
CREAT SERPL-MCNC: 0.82 MG/DL (ref 0.5–1.4)
EOSINOPHIL # BLD AUTO: 0.28 K/UL (ref 0–0.51)
EOSINOPHIL NFR BLD: 1.4 % (ref 0–6.9)
ERYTHROCYTE [DISTWIDTH] IN BLOOD BY AUTOMATED COUNT: 50.1 FL (ref 35.9–50)
GLOBULIN SER CALC-MCNC: 2.7 G/DL (ref 1.9–3.5)
GLUCOSE SERPL-MCNC: 133 MG/DL (ref 65–99)
GLUCOSE SERPL-MCNC: 135 MG/DL (ref 65–99)
GLUCOSE SERPL-MCNC: 139 MG/DL (ref 65–99)
GLUCOSE SERPL-MCNC: 158 MG/DL (ref 65–99)
HCO3 BLDA-SCNC: 29.3 MMOL/L (ref 17–25)
HCT VFR BLD AUTO: 23.7 % (ref 42–52)
HGB BLD-MCNC: 7.1 G/DL (ref 14–18)
HOROWITZ INDEX BLDA+IHG-RTO: 265 MM[HG]
IMM GRANULOCYTES # BLD AUTO: 0.31 K/UL (ref 0–0.11)
IMM GRANULOCYTES NFR BLD AUTO: 1.5 % (ref 0–0.9)
INST. QUALIFIED PATIENT IIQPT: YES
IRON SATN MFR SERPL: ABNORMAL % (ref 15–55)
IRON SERPL-MCNC: <10 UG/DL (ref 50–180)
LYMPHOCYTES # BLD AUTO: 1.36 K/UL (ref 1–4.8)
LYMPHOCYTES NFR BLD: 6.6 % (ref 22–41)
MCH RBC QN AUTO: 30.5 PG (ref 27–33)
MCHC RBC AUTO-ENTMCNC: 30 G/DL (ref 33.7–35.3)
MCV RBC AUTO: 101.7 FL (ref 81.4–97.8)
MONOCYTES # BLD AUTO: 0.76 K/UL (ref 0–0.85)
MONOCYTES NFR BLD AUTO: 3.7 % (ref 0–13.4)
NEUTROPHILS # BLD AUTO: 17.74 K/UL (ref 1.82–7.42)
NEUTROPHILS NFR BLD: 86.5 % (ref 44–72)
NRBC # BLD AUTO: 0 K/UL
NRBC BLD-RTO: 0 /100 WBC
O2/TOTAL GAS SETTING VFR VENT: 60 %
PCO2 BLDA: 37.2 MMHG (ref 26–37)
PCO2 TEMP ADJ BLDA: 39.3 MMHG (ref 26–37)
PH BLDA: 7.5 [PH] (ref 7.4–7.5)
PH TEMP ADJ BLDA: 7.48 [PH] (ref 7.4–7.5)
PLATELET # BLD AUTO: 367 K/UL (ref 164–446)
PMV BLD AUTO: 10.6 FL (ref 9–12.9)
PO2 BLDA: 159 MMHG (ref 64–87)
PO2 TEMP ADJ BLDA: 166 MMHG (ref 64–87)
POTASSIUM SERPL-SCNC: 3.2 MMOL/L (ref 3.6–5.5)
POTASSIUM SERPL-SCNC: 3.3 MMOL/L (ref 3.6–5.5)
POTASSIUM SERPL-SCNC: 3.3 MMOL/L (ref 3.6–5.5)
POTASSIUM SERPL-SCNC: 3.5 MMOL/L (ref 3.6–5.5)
PROT SERPL-MCNC: 5.5 G/DL (ref 6–8.2)
RBC # BLD AUTO: 2.33 M/UL (ref 4.7–6.1)
SAO2 % BLDA: 100 % (ref 93–99)
SODIUM SERPL-SCNC: 149 MMOL/L (ref 135–145)
SODIUM SERPL-SCNC: 149 MMOL/L (ref 135–145)
SODIUM SERPL-SCNC: 150 MMOL/L (ref 135–145)
SODIUM SERPL-SCNC: 151 MMOL/L (ref 135–145)
SPECIMEN DRAWN FROM PATIENT: ABNORMAL
TIBC SERPL-MCNC: 210 UG/DL (ref 250–450)
TRIGL SERPL-MCNC: 177 MG/DL (ref 0–149)
WBC # BLD AUTO: 20.5 K/UL (ref 4.8–10.8)

## 2019-02-06 PROCEDURE — A9270 NON-COVERED ITEM OR SERVICE: HCPCS | Performed by: SURGERY

## 2019-02-06 PROCEDURE — 700102 HCHG RX REV CODE 250 W/ 637 OVERRIDE(OP): Performed by: SURGERY

## 2019-02-06 PROCEDURE — 80053 COMPREHEN METABOLIC PANEL: CPT

## 2019-02-06 PROCEDURE — 94640 AIRWAY INHALATION TREATMENT: CPT

## 2019-02-06 PROCEDURE — 83550 IRON BINDING TEST: CPT

## 2019-02-06 PROCEDURE — 83540 ASSAY OF IRON: CPT

## 2019-02-06 PROCEDURE — 770022 HCHG ROOM/CARE - ICU (200)

## 2019-02-06 PROCEDURE — 71045 X-RAY EXAM CHEST 1 VIEW: CPT

## 2019-02-06 PROCEDURE — 99291 CRITICAL CARE FIRST HOUR: CPT | Performed by: SURGERY

## 2019-02-06 PROCEDURE — 700111 HCHG RX REV CODE 636 W/ 250 OVERRIDE (IP): Performed by: SURGERY

## 2019-02-06 PROCEDURE — 94003 VENT MGMT INPAT SUBQ DAY: CPT

## 2019-02-06 PROCEDURE — 700111 HCHG RX REV CODE 636 W/ 250 OVERRIDE (IP): Performed by: PHYSICIAN ASSISTANT

## 2019-02-06 PROCEDURE — 84478 ASSAY OF TRIGLYCERIDES: CPT

## 2019-02-06 PROCEDURE — 700105 HCHG RX REV CODE 258: Performed by: SURGERY

## 2019-02-06 PROCEDURE — 80048 BASIC METABOLIC PNL TOTAL CA: CPT | Mod: 91

## 2019-02-06 PROCEDURE — 85025 COMPLETE CBC W/AUTO DIFF WBC: CPT

## 2019-02-06 PROCEDURE — 700101 HCHG RX REV CODE 250: Performed by: SURGERY

## 2019-02-06 PROCEDURE — 82803 BLOOD GASES ANY COMBINATION: CPT

## 2019-02-06 RX ORDER — POTASSIUM CHLORIDE 29.8 MG/ML
40 INJECTION INTRAVENOUS ONCE
Status: COMPLETED | OUTPATIENT
Start: 2019-02-06 | End: 2019-02-06

## 2019-02-06 RX ORDER — 3% SODIUM CHLORIDE 3 G/100ML
500 INJECTION, SOLUTION INTRAVENOUS CONTINUOUS
Status: DISCONTINUED | OUTPATIENT
Start: 2019-02-06 | End: 2019-02-07

## 2019-02-06 RX ADMIN — EPOPROSTENOL SODIUM 0.05 MCG/KG/MIN: 1.5 INJECTION, POWDER, LYOPHILIZED, FOR SOLUTION INTRAVENOUS at 06:35

## 2019-02-06 RX ADMIN — ENOXAPARIN SODIUM 40 MG: 100 INJECTION SUBCUTANEOUS at 05:51

## 2019-02-06 RX ADMIN — CEFEPIME 2 G: 2 INJECTION, POWDER, FOR SOLUTION INTRAVENOUS at 14:48

## 2019-02-06 RX ADMIN — SODIUM CHLORIDE 500 ML: 3 INJECTION, SOLUTION INTRAVENOUS at 20:46

## 2019-02-06 RX ADMIN — ACETAMINOPHEN 650 MG: 325 TABLET, FILM COATED ORAL at 17:53

## 2019-02-06 RX ADMIN — MINERAL OIL AND WHITE PETROLATUM 1 APPLICATION: 150; 830 OINTMENT OPHTHALMIC at 22:00

## 2019-02-06 RX ADMIN — EPOPROSTENOL SODIUM 0.03 MCG/KG/MIN: 1.5 INJECTION, POWDER, LYOPHILIZED, FOR SOLUTION INTRAVENOUS at 12:25

## 2019-02-06 RX ADMIN — SODIUM CHLORIDE 500 MG: 9 INJECTION, SOLUTION INTRAVENOUS at 17:52

## 2019-02-06 RX ADMIN — IRON SUCROSE 200 MG: 20 INJECTION, SOLUTION INTRAVENOUS at 14:51

## 2019-02-06 RX ADMIN — POTASSIUM CHLORIDE 40 MEQ: 29.8 INJECTION, SOLUTION INTRAVENOUS at 16:12

## 2019-02-06 RX ADMIN — FAMOTIDINE 20 MG: 20 TABLET ORAL at 05:51

## 2019-02-06 RX ADMIN — ACETAMINOPHEN 650 MG: 325 TABLET, FILM COATED ORAL at 06:07

## 2019-02-06 RX ADMIN — PROPOFOL 30 MCG/KG/MIN: 10 INJECTION, EMULSION INTRAVENOUS at 04:54

## 2019-02-06 RX ADMIN — EPOPROSTENOL SODIUM 0.05 MCG/KG/MIN: 1.5 INJECTION, POWDER, LYOPHILIZED, FOR SOLUTION INTRAVENOUS at 00:20

## 2019-02-06 RX ADMIN — PROPOFOL 30 MCG/KG/MIN: 10 INJECTION, EMULSION INTRAVENOUS at 11:45

## 2019-02-06 RX ADMIN — MINERAL OIL AND WHITE PETROLATUM 1 APPLICATION: 150; 830 OINTMENT OPHTHALMIC at 06:00

## 2019-02-06 RX ADMIN — PROPRANOLOL HYDROCHLORIDE 10 MG: 10 TABLET ORAL at 05:52

## 2019-02-06 RX ADMIN — CEFEPIME 2 G: 2 INJECTION, POWDER, FOR SOLUTION INTRAVENOUS at 21:59

## 2019-02-06 RX ADMIN — MINERAL OIL AND WHITE PETROLATUM 1 APPLICATION: 150; 830 OINTMENT OPHTHALMIC at 14:49

## 2019-02-06 RX ADMIN — PROPRANOLOL HYDROCHLORIDE 10 MG: 10 TABLET ORAL at 14:48

## 2019-02-06 RX ADMIN — PROPOFOL 30 MCG/KG/MIN: 10 INJECTION, EMULSION INTRAVENOUS at 17:51

## 2019-02-06 RX ADMIN — LINEZOLID 600 MG: 600 INJECTION, SOLUTION INTRAVENOUS at 17:52

## 2019-02-06 RX ADMIN — CEFEPIME 2 G: 2 INJECTION, POWDER, FOR SOLUTION INTRAVENOUS at 06:19

## 2019-02-06 RX ADMIN — LINEZOLID 600 MG: 600 INJECTION, SOLUTION INTRAVENOUS at 06:59

## 2019-02-06 RX ADMIN — PROPRANOLOL HYDROCHLORIDE 10 MG: 10 TABLET ORAL at 21:59

## 2019-02-06 RX ADMIN — FENTANYL CITRATE 100 MCG/HR: 50 INJECTION, SOLUTION INTRAMUSCULAR; INTRAVENOUS at 16:12

## 2019-02-06 RX ADMIN — SODIUM CHLORIDE 500 MG: 9 INJECTION, SOLUTION INTRAVENOUS at 05:52

## 2019-02-06 RX ADMIN — FAMOTIDINE 20 MG: 20 TABLET ORAL at 17:52

## 2019-02-06 NOTE — PROGRESS NOTES
2 RN skin check complete. Red, non-blanching spot found on LLE at the ankle. Mepilex placed under SCD. Heel float boot and foot drop boot switched extremities q 2hrs. Picture taken. Flowsheet opened in LDAs. No other areas of concern noted. Craniotomy site open to air; CDI.

## 2019-02-06 NOTE — CARE PLAN
Adult Ventilation Update    Total Vent Days: 12    Patient Lines/Drains/Airways Status    Active Airway     Name: Placement date: Placement time: Site: Days:    Airway Trach Tracheostomy 8.0 02/03/19   1015   Tracheostomy   2                  4 days post trache.        Sputum/Suction  Cough: Productive (02/06/19 0236)  Sputum Amount: Small (02/06/19 0236)  Sputum Color: Brown;Bloody (02/06/19 0236)  Sputum Consistency: Thick;Thin (02/06/19 0236)    Mobility  Level of Mobility: Level I (02/04/19 1900)  Activity Performed: Unable to mobilize (02/05/19 0800)  Assistance / Tolerance: Assistance of Two or More (02/04/19 1900)  Pt Calls for Assistance: No (02/03/19 2000)  Staff Present for Mobilization: RN (02/02/19 2000)  Gait: Unable to Ambulate (02/05/19 1800)  Reason Not Mobilized: Unstable condition (02/05/19 0800)  Mobilization Comments: per MD order; high PEEP and FIO2 (02/05/19 0800)    Events/Summary/Plan: Flolan changed (02/05/19 2015)

## 2019-02-06 NOTE — PROGRESS NOTES
Neurosurgery Progress Note    Subjective:    Seen with Dr. Yuko REID yesterday, IVC filter placed yesterday  CT Head stable  Eyes open  Does not follow   PEERL  Decompression full  MR brain- brainstem ok Diffuse  Hemisphere changes  MR c spine ok  Extends LUE  Flexes BLE  Na 149  Craniectomy site is still swollen    Exam:  As above    BP  Min: 130/63  Max: 130/63  Pulse  Av.4  Min: 87  Max: 106  Resp  Av.4  Min: 10  Max: 52  Monitored Temp 2  Av.6 °C (101.5 °F)  Min: 38.2 °C (100.8 °F)  Max: 39.3 °C (102.7 °F)  SpO2  Av %  Min: 90 %  Max: 100 %    No Data Recorded    Recent Labs      19   0600  19   0545  19   0545   WBC  17.6*  23.5*  20.5*   RBC  2.49*  2.47*  2.33*   HEMOGLOBIN  7.6*  7.6*  7.1*   HEMATOCRIT  25.0*  25.3*  23.7*   MCV  100.4*  102.4*  101.7*   MCH  30.5  30.8  30.5   MCHC  30.4*  30.0*  30.0*   RDW  48.8  50.6*  50.1*   PLATELETCT  304  353  367   MPV  10.8  10.9  10.6     Recent Labs      19   1750  19   0015  19   0545   SODIUM  154*  149*  151*   POTASSIUM  3.5*  3.2*  3.3*   CHLORIDE  117*  115*  116*   CO2  31  29  30   GLUCOSE  125*  158*  139*   BUN  28*  26*  28*   CREATININE  0.76  0.82  0.76   CALCIUM  7.6*  7.7*  7.7*     Recent Labs      19   0600   INR  1.33*           Intake/Output       19 - 1959 19 - 19 Total  Total       Intake    I.V.  400  345.5 745.5  --  -- --    Propofol Volume -- 182 182 -- -- --    Volume (mL) (NS infusion) 300 -- 300 -- -- --    Volume (mL) (3% sodium chloride (HYPERTONIC SALINE) 500mL infusion 500 mL) 100 163.5 263.5 -- -- --    Other  60  -- 60  --  -- --    Medications (PO/Enteral Liquids) 30 -- 30 -- -- --    Flush / Irrigation Volume (Cortrak) 30 -- 30 -- -- --    NG/GT  330  330 660  --  -- --    Intake (mL) (Enteral Tube 19 Cortrak - Gastric Left nare) 330 330 660 -- -- --    IV Piggyback  425   100 525  --  -- --    Volume (mL) (Linezolid (ZYVOX) premix 600 mg) 325 -- 325 -- -- --    Volume (mL) (cefepime (MAXIPIME) 2 g in  mL IVPB) 100 100 200 -- -- --    Total Intake 1215 71990.5 -- -- --       Output    Urine  1975  815 2790  --  -- --    Output (mL) (Urethral Catheter 18 Fr.) 5004 702 9053 -- -- --    Stool  0  -- 0  --  -- --    Number of Times Stooled 0 x 1 x 1 x -- -- --    Measurable Stool (mL) 0 -- 0 -- -- --    Total Output 3758 481 7639 -- -- --       Net I/O     -760 -39.5 -799.5 -- -- --            Intake/Output Summary (Last 24 hours) at 02/06/19 0808  Last data filed at 02/06/19 0600   Gross per 24 hour   Intake          1770.49 ml   Output             2040 ml   Net          -269.51 ml            • famotidine  20 mg BID    Or   • famotidine  20 mg BID   • linezolid (ZYVOX) IV  600 mg Q12HRS   • cefepime  2 g Q8HRS   • MD Alert...Total Body Iron Replacement per Pharmacy   PRN   • epoprostenol (FLOLAN) 1.5 mg syringe (For INHALATION)  0.01-0.05 mcg/kg/min Continuous   • artificial tear ointment  1 Application Q8HRS   • vecuronium  0.1 mg/kg Q2HRS PRN   • levETIRAcetam (KEPPRA) IV  500 mg Q12HRS   • midazolam  2 mg Q HOUR PRN   • vecuronium infusion  0-1.7 mcg/kg/min Continuous   • fentaNYL   Continuous   • acetaminophen  650 mg Q4HRS PRN   • propranolol  10 mg Q8HRS   • senna-docusate  2 Tab DAILY AT NOON   • polyethylene glycol/lytes  1 Packet DAILY AT NOON   • enoxaparin (LOVENOX) injection  40 mg DAILY   • morphine injection  2 mg Q HOUR PRN    Or   • morphine injection  4 mg Q HOUR PRN   • Pharmacy  1 Each PHARMACY TO DOSE   • cloNIDine  0.1 mg Q4HRS PRN   • magnesium hydroxide  30 mL QDAY PRN   • oxyCODONE immediate-release  2.5 mg Q3HRS PRN   • oxyCODONE immediate-release  5 mg Q3HRS PRN   • polyethylene glycol/lytes  1 Packet BID PRN   • senna-docusate  1 Tab Q24HRS PRN   • Respiratory Care per Protocol   Continuous RT   • NS   Continuous   • dextrose 50%  25 mL Q15 MIN PRN    • propofol  0-80 mcg/kg/min Continuous   • Pharmacy Consult Request  1 Each PHARMACY TO DOSE   • MD ALERT...DO NOT ADMINISTER NSAIDS or ASPIRIN unless ORDERED By Neurosurgery  1 Each PRN   • ondansetron  4 mg Q4HRS PRN   • dexamethasone  4 mg Once PRN   • scopolamine  1 Patch Q72HRS PRN   • hydrALAZINE  10 mg Q HOUR PRN   • niCARdipine infusion  0-15 mg/hr Continuous   • bisacodyl  10 mg Q24HRS PRN   • fleet  1 Each Once PRN   • 3% sodium chloride  500 mL Continuous   • sodium chloride 23.4% ivpb  200 mEq Q6HRS PRN   • docusate sodium 100mg/10mL  100 mg BID   • LORazepam  1 mg Q2HRS PRN       Assessment and Plan:  POD#11 HD #11  Okay for Lovenox      NO changes, reduce sedation as tolerated  Hold full anticoagulation, cleared for prophylaxis

## 2019-02-06 NOTE — PROGRESS NOTES
Pt transported to IR by ACLS RN and RT. Pt attached to transport monitor and vent. VSS during transport. Pt family at bedside for trip down to IR.

## 2019-02-06 NOTE — PROGRESS NOTES
Trauma / Surgical Daily Progress Note    Date of Service  2/5/2019    Chief Complaint  28 y.o. male admitted 1/26/2019 with Trauma    Interval Events    Increased secretions - Bronch / BAL  BC x 2 / Empiric antibiotics  CTA chest with PE - to IR for filter  No significant neurologic change    Review of Systems  Review of Systems   Unable to perform ROS: Intubated        Vital Signs for last 24 hours  Pulse:  [87-97] 94  Resp:  [12-42] 26  BP: (114)/(64) 114/64  SpO2:  [90 %-100 %] 100 %    Hemodynamic parameters for last 24 hours       Respiratory Data     Respiration: (!) 26, Pulse Oximetry: 100 %     #Flolan: Yes, Work Of Breathing / Effort: Vented  RUL Breath Sounds: Coarse Crackles, RML Breath Sounds: Diminished, RLL Breath Sounds: Diminished, STACI Breath Sounds: Coarse Crackles, LLL Breath Sounds: Diminished    Physical Exam  Physical Exam   Constitutional: He appears well-developed and well-nourished.   HENT:   craniotomy   Eyes: Pupils are equal, round, and reactive to light.   Neck: No JVD present. No tracheal deviation present. No thyromegaly present.   Cardiovascular: Normal rate, regular rhythm and intact distal pulses.    Pulmonary/Chest: Effort normal. No respiratory distress. He has no wheezes. He has no rales.   Full vent    Abdominal: Soft. He exhibits no distension. There is no tenderness. There is no rebound.   Genitourinary:   Genitourinary Comments: Greer to gravity.   Musculoskeletal: He exhibits edema.   Lymphadenopathy:     He has no cervical adenopathy.   Neurological: No cranial nerve deficit. GCS eye subscore is 2. GCS verbal subscore is 1. GCS motor subscore is 4.   Intubated and sedated   Skin: Skin is warm and dry. He is not diaphoretic.   Nursing note and vitals reviewed.      Laboratory  Recent Results (from the past 24 hour(s))   Basic Metabolic Panel    Collection Time: 02/05/19 12:20 AM   Result Value Ref Range    Sodium 152 (H) 135 - 145 mmol/L    Potassium 3.6 3.6 - 5.5 mmol/L     Chloride 117 (H) 96 - 112 mmol/L    Co2 29 20 - 33 mmol/L    Glucose 118 (H) 65 - 99 mg/dL    Bun 27 (H) 8 - 22 mg/dL    Creatinine 0.83 0.50 - 1.40 mg/dL    Calcium 7.9 (L) 8.5 - 10.5 mg/dL    Anion Gap 6.0 0.0 - 11.9   ESTIMATED GFR    Collection Time: 02/05/19 12:20 AM   Result Value Ref Range    GFR If African American >60 >60 mL/min/1.73 m 2    GFR If Non African American >60 >60 mL/min/1.73 m 2   ISTAT ARTERIAL BLOOD GAS    Collection Time: 02/05/19  4:33 AM   Result Value Ref Range    Ph 7.341 (L) 7.400 - 7.500    Pco2 49.1 (H) 26.0 - 37.0 mmHg    Po2 55 (L) 64 - 87 mmHg    Tco2 28 20 - 33 mmol/L    S02 86 (L) 93 - 99 %    Hco3 26.5 (H) 17.0 - 25.0 mmol/L    BE 0 -4 - 3 mmol/L    Body Temp 102.0 F degrees    O2 Therapy 60 %    iPF Ratio 92     Ph Temp Sri 7.314 (L) 7.400 - 7.500    Pco2 Temp Co 53.3 (HH) 26.0 - 37.0 mmHg    Po2 Temp Cor 63 (L) 64 - 87 mmHg    Specimen Arterial     Action Range Triggered YES     Inst. Qualified Patient YES    COMP METABOLIC PANEL    Collection Time: 02/05/19  5:45 AM   Result Value Ref Range    Sodium 153 (H) 135 - 145 mmol/L    Potassium 3.7 3.6 - 5.5 mmol/L    Chloride 118 (H) 96 - 112 mmol/L    Co2 27 20 - 33 mmol/L    Anion Gap 8.0 0.0 - 11.9    Glucose 126 (H) 65 - 99 mg/dL    Bun 29 (H) 8 - 22 mg/dL    Creatinine 0.80 0.50 - 1.40 mg/dL    Calcium 7.9 (L) 8.5 - 10.5 mg/dL    AST(SGOT) 33 12 - 45 U/L    ALT(SGPT) 46 2 - 50 U/L    Alkaline Phosphatase 54 30 - 99 U/L    Total Bilirubin 0.5 0.1 - 1.5 mg/dL    Albumin 2.8 (L) 3.2 - 4.9 g/dL    Total Protein 5.5 (L) 6.0 - 8.2 g/dL    Globulin 2.7 1.9 - 3.5 g/dL    A-G Ratio 1.0 g/dL   CBC WITH DIFFERENTIAL    Collection Time: 02/05/19  5:45 AM   Result Value Ref Range    WBC 23.5 (H) 4.8 - 10.8 K/uL    RBC 2.47 (L) 4.70 - 6.10 M/uL    Hemoglobin 7.6 (L) 14.0 - 18.0 g/dL    Hematocrit 25.3 (L) 42.0 - 52.0 %    .4 (H) 81.4 - 97.8 fL    MCH 30.8 27.0 - 33.0 pg    MCHC 30.0 (L) 33.7 - 35.3 g/dL    RDW 50.6 (H) 35.9 - 50.0  fL    Platelet Count 353 164 - 446 K/uL    MPV 10.9 9.0 - 12.9 fL    Neutrophils-Polys 85.00 (H) 44.00 - 72.00 %    Lymphocytes 7.10 (L) 22.00 - 41.00 %    Monocytes 4.70 0.00 - 13.40 %    Eosinophils 1.10 0.00 - 6.90 %    Basophils 0.40 0.00 - 1.80 %    Immature Granulocytes 1.70 (H) 0.00 - 0.90 %    Nucleated RBC 0.00 /100 WBC    Neutrophils (Absolute) 20.01 (H) 1.82 - 7.42 K/uL    Lymphs (Absolute) 1.67 1.00 - 4.80 K/uL    Monos (Absolute) 1.11 (H) 0.00 - 0.85 K/uL    Eos (Absolute) 0.25 0.00 - 0.51 K/uL    Baso (Absolute) 0.09 0.00 - 0.12 K/uL    Immature Granulocytes (abs) 0.40 (H) 0.00 - 0.11 K/uL    NRBC (Absolute) 0.00 K/uL   ESTIMATED GFR    Collection Time: 02/05/19  5:45 AM   Result Value Ref Range    GFR If African American >60 >60 mL/min/1.73 m 2    GFR If Non African American >60 >60 mL/min/1.73 m 2   Basic Metabolic Panel    Collection Time: 02/05/19 12:03 PM   Result Value Ref Range    Sodium 152 (H) 135 - 145 mmol/L    Potassium 3.4 (L) 3.6 - 5.5 mmol/L    Chloride 116 (H) 96 - 112 mmol/L    Co2 30 20 - 33 mmol/L    Glucose 131 (H) 65 - 99 mg/dL    Bun 29 (H) 8 - 22 mg/dL    Creatinine 0.74 0.50 - 1.40 mg/dL    Calcium 7.8 (L) 8.5 - 10.5 mg/dL    Anion Gap 6.0 0.0 - 11.9   ESTIMATED GFR    Collection Time: 02/05/19 12:03 PM   Result Value Ref Range    GFR If African American >60 >60 mL/min/1.73 m 2    GFR If Non African American >60 >60 mL/min/1.73 m 2   GRAM STAIN    Collection Time: 02/05/19  2:10 PM   Result Value Ref Range    Significant Indicator .     Source RESP     Site Quantitative BAL LLL     Gram Stain Result       Many WBCs.  Rare Gram positive rods.  <5% intracellular organisms.     Basic Metabolic Panel    Collection Time: 02/05/19  5:50 PM   Result Value Ref Range    Sodium 154 (H) 135 - 145 mmol/L    Potassium 3.5 (L) 3.6 - 5.5 mmol/L    Chloride 117 (H) 96 - 112 mmol/L    Co2 31 20 - 33 mmol/L    Glucose 125 (H) 65 - 99 mg/dL    Bun 28 (H) 8 - 22 mg/dL    Creatinine 0.76 0.50 - 1.40  mg/dL    Calcium 7.6 (L) 8.5 - 10.5 mg/dL    Anion Gap 6.0 0.0 - 11.9   ESTIMATED GFR    Collection Time: 02/05/19  5:50 PM   Result Value Ref Range    GFR If African American >60 >60 mL/min/1.73 m 2    GFR If Non African American >60 >60 mL/min/1.73 m 2       Fluids    Intake/Output Summary (Last 24 hours) at 02/05/19 2214  Last data filed at 02/05/19 1800   Gross per 24 hour   Intake             1955 ml   Output             3190 ml   Net            -1235 ml       Core Measures & Quality Metrics  Medications reviewed and Radiology images reviewed  Greer catheter: Critically Ill - Requiring Accurate Measurement of Urinary Output  Central line in place: Need for access    DVT Prophylaxis: Contraindicated - High bleeding risk  DVT prophylaxis - mechanical: SCDs  Ulcer prophylaxis: Yes  Antibiotics: Treating active infection/contamination beyond 24 hours perioperative coverage      JOHN Score  ETOH Screening    Assessment/Plan  Leukocytosis   Assessment & Plan    2/5 - Multifocal pneumonia and new small bilateral pleural effusions.  Bronchoscopy with BAL and blood cultures for purulent secretions, fever, leukocytosis and chest x-ray infiltrate. Empiric linezolid and cefepime initiated.  2/5 - Antibiotic day 1 of 7      Acute respiratory failure following trauma and surgery (HCC)- (present on admission)   Assessment & Plan    Intubated in field  1/27 aggressive management of a head injury /ICP- precludes weaning protocol  Continue full mechanical ventilatory support.   Ventilator bundle  2/3  Perc trach     Subdural hematoma (HCC)- (present on admission)   Assessment & Plan    Left sided holohemispheric subdural hematoma measuring approximately 9.4 mm in maximum thickness.   There is 10 mm of left-to-right midline shift. There is probably mild left sided hemispheric edema.  1/26 To OR for emergent craniotomy and evacuation of hematoma.   1/28 CT x 2 stable  Continue Keppra  2/2  MRI brainstem OK  2/4 not following,  moves left upper spontaneously, withdraws on other extremities  Jazlyn Huntley MD. Neurosurgery.     Aspiration into airway- (present on admission)   Assessment & Plan    Admission imaging with bilateral, left greater than right medial dependent consolidation could be due to aspiration pneumonitis or atelectasis.  Aggressive pulmonary hygiene and serial chest radiography.     Contraindication to deep vein thrombosis (DVT) prophylaxis- (present on admission)   Assessment & Plan    Systemic anticoagulation contraindicated secondary to elevated bleeding risk.  1/27 - Trauma BLE duplex negative   1/29 - initiate lovenox   2/4 - Trauma BLE duplex negative      Pneumothorax, traumatic- (present on admission)   Assessment & Plan    Small right apical  Observation   Resolved     Trauma- (present on admission)   Assessment & Plan    [place]. [place]ed seizure like activity. GCS 3. No evidence of acute trauma.  [facility]  Trauma Red Activation.  Mor Roa MD, Trauma/General Surgery.       Discussed patient condition with Family, RN, RT, Pharmacy and trauma surgery.  CRITICAL CARE TIME EXCLUDING PROCEDURES: 45 minutes

## 2019-02-06 NOTE — PROGRESS NOTES
RN received report from Amanda HDZ in IR. Pending Pt return to unit.   19:55pm Pt arrived to unit. Currently sedated post IR procedure, unable to obtain accurate Neuro assessment at this time, will reassess in 1 hour. Pt receiving fentanyl & Propofol.     Right femoral site clean, dry, intact, no bruising or firmness. Bilateral pedal pulses +2

## 2019-02-06 NOTE — OP REPORT
DATE OF OPERATION: 2/5/2019     PREOPERATIVE DIAGNOSIS: leukocytosis, increase in oxygen requirement and chest x-ray infiltrate.    POSTOPERATIVE DIAGNOSIS: same.    PROCEDURE PERFORMED: Therapeutic flexible fiberoptic bronchoscopy with bronchoalveolar lavage.    SURGEON: Gurpreet Brown M.D.    ANESTHESIA: Anesthesia consisting of intravenous sedation, parenteral analgesia and pharmacologic restraint was administered.    INDICATIONS: The patient is a 28 y.o. male with acute respiratory failure and leukocytosis, increase in oxygen requirement and chest x-ray infiltrate. Therapeutic flexible fiberoptic bronchoscopy with bronchoalveolar lavage is performed in the ICU.    FINDINGS: Purulent secretions  LLL.    SPECIMEN: Bronchoalveolar lavage for quantitative culture.    PROCEDURE: Following informed consent, the patient was properly identified and optimally positioned in bed. He was preoxygenated with 100% oxygen and placed on a regular ventilatory rate. Intravenous sedation, analgesia, topical aesthetic, and pharmacologic restraint was administered.    The fiberoptic bronchoscope was advanced through the the tracheostomy tube. The upper airways were suctioned. All airways were evaluated to the sub-segmental level.The airways were systematically and sequentially inspected and lavaged using a wedged alveolar technique. The pooled effluent was collected in a sterile specimen trap and submitted for quantitative cultures.     The patient tolerated the procedure well. There were no apparent complications.    ____________________________________   Gurpreet Brown M.D.    DD: 2/5/2019  10:12 PM

## 2019-02-06 NOTE — PROGRESS NOTES
IR Nursing Note:    Patient underwent a IVC filter by Dr. Sweeney. Procedure site was marked by MD and verified using imaging guidance.  Patient was placed in a supine position.  Right femoral vein accessed. Vitals were taken every 5 minutes and remained stable during procedure (see doc flow sheet for results). Ventilator managed by RT.  Manual pressure held for 5 minutes.    A Tegaderm and gauze dressing was placed over surgical site. Report called to Pura HDZ. Pt transported by edwardo with RN, RT and transport to [facility].     Cook Celect Ref # [id number]-Celect-PT Lot # [id number]

## 2019-02-06 NOTE — CARE PLAN
Problem: Ventilation Defect:  Goal: Ability to achieve and maintain unassisted ventilation or tolerate decreased levels of ventilator support    Intervention: Support and monitor invasive and noninvasive mechanical ventilation  Vent Day #11    APRV  P High - 24  P Low - 0  T High - 4.5  T Low - 0.5  Intervention: Monitor ventilator weaning response  No SBTs  Intervention: Perform ventilator associated pneumonia prevention interventions  VAP flowsheet  Intervention: Manage ventilation therapy by monitoring diagnostic test results  ABGs

## 2019-02-06 NOTE — ASSESSMENT & PLAN NOTE
2/5 Multifocal pneumonia and new small bilateral pleural effusions.  Bronchoscopy with BAL and blood cultures for purulent secretions, fever, leukocytosis and chest x-ray infiltrate. Empiric linezolid and cefepime initiated.  2/7  BAL - klebsiella. De-escalated to ceftriaxone monotherapy  2/11 Antibiotic day 7 of 7  2/18 Persistent leukocytosis. CXR with No acute cardiac or pulmonary abnormality is noted.  2/19 WBC trend up.  2/20 WBC trend down.

## 2019-02-06 NOTE — OR SURGEON
Immediate Post- Operative Note      PostOp Diagnosis: PE.CHI. UNABLE TO ANTICOAGULATE.       Procedure(s): RIGHT COMMON FEMORAL PUNCTURE WITH U/S GUIDANCE, IVC GRAM, INFRARENAL PLACEMENT  IVC FILTER PLACEMENT (COOK CELECT)    IVC GRAM SHOWS LEFT SIDED INFRARENAL IVC WHICH CROSSES BACK TO THE RIGHT ABOVE THE RENAL VEINS. IVC ILIAC CONFLUENCE IS INTACT. THIS IS NOT A DUPLICATED LEFT IVC.      Estimated Blood Loss: <20CC (FLUSHES)        Complications: NONE          2/5/2019     7:30 PM     Bakari Sweeney

## 2019-02-06 NOTE — DIETARY
Nutrition services: Day 11 of admit.  Propofol now decreased to 13.9 ml/hr. Will advance Impact 1.5 to full goal 60 ml/hr.  Discussed with RN. Will monitor propofol infusion daily and make adjustments to nutritional provision as appropriate.

## 2019-02-07 ENCOUNTER — APPOINTMENT (OUTPATIENT)
Dept: RADIOLOGY | Facility: MEDICAL CENTER | Age: 29
DRG: 003 | End: 2019-02-07
Attending: NURSE PRACTITIONER
Payer: COMMERCIAL

## 2019-02-07 LAB
ABO GROUP BLD: NORMAL
ACTION RANGE TRIGGERED IACRT: NO
ALBUMIN SERPL BCP-MCNC: 2.7 G/DL (ref 3.2–4.9)
ALBUMIN SERPL BCP-MCNC: 2.7 G/DL (ref 3.2–4.9)
ALBUMIN/GLOB SERPL: 0.9 G/DL
ALBUMIN/GLOB SERPL: 1 G/DL
ALP SERPL-CCNC: 50 U/L (ref 30–99)
ALP SERPL-CCNC: 55 U/L (ref 30–99)
ALT SERPL-CCNC: 35 U/L (ref 2–50)
ALT SERPL-CCNC: 49 U/L (ref 2–50)
ANION GAP SERPL CALC-SCNC: 4 MMOL/L (ref 0–11.9)
ANION GAP SERPL CALC-SCNC: 6 MMOL/L (ref 0–11.9)
ANION GAP SERPL CALC-SCNC: 7 MMOL/L (ref 0–11.9)
AST SERPL-CCNC: 33 U/L (ref 12–45)
AST SERPL-CCNC: 43 U/L (ref 12–45)
BACTERIA BRONCH AEROBE CULT: ABNORMAL
BACTERIA BRONCH AEROBE CULT: ABNORMAL
BARCODED ABORH UBTYP: 5100
BARCODED PRD CODE UBPRD: NORMAL
BARCODED UNIT NUM UBUNT: NORMAL
BASE EXCESS BLDA CALC-SCNC: 2 MMOL/L (ref -4–3)
BASOPHILS # BLD AUTO: 0.3 % (ref 0–1.8)
BASOPHILS # BLD: 0.04 K/UL (ref 0–0.12)
BILIRUB SERPL-MCNC: 0.4 MG/DL (ref 0.1–1.5)
BILIRUB SERPL-MCNC: 0.4 MG/DL (ref 0.1–1.5)
BLD GP AB SCN SERPL QL: NORMAL
BODY TEMPERATURE: ABNORMAL DEGREES
BUN SERPL-MCNC: 28 MG/DL (ref 8–22)
BUN SERPL-MCNC: 29 MG/DL (ref 8–22)
BUN SERPL-MCNC: 29 MG/DL (ref 8–22)
CALCIUM SERPL-MCNC: 7.5 MG/DL (ref 8.5–10.5)
CALCIUM SERPL-MCNC: 7.7 MG/DL (ref 8.5–10.5)
CALCIUM SERPL-MCNC: 8.3 MG/DL (ref 8.5–10.5)
CHLORIDE SERPL-SCNC: 117 MMOL/L (ref 96–112)
CHLORIDE SERPL-SCNC: 117 MMOL/L (ref 96–112)
CHLORIDE SERPL-SCNC: 118 MMOL/L (ref 96–112)
CO2 BLDA-SCNC: 28 MMOL/L (ref 20–33)
CO2 SERPL-SCNC: 29 MMOL/L (ref 20–33)
COMMENT 1642: NORMAL
COMPONENT R 8504R: NORMAL
CREAT SERPL-MCNC: 0.61 MG/DL (ref 0.5–1.4)
CREAT SERPL-MCNC: 0.64 MG/DL (ref 0.5–1.4)
CREAT SERPL-MCNC: 0.64 MG/DL (ref 0.5–1.4)
EOSINOPHIL # BLD AUTO: 0.41 K/UL (ref 0–0.51)
EOSINOPHIL NFR BLD: 2.7 % (ref 0–6.9)
ERYTHROCYTE [DISTWIDTH] IN BLOOD BY AUTOMATED COUNT: 50.2 FL (ref 35.9–50)
GLOBULIN SER CALC-MCNC: 2.7 G/DL (ref 1.9–3.5)
GLOBULIN SER CALC-MCNC: 2.9 G/DL (ref 1.9–3.5)
GLUCOSE SERPL-MCNC: 118 MG/DL (ref 65–99)
GLUCOSE SERPL-MCNC: 123 MG/DL (ref 65–99)
GLUCOSE SERPL-MCNC: 135 MG/DL (ref 65–99)
GRAM STN SPEC: ABNORMAL
HCO3 BLDA-SCNC: 26.6 MMOL/L (ref 17–25)
HCT VFR BLD AUTO: 23 % (ref 42–52)
HGB BLD-MCNC: 6.8 G/DL (ref 14–18)
HOROWITZ INDEX BLDA+IHG-RTO: 305 MM[HG]
IMM GRANULOCYTES # BLD AUTO: 0.4 K/UL (ref 0–0.11)
IMM GRANULOCYTES NFR BLD AUTO: 2.7 % (ref 0–0.9)
INST. QUALIFIED PATIENT IIQPT: YES
LYMPHOCYTES # BLD AUTO: 1.74 K/UL (ref 1–4.8)
LYMPHOCYTES NFR BLD: 11.7 % (ref 22–41)
MAGNESIUM SERPL-MCNC: 2.4 MG/DL (ref 1.5–2.5)
MCH RBC QN AUTO: 30 PG (ref 27–33)
MCHC RBC AUTO-ENTMCNC: 29.6 G/DL (ref 33.7–35.3)
MCV RBC AUTO: 101.3 FL (ref 81.4–97.8)
MONOCYTES # BLD AUTO: 0.68 K/UL (ref 0–0.85)
MONOCYTES NFR BLD AUTO: 4.6 % (ref 0–13.4)
MORPHOLOGY BLD-IMP: NORMAL
NEUTROPHILS # BLD AUTO: 11.64 K/UL (ref 1.82–7.42)
NEUTROPHILS NFR BLD: 78 % (ref 44–72)
NRBC # BLD AUTO: 0 K/UL
NRBC BLD-RTO: 0 /100 WBC
O2/TOTAL GAS SETTING VFR VENT: 40 %
PCO2 BLDA: 40.9 MMHG (ref 26–37)
PCO2 TEMP ADJ BLDA: 42.2 MMHG (ref 26–37)
PH BLDA: 7.42 [PH] (ref 7.4–7.5)
PH TEMP ADJ BLDA: 7.41 [PH] (ref 7.4–7.5)
PHOSPHATE SERPL-MCNC: 1.6 MG/DL (ref 2.5–4.5)
PLATELET # BLD AUTO: 400 K/UL (ref 164–446)
PLATELET BLD QL SMEAR: NORMAL
PMV BLD AUTO: 10.8 FL (ref 9–12.9)
PO2 BLDA: 122 MMHG (ref 64–87)
PO2 TEMP ADJ BLDA: 127 MMHG (ref 64–87)
POTASSIUM SERPL-SCNC: 3.4 MMOL/L (ref 3.6–5.5)
POTASSIUM SERPL-SCNC: 3.5 MMOL/L (ref 3.6–5.5)
POTASSIUM SERPL-SCNC: 3.5 MMOL/L (ref 3.6–5.5)
PRODUCT TYPE UPROD: NORMAL
PROT SERPL-MCNC: 5.4 G/DL (ref 6–8.2)
PROT SERPL-MCNC: 5.6 G/DL (ref 6–8.2)
RBC # BLD AUTO: 2.27 M/UL (ref 4.7–6.1)
RBC BLD AUTO: NORMAL
RH BLD: NORMAL
SAO2 % BLDA: 99 % (ref 93–99)
SIGNIFICANT IND 70042: ABNORMAL
SITE SITE: ABNORMAL
SODIUM SERPL-SCNC: 150 MMOL/L (ref 135–145)
SODIUM SERPL-SCNC: 153 MMOL/L (ref 135–145)
SODIUM SERPL-SCNC: 153 MMOL/L (ref 135–145)
SOURCE SOURCE: ABNORMAL
SPECIMEN DRAWN FROM PATIENT: ABNORMAL
UNIT STATUS USTAT: NORMAL
WBC # BLD AUTO: 14.9 K/UL (ref 4.8–10.8)

## 2019-02-07 PROCEDURE — 85025 COMPLETE CBC W/AUTO DIFF WBC: CPT

## 2019-02-07 PROCEDURE — 86850 RBC ANTIBODY SCREEN: CPT

## 2019-02-07 PROCEDURE — 700111 HCHG RX REV CODE 636 W/ 250 OVERRIDE (IP): Performed by: SURGERY

## 2019-02-07 PROCEDURE — 700112 HCHG RX REV CODE 229: Performed by: NEUROLOGICAL SURGERY

## 2019-02-07 PROCEDURE — 82803 BLOOD GASES ANY COMBINATION: CPT

## 2019-02-07 PROCEDURE — 30233N1 TRANSFUSION OF NONAUTOLOGOUS RED BLOOD CELLS INTO PERIPHERAL VEIN, PERCUTANEOUS APPROACH: ICD-10-PCS | Performed by: SURGERY

## 2019-02-07 PROCEDURE — 86901 BLOOD TYPING SEROLOGIC RH(D): CPT

## 2019-02-07 PROCEDURE — 700102 HCHG RX REV CODE 250 W/ 637 OVERRIDE(OP): Performed by: SURGERY

## 2019-02-07 PROCEDURE — 36600 WITHDRAWAL OF ARTERIAL BLOOD: CPT

## 2019-02-07 PROCEDURE — A9270 NON-COVERED ITEM OR SERVICE: HCPCS | Performed by: SURGERY

## 2019-02-07 PROCEDURE — P9016 RBC LEUKOCYTES REDUCED: HCPCS

## 2019-02-07 PROCEDURE — 83735 ASSAY OF MAGNESIUM: CPT

## 2019-02-07 PROCEDURE — 36430 TRANSFUSION BLD/BLD COMPNT: CPT

## 2019-02-07 PROCEDURE — 80048 BASIC METABOLIC PNL TOTAL CA: CPT

## 2019-02-07 PROCEDURE — 86900 BLOOD TYPING SEROLOGIC ABO: CPT

## 2019-02-07 PROCEDURE — 700102 HCHG RX REV CODE 250 W/ 637 OVERRIDE(OP): Performed by: PHYSICAL MEDICINE & REHABILITATION

## 2019-02-07 PROCEDURE — 770022 HCHG ROOM/CARE - ICU (200)

## 2019-02-07 PROCEDURE — 94003 VENT MGMT INPAT SUBQ DAY: CPT

## 2019-02-07 PROCEDURE — 99291 CRITICAL CARE FIRST HOUR: CPT | Performed by: SURGERY

## 2019-02-07 PROCEDURE — 71045 X-RAY EXAM CHEST 1 VIEW: CPT

## 2019-02-07 PROCEDURE — 700105 HCHG RX REV CODE 258: Performed by: SURGERY

## 2019-02-07 PROCEDURE — 700111 HCHG RX REV CODE 636 W/ 250 OVERRIDE (IP): Performed by: PHYSICIAN ASSISTANT

## 2019-02-07 PROCEDURE — 80053 COMPREHEN METABOLIC PANEL: CPT

## 2019-02-07 PROCEDURE — 86923 COMPATIBILITY TEST ELECTRIC: CPT

## 2019-02-07 PROCEDURE — 84100 ASSAY OF PHOSPHORUS: CPT

## 2019-02-07 PROCEDURE — 306565 RIGID MIT RESTRAINT(PAIR): Performed by: SURGERY

## 2019-02-07 PROCEDURE — A9270 NON-COVERED ITEM OR SERVICE: HCPCS | Performed by: NEUROLOGICAL SURGERY

## 2019-02-07 PROCEDURE — A9270 NON-COVERED ITEM OR SERVICE: HCPCS | Performed by: PHYSICAL MEDICINE & REHABILITATION

## 2019-02-07 RX ORDER — 3% SODIUM CHLORIDE 3 G/100ML
500 INJECTION, SOLUTION INTRAVENOUS CONTINUOUS
Status: DISCONTINUED | OUTPATIENT
Start: 2019-02-07 | End: 2019-02-08

## 2019-02-07 RX ORDER — LEVETIRACETAM 250 MG/1
500 TABLET ORAL 2 TIMES DAILY
Status: DISCONTINUED | OUTPATIENT
Start: 2019-02-07 | End: 2019-02-21 | Stop reason: HOSPADM

## 2019-02-07 RX ORDER — LINEZOLID 600 MG/1
600 TABLET, FILM COATED ORAL EVERY 12 HOURS
Status: DISCONTINUED | OUTPATIENT
Start: 2019-02-07 | End: 2019-02-07

## 2019-02-07 RX ORDER — QUETIAPINE FUMARATE 25 MG/1
50 TABLET, FILM COATED ORAL 3 TIMES DAILY
Status: DISCONTINUED | OUTPATIENT
Start: 2019-02-07 | End: 2019-02-11

## 2019-02-07 RX ADMIN — MINERAL OIL AND WHITE PETROLATUM 1 APPLICATION: 150; 830 OINTMENT OPHTHALMIC at 06:11

## 2019-02-07 RX ADMIN — PROPRANOLOL HYDROCHLORIDE 10 MG: 10 TABLET ORAL at 13:36

## 2019-02-07 RX ADMIN — PROPOFOL 30 MCG/KG/MIN: 10 INJECTION, EMULSION INTRAVENOUS at 02:57

## 2019-02-07 RX ADMIN — QUETIAPINE FUMARATE 50 MG: 25 TABLET ORAL at 12:05

## 2019-02-07 RX ADMIN — ACETAMINOPHEN 650 MG: 325 TABLET, FILM COATED ORAL at 13:36

## 2019-02-07 RX ADMIN — MINERAL OIL AND WHITE PETROLATUM 1 APPLICATION: 150; 830 OINTMENT OPHTHALMIC at 22:00

## 2019-02-07 RX ADMIN — PROPRANOLOL HYDROCHLORIDE 10 MG: 10 TABLET ORAL at 06:11

## 2019-02-07 RX ADMIN — DOCUSATE SODIUM 100 MG: 50 LIQUID ORAL at 06:11

## 2019-02-07 RX ADMIN — ENOXAPARIN SODIUM 40 MG: 100 INJECTION SUBCUTANEOUS at 06:10

## 2019-02-07 RX ADMIN — LEVETIRACETAM 500 MG: 500 TABLET, FILM COATED ORAL at 18:10

## 2019-02-07 RX ADMIN — DOCUSATE SODIUM 100 MG: 50 LIQUID ORAL at 18:09

## 2019-02-07 RX ADMIN — CEFEPIME 2 G: 2 INJECTION, POWDER, FOR SOLUTION INTRAVENOUS at 06:12

## 2019-02-07 RX ADMIN — PROPRANOLOL HYDROCHLORIDE 10 MG: 10 TABLET ORAL at 22:10

## 2019-02-07 RX ADMIN — POLYETHYLENE GLYCOL 3350 1 PACKET: 17 POWDER, FOR SOLUTION ORAL at 12:05

## 2019-02-07 RX ADMIN — CEFEPIME 2 G: 2 INJECTION, POWDER, FOR SOLUTION INTRAVENOUS at 13:36

## 2019-02-07 RX ADMIN — LINEZOLID 600 MG: 600 INJECTION, SOLUTION INTRAVENOUS at 06:43

## 2019-02-07 RX ADMIN — IRON SUCROSE 200 MG: 20 INJECTION, SOLUTION INTRAVENOUS at 06:11

## 2019-02-07 RX ADMIN — MINERAL OIL AND WHITE PETROLATUM 1 APPLICATION: 150; 830 OINTMENT OPHTHALMIC at 13:36

## 2019-02-07 RX ADMIN — PROPOFOL 20 MCG/KG/MIN: 10 INJECTION, EMULSION INTRAVENOUS at 11:02

## 2019-02-07 RX ADMIN — FAMOTIDINE 20 MG: 20 TABLET ORAL at 06:11

## 2019-02-07 RX ADMIN — FAMOTIDINE 20 MG: 20 TABLET ORAL at 18:10

## 2019-02-07 RX ADMIN — QUETIAPINE FUMARATE 25 MG: 25 TABLET ORAL at 18:09

## 2019-02-07 RX ADMIN — SENNOSIDES AND DOCUSATE SODIUM 2 TABLET: 8.6; 5 TABLET ORAL at 12:05

## 2019-02-07 RX ADMIN — SODIUM CHLORIDE 500 MG: 9 INJECTION, SOLUTION INTRAVENOUS at 06:12

## 2019-02-07 NOTE — CARE PLAN
Problem: Venous Thromboembolism (VTW)/Deep Vein Thrombosis (DVT) Prevention:  Goal: Patient will participate in Venous Thrombosis (VTE)/Deep Vein Thrombosis (DVT)Prevention Measures    Intervention: Ensure patient wears graduated elastic stockings (CLAU hose) and/or SCDs, if ordered, when in bed or chair (Remove at least once per shift for skin check)  Pt wearing SCDs. SCDs applied appropriately. Skin check performed under SCDs. SCD machine on.      Problem: Skin Integrity  Goal: Risk for impaired skin integrity will decrease    Intervention: Implement precautions to protect skin integrity in collaboration with the interdisciplinary team  Appropriate interventions in place to prevent skin breakdown. Patient turned q 2hrs with use of pillows to support positioning. Foot drop boot in place and repositioned q 2hr to opposite extremity. Heel float boot in use on other extremity.

## 2019-02-07 NOTE — CARE PLAN
Problem: Ventilation Defect:  Goal: Ability to achieve and maintain unassisted ventilation or tolerate decreased levels of ventilator support  Adult Ventilation Update    Total Vent Days: 13     Patient Lines/Drains/Airways Status    Active Airway     Name: Placement date: Placement time: Site: Days:    Airway Trach Tracheostomy 8.0 02/03/19   1015   Tracheostomy   3              #FVC / Vital Capacity (liters) : 0 (02/03/19 0840)  NIF (cm H2O) : 0 (02/03/19 0840)  Rapid Shallow Breathing Index (RR/VT): 60 (02/03/19 0840)  Plateau Pressure (Q Shift):  (APRV) (02/06/19 0640)  Static Compliance (ml / cm H2O): 39.8 (02/07/19 0240)    Patient failed trials because of Barriers to Wean: No Order;FiO2 >60% or PEEP >10 CM H2O;Other (Comments) (APRV) (02/06/19 0640)  Barriers to SBT Weaning Trial Stopped due to:: Pt weaned for 1 hour and returned to rest settings per protocol (02/03/19 0840)  Length of Weaning Trial Length of Weaning Trial (Hours): 50 minutes (02/01/19 1050)    5 day post trache.      Cough: Strong;Productive (02/07/19 0240)  Sputum Amount: Small (02/06/19 1600)  Sputum Color: Tan (02/06/19 1600)  Sputum Consistency: Thick;Thin (02/06/19 1600)    Mobility  Level of Mobility: Level I (02/04/19 1900)  Activity Performed: Unable to mobilize (02/06/19 0800)  Assistance / Tolerance: Assistance of Two or More (02/04/19 1900)  Pt Calls for Assistance: No (02/03/19 2000)  Staff Present for Mobilization: RN (02/02/19 2000)  Gait: Unable to Ambulate (02/06/19 2000)  Reason Not Mobilized: Unstable condition (02/06/19 0800)  Mobilization Comments: high PEEP (02/06/19 0800)    Events/Summary/Plan: patient continues on ventilation throughout the night. Patient tolerates this well. Will continue to monitor and assist as needed.

## 2019-02-07 NOTE — PROGRESS NOTES
Trauma / Surgical Daily Progress Note    Date of Service  2/4/19    Chief Complaint  28 y.o. male admitted 1/26/2019 with Trauma    Interval Events  Heavily sedated   Hypoxemia  Improving on flolan and APRV  Mode ventilator   CXR  With bilateral more florid infiltrates   Respiratory instability precludes transport for CT today   Continue diuresis with lasix   Picture consistent  With aspiration syndrome / ARDS   P/F ratio approximately 100  Criticality has increased   Prognosis guarded with mortality risk         Review of Systems  Review of Systems   Unable to perform ROS: Mental status change        Vital Signs for last 24 hours  Pulse:  [] 88  Resp:  [10-52] 22  BP: (130)/(63) 130/63  SpO2:  [90 %-100 %] 99 %    Hemodynamic parameters for last 24 hours       Respiratory Data     Respiration: (!) 22, Pulse Oximetry: 99 %     #Flolan: Yes, Work Of Breathing / Effort: Vented  RUL Breath Sounds: Clear, RML Breath Sounds: Diminished, RLL Breath Sounds: Diminished, STACI Breath Sounds: Clear, LLL Breath Sounds: Diminished    Physical Exam  Physical Exam   Constitutional: He appears well-developed and well-nourished.   HENT:   craniotomy   Eyes: Pupils are equal, round, and reactive to light.   Neck: No JVD present. No tracheal deviation present. No thyromegaly present.   Cardiovascular: Normal rate and regular rhythm.    Pulmonary/Chest: Effort normal. He has no wheezes. He has no rales.   Full vent    Abdominal: Soft. He exhibits no distension. There is no tenderness.   Musculoskeletal: He exhibits edema.   Lymphadenopathy:     He has no cervical adenopathy.   Neurological: No cranial nerve deficit. GCS eye subscore is 2. GCS verbal subscore is 1. GCS motor subscore is 4.   Skin: Skin is warm and dry. He is not diaphoretic.   Nursing note and vitals reviewed.      Laboratory  Recent Results (from the past 24 hour(s))   Basic Metabolic Panel    Collection Time: 02/05/19  5:50 PM   Result Value Ref Range    Sodium  154 (H) 135 - 145 mmol/L    Potassium 3.5 (L) 3.6 - 5.5 mmol/L    Chloride 117 (H) 96 - 112 mmol/L    Co2 31 20 - 33 mmol/L    Glucose 125 (H) 65 - 99 mg/dL    Bun 28 (H) 8 - 22 mg/dL    Creatinine 0.76 0.50 - 1.40 mg/dL    Calcium 7.6 (L) 8.5 - 10.5 mg/dL    Anion Gap 6.0 0.0 - 11.9   ESTIMATED GFR    Collection Time: 02/05/19  5:50 PM   Result Value Ref Range    GFR If African American >60 >60 mL/min/1.73 m 2    GFR If Non African American >60 >60 mL/min/1.73 m 2   Triglycerides Starting now and then Every 3 Days    Collection Time: 02/06/19 12:15 AM   Result Value Ref Range    Triglycerides 177 (H) 0 - 149 mg/dL   BASIC METABOLIC PANEL    Collection Time: 02/06/19 12:15 AM   Result Value Ref Range    Sodium 149 (H) 135 - 145 mmol/L    Potassium 3.2 (L) 3.6 - 5.5 mmol/L    Chloride 115 (H) 96 - 112 mmol/L    Co2 29 20 - 33 mmol/L    Glucose 158 (H) 65 - 99 mg/dL    Bun 26 (H) 8 - 22 mg/dL    Creatinine 0.82 0.50 - 1.40 mg/dL    Calcium 7.7 (L) 8.5 - 10.5 mg/dL    Anion Gap 5.0 0.0 - 11.9   ESTIMATED GFR    Collection Time: 02/06/19 12:15 AM   Result Value Ref Range    GFR If African American >60 >60 mL/min/1.73 m 2    GFR If Non African American >60 >60 mL/min/1.73 m 2   ISTAT ARTERIAL BLOOD GAS    Collection Time: 02/06/19  4:39 AM   Result Value Ref Range    Ph 7.503 (H) 7.400 - 7.500    Pco2 37.2 (H) 26.0 - 37.0 mmHg    Po2 159 (H) 64 - 87 mmHg    Tco2 30 20 - 33 mmol/L    S02 100 (H) 93 - 99 %    Hco3 29.3 (H) 17.0 - 25.0 mmol/L    BE 6 (H) -4 - 3 mmol/L    Body Temp 100.8 F degrees    O2 Therapy 60 %    iPF Ratio 265     Ph Temp Sri 7.484 7.400 - 7.500    Pco2 Temp Co 39.3 (H) 26.0 - 37.0 mmHg    Po2 Temp Cor 166 (H) 64 - 87 mmHg    Specimen Arterial     Action Range Triggered NO     Inst. Qualified Patient YES    CBC WITH DIFFERENTIAL    Collection Time: 02/06/19  5:45 AM   Result Value Ref Range    WBC 20.5 (H) 4.8 - 10.8 K/uL    RBC 2.33 (L) 4.70 - 6.10 M/uL    Hemoglobin 7.1 (L) 14.0 - 18.0 g/dL     Hematocrit 23.7 (L) 42.0 - 52.0 %    .7 (H) 81.4 - 97.8 fL    MCH 30.5 27.0 - 33.0 pg    MCHC 30.0 (L) 33.7 - 35.3 g/dL    RDW 50.1 (H) 35.9 - 50.0 fL    Platelet Count 367 164 - 446 K/uL    MPV 10.6 9.0 - 12.9 fL    Neutrophils-Polys 86.50 (H) 44.00 - 72.00 %    Lymphocytes 6.60 (L) 22.00 - 41.00 %    Monocytes 3.70 0.00 - 13.40 %    Eosinophils 1.40 0.00 - 6.90 %    Basophils 0.30 0.00 - 1.80 %    Immature Granulocytes 1.50 (H) 0.00 - 0.90 %    Nucleated RBC 0.00 /100 WBC    Neutrophils (Absolute) 17.74 (H) 1.82 - 7.42 K/uL    Lymphs (Absolute) 1.36 1.00 - 4.80 K/uL    Monos (Absolute) 0.76 0.00 - 0.85 K/uL    Eos (Absolute) 0.28 0.00 - 0.51 K/uL    Baso (Absolute) 0.06 0.00 - 0.12 K/uL    Immature Granulocytes (abs) 0.31 (H) 0.00 - 0.11 K/uL    NRBC (Absolute) 0.00 K/uL   COMP METABOLIC PANEL    Collection Time: 02/06/19  5:45 AM   Result Value Ref Range    Sodium 151 (H) 135 - 145 mmol/L    Potassium 3.3 (L) 3.6 - 5.5 mmol/L    Chloride 116 (H) 96 - 112 mmol/L    Co2 30 20 - 33 mmol/L    Anion Gap 5.0 0.0 - 11.9    Glucose 139 (H) 65 - 99 mg/dL    Bun 28 (H) 8 - 22 mg/dL    Creatinine 0.76 0.50 - 1.40 mg/dL    Calcium 7.7 (L) 8.5 - 10.5 mg/dL    AST(SGOT) 25 12 - 45 U/L    ALT(SGPT) 38 2 - 50 U/L    Alkaline Phosphatase 55 30 - 99 U/L    Total Bilirubin 0.5 0.1 - 1.5 mg/dL    Albumin 2.8 (L) 3.2 - 4.9 g/dL    Total Protein 5.5 (L) 6.0 - 8.2 g/dL    Globulin 2.7 1.9 - 3.5 g/dL    A-G Ratio 1.0 g/dL   IRON/TOTAL IRON BIND    Collection Time: 02/06/19  5:45 AM   Result Value Ref Range    Iron <10 (L) 50 - 180 ug/dL    Total Iron Binding 210 (L) 250 - 450 ug/dL    % Saturation see below 15 - 55 %   ESTIMATED GFR    Collection Time: 02/06/19  5:45 AM   Result Value Ref Range    GFR If African American >60 >60 mL/min/1.73 m 2    GFR If Non African American >60 >60 mL/min/1.73 m 2   Basic Metabolic Panel    Collection Time: 02/06/19  1:30 PM   Result Value Ref Range    Sodium 149 (H) 135 - 145 mmol/L     Potassium 3.3 (L) 3.6 - 5.5 mmol/L    Chloride 115 (H) 96 - 112 mmol/L    Co2 30 20 - 33 mmol/L    Glucose 135 (H) 65 - 99 mg/dL    Bun 27 (H) 8 - 22 mg/dL    Creatinine 0.69 0.50 - 1.40 mg/dL    Calcium 7.6 (L) 8.5 - 10.5 mg/dL    Anion Gap 4.0 0.0 - 11.9   ESTIMATED GFR    Collection Time: 02/06/19  1:30 PM   Result Value Ref Range    GFR If African American >60 >60 mL/min/1.73 m 2    GFR If Non African American >60 >60 mL/min/1.73 m 2       Fluids    Intake/Output Summary (Last 24 hours) at 02/06/19 1623  Last data filed at 02/06/19 1400   Gross per 24 hour   Intake          2046.69 ml   Output             1820 ml   Net           226.69 ml       Core Measures & Quality Metrics  Labs reviewed, Medications reviewed and Radiology images reviewed  Greer catheter: Critically Ill - Requiring Accurate Measurement of Urinary Output  Central line in place: Need for access    DVT Prophylaxis: Enoxaparin (Lovenox)  DVT prophylaxis - mechanical: SCDs  Ulcer prophylaxis: Yes        JOHN Score  ETOH Screening    Assessment/Plan  See problem list   Continuing antibiotics   Evaluate  For  PE   Continued complex neuro support and high level mechanical ventilation     Discussed patient condition with Family, RN, RT, Pharmacy, Patient and neurosurgery.  CRITICAL CARE TIME EXCLUDING PROCEDURES: 65    Minutes  I independently reviewed pertinent clinical lab tests from the last 48 hours and ordered additional follow up clinical lab tests.  I independently reviewed pertinent radiographic images and the radiologist's reports from the last 48 hours and ordered additional follow up radiographic studies.  I reviewed the details of the available patient records and documentation by consulting physicians in [facility] up to today, summated the information, and utilized the information as warranted in today's medical decision making for this patient.  I personally evaluated the patient condition at bedside and discussed the daily plan(s) with  available nursing staff, dieticians, social workers, pharmacists on rounds.  I am actively managing this patient's mechanical ventilation and directly involved in the adjustment of multiple settings (FiO2, PEEP, tidal volume, and minute ventilation) based on my personal bedside evaluation of this patient's radiographic, and laboratory findings and clinical changes throughout the day.

## 2019-02-07 NOTE — PROGRESS NOTES
Trauma / Surgical Daily Progress Note    Date of Service  2/6/2019    Chief Complaint  28 y.o. male admitted 1/26/2019 with Trauma    Interval Events    Remains on APRV mode of ventilation  Attempt to wean flolan as tolerated  IVC filter placed last night  Remains on lovenox 40 mg   Continue current abx for lungs    Review of Systems  Review of Systems   Unable to perform ROS: Intubated        Vital Signs for last 24 hours  Pulse:  [] 92  Resp:  [10-52] 33  BP: (130)/(63) 130/63  SpO2:  [90 %-100 %] 99 %    Hemodynamic parameters for last 24 hours       Respiratory Data     Respiration: (!) 33, Pulse Oximetry: 99 %     #Flolan: Yes, Work Of Breathing / Effort: Vented  RUL Breath Sounds: Clear, RML Breath Sounds: Diminished, RLL Breath Sounds: Diminished, STACI Breath Sounds: Clear, LLL Breath Sounds: Diminished    Physical Exam  Physical Exam   Constitutional: He appears well-developed and well-nourished.   HENT:   Left craniotomy with swelling - incision clean   Eyes: Pupils are equal, round, and reactive to light.   Neck: No JVD present. No tracheal deviation present. No thyromegaly present.   Cardiovascular: Normal rate, regular rhythm and intact distal pulses.    Pulmonary/Chest: Effort normal. No respiratory distress. He has no wheezes. He has no rales.   Full vent    Abdominal: Soft. He exhibits no distension. There is no tenderness. There is no rebound.   Genitourinary:   Genitourinary Comments: Greer to gravity.   Musculoskeletal: He exhibits edema.   Lymphadenopathy:     He has no cervical adenopathy.   Neurological: No cranial nerve deficit. GCS eye subscore is 2. GCS verbal subscore is 1. GCS motor subscore is 4.   Intubated and sedated   Skin: Skin is warm and dry. He is not diaphoretic.   Nursing note and vitals reviewed.      Laboratory  Recent Results (from the past 24 hour(s))   Basic Metabolic Panel    Collection Time: 02/05/19  5:50 PM   Result Value Ref Range    Sodium 154 (H) 135 - 145 mmol/L     Potassium 3.5 (L) 3.6 - 5.5 mmol/L    Chloride 117 (H) 96 - 112 mmol/L    Co2 31 20 - 33 mmol/L    Glucose 125 (H) 65 - 99 mg/dL    Bun 28 (H) 8 - 22 mg/dL    Creatinine 0.76 0.50 - 1.40 mg/dL    Calcium 7.6 (L) 8.5 - 10.5 mg/dL    Anion Gap 6.0 0.0 - 11.9   ESTIMATED GFR    Collection Time: 02/05/19  5:50 PM   Result Value Ref Range    GFR If African American >60 >60 mL/min/1.73 m 2    GFR If Non African American >60 >60 mL/min/1.73 m 2   Triglycerides Starting now and then Every 3 Days    Collection Time: 02/06/19 12:15 AM   Result Value Ref Range    Triglycerides 177 (H) 0 - 149 mg/dL   BASIC METABOLIC PANEL    Collection Time: 02/06/19 12:15 AM   Result Value Ref Range    Sodium 149 (H) 135 - 145 mmol/L    Potassium 3.2 (L) 3.6 - 5.5 mmol/L    Chloride 115 (H) 96 - 112 mmol/L    Co2 29 20 - 33 mmol/L    Glucose 158 (H) 65 - 99 mg/dL    Bun 26 (H) 8 - 22 mg/dL    Creatinine 0.82 0.50 - 1.40 mg/dL    Calcium 7.7 (L) 8.5 - 10.5 mg/dL    Anion Gap 5.0 0.0 - 11.9   ESTIMATED GFR    Collection Time: 02/06/19 12:15 AM   Result Value Ref Range    GFR If African American >60 >60 mL/min/1.73 m 2    GFR If Non African American >60 >60 mL/min/1.73 m 2   ISTAT ARTERIAL BLOOD GAS    Collection Time: 02/06/19  4:39 AM   Result Value Ref Range    Ph 7.503 (H) 7.400 - 7.500    Pco2 37.2 (H) 26.0 - 37.0 mmHg    Po2 159 (H) 64 - 87 mmHg    Tco2 30 20 - 33 mmol/L    S02 100 (H) 93 - 99 %    Hco3 29.3 (H) 17.0 - 25.0 mmol/L    BE 6 (H) -4 - 3 mmol/L    Body Temp 100.8 F degrees    O2 Therapy 60 %    iPF Ratio 265     Ph Temp Rsi 7.484 7.400 - 7.500    Pco2 Temp Co 39.3 (H) 26.0 - 37.0 mmHg    Po2 Temp Cor 166 (H) 64 - 87 mmHg    Specimen Arterial     Action Range Triggered NO     Inst. Qualified Patient YES    CBC WITH DIFFERENTIAL    Collection Time: 02/06/19  5:45 AM   Result Value Ref Range    WBC 20.5 (H) 4.8 - 10.8 K/uL    RBC 2.33 (L) 4.70 - 6.10 M/uL    Hemoglobin 7.1 (L) 14.0 - 18.0 g/dL    Hematocrit 23.7 (L) 42.0 -  52.0 %    .7 (H) 81.4 - 97.8 fL    MCH 30.5 27.0 - 33.0 pg    MCHC 30.0 (L) 33.7 - 35.3 g/dL    RDW 50.1 (H) 35.9 - 50.0 fL    Platelet Count 367 164 - 446 K/uL    MPV 10.6 9.0 - 12.9 fL    Neutrophils-Polys 86.50 (H) 44.00 - 72.00 %    Lymphocytes 6.60 (L) 22.00 - 41.00 %    Monocytes 3.70 0.00 - 13.40 %    Eosinophils 1.40 0.00 - 6.90 %    Basophils 0.30 0.00 - 1.80 %    Immature Granulocytes 1.50 (H) 0.00 - 0.90 %    Nucleated RBC 0.00 /100 WBC    Neutrophils (Absolute) 17.74 (H) 1.82 - 7.42 K/uL    Lymphs (Absolute) 1.36 1.00 - 4.80 K/uL    Monos (Absolute) 0.76 0.00 - 0.85 K/uL    Eos (Absolute) 0.28 0.00 - 0.51 K/uL    Baso (Absolute) 0.06 0.00 - 0.12 K/uL    Immature Granulocytes (abs) 0.31 (H) 0.00 - 0.11 K/uL    NRBC (Absolute) 0.00 K/uL   COMP METABOLIC PANEL    Collection Time: 02/06/19  5:45 AM   Result Value Ref Range    Sodium 151 (H) 135 - 145 mmol/L    Potassium 3.3 (L) 3.6 - 5.5 mmol/L    Chloride 116 (H) 96 - 112 mmol/L    Co2 30 20 - 33 mmol/L    Anion Gap 5.0 0.0 - 11.9    Glucose 139 (H) 65 - 99 mg/dL    Bun 28 (H) 8 - 22 mg/dL    Creatinine 0.76 0.50 - 1.40 mg/dL    Calcium 7.7 (L) 8.5 - 10.5 mg/dL    AST(SGOT) 25 12 - 45 U/L    ALT(SGPT) 38 2 - 50 U/L    Alkaline Phosphatase 55 30 - 99 U/L    Total Bilirubin 0.5 0.1 - 1.5 mg/dL    Albumin 2.8 (L) 3.2 - 4.9 g/dL    Total Protein 5.5 (L) 6.0 - 8.2 g/dL    Globulin 2.7 1.9 - 3.5 g/dL    A-G Ratio 1.0 g/dL   IRON/TOTAL IRON BIND    Collection Time: 02/06/19  5:45 AM   Result Value Ref Range    Iron <10 (L) 50 - 180 ug/dL    Total Iron Binding 210 (L) 250 - 450 ug/dL    % Saturation see below 15 - 55 %   ESTIMATED GFR    Collection Time: 02/06/19  5:45 AM   Result Value Ref Range    GFR If African American >60 >60 mL/min/1.73 m 2    GFR If Non African American >60 >60 mL/min/1.73 m 2   Basic Metabolic Panel    Collection Time: 02/06/19  1:30 PM   Result Value Ref Range    Sodium 149 (H) 135 - 145 mmol/L    Potassium 3.3 (L) 3.6 - 5.5 mmol/L     Chloride 115 (H) 96 - 112 mmol/L    Co2 30 20 - 33 mmol/L    Glucose 135 (H) 65 - 99 mg/dL    Bun 27 (H) 8 - 22 mg/dL    Creatinine 0.69 0.50 - 1.40 mg/dL    Calcium 7.6 (L) 8.5 - 10.5 mg/dL    Anion Gap 4.0 0.0 - 11.9   ESTIMATED GFR    Collection Time: 02/06/19  1:30 PM   Result Value Ref Range    GFR If African American >60 >60 mL/min/1.73 m 2    GFR If Non African American >60 >60 mL/min/1.73 m 2       Fluids    Intake/Output Summary (Last 24 hours) at 02/06/19 1612  Last data filed at 02/06/19 1400   Gross per 24 hour   Intake          2046.69 ml   Output             1820 ml   Net           226.69 ml       Core Measures & Quality Metrics  Medications reviewed and Radiology images reviewed  Greer catheter: Critically Ill - Requiring Accurate Measurement of Urinary Output  Central line in place: Need for access    DVT Prophylaxis: Contraindicated - High bleeding risk  DVT prophylaxis - mechanical: SCDs  Ulcer prophylaxis: Yes  Antibiotics: Treating active infection/contamination beyond 24 hours perioperative coverage      JOHN Score  ETOH Screening    Assessment/Plan  Leukocytosis   Assessment & Plan    2/5 - Multifocal pneumonia and new small bilateral pleural effusions.  Bronchoscopy with BAL and blood cultures for purulent secretions, fever, leukocytosis and chest x-ray infiltrate. Empiric linezolid and cefepime initiated.  2/5 - Antibiotic day 2 of 7      Acute respiratory failure following trauma and surgery (HCC)- (present on admission)   Assessment & Plan    Intubated in field  1/27 aggressive management of a head injury /ICP- precludes weaning protocol  Continue full mechanical ventilatory support.   Ventilator bundle  2/3  Perc trach / APRV mode  / flolan     Subdural hematoma (HCC)- (present on admission)   Assessment & Plan    Left sided holohemispheric subdural hematoma measuring approximately 9.4 mm in maximum thickness.   There is 10 mm of left-to-right midline shift. There is probably mild left  sided hemispheric edema.  1/26 To OR for emergent craniotomy and evacuation of hematoma.   1/28 CT x 2 stable  Continue Keppra  2/2  MRI brainstem OK  2/4  not following commands, moves left upper spontaneously, withdraws on other extremities, EO  Jazlyn Huntley MD. Neurosurgery.     Hypokalemia   Assessment & Plan    K low - replace and follow     Anemia associated with acute blood loss   Assessment & Plan    Critical anemia  Transfuse 1 unit PRBC if Hb < 7.0  2/6 - Iron studies low - replace per protocol.     Aspiration into airway- (present on admission)   Assessment & Plan    Admission imaging with bilateral, left greater than right medial dependent consolidation could be due to aspiration pneumonitis or atelectasis.  Aggressive pulmonary hygiene and serial chest radiography.     Contraindication to deep vein thrombosis (DVT) prophylaxis- (present on admission)   Assessment & Plan    Systemic anticoagulation contraindicated secondary to elevated bleeding risk.  1/27 - Trauma BLE duplex negative   1/29 - initiate lovenox   2/4 - Trauma BLE duplex negative   2/5 - CT shows PE     Pneumothorax, traumatic- (present on admission)   Assessment & Plan    Small right apical  Observation   Resolved     Trauma- (present on admission)   Assessment & Plan    [place]. [place]ed seizure like activity. GCS 3. No evidence of acute trauma.  Continuing keppra  Trauma Red Activation.  oMr Roa MD, Trauma/General Surgery.         Discussed patient condition with Family, RN, RT, Pharmacy and neurosurgery.  CRITICAL CARE TIME EXCLUDING PROCEDURES: 40 minutes

## 2019-02-07 NOTE — PROGRESS NOTES
Neurosurgery Progress Note    Subjective:    On sedation  Grimmaces to pain  Disconjugate gaze  Withdraws LUE  PERRL  Decompression full  MR brain- brainstem ok Diffuse  Hemisphere changes  MR c spine ok  Na 153 on 3% at 20mL/hr    Exam:  As above    Pulse  Av.9  Min: 84  Max: 95  Resp  Av.8  Min: 12  Max: 35  Monitored Temp 2  Av.9 °C (100.3 °F)  Min: 37.6 °C (99.7 °F)  Max: 38.5 °C (101.3 °F)  SpO2  Av.1 %  Min: 94 %  Max: 100 %    No Data Recorded    Recent Labs      19   0545  19   0545  19   06   WBC  23.5*  20.5*  14.9*   RBC  2.47*  2.33*  2.27*   HEMOGLOBIN  7.6*  7.1*  6.8*   HEMATOCRIT  25.3*  23.7*  23.0*   MCV  102.4*  101.7*  101.3*   MCH  30.8  30.5  30.0   MCHC  30.0*  30.0*  29.6*   RDW  50.6*  50.1*  50.2*   PLATELETCT  353  367  400   MPV  10.9  10.6  10.8     Recent Labs      19   1805  19   0005  19   0600   SODIUM  150*  153*  153*   POTASSIUM  3.5*  3.5*  3.5*   CHLORIDE  116*  117*  118*   CO2  30  29  29   GLUCOSE  133*  118*  135*   BUN  27*  29*  29*   CREATININE  0.60  0.64  0.61   CALCIUM  7.5*  7.5*  7.7*               Intake/Output       19 - 19 0659 19 - 1959       Total  Total       Intake    I.V.  461.8  736.8 1198.6  --  -- --    Propofol Volume 166.8 166.8 333.6 -- -- --    Volume (mL) (3% sodium chloride (HYPERTONIC SALINE) 500mL infusion 500 mL) 135 240 375 -- -- --    Volume (mL) (potassium chloride in water (KCL) ivpb **Administer in ICU only** 40 mEq) 45 -- 45 -- -- --    Volume (mL) (NS infusion) -- 330 330 -- -- --    Volume (mL) (3% sodium chloride (HYPERTONIC SALINE) 500mL infusion 500 mL) 115 -- 115 -- -- --    Other  60  -- 60  --  -- --    Flush / Irrigation Volume (Cortrak) 60 -- 60 -- -- --    NG/GT  570  180 750  --  -- --    Intake (mL) (Enteral Tube 19 Cortrak - Gastric Left nare) 570 180 750 -- -- --    IV Piggyback  997 241  1415  --  -- --    Volume (mL) (Linezolid (ZYVOX) premix 600 mg) 615 260 875 -- -- --    Volume (mL) (cefepime (MAXIPIME) 2 g in  mL IVPB) 340 200 540 -- -- --    Total Intake 2046.8 1376.8 3423.6 -- -- --       Output    Urine  1205  1035 2240  --  -- --    Output (mL) (Urethral Catheter 18 Fr.) 1205 1035 2240 -- -- --    Stool  0  -- 0  --  -- --    Number of Times Stooled 0 x -- 0 x -- -- --    Measurable Stool (mL) 0 -- 0 -- -- --    Total Output 1205 1035 2240 -- -- --       Net I/O     841.8 341.8 1183.6 -- -- --            Intake/Output Summary (Last 24 hours) at 02/07/19 0806  Last data filed at 02/07/19 0642   Gross per 24 hour   Intake           2640.8 ml   Output             2060 ml   Net            580.8 ml            • linezolid  600 mg Q12HRS   • levETIRAcetam  500 mg BID   • 3% sodium chloride  500 mL Continuous   • iron sucrose  200 mg DAILY   • famotidine  20 mg BID   • cefepime  2 g Q8HRS   • epoprostenol (FLOLAN) 1.5 mg syringe (For INHALATION)  0.01-0.05 mcg/kg/min Continuous   • artificial tear ointment  1 Application Q8HRS   • midazolam  2 mg Q HOUR PRN   • fentaNYL   Continuous   • acetaminophen  650 mg Q4HRS PRN   • propranolol  10 mg Q8HRS   • senna-docusate  2 Tab DAILY AT NOON   • polyethylene glycol/lytes  1 Packet DAILY AT NOON   • enoxaparin (LOVENOX) injection  40 mg DAILY   • morphine injection  2 mg Q HOUR PRN    Or   • morphine injection  4 mg Q HOUR PRN   • Pharmacy  1 Each PHARMACY TO DOSE   • cloNIDine  0.1 mg Q4HRS PRN   • magnesium hydroxide  30 mL QDAY PRN   • oxyCODONE immediate-release  2.5 mg Q3HRS PRN   • oxyCODONE immediate-release  5 mg Q3HRS PRN   • polyethylene glycol/lytes  1 Packet BID PRN   • senna-docusate  1 Tab Q24HRS PRN   • Respiratory Care per Protocol   Continuous RT   • NS   Continuous   • dextrose 50%  25 mL Q15 MIN PRN   • propofol  0-80 mcg/kg/min Continuous   • Pharmacy Consult Request  1 Each PHARMACY TO DOSE   • MD ALERT...DO NOT ADMINISTER  NSAIDS or ASPIRIN unless ORDERED By Neurosurgery  1 Each PRN   • ondansetron  4 mg Q4HRS PRN   • hydrALAZINE  10 mg Q HOUR PRN   • bisacodyl  10 mg Q24HRS PRN   • fleet  1 Each Once PRN   • docusate sodium 100mg/10mL  100 mg BID       Assessment and Plan:  POD#12 HD #12  Okay for Lovenox      NO changes, reduce sedation as tolerated  Hold full anticoagulation, cleared for prophylaxis

## 2019-02-07 NOTE — CARE PLAN
Problem: Ventilation Defect:  Goal: Ability to achieve and maintain unassisted ventilation or tolerate decreased levels of ventilator support    Intervention: Support and monitor invasive and noninvasive mechanical ventilation  Vent Day #12    APRV  P High 24  P Low 0  T High 4.5  T Low 0.5  Intervention: Monitor ventilator weaning response  No SBTs  Intervention: Perform ventilator associated pneumonia prevention interventions  VAP flowsheet  Intervention: Manage ventilation therapy by monitoring diagnostic test results  ABGs

## 2019-02-07 NOTE — PROGRESS NOTES
Dr Brown and team here for morning rounds, update given, plan of care was discussed and new orders were received.

## 2019-02-07 NOTE — ASSESSMENT & PLAN NOTE
Critical anemia  2/6 Iron studies low - replaced per protocol.  2/7 Transfused 1 PRBC   Transfuse 1 unit PRBC if Hb < 7.0

## 2019-02-08 ENCOUNTER — APPOINTMENT (OUTPATIENT)
Dept: RADIOLOGY | Facility: MEDICAL CENTER | Age: 29
DRG: 003 | End: 2019-02-08
Attending: NURSE PRACTITIONER
Payer: COMMERCIAL

## 2019-02-08 LAB
ACTION RANGE TRIGGERED IACRT: NO
ALBUMIN SERPL BCP-MCNC: 2.8 G/DL (ref 3.2–4.9)
ALBUMIN/GLOB SERPL: 0.9 G/DL
ALP SERPL-CCNC: 55 U/L (ref 30–99)
ALT SERPL-CCNC: 75 U/L (ref 2–50)
ANION GAP SERPL CALC-SCNC: 4 MMOL/L (ref 0–11.9)
ANION GAP SERPL CALC-SCNC: 5 MMOL/L (ref 0–11.9)
ANION GAP SERPL CALC-SCNC: 7 MMOL/L (ref 0–11.9)
ANION GAP SERPL CALC-SCNC: 7 MMOL/L (ref 0–11.9)
AST SERPL-CCNC: 55 U/L (ref 12–45)
BASE EXCESS BLDA CALC-SCNC: 3 MMOL/L (ref -4–3)
BASOPHILS # BLD AUTO: 0 % (ref 0–1.8)
BASOPHILS # BLD: 0 K/UL (ref 0–0.12)
BILIRUB SERPL-MCNC: 0.4 MG/DL (ref 0.1–1.5)
BODY TEMPERATURE: ABNORMAL DEGREES
BUN SERPL-MCNC: 23 MG/DL (ref 8–22)
BUN SERPL-MCNC: 25 MG/DL (ref 8–22)
BUN SERPL-MCNC: 25 MG/DL (ref 8–22)
BUN SERPL-MCNC: 27 MG/DL (ref 8–22)
CALCIUM SERPL-MCNC: 7.7 MG/DL (ref 8.5–10.5)
CALCIUM SERPL-MCNC: 7.9 MG/DL (ref 8.5–10.5)
CALCIUM SERPL-MCNC: 7.9 MG/DL (ref 8.5–10.5)
CALCIUM SERPL-MCNC: 8.4 MG/DL (ref 8.5–10.5)
CHLORIDE SERPL-SCNC: 115 MMOL/L (ref 96–112)
CHLORIDE SERPL-SCNC: 117 MMOL/L (ref 96–112)
CO2 BLDA-SCNC: 28 MMOL/L (ref 20–33)
CO2 SERPL-SCNC: 27 MMOL/L (ref 20–33)
CO2 SERPL-SCNC: 27 MMOL/L (ref 20–33)
CO2 SERPL-SCNC: 28 MMOL/L (ref 20–33)
CO2 SERPL-SCNC: 29 MMOL/L (ref 20–33)
CREAT SERPL-MCNC: 0.48 MG/DL (ref 0.5–1.4)
CREAT SERPL-MCNC: 0.54 MG/DL (ref 0.5–1.4)
CREAT SERPL-MCNC: 0.61 MG/DL (ref 0.5–1.4)
CREAT SERPL-MCNC: 0.64 MG/DL (ref 0.5–1.4)
EOSINOPHIL # BLD AUTO: 0.41 K/UL (ref 0–0.51)
EOSINOPHIL NFR BLD: 3 % (ref 0–6.9)
ERYTHROCYTE [DISTWIDTH] IN BLOOD BY AUTOMATED COUNT: 51.8 FL (ref 35.9–50)
GLOBULIN SER CALC-MCNC: 3 G/DL (ref 1.9–3.5)
GLUCOSE SERPL-MCNC: 120 MG/DL (ref 65–99)
GLUCOSE SERPL-MCNC: 124 MG/DL (ref 65–99)
GLUCOSE SERPL-MCNC: 127 MG/DL (ref 65–99)
GLUCOSE SERPL-MCNC: 128 MG/DL (ref 65–99)
HCO3 BLDA-SCNC: 26.8 MMOL/L (ref 17–25)
HCT VFR BLD AUTO: 25.2 % (ref 42–52)
HGB BLD-MCNC: 7.7 G/DL (ref 14–18)
HOROWITZ INDEX BLDA+IHG-RTO: 280 MM[HG]
INST. QUALIFIED PATIENT IIQPT: YES
LYMPHOCYTES # BLD AUTO: 2.35 K/UL (ref 1–4.8)
LYMPHOCYTES NFR BLD: 17 % (ref 22–41)
MANUAL DIFF BLD: NORMAL
MCH RBC QN AUTO: 30.4 PG (ref 27–33)
MCHC RBC AUTO-ENTMCNC: 30.6 G/DL (ref 33.7–35.3)
MCV RBC AUTO: 99.6 FL (ref 81.4–97.8)
MONOCYTES # BLD AUTO: 0.83 K/UL (ref 0–0.85)
MONOCYTES NFR BLD AUTO: 6 % (ref 0–13.4)
MORPHOLOGY BLD-IMP: NORMAL
MYELOCYTES NFR BLD MANUAL: 3 %
NEUTROPHILS # BLD AUTO: 9.8 K/UL (ref 1.82–7.42)
NEUTROPHILS NFR BLD: 68 % (ref 44–72)
NEUTS BAND NFR BLD MANUAL: 3 % (ref 0–10)
NRBC # BLD AUTO: 0.02 K/UL
NRBC BLD-RTO: 0.1 /100 WBC
O2/TOTAL GAS SETTING VFR VENT: 40 %
PCO2 BLDA: 37.3 MMHG (ref 26–37)
PCO2 TEMP ADJ BLDA: 37.7 MMHG (ref 26–37)
PH BLDA: 7.46 [PH] (ref 7.4–7.5)
PH TEMP ADJ BLDA: 7.46 [PH] (ref 7.4–7.5)
PHOSPHATE SERPL-MCNC: 2.1 MG/DL (ref 2.5–4.5)
PLATELET # BLD AUTO: 431 K/UL (ref 164–446)
PLATELET BLD QL SMEAR: NORMAL
PMV BLD AUTO: 10.8 FL (ref 9–12.9)
PO2 BLDA: 112 MMHG (ref 64–87)
PO2 TEMP ADJ BLDA: 113 MMHG (ref 64–87)
POTASSIUM SERPL-SCNC: 3.2 MMOL/L (ref 3.6–5.5)
POTASSIUM SERPL-SCNC: 3.3 MMOL/L (ref 3.6–5.5)
POTASSIUM SERPL-SCNC: 3.5 MMOL/L (ref 3.6–5.5)
POTASSIUM SERPL-SCNC: 3.7 MMOL/L (ref 3.6–5.5)
PROT SERPL-MCNC: 5.8 G/DL (ref 6–8.2)
RBC # BLD AUTO: 2.53 M/UL (ref 4.7–6.1)
RBC BLD AUTO: NORMAL
SAO2 % BLDA: 99 % (ref 93–99)
SODIUM SERPL-SCNC: 148 MMOL/L (ref 135–145)
SODIUM SERPL-SCNC: 149 MMOL/L (ref 135–145)
SODIUM SERPL-SCNC: 149 MMOL/L (ref 135–145)
SODIUM SERPL-SCNC: 150 MMOL/L (ref 135–145)
SPECIMEN DRAWN FROM PATIENT: ABNORMAL
WBC # BLD AUTO: 13.8 K/UL (ref 4.8–10.8)

## 2019-02-08 PROCEDURE — 700111 HCHG RX REV CODE 636 W/ 250 OVERRIDE (IP): Performed by: SURGERY

## 2019-02-08 PROCEDURE — 99291 CRITICAL CARE FIRST HOUR: CPT | Performed by: SURGERY

## 2019-02-08 PROCEDURE — 700105 HCHG RX REV CODE 258: Performed by: PHYSICIAN ASSISTANT

## 2019-02-08 PROCEDURE — 700102 HCHG RX REV CODE 250 W/ 637 OVERRIDE(OP): Performed by: SURGERY

## 2019-02-08 PROCEDURE — 700111 HCHG RX REV CODE 636 W/ 250 OVERRIDE (IP): Performed by: PHYSICIAN ASSISTANT

## 2019-02-08 PROCEDURE — A9270 NON-COVERED ITEM OR SERVICE: HCPCS | Performed by: SURGERY

## 2019-02-08 PROCEDURE — 80053 COMPREHEN METABOLIC PANEL: CPT

## 2019-02-08 PROCEDURE — 82803 BLOOD GASES ANY COMBINATION: CPT

## 2019-02-08 PROCEDURE — 85027 COMPLETE CBC AUTOMATED: CPT

## 2019-02-08 PROCEDURE — 700105 HCHG RX REV CODE 258: Performed by: SURGERY

## 2019-02-08 PROCEDURE — 94003 VENT MGMT INPAT SUBQ DAY: CPT

## 2019-02-08 PROCEDURE — 85007 BL SMEAR W/DIFF WBC COUNT: CPT

## 2019-02-08 PROCEDURE — 36600 WITHDRAWAL OF ARTERIAL BLOOD: CPT

## 2019-02-08 PROCEDURE — 71045 X-RAY EXAM CHEST 1 VIEW: CPT

## 2019-02-08 PROCEDURE — 770022 HCHG ROOM/CARE - ICU (200)

## 2019-02-08 PROCEDURE — 84100 ASSAY OF PHOSPHORUS: CPT

## 2019-02-08 PROCEDURE — 700101 HCHG RX REV CODE 250: Performed by: SURGERY

## 2019-02-08 PROCEDURE — 80048 BASIC METABOLIC PNL TOTAL CA: CPT | Mod: 91

## 2019-02-08 RX ORDER — SODIUM CHLORIDE 1 G/1
2 TABLET ORAL 4 TIMES DAILY
Status: DISCONTINUED | OUTPATIENT
Start: 2019-02-08 | End: 2019-02-17

## 2019-02-08 RX ORDER — SODIUM CHLORIDE 1 G/1
2 TABLET ORAL 4 TIMES DAILY
Status: DISCONTINUED | OUTPATIENT
Start: 2019-02-08 | End: 2019-02-08

## 2019-02-08 RX ORDER — 3% SODIUM CHLORIDE 3 G/100ML
500 INJECTION, SOLUTION INTRAVENOUS CONTINUOUS
Status: DISCONTINUED | OUTPATIENT
Start: 2019-02-08 | End: 2019-02-09

## 2019-02-08 RX ADMIN — QUETIAPINE FUMARATE 50 MG: 25 TABLET ORAL at 11:48

## 2019-02-08 RX ADMIN — QUETIAPINE FUMARATE 50 MG: 25 TABLET ORAL at 05:29

## 2019-02-08 RX ADMIN — SODIUM CHLORIDE TAB 1 GM 2 G: 1 TAB at 16:17

## 2019-02-08 RX ADMIN — CEFTRIAXONE SODIUM 2 G: 2 INJECTION, POWDER, FOR SOLUTION INTRAMUSCULAR; INTRAVENOUS at 05:29

## 2019-02-08 RX ADMIN — LEVETIRACETAM 500 MG: 500 TABLET, FILM COATED ORAL at 16:17

## 2019-02-08 RX ADMIN — POTASSIUM PHOSPHATE, MONOBASIC AND POTASSIUM PHOSPHATE, DIBASIC 30 MMOL: 224; 236 INJECTION, SOLUTION INTRAVENOUS at 11:47

## 2019-02-08 RX ADMIN — PROPOFOL 10 MCG/KG/MIN: 10 INJECTION, EMULSION INTRAVENOUS at 08:05

## 2019-02-08 RX ADMIN — PROPRANOLOL HYDROCHLORIDE 10 MG: 10 TABLET ORAL at 05:29

## 2019-02-08 RX ADMIN — FENTANYL CITRATE 50 MCG/HR: 50 INJECTION, SOLUTION INTRAMUSCULAR; INTRAVENOUS at 21:58

## 2019-02-08 RX ADMIN — FAMOTIDINE 20 MG: 20 TABLET ORAL at 05:29

## 2019-02-08 RX ADMIN — MINERAL OIL AND WHITE PETROLATUM 1 APPLICATION: 150; 830 OINTMENT OPHTHALMIC at 08:04

## 2019-02-08 RX ADMIN — LEVETIRACETAM 500 MG: 500 TABLET, FILM COATED ORAL at 05:29

## 2019-02-08 RX ADMIN — QUETIAPINE FUMARATE 50 MG: 25 TABLET ORAL at 16:17

## 2019-02-08 RX ADMIN — IRON SUCROSE 200 MG: 20 INJECTION, SOLUTION INTRAVENOUS at 05:30

## 2019-02-08 RX ADMIN — FAMOTIDINE 20 MG: 20 TABLET ORAL at 16:17

## 2019-02-08 RX ADMIN — PROPRANOLOL HYDROCHLORIDE 10 MG: 10 TABLET ORAL at 14:06

## 2019-02-08 RX ADMIN — SODIUM CHLORIDE TAB 1 GM 2 G: 1 TAB at 11:48

## 2019-02-08 RX ADMIN — MINERAL OIL AND WHITE PETROLATUM 1 APPLICATION: 150; 830 OINTMENT OPHTHALMIC at 21:59

## 2019-02-08 RX ADMIN — SODIUM CHLORIDE 500 ML: 3 INJECTION, SOLUTION INTRAVENOUS at 08:03

## 2019-02-08 RX ADMIN — PROPRANOLOL HYDROCHLORIDE 10 MG: 10 TABLET ORAL at 21:59

## 2019-02-08 RX ADMIN — SODIUM CHLORIDE TAB 1 GM 2 G: 1 TAB at 21:59

## 2019-02-08 RX ADMIN — ENOXAPARIN SODIUM 40 MG: 100 INJECTION SUBCUTANEOUS at 05:29

## 2019-02-08 NOTE — CARE PLAN
Problem: Traumatic Brain Injury  Goal: Optimal care of the traumatic brain injury patient    Intervention: Minimize stimulation, noise and lights  Pt is on propofol and fentanyl. Sedation held for neuro assessments, Seroquel added per MD.   Intervention: Do not turn to side of bone removal  Turns Q 2 hrs with extreme caution with head placement and bone flap.

## 2019-02-08 NOTE — CARE PLAN
Problem: Bowel/Gastric:  Goal: Normal bowel function is maintained or improved  Outcome: PROGRESSING AS EXPECTED  RN to assess for incontinence with turning. RN to evaluate need for prn bowel medications based on bowel habits and abdominal assessment. RN to continue to assess throughout shift to ensure pt does not have any complications due to constipation.     Problem: Skin Integrity  Goal: Risk for impaired skin integrity will decrease  Outcome: PROGRESSING AS EXPECTED  RN to preform 2 RN skin check and assess for areas of increased risk for skin breakdown. RN to implement usage of barrier wipes, moisturizer, waffle pillow, and pillows for repositioning.

## 2019-02-08 NOTE — DIETARY
Nutrition services: Day 13 of admit.  Pt is receiving rocephin.  Will start probiotic protocol to be provided as kefir and fiber TID via feeding tube. Add 1 packet nutrisource fiber to  ml warm water, add kefir. Mix and administer by syringe through feeding tube. Flush afterwards with a minimum of 30-60 ml water.

## 2019-02-08 NOTE — PROGRESS NOTES
BRAIN Gardner from Neuro surgery at bedside given current drips and neuro assessment trend. Orders to increase 3% drip to 20 ml per hour.

## 2019-02-08 NOTE — CARE PLAN
Problem: Knowledge Deficit  Goal: Knowledge of disease process/condition, treatment plan, diagnostic tests, and medications will improve    Intervention: Explain information regarding disease process/condition, treatment plan, diagnostic tests, and medications and document in education  Pt transfused 1 unit RBC for Hgb of 6.8. Consents obtained per family prior to transfusion. No signs of reaction noted. VSS.

## 2019-02-08 NOTE — CARE PLAN
Problem: Ventilation Defect:  Goal: Ability to achieve and maintain unassisted ventilation or tolerate decreased levels of ventilator support  Adult Ventilation Update    Total Vent Days: 14     Patient Lines/Drains/Airways Status    Active Airway     Name: Placement date: Placement time: Site: Days:    Airway Trach Tracheostomy 8.0 02/03/19   1015   Tracheostomy   3              #FVC / Vital Capacity (liters) : 0 (02/03/19 0840)  NIF (cm H2O) : 0 (02/03/19 0840)  Rapid Shallow Breathing Index (RR/VT): 60 (02/03/19 0840)  Plateau Pressure (Q Shift):  (APRV) (02/06/19 0640)  Static Compliance (ml / cm H2O): 39.8 (02/07/19 0240)    Patient failed trials because of Barriers to Wean: No Order;FiO2 >60% or PEEP >10 CM H2O;Other (Comments) (APRV) (02/06/19 0640)  Barriers to SBT Weaning Trial Stopped due to:: Pt weaned for 1 hour and returned to rest settings per protocol (02/03/19 0840)  Length of Weaning Trial Length of Weaning Trial (Hours): 50 minutes (02/01/19 1050)    5 day post trache.      Cough: Strong;Productive (02/07/19 0240)  Sputum Amount: Small (02/06/19 1600)  Sputum Color: Tan (02/06/19 1600)  Sputum Consistency: Thick;Thin (02/06/19 1600)    Mobility  Level of Mobility: Level I (02/04/19 1900)  Activity Performed: Unable to mobilize (02/06/19 0800)  Assistance / Tolerance: Assistance of Two or More (02/04/19 1900)  Pt Calls for Assistance: No (02/03/19 2000)  Staff Present for Mobilization: RN (02/02/19 2000)  Gait: Unable to Ambulate (02/06/19 2000)  Reason Not Mobilized: Unstable condition (02/06/19 0800)  Mobilization Comments: high PEEP (02/06/19 0800)    Events/Summary/Plan: patient continues on ventilation throughout the night. Patient tolerates this well. Will continue to monitor and assist as needed. Patient started weaning from APRV overnight as well.

## 2019-02-09 ENCOUNTER — APPOINTMENT (OUTPATIENT)
Dept: RADIOLOGY | Facility: MEDICAL CENTER | Age: 29
DRG: 003 | End: 2019-02-09
Attending: NURSE PRACTITIONER
Payer: COMMERCIAL

## 2019-02-09 PROBLEM — S27.0XXA PNEUMOTHORAX, TRAUMATIC: Status: RESOLVED | Noted: 2019-01-29 | Resolved: 2019-02-09

## 2019-02-09 PROBLEM — R13.12 OROPHARYNGEAL DYSPHAGIA: Status: ACTIVE | Noted: 2019-02-09

## 2019-02-09 LAB
ACTION RANGE TRIGGERED IACRT: NO
ALBUMIN SERPL BCP-MCNC: 2.8 G/DL (ref 3.2–4.9)
ALBUMIN/GLOB SERPL: 1.1 G/DL
ALP SERPL-CCNC: 58 U/L (ref 30–99)
ALT SERPL-CCNC: 145 U/L (ref 2–50)
ANION GAP SERPL CALC-SCNC: 4 MMOL/L (ref 0–11.9)
ANION GAP SERPL CALC-SCNC: 6 MMOL/L (ref 0–11.9)
ANION GAP SERPL CALC-SCNC: 7 MMOL/L (ref 0–11.9)
ANION GAP SERPL CALC-SCNC: 7 MMOL/L (ref 0–11.9)
AST SERPL-CCNC: 112 U/L (ref 12–45)
BASE EXCESS BLDA CALC-SCNC: 3 MMOL/L (ref -4–3)
BASOPHILS # BLD AUTO: 0.2 % (ref 0–1.8)
BASOPHILS # BLD: 0.03 K/UL (ref 0–0.12)
BILIRUB SERPL-MCNC: 0.5 MG/DL (ref 0.1–1.5)
BODY TEMPERATURE: ABNORMAL DEGREES
BUN SERPL-MCNC: 22 MG/DL (ref 8–22)
BUN SERPL-MCNC: 22 MG/DL (ref 8–22)
BUN SERPL-MCNC: 24 MG/DL (ref 8–22)
BUN SERPL-MCNC: 24 MG/DL (ref 8–22)
CALCIUM SERPL-MCNC: 7.9 MG/DL (ref 8.5–10.5)
CALCIUM SERPL-MCNC: 8.5 MG/DL (ref 8.5–10.5)
CALCIUM SERPL-MCNC: 8.8 MG/DL (ref 8.5–10.5)
CALCIUM SERPL-MCNC: 8.8 MG/DL (ref 8.5–10.5)
CHLORIDE SERPL-SCNC: 113 MMOL/L (ref 96–112)
CHLORIDE SERPL-SCNC: 114 MMOL/L (ref 96–112)
CO2 BLDA-SCNC: 28 MMOL/L (ref 20–33)
CO2 SERPL-SCNC: 26 MMOL/L (ref 20–33)
CO2 SERPL-SCNC: 27 MMOL/L (ref 20–33)
CREAT SERPL-MCNC: 0.41 MG/DL (ref 0.5–1.4)
CREAT SERPL-MCNC: 0.43 MG/DL (ref 0.5–1.4)
CREAT SERPL-MCNC: 0.44 MG/DL (ref 0.5–1.4)
CREAT SERPL-MCNC: 0.46 MG/DL (ref 0.5–1.4)
EOSINOPHIL # BLD AUTO: 0.27 K/UL (ref 0–0.51)
EOSINOPHIL NFR BLD: 1.8 % (ref 0–6.9)
ERYTHROCYTE [DISTWIDTH] IN BLOOD BY AUTOMATED COUNT: 47.2 FL (ref 35.9–50)
GLOBULIN SER CALC-MCNC: 2.5 G/DL (ref 1.9–3.5)
GLUCOSE SERPL-MCNC: 115 MG/DL (ref 65–99)
GLUCOSE SERPL-MCNC: 126 MG/DL (ref 65–99)
GLUCOSE SERPL-MCNC: 130 MG/DL (ref 65–99)
GLUCOSE SERPL-MCNC: 137 MG/DL (ref 65–99)
HCO3 BLDA-SCNC: 27.1 MMOL/L (ref 17–25)
HCT VFR BLD AUTO: 24.4 % (ref 42–52)
HGB BLD-MCNC: 7.7 G/DL (ref 14–18)
HOROWITZ INDEX BLDA+IHG-RTO: 308 MM[HG]
IMM GRANULOCYTES # BLD AUTO: 1.03 K/UL (ref 0–0.11)
IMM GRANULOCYTES NFR BLD AUTO: 7 % (ref 0–0.9)
INST. QUALIFIED PATIENT IIQPT: YES
LYMPHOCYTES # BLD AUTO: 2.13 K/UL (ref 1–4.8)
LYMPHOCYTES NFR BLD: 14.5 % (ref 22–41)
MCH RBC QN AUTO: 30.7 PG (ref 27–33)
MCHC RBC AUTO-ENTMCNC: 31.6 G/DL (ref 33.7–35.3)
MCV RBC AUTO: 97.2 FL (ref 81.4–97.8)
MONOCYTES # BLD AUTO: 0.74 K/UL (ref 0–0.85)
MONOCYTES NFR BLD AUTO: 5.1 % (ref 0–13.4)
NEUTROPHILS # BLD AUTO: 10.45 K/UL (ref 1.82–7.42)
NEUTROPHILS NFR BLD: 71.4 % (ref 44–72)
NRBC # BLD AUTO: 0.03 K/UL
NRBC BLD-RTO: 0.2 /100 WBC
O2/TOTAL GAS SETTING VFR VENT: 40 %
PCO2 BLDA: 40 MMHG (ref 26–37)
PCO2 TEMP ADJ BLDA: 38.4 MMHG (ref 26–37)
PH BLDA: 7.44 [PH] (ref 7.4–7.5)
PH TEMP ADJ BLDA: 7.45 [PH] (ref 7.4–7.5)
PHOSPHATE SERPL-MCNC: 3.6 MG/DL (ref 2.5–4.5)
PLATELET # BLD AUTO: 448 K/UL (ref 164–446)
PMV BLD AUTO: 10.3 FL (ref 9–12.9)
PO2 BLDA: 123 MMHG (ref 64–87)
PO2 TEMP ADJ BLDA: 117 MMHG (ref 64–87)
POTASSIUM SERPL-SCNC: 3.4 MMOL/L (ref 3.6–5.5)
POTASSIUM SERPL-SCNC: 3.4 MMOL/L (ref 3.6–5.5)
POTASSIUM SERPL-SCNC: 3.5 MMOL/L (ref 3.6–5.5)
POTASSIUM SERPL-SCNC: 3.7 MMOL/L (ref 3.6–5.5)
PROT SERPL-MCNC: 5.3 G/DL (ref 6–8.2)
RBC # BLD AUTO: 2.51 M/UL (ref 4.7–6.1)
SAO2 % BLDA: 99 % (ref 93–99)
SODIUM SERPL-SCNC: 145 MMOL/L (ref 135–145)
SODIUM SERPL-SCNC: 145 MMOL/L (ref 135–145)
SODIUM SERPL-SCNC: 147 MMOL/L (ref 135–145)
SODIUM SERPL-SCNC: 147 MMOL/L (ref 135–145)
SPECIMEN DRAWN FROM PATIENT: ABNORMAL
TRIGL SERPL-MCNC: 152 MG/DL (ref 0–149)
WBC # BLD AUTO: 14.7 K/UL (ref 4.8–10.8)

## 2019-02-09 PROCEDURE — 700105 HCHG RX REV CODE 258: Performed by: SURGERY

## 2019-02-09 PROCEDURE — 700105 HCHG RX REV CODE 258: Performed by: PHYSICIAN ASSISTANT

## 2019-02-09 PROCEDURE — 80048 BASIC METABOLIC PNL TOTAL CA: CPT | Mod: 91

## 2019-02-09 PROCEDURE — A9270 NON-COVERED ITEM OR SERVICE: HCPCS | Performed by: SURGERY

## 2019-02-09 PROCEDURE — 84478 ASSAY OF TRIGLYCERIDES: CPT

## 2019-02-09 PROCEDURE — 36600 WITHDRAWAL OF ARTERIAL BLOOD: CPT

## 2019-02-09 PROCEDURE — 306565 RIGID MIT RESTRAINT(PAIR): Performed by: SURGERY

## 2019-02-09 PROCEDURE — 99291 CRITICAL CARE FIRST HOUR: CPT | Performed by: SURGERY

## 2019-02-09 PROCEDURE — 700102 HCHG RX REV CODE 250 W/ 637 OVERRIDE(OP): Performed by: SURGERY

## 2019-02-09 PROCEDURE — 80053 COMPREHEN METABOLIC PANEL: CPT

## 2019-02-09 PROCEDURE — 71045 X-RAY EXAM CHEST 1 VIEW: CPT

## 2019-02-09 PROCEDURE — 82803 BLOOD GASES ANY COMBINATION: CPT

## 2019-02-09 PROCEDURE — 700111 HCHG RX REV CODE 636 W/ 250 OVERRIDE (IP): Performed by: SURGERY

## 2019-02-09 PROCEDURE — 85025 COMPLETE CBC W/AUTO DIFF WBC: CPT

## 2019-02-09 PROCEDURE — 700111 HCHG RX REV CODE 636 W/ 250 OVERRIDE (IP): Performed by: PHYSICIAN ASSISTANT

## 2019-02-09 PROCEDURE — 84100 ASSAY OF PHOSPHORUS: CPT

## 2019-02-09 PROCEDURE — 770022 HCHG ROOM/CARE - ICU (200)

## 2019-02-09 PROCEDURE — 94003 VENT MGMT INPAT SUBQ DAY: CPT

## 2019-02-09 RX ORDER — 3% SODIUM CHLORIDE 3 G/100ML
500 INJECTION, SOLUTION INTRAVENOUS CONTINUOUS
Status: DISCONTINUED | OUTPATIENT
Start: 2019-02-09 | End: 2019-02-10

## 2019-02-09 RX ORDER — FLUDROCORTISONE ACETATE 0.1 MG/1
0.1 TABLET ORAL EVERY MORNING
Status: DISCONTINUED | OUTPATIENT
Start: 2019-02-09 | End: 2019-02-17

## 2019-02-09 RX ORDER — METHADONE HYDROCHLORIDE 5 MG/5ML
5 SOLUTION ORAL EVERY 8 HOURS
Status: DISCONTINUED | OUTPATIENT
Start: 2019-02-09 | End: 2019-02-14

## 2019-02-09 RX ADMIN — PROPRANOLOL HYDROCHLORIDE 10 MG: 10 TABLET ORAL at 04:54

## 2019-02-09 RX ADMIN — METHADONE HYDROCHLORIDE 5 MG: 5 SOLUTION ORAL at 23:30

## 2019-02-09 RX ADMIN — SODIUM CHLORIDE 500 ML: 3 INJECTION, SOLUTION INTRAVENOUS at 13:18

## 2019-02-09 RX ADMIN — QUETIAPINE FUMARATE 50 MG: 25 TABLET ORAL at 17:13

## 2019-02-09 RX ADMIN — SODIUM CHLORIDE TAB 1 GM 2 G: 1 TAB at 19:59

## 2019-02-09 RX ADMIN — SODIUM CHLORIDE TAB 1 GM 2 G: 1 TAB at 09:12

## 2019-02-09 RX ADMIN — PROPRANOLOL HYDROCHLORIDE 10 MG: 10 TABLET ORAL at 14:31

## 2019-02-09 RX ADMIN — MINERAL OIL AND WHITE PETROLATUM 1 APPLICATION: 150; 830 OINTMENT OPHTHALMIC at 14:32

## 2019-02-09 RX ADMIN — METHADONE HYDROCHLORIDE 5 MG: 5 SOLUTION ORAL at 14:32

## 2019-02-09 RX ADMIN — QUETIAPINE FUMARATE 50 MG: 25 TABLET ORAL at 04:54

## 2019-02-09 RX ADMIN — MINERAL OIL AND WHITE PETROLATUM 1 APPLICATION: 150; 830 OINTMENT OPHTHALMIC at 04:54

## 2019-02-09 RX ADMIN — CEFTRIAXONE SODIUM 2 G: 2 INJECTION, POWDER, FOR SOLUTION INTRAMUSCULAR; INTRAVENOUS at 04:55

## 2019-02-09 RX ADMIN — SODIUM CHLORIDE TAB 1 GM 2 G: 1 TAB at 13:05

## 2019-02-09 RX ADMIN — FAMOTIDINE 20 MG: 20 TABLET ORAL at 17:13

## 2019-02-09 RX ADMIN — LEVETIRACETAM 500 MG: 500 TABLET, FILM COATED ORAL at 17:12

## 2019-02-09 RX ADMIN — SODIUM CHLORIDE 500 ML: 3 INJECTION, SOLUTION INTRAVENOUS at 09:11

## 2019-02-09 RX ADMIN — OXYCODONE HYDROCHLORIDE 5 MG: 5 TABLET ORAL at 19:59

## 2019-02-09 RX ADMIN — SODIUM CHLORIDE TAB 1 GM 2 G: 1 TAB at 17:13

## 2019-02-09 RX ADMIN — IRON SUCROSE 200 MG: 20 INJECTION, SOLUTION INTRAVENOUS at 04:54

## 2019-02-09 RX ADMIN — LEVETIRACETAM 500 MG: 500 TABLET, FILM COATED ORAL at 04:54

## 2019-02-09 RX ADMIN — FAMOTIDINE 20 MG: 20 TABLET ORAL at 04:54

## 2019-02-09 RX ADMIN — QUETIAPINE FUMARATE 50 MG: 25 TABLET ORAL at 11:11

## 2019-02-09 RX ADMIN — PROPRANOLOL HYDROCHLORIDE 10 MG: 10 TABLET ORAL at 23:30

## 2019-02-09 RX ADMIN — MINERAL OIL AND WHITE PETROLATUM 1 APPLICATION: 150; 830 OINTMENT OPHTHALMIC at 23:30

## 2019-02-09 RX ADMIN — ENOXAPARIN SODIUM 40 MG: 100 INJECTION SUBCUTANEOUS at 04:53

## 2019-02-09 RX ADMIN — FLUDROCORTISONE ACETATE 0.1 MG: 0.1 TABLET ORAL at 11:07

## 2019-02-09 NOTE — PROGRESS NOTES
Neurosurgery Progress Note    Subjective:  D?W RN.  No changes    Exam:  Pos EO.  PERRL  WD to pain    Pulse  Av.8  Min: 77  Max: 91  Resp  Av.2  Min: 0  Max: 29  Temp  Av.8 °C (98.2 °F)  Min: 36.1 °C (96.9 °F)  Max: 37.1 °C (98.7 °F)  SpO2  Av.5 %  Min: 97 %  Max: 100 %    No Data Recorded    Recent Labs      19   0619   0619   0520   WBC  14.9*  13.8*  14.7*   RBC  2.27*  2.53*  2.51*   HEMOGLOBIN  6.8*  7.7*  7.7*   HEMATOCRIT  23.0*  25.2*  24.4*   MCV  101.3*  99.6*  97.2   MCH  30.0  30.4  30.7   MCHC  29.6*  30.6*  31.6*   RDW  50.2*  51.8*  47.2   PLATELETCT  400  431  448*   MPV  10.8  10.8  10.3     Recent Labs      19   0000  19   0520  19   1140   SODIUM  147*  147*  145   POTASSIUM  3.7  3.5*  3.4*   CHLORIDE  113*  113*  113*   CO2  27  27  26   GLUCOSE  130*  115*  126*   BUN  22  22  24*   CREATININE  0.43*  0.41*  0.44*   CALCIUM  8.8  8.5  7.9*               Intake/Output       19 - 19 - 02/10/19 0659       Total  Total       Intake    I.V.  256.1  246 502.1  123  -- 123    Fentanyl Volume 6 6 12 3 -- 3    Propofol Volume 27.6 -- 27.6 -- -- --    Volume (mL) (3% sodium chloride (HYPERTONIC SALINE) 500mL infusion 500 mL) 17.5 -- 17.5 -- -- --    Volume (mL) (3% sodium chloride (HYPERTONIC SALINE) 500mL infusion 500 mL) 205 240 445 120 -- 120    Other  55  -- 55  --  -- --    Medications (PO/Enteral Liquids) 55 -- 55 -- -- --    NG/GT  720  720 1440  360  -- 360    Intake (mL) (Enteral Tube 19 Cortrak - Gastric Left nare)  360 -- 360    IV Piggyback  499.8  -- 499.8  --  -- --    Volume (mL) (potassium phosphates 30 mmol in  mL ivpb) 499.8 -- 499.8 -- -- --    Total Intake 1530.9 966 2496.9 483 -- 483       Output    Urine    1350 3325  675  -- 675    Output (mL) (Urethral Catheter 18 Fr.)  1350 3325 675 -- 675    Stool  --  -- --   --  -- --    Number of Times Stooled 1 x 1 x 2 x 0 x -- 0 x    Total Output 1975 1350 3325 675 -- 675       Net I/O     -444.1 -384 -828.1 -192 -- -192            Intake/Output Summary (Last 24 hours) at 02/09/19 1227  Last data filed at 02/09/19 1200   Gross per 24 hour   Intake          2413.75 ml   Output             2950 ml   Net          -536.25 ml            • methadone  5 mg Q8HRS   • fludrocortisone  0.1 mg QAM   • 3% sodium chloride  500 mL Continuous   • sodium chloride  2 g 4X/DAY   • levETIRAcetam  500 mg BID   • QUEtiapine  50 mg TID   • cefTRIAXone (ROCEPHIN) IV  2 g Q24HRS   • iron sucrose  200 mg DAILY   • famotidine  20 mg BID   • artificial tear ointment  1 Application Q8HRS   • midazolam  2 mg Q HOUR PRN   • fentaNYL   Continuous   • acetaminophen  650 mg Q4HRS PRN   • propranolol  10 mg Q8HRS   • senna-docusate  2 Tab DAILY AT NOON   • polyethylene glycol/lytes  1 Packet DAILY AT NOON   • enoxaparin (LOVENOX) injection  40 mg DAILY   • morphine injection  2 mg Q HOUR PRN    Or   • morphine injection  4 mg Q HOUR PRN   • Pharmacy  1 Each PHARMACY TO DOSE   • cloNIDine  0.1 mg Q4HRS PRN   • magnesium hydroxide  30 mL QDAY PRN   • oxyCODONE immediate-release  2.5 mg Q3HRS PRN   • oxyCODONE immediate-release  5 mg Q3HRS PRN   • polyethylene glycol/lytes  1 Packet BID PRN   • senna-docusate  1 Tab Q24HRS PRN   • Respiratory Care per Protocol   Continuous RT   • NS   Continuous   • dextrose 50%  25 mL Q15 MIN PRN   • propofol  0-80 mcg/kg/min Continuous   • Pharmacy Consult Request  1 Each PHARMACY TO DOSE   • MD ALERT...DO NOT ADMINISTER NSAIDS or ASPIRIN unless ORDERED By Neurosurgery  1 Each PRN   • ondansetron  4 mg Q4HRS PRN   • hydrALAZINE  10 mg Q HOUR PRN   • bisacodyl  10 mg Q24HRS PRN   • fleet  1 Each Once PRN   • docusate sodium 100mg/10mL  100 mg BID       Assessment and Plan:  Neuro stable.

## 2019-02-09 NOTE — CARE PLAN
Problem: Ventilation Defect:  Goal: Ability to achieve and maintain unassisted ventilation or tolerate decreased levels of ventilator support    Intervention: Support and monitor invasive and noninvasive mechanical ventilation  Adult Ventilation Update    Total Vent Days: 15    Patient Lines/Drains/Airways Status    Active Airway     Name: Placement date: Placement time: Site: Days:    Airway Trach Tracheostomy 8.0 02/03/19   1015   Tracheostomy   7              Barriers to Wean: No Order; Other (Comments) (APRV) (02/08/19 0639)    Cough: Productive (02/09/19 0334)  Sputum Amount: Moderate (02/09/19 0334)  Sputum Color: Pink Tinged;Tan (02/09/19 0334)  Sputum Consistency: Thick;Thin (02/09/19 0334)    Mobility  Activity Performed: Unable to mobilize (02/08/19 2000)  Reason Not Mobilized: Bed rest;Unstable condition (02/08/19 2000)  Mobilization Comments:  (pt does not have helmet for craniectomy) (02/08/19 2000)    Events/Summary/Plan: p high @ 8, T high 13 now  (02/09/19 0334)

## 2019-02-09 NOTE — PROGRESS NOTES
Trauma / Surgical Daily Progress Note    Date of Service  2/9/2019    Chief Complaint  28 y.o. male admitted 1/26/2019 with SDH     Interval Events  Critically ill. Starting spont trials on vent. Hemodynamically appropriate. Tolerating TF. On abx for klebsiella PNA. Starting to open eyes, ? Track.     Review of Systems  Review of Systems   Unable to perform ROS: Intubated        Vital Signs for last 24 hours  Temp:  [36.1 °C (96.9 °F)-37.1 °C (98.7 °F)] 37 °C (98.6 °F)  Pulse:  [77-91] 83  Resp:  [0-29] 10  SpO2:  [97 %-100 %] 98 %    Hemodynamic parameters for last 24 hours       Respiratory Data     Respiration: (!) 10, Pulse Oximetry: 98 %     Work Of Breathing / Effort: Vented  RUL Breath Sounds: Crackles, RML Breath Sounds: Crackles, RLL Breath Sounds: Diminished, STACI Breath Sounds: Crackles, LLL Breath Sounds: Diminished    Physical Exam  Physical Exam   Constitutional: He appears well-developed.   HENT:   Left bone flap out   Eyes:   Dysconjugate   Neck:   Trach in place   Cardiovascular: Normal rate.    Pulmonary/Chest: Effort normal. He exhibits no tenderness.   Abdominal: Soft. He exhibits no distension. There is no tenderness.   Genitourinary:   Genitourinary Comments: Greer to gravity   Skin: Skin is warm.       Laboratory  Recent Results (from the past 24 hour(s))   Basic Metabolic Panel    Collection Time: 02/08/19  6:20 PM   Result Value Ref Range    Sodium 149 (H) 135 - 145 mmol/L    Potassium 3.7 3.6 - 5.5 mmol/L    Chloride 115 (H) 96 - 112 mmol/L    Co2 27 20 - 33 mmol/L    Glucose 127 (H) 65 - 99 mg/dL    Bun 23 (H) 8 - 22 mg/dL    Creatinine 0.48 (L) 0.50 - 1.40 mg/dL    Calcium 7.9 (L) 8.5 - 10.5 mg/dL    Anion Gap 7.0 0.0 - 11.9   ESTIMATED GFR    Collection Time: 02/08/19  6:20 PM   Result Value Ref Range    GFR If African American >60 >60 mL/min/1.73 m 2    GFR If Non African American >60 >60 mL/min/1.73 m 2   Basic Metabolic Panel    Collection Time: 02/09/19 12:00 AM   Result Value Ref  Range    Sodium 147 (H) 135 - 145 mmol/L    Potassium 3.7 3.6 - 5.5 mmol/L    Chloride 113 (H) 96 - 112 mmol/L    Co2 27 20 - 33 mmol/L    Glucose 130 (H) 65 - 99 mg/dL    Bun 22 8 - 22 mg/dL    Creatinine 0.43 (L) 0.50 - 1.40 mg/dL    Calcium 8.8 8.5 - 10.5 mg/dL    Anion Gap 7.0 0.0 - 11.9   Triglyceride    Collection Time: 02/09/19 12:00 AM   Result Value Ref Range    Triglycerides 152 (H) 0 - 149 mg/dL   ESTIMATED GFR    Collection Time: 02/09/19 12:00 AM   Result Value Ref Range    GFR If African American >60 >60 mL/min/1.73 m 2    GFR If Non African American >60 >60 mL/min/1.73 m 2   ISTAT ARTERIAL BLOOD GAS    Collection Time: 02/09/19  5:14 AM   Result Value Ref Range    Ph 7.438 7.400 - 7.500    Pco2 40.0 (H) 26.0 - 37.0 mmHg    Po2 123 (H) 64 - 87 mmHg    Tco2 28 20 - 33 mmol/L    S02 99 93 - 99 %    Hco3 27.1 (H) 17.0 - 25.0 mmol/L    BE 3 -4 - 3 mmol/L    Body Temp 96.9 F degrees    O2 Therapy 40 %    iPF Ratio 308     Ph Temp Sri 7.452 7.400 - 7.500    Pco2 Temp Co 38.4 (H) 26.0 - 37.0 mmHg    Po2 Temp Cor 117 (H) 64 - 87 mmHg    Specimen Arterial     Action Range Triggered NO     Inst. Qualified Patient YES    CBC WITH DIFFERENTIAL    Collection Time: 02/09/19  5:20 AM   Result Value Ref Range    WBC 14.7 (H) 4.8 - 10.8 K/uL    RBC 2.51 (L) 4.70 - 6.10 M/uL    Hemoglobin 7.7 (L) 14.0 - 18.0 g/dL    Hematocrit 24.4 (L) 42.0 - 52.0 %    MCV 97.2 81.4 - 97.8 fL    MCH 30.7 27.0 - 33.0 pg    MCHC 31.6 (L) 33.7 - 35.3 g/dL    RDW 47.2 35.9 - 50.0 fL    Platelet Count 448 (H) 164 - 446 K/uL    MPV 10.3 9.0 - 12.9 fL    Neutrophils-Polys 71.40 44.00 - 72.00 %    Lymphocytes 14.50 (L) 22.00 - 41.00 %    Monocytes 5.10 0.00 - 13.40 %    Eosinophils 1.80 0.00 - 6.90 %    Basophils 0.20 0.00 - 1.80 %    Immature Granulocytes 7.00 (H) 0.00 - 0.90 %    Nucleated RBC 0.20 /100 WBC    Neutrophils (Absolute) 10.45 (H) 1.82 - 7.42 K/uL    Lymphs (Absolute) 2.13 1.00 - 4.80 K/uL    Monos (Absolute) 0.74 0.00 - 0.85  K/uL    Eos (Absolute) 0.27 0.00 - 0.51 K/uL    Baso (Absolute) 0.03 0.00 - 0.12 K/uL    Immature Granulocytes (abs) 1.03 (H) 0.00 - 0.11 K/uL    NRBC (Absolute) 0.03 K/uL   COMP METABOLIC PANEL    Collection Time: 02/09/19  5:20 AM   Result Value Ref Range    Sodium 147 (H) 135 - 145 mmol/L    Potassium 3.5 (L) 3.6 - 5.5 mmol/L    Chloride 113 (H) 96 - 112 mmol/L    Co2 27 20 - 33 mmol/L    Anion Gap 7.0 0.0 - 11.9    Glucose 115 (H) 65 - 99 mg/dL    Bun 22 8 - 22 mg/dL    Creatinine 0.41 (L) 0.50 - 1.40 mg/dL    Calcium 8.5 8.5 - 10.5 mg/dL    AST(SGOT) 112 (H) 12 - 45 U/L    ALT(SGPT) 145 (H) 2 - 50 U/L    Alkaline Phosphatase 58 30 - 99 U/L    Total Bilirubin 0.5 0.1 - 1.5 mg/dL    Albumin 2.8 (L) 3.2 - 4.9 g/dL    Total Protein 5.3 (L) 6.0 - 8.2 g/dL    Globulin 2.5 1.9 - 3.5 g/dL    A-G Ratio 1.1 g/dL   PHOSPHORUS    Collection Time: 02/09/19  5:20 AM   Result Value Ref Range    Phosphorus 3.6 2.5 - 4.5 mg/dL   ESTIMATED GFR    Collection Time: 02/09/19  5:20 AM   Result Value Ref Range    GFR If African American >60 >60 mL/min/1.73 m 2    GFR If Non African American >60 >60 mL/min/1.73 m 2   Basic Metabolic Panel    Collection Time: 02/09/19 11:40 AM   Result Value Ref Range    Sodium 145 135 - 145 mmol/L    Potassium 3.4 (L) 3.6 - 5.5 mmol/L    Chloride 113 (H) 96 - 112 mmol/L    Co2 26 20 - 33 mmol/L    Glucose 126 (H) 65 - 99 mg/dL    Bun 24 (H) 8 - 22 mg/dL    Creatinine 0.44 (L) 0.50 - 1.40 mg/dL    Calcium 7.9 (L) 8.5 - 10.5 mg/dL    Anion Gap 6.0 0.0 - 11.9   ESTIMATED GFR    Collection Time: 02/09/19 11:40 AM   Result Value Ref Range    GFR If African American >60 >60 mL/min/1.73 m 2    GFR If Non African American >60 >60 mL/min/1.73 m 2       Fluids    Intake/Output Summary (Last 24 hours) at 02/09/19 1241  Last data filed at 02/09/19 1200   Gross per 24 hour   Intake          2413.75 ml   Output             2950 ml   Net          -536.25 ml       Core Measures & Quality Metrics  Labs reviewed,  Medications reviewed and Radiology images reviewed  Greer catheter: Critically Ill - Requiring Accurate Measurement of Urinary Output  Central line in place: Need for access    DVT Prophylaxis: Contraindicated - High bleeding risk  DVT prophylaxis - mechanical: SCDs  Ulcer prophylaxis: Yes  Antibiotics: Treating active infection/contamination beyond 24 hours perioperative coverage  Assessed for rehab: Patient unable to tolerate rehabilitation therapeutic regimen    JOHN Score  ETOH Screening    Assessment/Plan  Pulmonary embolus, right (HCC)   Assessment & Plan    2/3  acute deterioration / inability to oxygenate precipitously 2 hours after tracheostomy.    Chest x-ray shows some increased infiltrate in the left lower lobe, patient refractory to bagging and increase PEEP, Flolan  2/5 - CTA shows proximal segmental and subsegmental pulmonary emboli in the right lower lobe. No RV strain.  Contraindication to systemic anticoagulation / DVT screen negative - IR for IVC filter     Leukocytosis   Assessment & Plan    2/5 - Multifocal pneumonia and new small bilateral pleural effusions.  Bronchoscopy with BAL and blood cultures for purulent secretions, fever, leukocytosis and chest x-ray infiltrate. Empiric linezolid and cefepime initiated.  2/7 - BAL - klebsiella. De-escalated to ceftriaxone monotherapy  2/9 - Antibiotic day 5 of 7      Acute respiratory failure following trauma and surgery (HCC)- (present on admission)   Assessment & Plan    Intubated in field  Continue full mechanical ventilatory support.   Ventilator bundle  2/3  Perc trach / APRV mode  / flolan  2/6 - weaned flolan off  2/7 - started APRV wean  2/9 - Spont trials started     Subdural hematoma (HCC)- (present on admission)   Assessment & Plan    Left sided holohemispheric subdural hematoma measuring approximately 9.4 mm in maximum thickness.   10 mm of left-to-right midline shift. There is probably mild left sided hemispheric edema.  1/26 To OR for  emergent craniotomy and evacuation of hematoma.   1/28 CT x 2 stable  Continue Keppra  2/2  MRI brainstem OK  Not following commands, moves left upper spontaneously, withdraws on other extremities, EO  Jazlyn Huntley MD. Neurosurgery.     Hypokalemia   Assessment & Plan    K low - replace and follow     Anemia associated with acute blood loss   Assessment & Plan    Critical anemia  2/6 - Iron studies low - replaced per protocol.  2/7 - Transfused 1 PRBC for Hb 6.8  Transfuse 1 unit PRBC if Hb < 7.0     Aspiration into airway- (present on admission)   Assessment & Plan    Admission imaging with bilateral, left greater than right medial dependent consolidation could be due to aspiration pneumonitis or atelectasis.  Aggressive pulmonary hygiene and serial chest radiography.     Contraindication to deep vein thrombosis (DVT) prophylaxis- (present on admission)   Assessment & Plan    Systemic anticoagulation contraindicated secondary to elevated bleeding risk.  1/27 - Trauma BLE duplex negative   1/29 - initiate lovenox   2/4 - Trauma BLE duplex negative   2/5 - CT shows PE - IVC filter placed     Oropharyngeal dysphagia   Assessment & Plan    Cortrak in place  On TF     Trauma- (present on admission)   Assessment & Plan    [place]ed seizure like activity. GCS 3. No evidence of acute trauma.  Continuing keppra  Trauma Red Activation.  Mor Roa MD, Trauma/General Surgery.         Discussed patient condition with Family, RN, RT and Pharmacy.  CRITICAL CARE TIME EXCLUDING PROCEDURES: 38   minutes

## 2019-02-09 NOTE — ASSESSMENT & PLAN NOTE
1/29 Prophylactic lovenox initiated   2/3 Acute deterioration / inability to oxygenate precipitously 2 hours after tracheostomy.    Chest x-ray shows some increased infiltrate in the left lower lobe, patient refractory to bagging and increase PEEP, Flolan  2/5 CTA shows proximal segmental and subsegmental pulmonary emboli in the right lower lobe. No RV strain.   - IVC filter placed   Prophylactic pharmacological DVT prophylaxis cleared by neurosurgery. Hold full anticoagulation.

## 2019-02-09 NOTE — PROGRESS NOTES
2 RN skin check complete. No new areas of concern. Rash noted to trunk area. Crani side CDI, open to air. Pt turned q2h, pillows in use for support and positioning.

## 2019-02-09 NOTE — NON-PROVIDER
"  Trauma/Surgical Progress Note    Author: Lea Nowakqui Date & Time created: 2/9/2019   12:58 PM     Interval Events:  Patient is currently receiving Q2 neuro checks.  He is tolerating the APRV wean off.   He is on fentanyl drip, but will work to wean off and use methadone/oxycodone.   Will consider TP tomorrow.   He is currently on 40% oxygen and tube feeding. Per neuro, he is now on DVT prophylaxis with lovenox.   He was switched to rosephin due to klebsiella growth. Will continue to monitor his WBC counts.   Hemodynamics:  Blood pressure 121/66, pulse 83, temperature 37 °C (98.6 °F), temperature source Temporal, resp. rate (!) 10, height 1.702 m (5' 7.01\"), weight 88.9 kg (195 lb 15.8 oz), SpO2 98 %.     Respiratory:  Serrano Vent Mode: Spont, PEEP/CPAP: 8, FiO2: 30, P Peak (PIP): 20, P MEAN: 11 Respiration: (!) 10, Pulse Oximetry: 98 %     Work Of Breathing / Effort: Vented  RUL Breath Sounds: Crackles, RML Breath Sounds: Crackles, RLL Breath Sounds: Diminished, STACI Breath Sounds: Crackles, LLL Breath Sounds: Diminished  Fluids:    Intake/Output Summary (Last 24 hours) at 02/09/19 1258  Last data filed at 02/09/19 1200   Gross per 24 hour   Intake          2413.75 ml   Output             2950 ml   Net          -536.25 ml     Admit Weight: 82.1 kg (181 lb)  Current      Physical Exam   HENT:   Head: Normocephalic.   Closure of craniotomy; well healed, no erythema or drainage    Eyes: Pupils are equal, round, and reactive to light.   Neck: Neck supple. No JVD present. No tracheal deviation present.   Cardiovascular: Normal rate, regular rhythm and intact distal pulses.    Pulmonary/Chest: Effort normal and breath sounds normal.   Intubated     Abdominal: Soft. Bowel sounds are normal.   Musculoskeletal:   Sedated and intubated   Lymphadenopathy:     He has no cervical adenopathy.   Neurological: He is alert. He is disoriented. He displays abnormal reflex.   Brisk reflexes secondary to hematoma; sedated and " unable to perform full neurological exam; patient opens eyes but does not track with eyes; some movements are asymmetric but overall exhibits spontaneous movements of all extremities.    Skin: Skin is warm and dry.   Psychiatric:   Cannot assess   Vitals reviewed.      Medical Decision Making/Problem List:    Active Hospital Problems    Diagnosis   • Leukocytosis [D72.829]     Priority: High     2/5 - Multifocal pneumonia and new small bilateral pleural effusions.  Bronchoscopy with BAL and blood cultures for purulent secretions, fever, leukocytosis and chest x-ray infiltrate. Empiric linezolid and cefepime initiated.  2/7 - Bronchoalveolar lavage cultures remarkable for Klebsiella pneumoniae. Blood cultures negative. Antibiotic therapy de-escalated to ceftriaxone monotherapy.  2/7 - Antibiotic day 3 of 7      • Pulmonary embolus, right (HCC) [I26.99]     Priority: High   • Subdural hematoma (HCC) [S06.5X9A]     Priority: High   • Acute respiratory failure following trauma and surgery (HCC) [J95.821]     Priority: High   • Anemia associated with acute blood loss [D62]     Priority: Medium   • Hypokalemia [E87.6]     Priority: Medium   • Contraindication to deep vein thrombosis (DVT) prophylaxis [Z53.09]     Priority: Medium   • Aspiration into airway [T17.908A]     Priority: Medium   • Oropharyngeal dysphagia [R13.12]     Priority: Low   • Trauma [T14.90XA]     Priority: Low     Patient currently on lovenox per neurosurgery's approval for DVT prophylaxis.    Patient also provided salt tablets. Will replete K as needed.     Patient currently on ceftriaxone due to cultures revealing Klebsiella. WBCs revealed elevation at 14.7 today. Will continue to monitor.     Consider swallow studies due to aspiration and dysphagia.     Will wean off fentanyl drip and use methadone/oxycodone for pain management.  Core Measures & Quality Metrics:  Labs reviewed and Medications reviewed  Greer catheter: Critically Ill - Requiring  Accurate Measurement of Urinary Output  Central line in place: Need for access    DVT Prophylaxis: Enoxaparin (Lovenox)  DVT prophylaxis - mechanical: SCDs  Ulcer prophylaxis: Yes  Antibiotics: Treating active infection/contamination beyond 24 hours perioperative coverage  Assessed for rehab: Patient unable to tolerate rehabilitation therapeutic regimen    Total Score: 0

## 2019-02-09 NOTE — ASSESSMENT & PLAN NOTE
Cortrak in place  On TF  2/13 Speech eval - speaking valve with therapists only.    2/20 Swallow evaluation would require G tube for rehab if unable to swallow

## 2019-02-09 NOTE — CARE PLAN
Problem: Pain Management  Goal: Pain level will decrease to patient's comfort goal    Intervention: Follow pain managment plan developed in collaboration with patient and Interdisciplinary Team  Pain medication administered per MAR as needed       Problem: Respiratory:  Goal: Respiratory status will improve    Intervention: Assess and monitor pulmonary status   02/08/19 0238 02/09/19 0000 02/09/19 0200   Vitals   Respiration --  --  (!) 0   Pulse Oximetry --  --  100 %   OTHER   Respiratory Pattern --  Vented --    Work Of Breathing / Effort --  Vented --    Pre/Post Intervention Pre Intervention Assessment --  --    RUL Breath Sounds --  Clear After Suction --    RML Breath Sounds --  Clear After Suction --    RLL Breath Sounds --  Diminished --    STACI Breath Sounds --  Clear After Suction --    LLL Breath Sounds --  Diminished --    Respiratory   Cough --  Productive --

## 2019-02-09 NOTE — CARE PLAN
Problem: Safety  Goal: Will remain free from injury  Outcome: PROGRESSING AS EXPECTED  Bed is locked and in low position. Bed alarm is on. Patient is close to nursing station. Call light within reach and educated on how to use it.     Problem: Pain Management  Goal: Pain level will decrease to patient's comfort goal  Outcome: PROGRESSING AS EXPECTED  Pain assessed every 2 hours. Patient medicated per MAR. Given extra blankets and pillows for comfort.

## 2019-02-09 NOTE — PROGRESS NOTES
Trauma / Surgical Daily Progress Note    Date of Service  02/08/2019    Chief Complaint  28 y.o. male admitted 1/26/2019 with Trauma    Interval Events    APRV weaning tolerated  Propofol to off with addition of seroquel  WBC improving on current abx for Klebsiella pneumoniae   Phos replacement    Review of Systems  Review of Systems   Unable to perform ROS: Intubated        Vital Signs for last 24 hours  Temp:  [36.1 °C (96.9 °F)-37.9 °C (100.2 °F)] 36.8 °C (98.3 °F)  Pulse:  [77-91] 82  Resp:  [0-31] 0  SpO2:  [97 %-100 %] 98 %    Hemodynamic parameters for last 24 hours       Respiratory Data     Respiration: (!) 0, Pulse Oximetry: 98 %     Work Of Breathing / Effort: Vented  RUL Breath Sounds: Crackles, RML Breath Sounds: Crackles, RLL Breath Sounds: Crackles, STACI Breath Sounds: Crackles, LLL Breath Sounds: Crackles    Physical Exam  Physical Exam   Constitutional: He appears well-developed and well-nourished.   HENT:   Left craniotomy with swelling - incision clean   Eyes: Pupils are equal, round, and reactive to light.   Neck: No JVD present. No tracheal deviation present. No thyromegaly present.   Cardiovascular: Normal rate, regular rhythm and intact distal pulses.    Pulmonary/Chest: Effort normal. No respiratory distress. He has no wheezes. He has no rales.   Full vent    Abdominal: Soft. He exhibits no distension. There is no tenderness. There is no rebound.   Genitourinary:   Genitourinary Comments: Greer to gravity.   Musculoskeletal: He exhibits edema.   Lymphadenopathy:     He has no cervical adenopathy.   Neurological: No cranial nerve deficit. GCS eye subscore is 2. GCS verbal subscore is 1. GCS motor subscore is 4.   Intubated and sedated   Skin: Skin is warm and dry. He is not diaphoretic.   Nursing note and vitals reviewed.      Laboratory  Recent Results (from the past 24 hour(s))   Basic Metabolic Panel    Collection Time: 02/08/19 12:00 PM   Result Value Ref Range    Sodium 149 (H) 135 - 145  mmol/L    Potassium 3.2 (L) 3.6 - 5.5 mmol/L    Chloride 115 (H) 96 - 112 mmol/L    Co2 27 20 - 33 mmol/L    Glucose 124 (H) 65 - 99 mg/dL    Bun 25 (H) 8 - 22 mg/dL    Creatinine 0.54 0.50 - 1.40 mg/dL    Calcium 7.9 (L) 8.5 - 10.5 mg/dL    Anion Gap 7.0 0.0 - 11.9   ESTIMATED GFR    Collection Time: 02/08/19 12:00 PM   Result Value Ref Range    GFR If African American >60 >60 mL/min/1.73 m 2    GFR If Non African American >60 >60 mL/min/1.73 m 2   Basic Metabolic Panel    Collection Time: 02/08/19  6:20 PM   Result Value Ref Range    Sodium 149 (H) 135 - 145 mmol/L    Potassium 3.7 3.6 - 5.5 mmol/L    Chloride 115 (H) 96 - 112 mmol/L    Co2 27 20 - 33 mmol/L    Glucose 127 (H) 65 - 99 mg/dL    Bun 23 (H) 8 - 22 mg/dL    Creatinine 0.48 (L) 0.50 - 1.40 mg/dL    Calcium 7.9 (L) 8.5 - 10.5 mg/dL    Anion Gap 7.0 0.0 - 11.9   ESTIMATED GFR    Collection Time: 02/08/19  6:20 PM   Result Value Ref Range    GFR If African American >60 >60 mL/min/1.73 m 2    GFR If Non African American >60 >60 mL/min/1.73 m 2   Basic Metabolic Panel    Collection Time: 02/09/19 12:00 AM   Result Value Ref Range    Sodium 147 (H) 135 - 145 mmol/L    Potassium 3.7 3.6 - 5.5 mmol/L    Chloride 113 (H) 96 - 112 mmol/L    Co2 27 20 - 33 mmol/L    Glucose 130 (H) 65 - 99 mg/dL    Bun 22 8 - 22 mg/dL    Creatinine 0.43 (L) 0.50 - 1.40 mg/dL    Calcium 8.8 8.5 - 10.5 mg/dL    Anion Gap 7.0 0.0 - 11.9   Triglyceride    Collection Time: 02/09/19 12:00 AM   Result Value Ref Range    Triglycerides 152 (H) 0 - 149 mg/dL   ESTIMATED GFR    Collection Time: 02/09/19 12:00 AM   Result Value Ref Range    GFR If African American >60 >60 mL/min/1.73 m 2    GFR If Non African American >60 >60 mL/min/1.73 m 2   ISTAT ARTERIAL BLOOD GAS    Collection Time: 02/09/19  5:14 AM   Result Value Ref Range    Ph 7.438 7.400 - 7.500    Pco2 40.0 (H) 26.0 - 37.0 mmHg    Po2 123 (H) 64 - 87 mmHg    Tco2 28 20 - 33 mmol/L    S02 99 93 - 99 %    Hco3 27.1 (H) 17.0 - 25.0  mmol/L    BE 3 -4 - 3 mmol/L    Body Temp 96.9 F degrees    O2 Therapy 40 %    iPF Ratio 308     Ph Temp Sri 7.452 7.400 - 7.500    Pco2 Temp Co 38.4 (H) 26.0 - 37.0 mmHg    Po2 Temp Cor 117 (H) 64 - 87 mmHg    Specimen Arterial     Action Range Triggered NO     Inst. Qualified Patient YES    CBC WITH DIFFERENTIAL    Collection Time: 02/09/19  5:20 AM   Result Value Ref Range    WBC 14.7 (H) 4.8 - 10.8 K/uL    RBC 2.51 (L) 4.70 - 6.10 M/uL    Hemoglobin 7.7 (L) 14.0 - 18.0 g/dL    Hematocrit 24.4 (L) 42.0 - 52.0 %    MCV 97.2 81.4 - 97.8 fL    MCH 30.7 27.0 - 33.0 pg    MCHC 31.6 (L) 33.7 - 35.3 g/dL    RDW 47.2 35.9 - 50.0 fL    Platelet Count 448 (H) 164 - 446 K/uL    MPV 10.3 9.0 - 12.9 fL    Neutrophils-Polys 71.40 44.00 - 72.00 %    Lymphocytes 14.50 (L) 22.00 - 41.00 %    Monocytes 5.10 0.00 - 13.40 %    Eosinophils 1.80 0.00 - 6.90 %    Basophils 0.20 0.00 - 1.80 %    Immature Granulocytes 7.00 (H) 0.00 - 0.90 %    Nucleated RBC 0.20 /100 WBC    Neutrophils (Absolute) 10.45 (H) 1.82 - 7.42 K/uL    Lymphs (Absolute) 2.13 1.00 - 4.80 K/uL    Monos (Absolute) 0.74 0.00 - 0.85 K/uL    Eos (Absolute) 0.27 0.00 - 0.51 K/uL    Baso (Absolute) 0.03 0.00 - 0.12 K/uL    Immature Granulocytes (abs) 1.03 (H) 0.00 - 0.11 K/uL    NRBC (Absolute) 0.03 K/uL   COMP METABOLIC PANEL    Collection Time: 02/09/19  5:20 AM   Result Value Ref Range    Sodium 147 (H) 135 - 145 mmol/L    Potassium 3.5 (L) 3.6 - 5.5 mmol/L    Chloride 113 (H) 96 - 112 mmol/L    Co2 27 20 - 33 mmol/L    Anion Gap 7.0 0.0 - 11.9    Glucose 115 (H) 65 - 99 mg/dL    Bun 22 8 - 22 mg/dL    Creatinine 0.41 (L) 0.50 - 1.40 mg/dL    Calcium 8.5 8.5 - 10.5 mg/dL    AST(SGOT) 112 (H) 12 - 45 U/L    ALT(SGPT) 145 (H) 2 - 50 U/L    Alkaline Phosphatase 58 30 - 99 U/L    Total Bilirubin 0.5 0.1 - 1.5 mg/dL    Albumin 2.8 (L) 3.2 - 4.9 g/dL    Total Protein 5.3 (L) 6.0 - 8.2 g/dL    Globulin 2.5 1.9 - 3.5 g/dL    A-G Ratio 1.1 g/dL   PHOSPHORUS    Collection Time:  02/09/19  5:20 AM   Result Value Ref Range    Phosphorus 3.6 2.5 - 4.5 mg/dL   ESTIMATED GFR    Collection Time: 02/09/19  5:20 AM   Result Value Ref Range    GFR If African American >60 >60 mL/min/1.73 m 2    GFR If Non African American >60 >60 mL/min/1.73 m 2       Fluids    Intake/Output Summary (Last 24 hours) at 02/09/19 1117  Last data filed at 02/09/19 1000   Gross per 24 hour   Intake             2496 ml   Output             3000 ml   Net             -504 ml       Core Measures & Quality Metrics  Medications reviewed and Radiology images reviewed  Greer catheter: Critically Ill - Requiring Accurate Measurement of Urinary Output  Central line in place: Need for access    DVT Prophylaxis: Contraindicated - High bleeding risk  DVT prophylaxis - mechanical: SCDs  Ulcer prophylaxis: Yes  Antibiotics: Treating active infection/contamination beyond 24 hours perioperative coverage      JOHN Score  ETOH Screening    Assessment/Plan  Leukocytosis   Assessment & Plan    2/5 - Multifocal pneumonia and new small bilateral pleural effusions.  Bronchoscopy with BAL and blood cultures for purulent secretions, fever, leukocytosis and chest x-ray infiltrate. Empiric linezolid and cefepime initiated.  2/8 - Antibiotic day 4 of 7      Acute respiratory failure following trauma and surgery (HCC)- (present on admission)   Assessment & Plan    Intubated in field  1/27 aggressive management of a head injury /ICP- precludes weaning protocol  Continue full mechanical ventilatory support.   Ventilator bundle  2/3  Perc trach / APRV mode  / flolan  2/6 - wean flolan off  2/7 - start APRV wean     Subdural hematoma (HCC)- (present on admission)   Assessment & Plan    Left sided holohemispheric subdural hematoma measuring approximately 9.4 mm in maximum thickness.   There is 10 mm of left-to-right midline shift. There is probably mild left sided hemispheric edema.  1/26 To OR for emergent craniotomy and evacuation of hematoma.   1/28 CT  x 2 stable  Continue Keppra  2/2  MRI brainstem OK  2/4  not following commands, moves left upper spontaneously, withdraws on other extremities, EO  Jazlyn Huntley MD. Neurosurgery.     Hypokalemia   Assessment & Plan    K low - replace and follow     Anemia associated with acute blood loss   Assessment & Plan    Critical anemia  Transfuse 1 unit PRBC if Hb < 7.0  2/6 - Iron studies low - replace per protocol.  2/7 - transfuse 1 PRBC for Hb 6.8     Aspiration into airway- (present on admission)   Assessment & Plan    Admission imaging with bilateral, left greater than right medial dependent consolidation could be due to aspiration pneumonitis or atelectasis.  Aggressive pulmonary hygiene and serial chest radiography.     Contraindication to deep vein thrombosis (DVT) prophylaxis- (present on admission)   Assessment & Plan    Systemic anticoagulation contraindicated secondary to elevated bleeding risk.  1/27 - Trauma BLE duplex negative   1/29 - initiate lovenox   2/4 - Trauma BLE duplex negative   2/5 - CT shows PE     Pneumothorax, traumatic- (present on admission)   Assessment & Plan    Small right apical  Observation   Resolved     Trauma- (present on admission)   Assessment & Plan    [place]. [place]ed seizure like activity. GCS 3. No evidence of acute trauma.  Continuing keppra  Trauma Red Activation.  Mor Roa MD, Trauma/General Surgery.       Discussed patient condition with Family, RN, RT, Pharmacy and trauma surgery.  CRITICAL CARE TIME EXCLUDING PROCEDURES: 40  minutes

## 2019-02-10 ENCOUNTER — APPOINTMENT (OUTPATIENT)
Dept: RADIOLOGY | Facility: MEDICAL CENTER | Age: 29
DRG: 003 | End: 2019-02-10
Attending: PHYSICIAN ASSISTANT
Payer: COMMERCIAL

## 2019-02-10 ENCOUNTER — APPOINTMENT (OUTPATIENT)
Dept: RADIOLOGY | Facility: MEDICAL CENTER | Age: 29
DRG: 003 | End: 2019-02-10
Attending: NURSE PRACTITIONER
Payer: COMMERCIAL

## 2019-02-10 LAB
ACTION RANGE TRIGGERED IACRT: NO
ALBUMIN SERPL BCP-MCNC: 3.1 G/DL (ref 3.2–4.9)
ALBUMIN/GLOB SERPL: 1.2 G/DL
ALP SERPL-CCNC: 63 U/L (ref 30–99)
ALT SERPL-CCNC: 222 U/L (ref 2–50)
ANION GAP SERPL CALC-SCNC: 6 MMOL/L (ref 0–11.9)
ANION GAP SERPL CALC-SCNC: 7 MMOL/L (ref 0–11.9)
ANION GAP SERPL CALC-SCNC: 8 MMOL/L (ref 0–11.9)
ANION GAP SERPL CALC-SCNC: 8 MMOL/L (ref 0–11.9)
AST SERPL-CCNC: 125 U/L (ref 12–45)
BACTERIA BLD CULT: NORMAL
BACTERIA BLD CULT: NORMAL
BASE EXCESS BLDA CALC-SCNC: 0 MMOL/L (ref -4–3)
BASOPHILS # BLD AUTO: 0 % (ref 0–1.8)
BASOPHILS # BLD: 0 K/UL (ref 0–0.12)
BILIRUB SERPL-MCNC: 0.5 MG/DL (ref 0.1–1.5)
BODY TEMPERATURE: ABNORMAL DEGREES
BUN SERPL-MCNC: 21 MG/DL (ref 8–22)
BUN SERPL-MCNC: 21 MG/DL (ref 8–22)
BUN SERPL-MCNC: 23 MG/DL (ref 8–22)
BUN SERPL-MCNC: 24 MG/DL (ref 8–22)
CALCIUM SERPL-MCNC: 8.1 MG/DL (ref 8.5–10.5)
CALCIUM SERPL-MCNC: 8.1 MG/DL (ref 8.5–10.5)
CALCIUM SERPL-MCNC: 8.6 MG/DL (ref 8.5–10.5)
CALCIUM SERPL-MCNC: 8.7 MG/DL (ref 8.5–10.5)
CHLORIDE SERPL-SCNC: 110 MMOL/L (ref 96–112)
CHLORIDE SERPL-SCNC: 112 MMOL/L (ref 96–112)
CHLORIDE SERPL-SCNC: 113 MMOL/L (ref 96–112)
CHLORIDE SERPL-SCNC: 113 MMOL/L (ref 96–112)
CO2 BLDA-SCNC: 25 MMOL/L (ref 20–33)
CO2 SERPL-SCNC: 25 MMOL/L (ref 20–33)
CO2 SERPL-SCNC: 25 MMOL/L (ref 20–33)
CO2 SERPL-SCNC: 26 MMOL/L (ref 20–33)
CO2 SERPL-SCNC: 26 MMOL/L (ref 20–33)
CREAT SERPL-MCNC: 0.44 MG/DL (ref 0.5–1.4)
CREAT SERPL-MCNC: 0.49 MG/DL (ref 0.5–1.4)
CREAT SERPL-MCNC: 0.5 MG/DL (ref 0.5–1.4)
CREAT SERPL-MCNC: 0.52 MG/DL (ref 0.5–1.4)
EOSINOPHIL # BLD AUTO: 0 K/UL (ref 0–0.51)
EOSINOPHIL NFR BLD: 0 % (ref 0–6.9)
ERYTHROCYTE [DISTWIDTH] IN BLOOD BY AUTOMATED COUNT: 47.1 FL (ref 35.9–50)
GLOBULIN SER CALC-MCNC: 2.6 G/DL (ref 1.9–3.5)
GLUCOSE SERPL-MCNC: 117 MG/DL (ref 65–99)
GLUCOSE SERPL-MCNC: 118 MG/DL (ref 65–99)
GLUCOSE SERPL-MCNC: 127 MG/DL (ref 65–99)
GLUCOSE SERPL-MCNC: 132 MG/DL (ref 65–99)
HCO3 BLDA-SCNC: 23.6 MMOL/L (ref 17–25)
HCT VFR BLD AUTO: 27.2 % (ref 42–52)
HGB BLD-MCNC: 8.2 G/DL (ref 14–18)
HOROWITZ INDEX BLDA+IHG-RTO: 177 MM[HG]
INST. QUALIFIED PATIENT IIQPT: YES
LYMPHOCYTES # BLD AUTO: 2.23 K/UL (ref 1–4.8)
LYMPHOCYTES NFR BLD: 11.3 % (ref 22–41)
MANUAL DIFF BLD: NORMAL
MCH RBC QN AUTO: 30 PG (ref 27–33)
MCHC RBC AUTO-ENTMCNC: 30.1 G/DL (ref 33.7–35.3)
MCV RBC AUTO: 99.6 FL (ref 81.4–97.8)
METAMYELOCYTES NFR BLD MANUAL: 0.9 %
MONOCYTES # BLD AUTO: 0.85 K/UL (ref 0–0.85)
MONOCYTES NFR BLD AUTO: 4.3 % (ref 0–13.4)
MORPHOLOGY BLD-IMP: NORMAL
MYELOCYTES NFR BLD MANUAL: 7 %
NEUTROPHILS # BLD AUTO: 15.07 K/UL (ref 1.82–7.42)
NEUTROPHILS NFR BLD: 71.3 % (ref 44–72)
NEUTS BAND NFR BLD MANUAL: 5.2 % (ref 0–10)
NRBC # BLD AUTO: 0.02 K/UL
NRBC BLD-RTO: 0.1 /100 WBC
O2/TOTAL GAS SETTING VFR VENT: 30 %
PCO2 BLDA: 33.7 MMHG (ref 26–37)
PCO2 TEMP ADJ BLDA: 33.3 MMHG (ref 26–37)
PH BLDA: 7.45 [PH] (ref 7.4–7.5)
PH TEMP ADJ BLDA: 7.46 [PH] (ref 7.4–7.5)
PHOSPHATE SERPL-MCNC: 3.5 MG/DL (ref 2.5–4.5)
PLATELET # BLD AUTO: 543 K/UL (ref 164–446)
PLATELET BLD QL SMEAR: NORMAL
PMV BLD AUTO: 10.3 FL (ref 9–12.9)
PO2 BLDA: 53 MMHG (ref 64–87)
PO2 TEMP ADJ BLDA: 52 MMHG (ref 64–87)
POTASSIUM SERPL-SCNC: 3.5 MMOL/L (ref 3.6–5.5)
POTASSIUM SERPL-SCNC: 3.5 MMOL/L (ref 3.6–5.5)
POTASSIUM SERPL-SCNC: 3.8 MMOL/L (ref 3.6–5.5)
POTASSIUM SERPL-SCNC: 3.8 MMOL/L (ref 3.6–5.5)
PREALB SERPL-MCNC: 17 MG/DL (ref 18–38)
PROT SERPL-MCNC: 5.7 G/DL (ref 6–8.2)
RBC # BLD AUTO: 2.73 M/UL (ref 4.7–6.1)
RBC BLD AUTO: NORMAL
SAO2 % BLDA: 89 % (ref 93–99)
SIGNIFICANT IND 70042: NORMAL
SIGNIFICANT IND 70042: NORMAL
SITE SITE: NORMAL
SITE SITE: NORMAL
SODIUM SERPL-SCNC: 143 MMOL/L (ref 135–145)
SODIUM SERPL-SCNC: 144 MMOL/L (ref 135–145)
SODIUM SERPL-SCNC: 146 MMOL/L (ref 135–145)
SODIUM SERPL-SCNC: 146 MMOL/L (ref 135–145)
SOURCE SOURCE: NORMAL
SOURCE SOURCE: NORMAL
SPECIMEN DRAWN FROM PATIENT: ABNORMAL
WBC # BLD AUTO: 19.7 K/UL (ref 4.8–10.8)

## 2019-02-10 PROCEDURE — 82803 BLOOD GASES ANY COMBINATION: CPT

## 2019-02-10 PROCEDURE — 85007 BL SMEAR W/DIFF WBC COUNT: CPT

## 2019-02-10 PROCEDURE — 700105 HCHG RX REV CODE 258: Performed by: SURGERY

## 2019-02-10 PROCEDURE — 80048 BASIC METABOLIC PNL TOTAL CA: CPT | Mod: 91

## 2019-02-10 PROCEDURE — 99291 CRITICAL CARE FIRST HOUR: CPT | Performed by: SURGERY

## 2019-02-10 PROCEDURE — 700111 HCHG RX REV CODE 636 W/ 250 OVERRIDE (IP): Performed by: PHYSICIAN ASSISTANT

## 2019-02-10 PROCEDURE — A9270 NON-COVERED ITEM OR SERVICE: HCPCS | Performed by: SURGERY

## 2019-02-10 PROCEDURE — 700102 HCHG RX REV CODE 250 W/ 637 OVERRIDE(OP): Performed by: SURGERY

## 2019-02-10 PROCEDURE — 71045 X-RAY EXAM CHEST 1 VIEW: CPT

## 2019-02-10 PROCEDURE — 94640 AIRWAY INHALATION TREATMENT: CPT

## 2019-02-10 PROCEDURE — 700111 HCHG RX REV CODE 636 W/ 250 OVERRIDE (IP): Performed by: SURGERY

## 2019-02-10 PROCEDURE — 70450 CT HEAD/BRAIN W/O DYE: CPT

## 2019-02-10 PROCEDURE — 302196 LINEN, HYPOALLERGENIC: Performed by: SURGERY

## 2019-02-10 PROCEDURE — 85027 COMPLETE CBC AUTOMATED: CPT

## 2019-02-10 PROCEDURE — 94003 VENT MGMT INPAT SUBQ DAY: CPT

## 2019-02-10 PROCEDURE — 83735 ASSAY OF MAGNESIUM: CPT

## 2019-02-10 PROCEDURE — 36600 WITHDRAWAL OF ARTERIAL BLOOD: CPT

## 2019-02-10 PROCEDURE — 84100 ASSAY OF PHOSPHORUS: CPT | Mod: 91

## 2019-02-10 PROCEDURE — 80053 COMPREHEN METABOLIC PANEL: CPT

## 2019-02-10 PROCEDURE — 770022 HCHG ROOM/CARE - ICU (200)

## 2019-02-10 RX ADMIN — CEFTRIAXONE SODIUM 2 G: 2 INJECTION, POWDER, FOR SOLUTION INTRAMUSCULAR; INTRAVENOUS at 05:16

## 2019-02-10 RX ADMIN — LEVETIRACETAM 500 MG: 500 TABLET, FILM COATED ORAL at 05:16

## 2019-02-10 RX ADMIN — SODIUM CHLORIDE TAB 1 GM 2 G: 1 TAB at 09:13

## 2019-02-10 RX ADMIN — SODIUM CHLORIDE TAB 1 GM 2 G: 1 TAB at 21:29

## 2019-02-10 RX ADMIN — QUETIAPINE FUMARATE 50 MG: 25 TABLET ORAL at 17:17

## 2019-02-10 RX ADMIN — SODIUM CHLORIDE TAB 1 GM 2 G: 1 TAB at 17:16

## 2019-02-10 RX ADMIN — LEVETIRACETAM 500 MG: 500 TABLET, FILM COATED ORAL at 17:17

## 2019-02-10 RX ADMIN — ENOXAPARIN SODIUM 40 MG: 100 INJECTION SUBCUTANEOUS at 05:16

## 2019-02-10 RX ADMIN — FAMOTIDINE 20 MG: 20 TABLET ORAL at 05:15

## 2019-02-10 RX ADMIN — METHADONE HYDROCHLORIDE 5 MG: 5 SOLUTION ORAL at 21:28

## 2019-02-10 RX ADMIN — MINERAL OIL AND WHITE PETROLATUM 1 APPLICATION: 150; 830 OINTMENT OPHTHALMIC at 14:34

## 2019-02-10 RX ADMIN — FAMOTIDINE 20 MG: 20 TABLET ORAL at 17:17

## 2019-02-10 RX ADMIN — PROPRANOLOL HYDROCHLORIDE 10 MG: 10 TABLET ORAL at 14:34

## 2019-02-10 RX ADMIN — QUETIAPINE FUMARATE 50 MG: 25 TABLET ORAL at 05:15

## 2019-02-10 RX ADMIN — MINERAL OIL AND WHITE PETROLATUM 1 APPLICATION: 150; 830 OINTMENT OPHTHALMIC at 21:29

## 2019-02-10 RX ADMIN — SODIUM CHLORIDE 500 ML: 3 INJECTION, SOLUTION INTRAVENOUS at 03:31

## 2019-02-10 RX ADMIN — FLUDROCORTISONE ACETATE 0.1 MG: 0.1 TABLET ORAL at 05:15

## 2019-02-10 RX ADMIN — SODIUM CHLORIDE TAB 1 GM 2 G: 1 TAB at 12:04

## 2019-02-10 RX ADMIN — MINERAL OIL AND WHITE PETROLATUM 1 APPLICATION: 150; 830 OINTMENT OPHTHALMIC at 05:15

## 2019-02-10 RX ADMIN — PROPRANOLOL HYDROCHLORIDE 10 MG: 10 TABLET ORAL at 05:15

## 2019-02-10 RX ADMIN — QUETIAPINE FUMARATE 50 MG: 25 TABLET ORAL at 12:04

## 2019-02-10 RX ADMIN — IRON SUCROSE 200 MG: 20 INJECTION, SOLUTION INTRAVENOUS at 05:15

## 2019-02-10 RX ADMIN — METHADONE HYDROCHLORIDE 5 MG: 5 SOLUTION ORAL at 05:15

## 2019-02-10 RX ADMIN — PROPRANOLOL HYDROCHLORIDE 10 MG: 10 TABLET ORAL at 21:28

## 2019-02-10 RX ADMIN — METHADONE HYDROCHLORIDE 5 MG: 5 SOLUTION ORAL at 14:34

## 2019-02-10 NOTE — PROGRESS NOTES
2 RN skin check     No visual wound on left post ankle like charted   Appropriate interventions in place   Unable to place mepilex due to pt's moisture (pt very diaphoretic)

## 2019-02-10 NOTE — NON-PROVIDER
Patient Summary:   This is a 29 y/o male who was admitted 1/26/19 secondary to a skiing accident whereby he presented with GCS of 3, was intubated, and sustained an acute subdural hematoma whereby he underwent left  Craniectomy (POD#15) and ICP placement.     In addition he had, per imaging, bilateral consolidation due to aspiration pneumonitis or atelectasis whereby percutaneous tracheostomy was carried out in the SICU under bronchoscopic guidance (2/3/19). Cultures grew back Klebsiella for which the patient is being provided rosephin.     Patient has been progressing well since admission.     24 Hour Events:    Patient remained afebrile although WBCs have increased. Will continue to  Monitor. Electrolytes have been replenished as needed. Patient also was tolerating spontaneous breathing. Respiratory therapy will employ TP every few hours to facilitate breathing process. Patient is also on salt tablets and CT has continued to remain stable. Will d/c 3% hypertonic saline.     SUBJECTIVE/OBJECTIVE:  NEURO:  GCS 10  24hr: no significant changes   Current: CT stable; see exam   Exam Purposeful L sided movement, spontaneous movement of other extremities; flexes on rt to pain, disconjugate gaze/no tracking. PERRLA   Meds/Pain Keppra, methadone/oxycodone. Weaning off fentanyl drip. Seroquil.     Labs N/A   Imaging CT 2/10/19: persistent herniation of brain through left craniotomy defect--no midline shift; disappearance of prior left post parafalcine subdural hemorrhage; no new hemorrhage      RESP:  RR, SpO2 15, 100%   Vent Weaning off; patient tolerating spont well   Exam Crackles bilaterally    Meds rosephin secondary to Klebsiella growth    Labs WBC: 19.7; will monitor   Imaging Chest Xray 2/10/19: interstitial opacities persist bilaterally but with improvement; tracheostomy and rt central venous line in place; improving pulmonary edema      CV:  HR/BP/MAP/  CVP 79 bpm; 112/68   Exam Regular rate and rhythm   Meds  Clonidine prn; no home meds    Labs 2/10/19: RBC 2.73, Hgb 8.2, Hct 27.2   Imaging Chest Xray 2/10/19:  Normal cardiac silhouette      GI:  Diet/Bowel Function Tube feeding; bowel regimen   Exam Bowel sounds present    Labs 2/10/19: Na 146, K:3.8, Cl: 113, BUN: 23, Cr: 0.49, AST: 124, ALT:222, Albumin 3.1, Total protein: 5.7   Imaging NA     F/E/N-:  I/O  24hr totals: -544.67ml   Intake: 1980.33ml   Output: 2525ml                                             Exam Normal genitalia, no edema or erythema    Meds NA   Labs 2/10/19: Na 146, K:3.8, Cl: 113, BUN: 23, Cr: 0.49, AST: 124, ALT:222, Albumin 3.1, Total protein: 5.7      Nutrition Tube feeding      HEME:  Labs 2/10/19: RBC 2.73, Hgb 8.2, Hct 27.2, WBC 19.7, Abs Neutrophils 15.07   Transfusions NA   Imaging NA     ID:  Temp  24hr:   Tmax:   Labs    Micro Klebsiella growth    ABX Rosephin, 2g q24 IV      ENDOCRINE:  Blood Sugar 121   Meds NA     MUSCULOSKELETAL:  Imaging NA   WB Status NA      SKIN:  Exam Rash on trunk area   Interventions Moving patient's position on bed     ASSESSMENT/PLAN:  NEURO:  Patient progressing well. GCS at 10 with purposeful L movements and spontaneous movements of other extremities. CT stable and improving. Will continue to monitor until neurosurgery approves transfer to the main floor.     Will continue keppra prophylaxis per neuro. Will continue wean off from fentanyl and manage pain with methadone/oxycodone, seroquil at night.     Due to stable CT, will d/c hypertonic saline and utilize salt tablets.     RESP/ID:     Patient on tracheostomy and vent but tolerating spontaneous breathing. Currently will continue TP to facilitate improvement. After 24 hours without ventilator, will transfer patient to the main floor.     Chest xray shows improving but persistent bilateral interstitial opacities. Cultures had previously grown Klebsiella and patient is currently on rosephin abx therapy; however, WBCs have increased to 19.7. Will  continue to monitor before considering adjustment to medication.         PROPHYLAXIS:  DVT  yes; lovenox   GI Tube feeding; pepcid   Restraints Yes; nonviolent restraints      LINES/TUBES/CATHETERS: tracheostomy and central line in place; urethral catheter in place. Peripheral IV line in place, currently infusing NS 30ml/hr.

## 2019-02-10 NOTE — PROGRESS NOTES
0300 Pt to CT on monitor with ACLS RN, CCT, and RT. VSS.    0320 pt back to unit from CT with ACLS RN, CCT, and RT. VSS

## 2019-02-10 NOTE — PROGRESS NOTES
Neurosurgery Progress Note    Subjective:    Purposeful left side  Flexes on the right to pain  Disconjugate gaze  PERRL    Exam:  As above    BP  Min: 123/62  Max: 123/62  Pulse  Av.5  Min: 74  Max: 94  Resp  Avg: 15.9  Min: 7  Max: 24  Temp  Av °C (98.6 °F)  Min: 36.7 °C (98 °F)  Max: 37.2 °C (99 °F)  Monitored Temp 2  Av.9 °C (98.4 °F)  Min: 36.6 °C (97.9 °F)  Max: 37 °C (98.6 °F)  SpO2  Av.9 %  Min: 97 %  Max: 100 %    No Data Recorded    Recent Labs      19   0600  19   0520  02/10/19   0500   WBC  13.8*  14.7*  19.7*   RBC  2.53*  2.51*  2.73*   HEMOGLOBIN  7.7*  7.7*  8.2*   HEMATOCRIT  25.2*  24.4*  27.2*   MCV  99.6*  97.2  99.6*   MCH  30.4  30.7  30.0   MCHC  30.6*  31.6*  30.1*   RDW  51.8*  47.2  47.1   PLATELETCT  431  448*  543*   MPV  10.8  10.3  10.3     Recent Labs      19   1730  19   2340  02/10/19   0500   SODIUM  145  144  146*   POTASSIUM  3.4*  3.8  3.8   CHLORIDE  114*  112  113*   CO2  27  26  25   GLUCOSE  137*  132*  127*   BUN  24*  24*  23*   CREATININE  0.46*  0.52  0.49*   CALCIUM  8.8  8.1*  8.7               Intake/Output       19 - 02/10/19 0659 02/10/19 0700 - 1959       Total  Total       Intake    I.V.  291.8  360.5 652.3  60  -- 60    Fentanyl Volume 5.5 0.5 6 -- -- --    Volume (mL) (3% sodium chloride (HYPERTONIC SALINE) 500mL infusion 500 mL) 145.3 -- 145.3 -- -- --    Volume (mL) (3% sodium chloride (HYPERTONIC SALINE) 500mL infusion 500 mL) 141 360 501 60 -- 60    Other  --  -- --  30  -- 30    Medications (PO/Enteral Liquids) -- -- -- 30 -- 30    NG/GT  720  720 1440  120  -- 120    Intake (mL) (Enteral Tube 19 Cortrak - Gastric Left nare)  120 -- 120    Total Intake 1011.8 1080.5 2092.3 210 -- 210       Output    Urine  1275  1475 2750  250  -- 250    Output (mL) (Urethral Catheter 18 Fr.) 1275 1475 2750 250 -- 250    Stool  --  -- --  --  -- --     Number of Times Stooled 2 x -- 2 x 0 x -- 0 x    Total Output 1275 1475 2750 250 -- 250       Net I/O     -263.2 -394.5 -657.7 -40 -- -40            Intake/Output Summary (Last 24 hours) at 02/10/19 1008  Last data filed at 02/10/19 0800   Gross per 24 hour   Intake          1980.33 ml   Output             2525 ml   Net          -544.67 ml            • methadone  5 mg Q8HRS   • fludrocortisone  0.1 mg QAM   • 3% sodium chloride  500 mL Continuous   • sodium chloride  2 g 4X/DAY   • levETIRAcetam  500 mg BID   • QUEtiapine  50 mg TID   • cefTRIAXone (ROCEPHIN) IV  2 g Q24HRS   • famotidine  20 mg BID   • artificial tear ointment  1 Application Q8HRS   • midazolam  2 mg Q HOUR PRN   • fentaNYL   Continuous   • acetaminophen  650 mg Q4HRS PRN   • propranolol  10 mg Q8HRS   • senna-docusate  2 Tab DAILY AT NOON   • polyethylene glycol/lytes  1 Packet DAILY AT NOON   • enoxaparin (LOVENOX) injection  40 mg DAILY   • morphine injection  2 mg Q HOUR PRN    Or   • morphine injection  4 mg Q HOUR PRN   • Pharmacy  1 Each PHARMACY TO DOSE   • cloNIDine  0.1 mg Q4HRS PRN   • magnesium hydroxide  30 mL QDAY PRN   • oxyCODONE immediate-release  2.5 mg Q3HRS PRN   • oxyCODONE immediate-release  5 mg Q3HRS PRN   • polyethylene glycol/lytes  1 Packet BID PRN   • senna-docusate  1 Tab Q24HRS PRN   • Respiratory Care per Protocol   Continuous RT   • NS   Continuous   • dextrose 50%  25 mL Q15 MIN PRN   • propofol  0-80 mcg/kg/min Continuous   • Pharmacy Consult Request  1 Each PHARMACY TO DOSE   • MD ALERT...DO NOT ADMINISTER NSAIDS or ASPIRIN unless ORDERED By Neurosurgery  1 Each PRN   • ondansetron  4 mg Q4HRS PRN   • hydrALAZINE  10 mg Q HOUR PRN   • bisacodyl  10 mg Q24HRS PRN   • fleet  1 Each Once PRN   • docusate sodium 100mg/10mL  100 mg BID       Assessment and Plan:  POD#15 Left Craniectomy  Okay for Lovenox  D/c 3%- flap soft, exam stable, 2 weeks out- cont q6h Na for now   Hold full anticoagulation, cleared for  prophylaxis  Repeat CT Monday

## 2019-02-10 NOTE — PROGRESS NOTES
Dr. Cartwright at the bedside.  Order received to stop 3% drip at noon, but continue with Q6hrs BMP.  OK to mobilize the patient with helmet.

## 2019-02-10 NOTE — CARE PLAN
Problem: Ventilation Defect:  Goal: Ability to achieve and maintain unassisted ventilation or tolerate decreased levels of ventilator support    Intervention: Monitor ventilator weaning response  Adult Ventilation Update    Total Vent Days: 16  Trache day: 8    ASV  120  +8  30%    Patient Lines/Drains/Airways Status    Active Airway     Name: Placement date: Placement time: Site: Days:    Airway Trach Tracheostomy 8.0 02/03/19   1015   Tracheostomy   7              Static Compliance (ml / cm H2O): 54.4 (02/10/19 0632)    Cough: Productive (02/10/19 1200)  Sputum Amount: Small (02/10/19 1345)  Sputum Color: White;Tan (02/10/19 1345)  Sputum Consistency: Thin;Thick (02/10/19 1345)    Events/Summary/Plan: Pt did 4 hours t-piece aerosol on 10 L/min, 40% fio2.

## 2019-02-10 NOTE — CARE PLAN
Problem: Ventilation Defect:  Goal: Ability to achieve and maintain unassisted ventilation or tolerate decreased levels of ventilator support    Intervention: Monitor ventilator weaning response  Adult Ventilation Update    Total Vent Days: 15  Trache day: 7      Patient Lines/Drains/Airways Status    Active Airway     Name: Placement date: Placement time: Site: Days:    Airway Trach Tracheostomy 8.0 02/03/19   1015   Tracheostomy   6                Static Compliance (ml / cm H2O): 51.5 (02/09/19 1417)    Cough: Productive (02/09/19 1600)  Sputum Amount: Moderate (02/09/19 1600)  Sputum Color: Pink Tinged;White;Tan (02/09/19 1600)  Sputum Consistency: Thin;Thick (02/09/19 1600)      Events/Summary/Plan: Pt off APRV, pt tolerating SBTs on PS 5-10, peep 8, will continue to monitor

## 2019-02-10 NOTE — CARE PLAN
Problem: Ventilation Defect:  Goal: Ability to achieve and maintain unassisted ventilation or tolerate decreased levels of ventilator support    Intervention: Support and monitor invasive and noninvasive mechanical ventilation  Adult Ventilation Update    Total Vent Days: 16    Patient Lines/Drains/Airways Status    Active Airway     Name: Placement date: Placement time: Site: Days:    Airway Trach Tracheostomy 8.0 02/03/19   1015   Tracheostomy   8              In the last 24 hours, the patient tolerated Spont for the last 21 hours & counting.  .    Cough: Productive (02/10/19 0235)  Sputum Amount: Small (02/10/19 0235)  Sputum Color: White;Tan (02/10/19 0235)  Sputum Consistency: Thin;Thick (02/10/19 0235)    Mobility  Level of Mobility: Level III (02/09/19 2000)  Activity Performed: Unable to mobilize (02/09/19 2000)  Reason Not Mobilized: Bed rest;Unstable condition (02/09/19 2000)  Mobilization Comments:  (no helmet for craniectomy) (02/09/19 2000)    Events/Summary/Plan: pt tolerating spont well.

## 2019-02-10 NOTE — PROGRESS NOTES
2 RN skin check complete. No new areas of concern at this time. Craniectomy incision CDI, rash noted to trunk area. Pt turned q2h, pillows in use for support and positioning, heel float boots in place.

## 2019-02-10 NOTE — FLOWSHEET NOTE
02/10/19 1500   Vent Clock   Vent Initiated Yes  (t-piece trial end)   Events/Summary/Plan   Events/Summary/Plan t-piece end, returned to vent, placed on ASV   General Vent Information   Pulse Oximetry 96 %   Heart Rate (Monitored) 88   Trache Weaning   Placed on T-Piece/Collar and Stopped Vent Clock Yes   Length of T-Piece Trial (Hours) 4   T-Piece Trial Smart Text Completed? Yes

## 2019-02-10 NOTE — CARE PLAN
Problem: Pain Management  Goal: Pain level will decrease to patient's comfort goal    Intervention: Follow pain managment plan developed in collaboration with patient and Interdisciplinary Team  CPOT scale used to ensure adequate pain control.      Problem: Skin Integrity  Goal: Risk for impaired skin integrity will decrease    Intervention: Assess risk factors for impaired skin integrity and/or pressure ulcers  Patient turned every 2 hrs and pressure points floating using pillows.

## 2019-02-10 NOTE — PROGRESS NOTES
Trauma / Surgical Daily Progress Note    Date of Service  2/10/2019    Chief Complaint  28 y.o. male admitted 1/26/2019 with SDH     Interval Events  Critically ill. Neuro improving with some following. DC 3% gtt. Starting T piece trials. Hemodynamically appropriate. Tolerating TF. On abx for klebsiella PNA. WBC up but no clear etiology. Will follow.       Review of Systems  Review of Systems   Unable to perform ROS: Intubated        Vital Signs for last 24 hours  Temp:  [36.7 °C (98 °F)-37.2 °C (99 °F)] 36.7 °C (98 °F)  Pulse:  [74-94] 87  Resp:  [7-24] 16  BP: (123)/(62) 123/62  SpO2:  [92 %-100 %] 92 %    Hemodynamic parameters for last 24 hours       Respiratory Data     Respiration: 16, Pulse Oximetry: 92 %     Work Of Breathing / Effort: Vented  RUL Breath Sounds: Crackles, RML Breath Sounds: Diminished, RLL Breath Sounds: Diminished, STACI Breath Sounds: Crackles, LLL Breath Sounds: Diminished    Physical Exam  Physical Exam   Constitutional: He appears well-developed.   HENT:   Head: Normocephalic.   Left bone flap out   Eyes: Pupils are equal, round, and reactive to light.   Dysconjugate   Neck:   Trach in place   Cardiovascular: Normal rate.    Pulmonary/Chest: Effort normal. He exhibits no tenderness.   Abdominal: Soft. He exhibits no distension. There is no tenderness.   Genitourinary:   Genitourinary Comments: Greer to gravity   Musculoskeletal:   Following commands with L hand   Neurological: He is alert.   Skin: Skin is warm.       Laboratory  Recent Results (from the past 24 hour(s))   Basic Metabolic Panel    Collection Time: 02/09/19 11:40 AM   Result Value Ref Range    Sodium 145 135 - 145 mmol/L    Potassium 3.4 (L) 3.6 - 5.5 mmol/L    Chloride 113 (H) 96 - 112 mmol/L    Co2 26 20 - 33 mmol/L    Glucose 126 (H) 65 - 99 mg/dL    Bun 24 (H) 8 - 22 mg/dL    Creatinine 0.44 (L) 0.50 - 1.40 mg/dL    Calcium 7.9 (L) 8.5 - 10.5 mg/dL    Anion Gap 6.0 0.0 - 11.9   ESTIMATED GFR    Collection Time: 02/09/19  11:40 AM   Result Value Ref Range    GFR If African American >60 >60 mL/min/1.73 m 2    GFR If Non African American >60 >60 mL/min/1.73 m 2   Basic Metabolic Panel    Collection Time: 02/09/19  5:30 PM   Result Value Ref Range    Sodium 145 135 - 145 mmol/L    Potassium 3.4 (L) 3.6 - 5.5 mmol/L    Chloride 114 (H) 96 - 112 mmol/L    Co2 27 20 - 33 mmol/L    Glucose 137 (H) 65 - 99 mg/dL    Bun 24 (H) 8 - 22 mg/dL    Creatinine 0.46 (L) 0.50 - 1.40 mg/dL    Calcium 8.8 8.5 - 10.5 mg/dL    Anion Gap 4.0 0.0 - 11.9   ESTIMATED GFR    Collection Time: 02/09/19  5:30 PM   Result Value Ref Range    GFR If African American >60 >60 mL/min/1.73 m 2    GFR If Non African American >60 >60 mL/min/1.73 m 2   Basic Metabolic Panel    Collection Time: 02/09/19 11:40 PM   Result Value Ref Range    Sodium 144 135 - 145 mmol/L    Potassium 3.8 3.6 - 5.5 mmol/L    Chloride 112 96 - 112 mmol/L    Co2 26 20 - 33 mmol/L    Glucose 132 (H) 65 - 99 mg/dL    Bun 24 (H) 8 - 22 mg/dL    Creatinine 0.52 0.50 - 1.40 mg/dL    Calcium 8.1 (L) 8.5 - 10.5 mg/dL    Anion Gap 6.0 0.0 - 11.9   ESTIMATED GFR    Collection Time: 02/09/19 11:40 PM   Result Value Ref Range    GFR If African American >60 >60 mL/min/1.73 m 2    GFR If Non African American >60 >60 mL/min/1.73 m 2   CBC WITH DIFFERENTIAL    Collection Time: 02/10/19  5:00 AM   Result Value Ref Range    WBC 19.7 (H) 4.8 - 10.8 K/uL    RBC 2.73 (L) 4.70 - 6.10 M/uL    Hemoglobin 8.2 (L) 14.0 - 18.0 g/dL    Hematocrit 27.2 (L) 42.0 - 52.0 %    MCV 99.6 (H) 81.4 - 97.8 fL    MCH 30.0 27.0 - 33.0 pg    MCHC 30.1 (L) 33.7 - 35.3 g/dL    RDW 47.1 35.9 - 50.0 fL    Platelet Count 543 (H) 164 - 446 K/uL    MPV 10.3 9.0 - 12.9 fL    Neutrophils-Polys 71.30 44.00 - 72.00 %    Lymphocytes 11.30 (L) 22.00 - 41.00 %    Monocytes 4.30 0.00 - 13.40 %    Eosinophils 0.00 0.00 - 6.90 %    Basophils 0.00 0.00 - 1.80 %    Nucleated RBC 0.10 /100 WBC    Neutrophils (Absolute) 15.07 (H) 1.82 - 7.42 K/uL    Lymphs  (Absolute) 2.23 1.00 - 4.80 K/uL    Monos (Absolute) 0.85 0.00 - 0.85 K/uL    Eos (Absolute) 0.00 0.00 - 0.51 K/uL    Baso (Absolute) 0.00 0.00 - 0.12 K/uL    NRBC (Absolute) 0.02 K/uL   COMP METABOLIC PANEL    Collection Time: 02/10/19  5:00 AM   Result Value Ref Range    Sodium 146 (H) 135 - 145 mmol/L    Potassium 3.8 3.6 - 5.5 mmol/L    Chloride 113 (H) 96 - 112 mmol/L    Co2 25 20 - 33 mmol/L    Anion Gap 8.0 0.0 - 11.9    Glucose 127 (H) 65 - 99 mg/dL    Bun 23 (H) 8 - 22 mg/dL    Creatinine 0.49 (L) 0.50 - 1.40 mg/dL    Calcium 8.7 8.5 - 10.5 mg/dL    AST(SGOT) 125 (H) 12 - 45 U/L    ALT(SGPT) 222 (H) 2 - 50 U/L    Alkaline Phosphatase 63 30 - 99 U/L    Total Bilirubin 0.5 0.1 - 1.5 mg/dL    Albumin 3.1 (L) 3.2 - 4.9 g/dL    Total Protein 5.7 (L) 6.0 - 8.2 g/dL    Globulin 2.6 1.9 - 3.5 g/dL    A-G Ratio 1.2 g/dL   PHOSPHORUS    Collection Time: 02/10/19  5:00 AM   Result Value Ref Range    Phosphorus 3.5 2.5 - 4.5 mg/dL   ESTIMATED GFR    Collection Time: 02/10/19  5:00 AM   Result Value Ref Range    GFR If African American >60 >60 mL/min/1.73 m 2    GFR If Non African American >60 >60 mL/min/1.73 m 2   DIFFERENTIAL MANUAL    Collection Time: 02/10/19  5:00 AM   Result Value Ref Range    Bands-Stabs 5.20 0.00 - 10.00 %    Metamyelocytes 0.90 %    Myelocytes 7.00 %    Manual Diff Status PERFORMED    PERIPHERAL SMEAR REVIEW    Collection Time: 02/10/19  5:00 AM   Result Value Ref Range    Peripheral Smear Review see below    PLATELET ESTIMATE    Collection Time: 02/10/19  5:00 AM   Result Value Ref Range    Plt Estimation Increased    MORPHOLOGY    Collection Time: 02/10/19  5:00 AM   Result Value Ref Range    RBC Morphology Normal    ISTAT ARTERIAL BLOOD GAS    Collection Time: 02/10/19  5:11 AM   Result Value Ref Range    Ph 7.453 7.400 - 7.500    Pco2 33.7 26.0 - 37.0 mmHg    Po2 53 (L) 64 - 87 mmHg    Tco2 25 20 - 33 mmol/L    S02 89 (L) 93 - 99 %    Hco3 23.6 17.0 - 25.0 mmol/L    BE 0 -4 - 3 mmol/L     Body Temp 98.1 F degrees    O2 Therapy 30 %    iPF Ratio 177     Ph Temp Sri 7.457 7.400 - 7.500    Pco2 Temp Co 33.3 26.0 - 37.0 mmHg    Po2 Temp Cor 52 (L) 64 - 87 mmHg    Specimen Arterial     Action Range Triggered NO     Inst. Qualified Patient YES        Fluids    Intake/Output Summary (Last 24 hours) at 02/10/19 1134  Last data filed at 02/10/19 1000   Gross per 24 hour   Intake          2160.33 ml   Output             2875 ml   Net          -714.67 ml       Core Measures & Quality Metrics  Labs reviewed, Medications reviewed and Radiology images reviewed  Greer catheter: Critically Ill - Requiring Accurate Measurement of Urinary Output  Central line in place: Need for access    DVT Prophylaxis: Contraindicated - High bleeding risk  DVT prophylaxis - mechanical: SCDs  Ulcer prophylaxis: Yes  Antibiotics: Treating active infection/contamination beyond 24 hours perioperative coverage  Assessed for rehab: Patient unable to tolerate rehabilitation therapeutic regimen    Total Score: 0    ETOH Screening    Assessment/Plan  Pulmonary embolus, right (HCC)   Assessment & Plan    2/3  acute deterioration / inability to oxygenate precipitously 2 hours after tracheostomy.    Chest x-ray shows some increased infiltrate in the left lower lobe, patient refractory to bagging and increase PEEP, Flolan  2/5 - CTA shows proximal segmental and subsegmental pulmonary emboli in the right lower lobe. No RV strain.  Contraindication to systemic anticoagulation / DVT screen negative - IR for IVC filter     Leukocytosis   Assessment & Plan    2/5 - Multifocal pneumonia and new small bilateral pleural effusions.  Bronchoscopy with BAL and blood cultures for purulent secretions, fever, leukocytosis and chest x-ray infiltrate. Empiric linezolid and cefepime initiated.  2/7 - BAL - klebsiella. De-escalated to ceftriaxone monotherapy  2/10 - Antibiotic day 6 of 7; WBC increased - monitor     Acute respiratory failure following trauma and  surgery (HCC)- (present on admission)   Assessment & Plan    Intubated in field  Continue full mechanical ventilatory support.   Ventilator bundle  2/3  Perc trach / APRV mode  / flolan  2/6 - weaned flolan off  2/7 - started APRV wean  2/9 - Spont trials started  2/10 - T piece trials     Subdural hematoma (HCC)- (present on admission)   Assessment & Plan    Left sided holohemispheric subdural hematoma measuring approximately 9.4 mm in maximum thickness.   10 mm of left-to-right midline shift. There is probably mild left sided hemispheric edema.  1/26 To OR for emergent craniotomy and evacuation of hematoma.   1/28 CT x 2 stable  Continue Keppra  2/2  MRI brainstem OK  Improving GCS  Jazlyn Huntley MD. Neurosurgery.     Hypokalemia   Assessment & Plan    Replace and follow     Anemia associated with acute blood loss   Assessment & Plan    Critical anemia  2/6 - Iron studies low - replaced per protocol.  2/7 - Transfused 1 PRBC   Transfuse 1 unit PRBC if Hb < 7.0     Aspiration into airway- (present on admission)   Assessment & Plan    Admission imaging with bilateral, left greater than right medial dependent consolidation could be due to aspiration pneumonitis or atelectasis.  Aggressive pulmonary hygiene and serial chest radiography.     Contraindication to deep vein thrombosis (DVT) prophylaxis- (present on admission)   Assessment & Plan    Systemic anticoagulation contraindicated secondary to elevated bleeding risk.  1/27 - Trauma BLE duplex negative   1/29 - initiate lovenox   2/4 - Trauma BLE duplex negative   2/5 - CT shows PE - IVC filter placed     Oropharyngeal dysphagia   Assessment & Plan    Cortrak in place  On TF     Trauma- (present on admission)   Assessment & Plan    [place] Witnessed seizure like activity. GCS 3. No evidence of acute trauma.  Continuing kera  Trauma Red Activation.  Mor Roa MD, Trauma/General Surgery.         Discussed patient condition with Family, RN, RT  and Pharmacy. Dr Cartwright  CRITICAL CARE TIME EXCLUDING PROCEDURES: 36   minutes

## 2019-02-10 NOTE — PROGRESS NOTES
Helmet was ordered to [facility] ([phone]).  If any further assistance needed, please call extension [phone] or place order for Ortho Technician assistance as a communication order in [place].

## 2019-02-10 NOTE — CARE PLAN
Problem: Pain Management  Goal: Pain level will decrease to patient's comfort goal    Intervention: Follow pain managment plan developed in collaboration with patient and Interdisciplinary Team  Pain medication administered per MAR as needed       Problem: Respiratory:  Goal: Respiratory status will improve    Intervention: Assess and monitor pulmonary status   02/08/19 0238 02/10/19 0000   Vitals   Respiration --  (!) 22   Pulse Oximetry --  99 %   OTHER   Respiratory Pattern --  Vented   Work Of Breathing / Effort --  Vented   Pre/Post Intervention Pre Intervention Assessment --    RUL Breath Sounds --  Clear After Suction   RML Breath Sounds --  Clear After Suction   RLL Breath Sounds --  Diminished   STACI Breath Sounds --  Clear After Suction   LLL Breath Sounds --  Diminished   Respiratory   Cough --  Productive

## 2019-02-11 ENCOUNTER — APPOINTMENT (OUTPATIENT)
Dept: RADIOLOGY | Facility: MEDICAL CENTER | Age: 29
DRG: 003 | End: 2019-02-11
Attending: NURSE PRACTITIONER
Payer: COMMERCIAL

## 2019-02-11 PROBLEM — E87.6 HYPOKALEMIA: Status: RESOLVED | Noted: 2019-02-06 | Resolved: 2019-02-11

## 2019-02-11 LAB
ALBUMIN SERPL BCP-MCNC: 3.1 G/DL (ref 3.2–4.9)
ALBUMIN/GLOB SERPL: 1.1 G/DL
ALP SERPL-CCNC: 64 U/L (ref 30–99)
ALT SERPL-CCNC: 238 U/L (ref 2–50)
ANION GAP SERPL CALC-SCNC: 5 MMOL/L (ref 0–11.9)
ANION GAP SERPL CALC-SCNC: 6 MMOL/L (ref 0–11.9)
ANISOCYTOSIS BLD QL SMEAR: ABNORMAL
AST SERPL-CCNC: 110 U/L (ref 12–45)
BASOPHILS # BLD AUTO: 1.7 % (ref 0–1.8)
BASOPHILS # BLD: 0.29 K/UL (ref 0–0.12)
BILIRUB SERPL-MCNC: 0.4 MG/DL (ref 0.1–1.5)
BUN SERPL-MCNC: 21 MG/DL (ref 8–22)
BUN SERPL-MCNC: 22 MG/DL (ref 8–22)
CALCIUM SERPL-MCNC: 8.5 MG/DL (ref 8.5–10.5)
CALCIUM SERPL-MCNC: 8.6 MG/DL (ref 8.5–10.5)
CHLORIDE SERPL-SCNC: 110 MMOL/L (ref 96–112)
CHLORIDE SERPL-SCNC: 110 MMOL/L (ref 96–112)
CO2 SERPL-SCNC: 27 MMOL/L (ref 20–33)
CO2 SERPL-SCNC: 27 MMOL/L (ref 20–33)
CREAT SERPL-MCNC: 0.46 MG/DL (ref 0.5–1.4)
CREAT SERPL-MCNC: 0.48 MG/DL (ref 0.5–1.4)
CRP SERPL HS-MCNC: 25.38 MG/DL (ref 0–0.75)
EOSINOPHIL # BLD AUTO: 0.16 K/UL (ref 0–0.51)
EOSINOPHIL NFR BLD: 0.9 % (ref 0–6.9)
ERYTHROCYTE [DISTWIDTH] IN BLOOD BY AUTOMATED COUNT: 47.1 FL (ref 35.9–50)
GLOBULIN SER CALC-MCNC: 2.7 G/DL (ref 1.9–3.5)
GLUCOSE SERPL-MCNC: 117 MG/DL (ref 65–99)
GLUCOSE SERPL-MCNC: 119 MG/DL (ref 65–99)
HCT VFR BLD AUTO: 27 % (ref 42–52)
HGB BLD-MCNC: 8.3 G/DL (ref 14–18)
LYMPHOCYTES # BLD AUTO: 1.35 K/UL (ref 1–4.8)
LYMPHOCYTES NFR BLD: 7.8 % (ref 22–41)
MACROCYTES BLD QL SMEAR: ABNORMAL
MAGNESIUM SERPL-MCNC: 2.2 MG/DL (ref 1.5–2.5)
MANUAL DIFF BLD: NORMAL
MCH RBC QN AUTO: 30.5 PG (ref 27–33)
MCHC RBC AUTO-ENTMCNC: 30.7 G/DL (ref 33.7–35.3)
MCV RBC AUTO: 99.3 FL (ref 81.4–97.8)
METAMYELOCYTES NFR BLD MANUAL: 1.7 %
MONOCYTES # BLD AUTO: 0.61 K/UL (ref 0–0.85)
MONOCYTES NFR BLD AUTO: 3.5 % (ref 0–13.4)
MORPHOLOGY BLD-IMP: NORMAL
MYELOCYTES NFR BLD MANUAL: 4.4 %
NEUTROPHILS # BLD AUTO: 13.84 K/UL (ref 1.82–7.42)
NEUTROPHILS NFR BLD: 74.8 % (ref 44–72)
NEUTS BAND NFR BLD MANUAL: 5.2 % (ref 0–10)
NRBC # BLD AUTO: 0.02 K/UL
NRBC BLD-RTO: 0.1 /100 WBC
PHOSPHATE SERPL-MCNC: 3.5 MG/DL (ref 2.5–4.5)
PLATELET # BLD AUTO: 545 K/UL (ref 164–446)
PLATELET BLD QL SMEAR: NORMAL
PMV BLD AUTO: 10.1 FL (ref 9–12.9)
POLYCHROMASIA BLD QL SMEAR: NORMAL
POTASSIUM SERPL-SCNC: 3.4 MMOL/L (ref 3.6–5.5)
POTASSIUM SERPL-SCNC: 3.8 MMOL/L (ref 3.6–5.5)
PROT SERPL-MCNC: 5.8 G/DL (ref 6–8.2)
RBC # BLD AUTO: 2.72 M/UL (ref 4.7–6.1)
RBC BLD AUTO: PRESENT
SODIUM SERPL-SCNC: 142 MMOL/L (ref 135–145)
SODIUM SERPL-SCNC: 143 MMOL/L (ref 135–145)
WBC # BLD AUTO: 17.3 K/UL (ref 4.8–10.8)

## 2019-02-11 PROCEDURE — 700102 HCHG RX REV CODE 250 W/ 637 OVERRIDE(OP): Performed by: SURGERY

## 2019-02-11 PROCEDURE — A9270 NON-COVERED ITEM OR SERVICE: HCPCS | Performed by: SURGERY

## 2019-02-11 PROCEDURE — 306637 HCHG MISC ORTHO ITEM RC 0274

## 2019-02-11 PROCEDURE — 85027 COMPLETE CBC AUTOMATED: CPT

## 2019-02-11 PROCEDURE — 71045 X-RAY EXAM CHEST 1 VIEW: CPT

## 2019-02-11 PROCEDURE — 700111 HCHG RX REV CODE 636 W/ 250 OVERRIDE (IP): Performed by: SURGERY

## 2019-02-11 PROCEDURE — 700111 HCHG RX REV CODE 636 W/ 250 OVERRIDE (IP): Performed by: PHYSICIAN ASSISTANT

## 2019-02-11 PROCEDURE — 770022 HCHG ROOM/CARE - ICU (200)

## 2019-02-11 PROCEDURE — A8001 HARD PROTECT HELMET PREFAB: HCPCS

## 2019-02-11 PROCEDURE — 94640 AIRWAY INHALATION TREATMENT: CPT

## 2019-02-11 PROCEDURE — 94003 VENT MGMT INPAT SUBQ DAY: CPT

## 2019-02-11 PROCEDURE — 94760 N-INVAS EAR/PLS OXIMETRY 1: CPT

## 2019-02-11 PROCEDURE — 700105 HCHG RX REV CODE 258: Performed by: SURGERY

## 2019-02-11 PROCEDURE — 80053 COMPREHEN METABOLIC PANEL: CPT

## 2019-02-11 PROCEDURE — 99291 CRITICAL CARE FIRST HOUR: CPT | Performed by: SURGERY

## 2019-02-11 PROCEDURE — 85007 BL SMEAR W/DIFF WBC COUNT: CPT

## 2019-02-11 RX ORDER — QUETIAPINE FUMARATE 25 MG/1
25 TABLET, FILM COATED ORAL 3 TIMES DAILY
Status: DISCONTINUED | OUTPATIENT
Start: 2019-02-11 | End: 2019-02-13

## 2019-02-11 RX ADMIN — METHADONE HYDROCHLORIDE 5 MG: 5 SOLUTION ORAL at 05:57

## 2019-02-11 RX ADMIN — SODIUM CHLORIDE TAB 1 GM 2 G: 1 TAB at 16:55

## 2019-02-11 RX ADMIN — QUETIAPINE FUMARATE 50 MG: 25 TABLET ORAL at 06:01

## 2019-02-11 RX ADMIN — QUETIAPINE FUMARATE 25 MG: 25 TABLET ORAL at 12:19

## 2019-02-11 RX ADMIN — FAMOTIDINE 20 MG: 20 TABLET ORAL at 16:55

## 2019-02-11 RX ADMIN — METHADONE HYDROCHLORIDE 5 MG: 5 SOLUTION ORAL at 13:54

## 2019-02-11 RX ADMIN — LEVETIRACETAM 500 MG: 500 TABLET, FILM COATED ORAL at 16:54

## 2019-02-11 RX ADMIN — PROPRANOLOL HYDROCHLORIDE 10 MG: 10 TABLET ORAL at 05:57

## 2019-02-11 RX ADMIN — MINERAL OIL AND WHITE PETROLATUM 1 APPLICATION: 150; 830 OINTMENT OPHTHALMIC at 13:54

## 2019-02-11 RX ADMIN — PROPRANOLOL HYDROCHLORIDE 10 MG: 10 TABLET ORAL at 13:54

## 2019-02-11 RX ADMIN — SODIUM CHLORIDE TAB 1 GM 2 G: 1 TAB at 09:06

## 2019-02-11 RX ADMIN — QUETIAPINE FUMARATE 25 MG: 25 TABLET ORAL at 16:55

## 2019-02-11 RX ADMIN — FAMOTIDINE 20 MG: 20 TABLET ORAL at 05:57

## 2019-02-11 RX ADMIN — FLUDROCORTISONE ACETATE 0.1 MG: 0.1 TABLET ORAL at 05:57

## 2019-02-11 RX ADMIN — SODIUM CHLORIDE TAB 1 GM 2 G: 1 TAB at 12:18

## 2019-02-11 RX ADMIN — SODIUM CHLORIDE TAB 1 GM 2 G: 1 TAB at 21:26

## 2019-02-11 RX ADMIN — MINERAL OIL AND WHITE PETROLATUM 1 APPLICATION: 150; 830 OINTMENT OPHTHALMIC at 05:58

## 2019-02-11 RX ADMIN — PROPRANOLOL HYDROCHLORIDE 10 MG: 10 TABLET ORAL at 21:26

## 2019-02-11 RX ADMIN — LEVETIRACETAM 500 MG: 500 TABLET, FILM COATED ORAL at 05:57

## 2019-02-11 RX ADMIN — METHADONE HYDROCHLORIDE 5 MG: 5 SOLUTION ORAL at 21:26

## 2019-02-11 RX ADMIN — MINERAL OIL AND WHITE PETROLATUM 1 APPLICATION: 150; 830 OINTMENT OPHTHALMIC at 21:26

## 2019-02-11 RX ADMIN — ENOXAPARIN SODIUM 40 MG: 100 INJECTION SUBCUTANEOUS at 05:57

## 2019-02-11 RX ADMIN — CEFTRIAXONE SODIUM 2 G: 2 INJECTION, POWDER, FOR SOLUTION INTRAMUSCULAR; INTRAVENOUS at 05:58

## 2019-02-11 NOTE — PROGRESS NOTES
Trauma / Surgical Daily Progress Note    Date of Service  2/11/2019    Chief Complaint  28 y.o. male admitted 1/26/2019 with SDH     Interval Events  Critically ill. Neuro continues to improve.  Cont T piece trials. Hemodynamically appropriate. Tolerating TF. On abx for klebsiella PNA completing today.        Review of Systems  Review of Systems   Unable to perform ROS: Intubated        Vital Signs for last 24 hours  Pulse:  [76-97] 82  Resp:  [0-24] 18  SpO2:  [96 %-100 %] 96 %    Hemodynamic parameters for last 24 hours       Respiratory Data  #Aerosol Therapy / Airway Management: T-Piece, Aerosol Humidity Temp (celsius): 35  Respiration: 18, Pulse Oximetry: 96 %, O2 Daily Delivery Respiratory : T-Piece     Work Of Breathing / Effort: Vented  RUL Breath Sounds: Clear, RML Breath Sounds: Diminished, RLL Breath Sounds: Diminished, STACI Breath Sounds: Clear, LLL Breath Sounds: Diminished    Physical Exam  Physical Exam   Constitutional: He appears well-developed.   HENT:   Head: Normocephalic.   Left bone flap out   Eyes: Pupils are equal, round, and reactive to light.   Dysconjugate   Neck:   Trach in place   Cardiovascular: Normal rate.    Pulmonary/Chest: Effort normal. He exhibits no tenderness.   Abdominal: Soft. He exhibits no distension. There is no tenderness.   Genitourinary:   Genitourinary Comments: Greer to gravity   Musculoskeletal:   Following commands with L hand  Moving L leg   Neurological: He is alert.   Skin: Skin is warm.       Laboratory  Recent Results (from the past 24 hour(s))   Basic Metabolic Panel    Collection Time: 02/10/19 12:00 PM   Result Value Ref Range    Sodium 146 (H) 135 - 145 mmol/L    Potassium 3.5 (L) 3.6 - 5.5 mmol/L    Chloride 113 (H) 96 - 112 mmol/L    Co2 25 20 - 33 mmol/L    Glucose 118 (H) 65 - 99 mg/dL    Bun 21 8 - 22 mg/dL    Creatinine 0.50 0.50 - 1.40 mg/dL    Calcium 8.6 8.5 - 10.5 mg/dL    Anion Gap 8.0 0.0 - 11.9   ESTIMATED GFR    Collection Time: 02/10/19 12:00  PM   Result Value Ref Range    GFR If African American >60 >60 mL/min/1.73 m 2    GFR If Non African American >60 >60 mL/min/1.73 m 2   Basic Metabolic Panel    Collection Time: 02/10/19  5:40 PM   Result Value Ref Range    Sodium 143 135 - 145 mmol/L    Potassium 3.5 (L) 3.6 - 5.5 mmol/L    Chloride 110 96 - 112 mmol/L    Co2 26 20 - 33 mmol/L    Glucose 117 (H) 65 - 99 mg/dL    Bun 21 8 - 22 mg/dL    Creatinine 0.44 (L) 0.50 - 1.40 mg/dL    Calcium 8.1 (L) 8.5 - 10.5 mg/dL    Anion Gap 7.0 0.0 - 11.9   ESTIMATED GFR    Collection Time: 02/10/19  5:40 PM   Result Value Ref Range    GFR If African American >60 >60 mL/min/1.73 m 2    GFR If Non African American >60 >60 mL/min/1.73 m 2   Magnesium: Every Monday and Thursday AM    Collection Time: 02/10/19 11:53 PM   Result Value Ref Range    Magnesium 2.2 1.5 - 2.5 mg/dL   Phosphorus: Every Monday and Thursday AM    Collection Time: 02/10/19 11:53 PM   Result Value Ref Range    Phosphorus 3.5 2.5 - 4.5 mg/dL   Basic Metabolic Panel    Collection Time: 02/10/19 11:53 PM   Result Value Ref Range    Sodium 143 135 - 145 mmol/L    Potassium 3.4 (L) 3.6 - 5.5 mmol/L    Chloride 110 96 - 112 mmol/L    Co2 27 20 - 33 mmol/L    Glucose 119 (H) 65 - 99 mg/dL    Bun 21 8 - 22 mg/dL    Creatinine 0.48 (L) 0.50 - 1.40 mg/dL    Calcium 8.5 8.5 - 10.5 mg/dL    Anion Gap 6.0 0.0 - 11.9   ESTIMATED GFR    Collection Time: 02/10/19 11:53 PM   Result Value Ref Range    GFR If African American >60 >60 mL/min/1.73 m 2    GFR If Non African American >60 >60 mL/min/1.73 m 2   CBC WITH DIFFERENTIAL    Collection Time: 02/11/19  5:52 AM   Result Value Ref Range    WBC 17.3 (H) 4.8 - 10.8 K/uL    RBC 2.72 (L) 4.70 - 6.10 M/uL    Hemoglobin 8.3 (L) 14.0 - 18.0 g/dL    Hematocrit 27.0 (L) 42.0 - 52.0 %    MCV 99.3 (H) 81.4 - 97.8 fL    MCH 30.5 27.0 - 33.0 pg    MCHC 30.7 (L) 33.7 - 35.3 g/dL    RDW 47.1 35.9 - 50.0 fL    Platelet Count 545 (H) 164 - 446 K/uL    MPV 10.1 9.0 - 12.9 fL     Neutrophils-Polys 74.80 (H) 44.00 - 72.00 %    Lymphocytes 7.80 (L) 22.00 - 41.00 %    Monocytes 3.50 0.00 - 13.40 %    Eosinophils 0.90 0.00 - 6.90 %    Basophils 1.70 0.00 - 1.80 %    Nucleated RBC 0.10 /100 WBC    Neutrophils (Absolute) 13.84 (H) 1.82 - 7.42 K/uL    Lymphs (Absolute) 1.35 1.00 - 4.80 K/uL    Monos (Absolute) 0.61 0.00 - 0.85 K/uL    Eos (Absolute) 0.16 0.00 - 0.51 K/uL    Baso (Absolute) 0.29 (H) 0.00 - 0.12 K/uL    NRBC (Absolute) 0.02 K/uL    Anisocytosis 1+     Macrocytosis 1+    COMP METABOLIC PANEL    Collection Time: 02/11/19  5:52 AM   Result Value Ref Range    Sodium 142 135 - 145 mmol/L    Potassium 3.8 3.6 - 5.5 mmol/L    Chloride 110 96 - 112 mmol/L    Co2 27 20 - 33 mmol/L    Anion Gap 5.0 0.0 - 11.9    Glucose 117 (H) 65 - 99 mg/dL    Bun 22 8 - 22 mg/dL    Creatinine 0.46 (L) 0.50 - 1.40 mg/dL    Calcium 8.6 8.5 - 10.5 mg/dL    AST(SGOT) 110 (H) 12 - 45 U/L    ALT(SGPT) 238 (H) 2 - 50 U/L    Alkaline Phosphatase 64 30 - 99 U/L    Total Bilirubin 0.4 0.1 - 1.5 mg/dL    Albumin 3.1 (L) 3.2 - 4.9 g/dL    Total Protein 5.8 (L) 6.0 - 8.2 g/dL    Globulin 2.7 1.9 - 3.5 g/dL    A-G Ratio 1.1 g/dL   ESTIMATED GFR    Collection Time: 02/11/19  5:52 AM   Result Value Ref Range    GFR If African American >60 >60 mL/min/1.73 m 2    GFR If Non African American >60 >60 mL/min/1.73 m 2   DIFFERENTIAL MANUAL    Collection Time: 02/11/19  5:52 AM   Result Value Ref Range    Bands-Stabs 5.20 0.00 - 10.00 %    Metamyelocytes 1.70 %    Myelocytes 4.40 %    Manual Diff Status PERFORMED    PERIPHERAL SMEAR REVIEW    Collection Time: 02/11/19  5:52 AM   Result Value Ref Range    Peripheral Smear Review see below    PLATELET ESTIMATE    Collection Time: 02/11/19  5:52 AM   Result Value Ref Range    Plt Estimation Increased    MORPHOLOGY    Collection Time: 02/11/19  5:52 AM   Result Value Ref Range    RBC Morphology Present     Polychromia 1+        Fluids    Intake/Output Summary (Last 24 hours) at  02/11/19 1140  Last data filed at 02/11/19 0800   Gross per 24 hour   Intake             1820 ml   Output             2625 ml   Net             -805 ml       Core Measures & Quality Metrics  Labs reviewed, Medications reviewed and Radiology images reviewed  Greer catheter: Critically Ill - Requiring Accurate Measurement of Urinary Output  Central line in place: Need for access    DVT Prophylaxis: Contraindicated - High bleeding risk  DVT prophylaxis - mechanical: SCDs  Ulcer prophylaxis: Yes  Antibiotics: Treating active infection/contamination beyond 24 hours perioperative coverage  Assessed for rehab: Patient unable to tolerate rehabilitation therapeutic regimen    Total Score: 0    ETOH Screening    Assessment/Plan  Pulmonary embolus, right (HCC)   Assessment & Plan    2/3  acute deterioration / inability to oxygenate precipitously 2 hours after tracheostomy.    Chest x-ray shows some increased infiltrate in the left lower lobe, patient refractory to bagging and increase PEEP, Flolan  2/5 - CTA shows proximal segmental and subsegmental pulmonary emboli in the right lower lobe. No RV strain.   Contraindication to systemic anticoagulation. DVT screen negative   2/5 - IVC filter placed       Leukocytosis   Assessment & Plan    2/5 - Multifocal pneumonia and new small bilateral pleural effusions.  Bronchoscopy with BAL and blood cultures for purulent secretions, fever, leukocytosis and chest x-ray infiltrate. Empiric linezolid and cefepime initiated.  2/7 - BAL - klebsiella. De-escalated to ceftriaxone monotherapy  2/11 - Antibiotic day 7 of 7     Acute respiratory failure following trauma and surgery (HCC)- (present on admission)   Assessment & Plan    Intubated in field  Continue full mechanical ventilatory support.   Ventilator bundle  2/3  Perc trach / APRV mode  / flolan  2/6 - weaned flolan off  2/7 - started APRV wean  2/9 - Spont trials started  2/10 - T piece trials  Cont to liberalize vent     Subdural  hematoma (HCC)- (present on admission)   Assessment & Plan    Left sided holohemispheric subdural hematoma measuring approximately 9.4 mm in maximum thickness.   10 mm of left-to-right midline shift. There is probably mild left sided hemispheric edema.  1/26 To OR for emergent craniotomy and evacuation of hematoma.   1/28 CT x 2 stable  2/2  MRI brainstem OK  Improving GCS  Following on left  On sumi Huntley MD. Neurosurgery.     Anemia associated with acute blood loss   Assessment & Plan    Critical anemia  2/6 - Iron studies low - replaced per protocol.  2/7 - Transfused 1 PRBC   Transfuse 1 unit PRBC if Hb < 7.0     Aspiration into airway- (present on admission)   Assessment & Plan    Admission imaging with bilateral, left greater than right medial dependent consolidation could be due to aspiration pneumonitis or atelectasis.  Aggressive pulmonary hygiene and serial chest radiography.     Contraindication to deep vein thrombosis (DVT) prophylaxis- (present on admission)   Assessment & Plan    Systemic anticoagulation contraindicated secondary to elevated bleeding risk.  1/27 - Trauma BLE duplex negative   1/29 - Initiated lovenox   2/4 - Trauma BLE duplex negative   2/5 - CT shows PE - IVC filter placed     Oropharyngeal dysphagia   Assessment & Plan    Cortrak in place  On TF     Trauma- (present on admission)   Assessment & Plan    [place]ed seizure like activity. GCS 3. No evidence of acute trauma.  Continuing [facility]  Trauma Red Activation.  Mor Roa MD, Trauma/General Surgery.         Discussed patient condition with Family, RN, RT, Therapies, Pharmacy, Dietary and .   CRITICAL CARE TIME EXCLUDING PROCEDURES: 35   minutes

## 2019-02-11 NOTE — PROGRESS NOTES
Dr. Huntley at bedside. Per MD approximately another 3 weeks before patient will be ready to have his bone flap replaced. Per MD if family would like to transfer patient to another facility they can send the bone flap with him and have it replaced at another facility.

## 2019-02-11 NOTE — CARE PLAN
Problem: Ventilation Defect:  Goal: Ability to achieve and maintain unassisted ventilation or tolerate decreased levels of ventilator support    Intervention: Support and monitor invasive and noninvasive mechanical ventilation  Adult Ventilation Update    Total Vent Days: 17    Patient Lines/Drains/Airways Status    Active Airway     Name: Placement date: Placement time: Site: Days:    Airway Trach Tracheostomy 8.0 02/03/19   1015   Tracheostomy   9              In the last 24 hours, the patient tolerated T-piece for 4 hours.    (ASV) % MIN VOL: 120 (02/11/19 0306)    Cough: Productive (02/11/19 0400)  Sputum Amount: Small (02/11/19 0400)  Sputum Color: White;Clear (02/11/19 0400)  Sputum Consistency: Thin (02/11/19 0400)    Mobility  Level of Mobility: Level II (02/10/19 2000)  Activity Performed: Unable to mobilize (02/10/19 2000)  Reason Not Mobilized: Bed rest (02/10/19 2000)  Mobilization Comments:  (no bone flap and no helmet yet) (02/10/19 2000)    Events/Summary/Plan: plan is to t-piece 4 on/ 4 off/ 4 on/ rest today

## 2019-02-11 NOTE — CARE PLAN
Problem: Safety  Goal: Will remain free from falls    Intervention: Assess risk factors for falls  Room near nursing station and bedalarm activated.        Problem: Pain Management  Goal: Pain level will decrease to patient's comfort goal    Intervention: Follow pain managment plan developed in collaboration with patient and Interdisciplinary Team  CPOT scale used to ensure adequate pain control.

## 2019-02-11 NOTE — CARE PLAN
Problem: Venous Thromboembolism (VTW)/Deep Vein Thrombosis (DVT) Prevention:  Goal: Patient will participate in Venous Thrombosis (VTE)/Deep Vein Thrombosis (DVT)Prevention Measures  Outcome: PROGRESSING AS EXPECTED  SCD's in place, lovenox administered per MAR     Problem: Respiratory:  Goal: Respiratory status will improve  Outcome: PROGRESSING AS EXPECTED  Patient on t-piece for four hours today, plan to rest on ventilator tonight and resume trails in AM. Assessing pulmonary status and collaborating with RT

## 2019-02-11 NOTE — PROGRESS NOTES
Neurosurgery Progress Note    Subjective:    Purposeful left side  Flexes on the right to pain  Disconjugate gaze  PERRL  Na+ 142, Salt tabe 2gm QID  CT  was stable/improved  Crani site is full, soft    Exam:  As above    Pulse  Av.6  Min: 76  Max: 97  Resp  Av.2  Min: 0  Max: 24  Monitored Temp 2  Av.7 °C (98 °F)  Min: 36.3 °C (97.3 °F)  Max: 37.2 °C (99 °F)  SpO2  Av.9 %  Min: 92 %  Max: 100 %    No Data Recorded    Recent Labs      19   0520  02/10/19   0500  19   0552   WBC  14.7*  19.7*  17.3*   RBC  2.51*  2.73*  2.72*   HEMOGLOBIN  7.7*  8.2*  8.3*   HEMATOCRIT  24.4*  27.2*  27.0*   MCV  97.2  99.6*  99.3*   MCH  30.7  30.0  30.5   MCHC  31.6*  30.1*  30.7*   RDW  47.2  47.1  47.1   PLATELETCT  448*  543*  545*   MPV  10.3  10.3  10.1     Recent Labs      02/10/19   1740  02/10/19   2353  19   0552   SODIUM  143  143  142   POTASSIUM  3.5*  3.4*  3.8   CHLORIDE  110  110  110   CO2  26  27  27   GLUCOSE  117*  119*  117*   BUN  21  21  22   CREATININE  0.44*  0.48*  0.46*   CALCIUM  8.1*  8.5  8.6               Intake/Output       02/10/19 07 - 19 0659 19 07 - 19 0659       Total  Total       Intake    I.V.  180  0 180  --  -- --    Volume (mL) (3% sodium chloride (HYPERTONIC SALINE) 500mL infusion 500 mL) 180 0 180 -- -- --    Other  190  250 440  --  -- --    Medications (PO/Enteral Liquids) 190 190 380 -- -- --    Flush / Irrigation Volume (Cortrak) -- 60 60 -- -- --    NG/GT  720  720 1440  --  -- --    Intake (mL) (Enteral Tube 19 Cortrak - Gastric Left nare)  -- -- --    Total Intake 5098 610 7206 -- -- --       Output    Urine  1900  1150 3050  --  -- --    Output (mL) (Urethral Catheter 18 Fr.) 1900 1150 3050 -- -- --    Stool  --  -- --  --  -- --    Number of Times Stooled 1 x -- 1 x -- -- --    Total Output 1900 1150 3050 -- -- --       Net I/O     -810 -180 -990 -- -- --             Intake/Output Summary (Last 24 hours) at 02/11/19 0807  Last data filed at 02/11/19 0600   Gross per 24 hour   Intake             1850 ml   Output             2800 ml   Net             -950 ml            • methadone  5 mg Q8HRS   • fludrocortisone  0.1 mg QAM   • sodium chloride  2 g 4X/DAY   • levETIRAcetam  500 mg BID   • QUEtiapine  50 mg TID   • famotidine  20 mg BID   • artificial tear ointment  1 Application Q8HRS   • midazolam  2 mg Q HOUR PRN   • fentaNYL   Continuous   • acetaminophen  650 mg Q4HRS PRN   • propranolol  10 mg Q8HRS   • senna-docusate  2 Tab DAILY AT NOON   • polyethylene glycol/lytes  1 Packet DAILY AT NOON   • enoxaparin (LOVENOX) injection  40 mg DAILY   • morphine injection  2 mg Q HOUR PRN    Or   • morphine injection  4 mg Q HOUR PRN   • Pharmacy  1 Each PHARMACY TO DOSE   • cloNIDine  0.1 mg Q4HRS PRN   • magnesium hydroxide  30 mL QDAY PRN   • oxyCODONE immediate-release  2.5 mg Q3HRS PRN   • oxyCODONE immediate-release  5 mg Q3HRS PRN   • polyethylene glycol/lytes  1 Packet BID PRN   • senna-docusate  1 Tab Q24HRS PRN   • Respiratory Care per Protocol   Continuous RT   • NS   Continuous   • dextrose 50%  25 mL Q15 MIN PRN   • propofol  0-80 mcg/kg/min Continuous   • Pharmacy Consult Request  1 Each PHARMACY TO DOSE   • MD ALERT...DO NOT ADMINISTER NSAIDS or ASPIRIN unless ORDERED By Neurosurgery  1 Each PRN   • ondansetron  4 mg Q4HRS PRN   • hydrALAZINE  10 mg Q HOUR PRN   • bisacodyl  10 mg Q24HRS PRN   • fleet  1 Each Once PRN   • docusate sodium 100mg/10mL  100 mg BID       Assessment and Plan:  POD#16 Left Craniectomy  Okay for Lovenox  Flap soft, exam stable, 2 weeks out- cont q6h Na for now   Hold full anticoagulation, cleared for prophylaxis  Q2 hr neuro checks, Q4 overnight  Leave sutures until site is sunken

## 2019-02-11 NOTE — PROGRESS NOTES
2 RN skin assessment.   - Rash to right side of body and left bottox,  Hypoallergenic sheets utilized.   - Small dry skin patch on chin, moisturizer applied.   - Scrotum edematous with blanchable reddness, swaddled with pillowcase  No other concerns at this time, all interventions in place.

## 2019-02-11 NOTE — PROGRESS NOTES
2 RN skin check     No pressure injury found   +waffle cushion behind head   Appropriate interventions in place

## 2019-02-11 NOTE — PROGRESS NOTES
BRAIN Ruiz at the bedside to assess the patient.  Order received for Q2hrs neuro checks during the day and Q4hrs during the night.

## 2019-02-12 ENCOUNTER — APPOINTMENT (OUTPATIENT)
Dept: RADIOLOGY | Facility: MEDICAL CENTER | Age: 29
DRG: 003 | End: 2019-02-12
Attending: NURSE PRACTITIONER
Payer: COMMERCIAL

## 2019-02-12 PROBLEM — T17.908A ASPIRATION INTO AIRWAY: Status: RESOLVED | Noted: 2019-01-26 | Resolved: 2019-02-12

## 2019-02-12 PROBLEM — D72.829 LEUKOCYTOSIS: Status: RESOLVED | Noted: 2019-02-05 | Resolved: 2019-02-12

## 2019-02-12 LAB
ALBUMIN SERPL BCP-MCNC: 3.2 G/DL (ref 3.2–4.9)
ALBUMIN/GLOB SERPL: 1.2 G/DL
ALP SERPL-CCNC: 63 U/L (ref 30–99)
ALT SERPL-CCNC: 208 U/L (ref 2–50)
ANION GAP SERPL CALC-SCNC: 6 MMOL/L (ref 0–11.9)
ANISOCYTOSIS BLD QL SMEAR: ABNORMAL
AST SERPL-CCNC: 82 U/L (ref 12–45)
BASO STIPL BLD QL SMEAR: NORMAL
BASOPHILS # BLD AUTO: 0 % (ref 0–1.8)
BASOPHILS # BLD: 0 K/UL (ref 0–0.12)
BILIRUB SERPL-MCNC: 0.3 MG/DL (ref 0.1–1.5)
BUN SERPL-MCNC: 27 MG/DL (ref 8–22)
CALCIUM SERPL-MCNC: 8.2 MG/DL (ref 8.5–10.5)
CHLORIDE SERPL-SCNC: 108 MMOL/L (ref 96–112)
CO2 SERPL-SCNC: 29 MMOL/L (ref 20–33)
CREAT SERPL-MCNC: 0.53 MG/DL (ref 0.5–1.4)
EOSINOPHIL # BLD AUTO: 0.28 K/UL (ref 0–0.51)
EOSINOPHIL NFR BLD: 1.7 % (ref 0–6.9)
ERYTHROCYTE [DISTWIDTH] IN BLOOD BY AUTOMATED COUNT: 48.3 FL (ref 35.9–50)
GLOBULIN SER CALC-MCNC: 2.7 G/DL (ref 1.9–3.5)
GLUCOSE SERPL-MCNC: 115 MG/DL (ref 65–99)
HCT VFR BLD AUTO: 27.6 % (ref 42–52)
HGB BLD-MCNC: 8.6 G/DL (ref 14–18)
LYMPHOCYTES # BLD AUTO: 1.73 K/UL (ref 1–4.8)
LYMPHOCYTES NFR BLD: 10.5 % (ref 22–41)
MACROCYTES BLD QL SMEAR: ABNORMAL
MANUAL DIFF BLD: NORMAL
MCH RBC QN AUTO: 30.8 PG (ref 27–33)
MCHC RBC AUTO-ENTMCNC: 31.2 G/DL (ref 33.7–35.3)
MCV RBC AUTO: 98.9 FL (ref 81.4–97.8)
METAMYELOCYTES NFR BLD MANUAL: 1.8 %
MONOCYTES # BLD AUTO: 1.58 K/UL (ref 0–0.85)
MONOCYTES NFR BLD AUTO: 9.6 % (ref 0–13.4)
MORPHOLOGY BLD-IMP: NORMAL
MYELOCYTES NFR BLD MANUAL: 0.9 %
NEUTROPHILS # BLD AUTO: 12.46 K/UL (ref 1.82–7.42)
NEUTROPHILS NFR BLD: 74.6 % (ref 44–72)
NEUTS BAND NFR BLD MANUAL: 0.9 % (ref 0–10)
NRBC # BLD AUTO: 0 K/UL
NRBC BLD-RTO: 0 /100 WBC
PLATELET # BLD AUTO: 587 K/UL (ref 164–446)
PLATELET BLD QL SMEAR: NORMAL
PMV BLD AUTO: 10 FL (ref 9–12.9)
POLYCHROMASIA BLD QL SMEAR: NORMAL
POTASSIUM SERPL-SCNC: 4 MMOL/L (ref 3.6–5.5)
PROT SERPL-MCNC: 5.9 G/DL (ref 6–8.2)
RBC # BLD AUTO: 2.79 M/UL (ref 4.7–6.1)
RBC BLD AUTO: PRESENT
SODIUM SERPL-SCNC: 143 MMOL/L (ref 135–145)
WBC # BLD AUTO: 16.5 K/UL (ref 4.8–10.8)

## 2019-02-12 PROCEDURE — 94640 AIRWAY INHALATION TREATMENT: CPT

## 2019-02-12 PROCEDURE — A9270 NON-COVERED ITEM OR SERVICE: HCPCS | Performed by: SURGERY

## 2019-02-12 PROCEDURE — 85007 BL SMEAR W/DIFF WBC COUNT: CPT

## 2019-02-12 PROCEDURE — 85027 COMPLETE CBC AUTOMATED: CPT

## 2019-02-12 PROCEDURE — 700102 HCHG RX REV CODE 250 W/ 637 OVERRIDE(OP): Performed by: SURGERY

## 2019-02-12 PROCEDURE — 71045 X-RAY EXAM CHEST 1 VIEW: CPT

## 2019-02-12 PROCEDURE — 700102 HCHG RX REV CODE 250 W/ 637 OVERRIDE(OP): Performed by: PHYSICAL MEDICINE & REHABILITATION

## 2019-02-12 PROCEDURE — 99291 CRITICAL CARE FIRST HOUR: CPT | Performed by: SURGERY

## 2019-02-12 PROCEDURE — 99233 SBSQ HOSP IP/OBS HIGH 50: CPT | Performed by: PHYSICAL MEDICINE & REHABILITATION

## 2019-02-12 PROCEDURE — 94003 VENT MGMT INPAT SUBQ DAY: CPT

## 2019-02-12 PROCEDURE — 770022 HCHG ROOM/CARE - ICU (200)

## 2019-02-12 PROCEDURE — A9270 NON-COVERED ITEM OR SERVICE: HCPCS | Performed by: NEUROLOGICAL SURGERY

## 2019-02-12 PROCEDURE — 700111 HCHG RX REV CODE 636 W/ 250 OVERRIDE (IP): Performed by: PHYSICIAN ASSISTANT

## 2019-02-12 PROCEDURE — A9270 NON-COVERED ITEM OR SERVICE: HCPCS | Performed by: PHYSICAL MEDICINE & REHABILITATION

## 2019-02-12 PROCEDURE — 700112 HCHG RX REV CODE 229: Performed by: NEUROLOGICAL SURGERY

## 2019-02-12 PROCEDURE — 80053 COMPREHEN METABOLIC PANEL: CPT

## 2019-02-12 PROCEDURE — 94760 N-INVAS EAR/PLS OXIMETRY 1: CPT

## 2019-02-12 RX ADMIN — FAMOTIDINE 20 MG: 20 TABLET ORAL at 05:38

## 2019-02-12 RX ADMIN — ENOXAPARIN SODIUM 40 MG: 100 INJECTION SUBCUTANEOUS at 05:39

## 2019-02-12 RX ADMIN — SODIUM CHLORIDE TAB 1 GM 2 G: 1 TAB at 09:00

## 2019-02-12 RX ADMIN — METHADONE HYDROCHLORIDE 5 MG: 5 SOLUTION ORAL at 05:39

## 2019-02-12 RX ADMIN — MINERAL OIL AND WHITE PETROLATUM 1 APPLICATION: 150; 830 OINTMENT OPHTHALMIC at 21:29

## 2019-02-12 RX ADMIN — MINERAL OIL AND WHITE PETROLATUM 1 APPLICATION: 150; 830 OINTMENT OPHTHALMIC at 13:12

## 2019-02-12 RX ADMIN — PROPRANOLOL HYDROCHLORIDE 10 MG: 10 TABLET ORAL at 05:38

## 2019-02-12 RX ADMIN — FLUDROCORTISONE ACETATE 0.1 MG: 0.1 TABLET ORAL at 05:38

## 2019-02-12 RX ADMIN — FAMOTIDINE 20 MG: 20 TABLET ORAL at 17:27

## 2019-02-12 RX ADMIN — PROPRANOLOL HYDROCHLORIDE 10 MG: 10 TABLET ORAL at 21:29

## 2019-02-12 RX ADMIN — METHADONE HYDROCHLORIDE 5 MG: 5 SOLUTION ORAL at 21:28

## 2019-02-12 RX ADMIN — LEVETIRACETAM 500 MG: 500 TABLET, FILM COATED ORAL at 17:28

## 2019-02-12 RX ADMIN — SENNOSIDES AND DOCUSATE SODIUM 2 TABLET: 8.6; 5 TABLET ORAL at 13:11

## 2019-02-12 RX ADMIN — LEVETIRACETAM 500 MG: 500 TABLET, FILM COATED ORAL at 05:38

## 2019-02-12 RX ADMIN — METHADONE HYDROCHLORIDE 5 MG: 5 SOLUTION ORAL at 13:11

## 2019-02-12 RX ADMIN — SODIUM CHLORIDE TAB 1 GM 2 G: 1 TAB at 17:28

## 2019-02-12 RX ADMIN — QUETIAPINE FUMARATE 25 MG: 25 TABLET ORAL at 13:11

## 2019-02-12 RX ADMIN — SODIUM CHLORIDE TAB 1 GM 2 G: 1 TAB at 13:11

## 2019-02-12 RX ADMIN — QUETIAPINE FUMARATE 25 MG: 25 TABLET ORAL at 05:38

## 2019-02-12 RX ADMIN — QUETIAPINE FUMARATE 25 MG: 25 TABLET ORAL at 17:28

## 2019-02-12 RX ADMIN — SODIUM CHLORIDE TAB 1 GM 2 G: 1 TAB at 21:28

## 2019-02-12 RX ADMIN — MINERAL OIL AND WHITE PETROLATUM 1 APPLICATION: 150; 830 OINTMENT OPHTHALMIC at 05:38

## 2019-02-12 RX ADMIN — PROPRANOLOL HYDROCHLORIDE 10 MG: 10 TABLET ORAL at 13:11

## 2019-02-12 RX ADMIN — DOCUSATE SODIUM 100 MG: 50 LIQUID ORAL at 17:27

## 2019-02-12 NOTE — PROGRESS NOTES
Neurosurgery Progress Note    Subjective:    Purposeful left side  Flexes on the right to pain  Disconjugate gaze continues  PERRL  Na+ 143, Salt tabs 2gm QID  CT  was stable/improved  Crani site less full this morning  Following commands in the afternoon when seen by Dr. Huntley    Exam:  As above    Pulse  Av  Min: 78  Max: 104  Resp  Av  Min: 0  Max: 20  Monitored Temp 2  Av.6 °C (97.8 °F)  Min: 35.9 °C (96.6 °F)  Max: 37.1 °C (98.8 °F)  SpO2  Av.4 %  Min: 94 %  Max: 100 %    No Data Recorded    Recent Labs      02/10/19   0500  19   0552  19   0429   WBC  19.7*  17.3*  16.5*   RBC  2.73*  2.72*  2.79*   HEMOGLOBIN  8.2*  8.3*  8.6*   HEMATOCRIT  27.2*  27.0*  27.6*   MCV  99.6*  99.3*  98.9*   MCH  30.0  30.5  30.8   MCHC  30.1*  30.7*  31.2*   RDW  47.1  47.1  48.3   PLATELETCT  543*  545*  587*   MPV  10.3  10.1  10.0     Recent Labs      02/10/19   2353  19   0552  19   0429   SODIUM  143  142  143   POTASSIUM  3.4*  3.8  4.0   CHLORIDE  110  110  108   CO2  27  27  29   GLUCOSE  119*  117*  115*   BUN  21  22  27*   CREATININE  0.48*  0.46*  0.53   CALCIUM  8.5  8.6  8.2*               Intake/Output       19 07 - 19 0659 19 07 - 19 0659       Total  Total       Intake    Other  190  250 440  --  -- --    Medications (PO/Enteral Liquids) 190 160 350 -- -- --    Flush / Irrigation Volume (Cortrak) -- 90 90 -- -- --    NG/GT  720  720 1440  --  -- --    Intake (mL) (Enteral Tube 19 Cortrak - Gastric Left nare)  -- -- --    Total Intake  -- -- --       Output    Urine  1875 3275  --  -- --    Output (mL) (Urethral Catheter 18 Fr.) 1875 3275 -- -- --    Stool  --  -- --  --  -- --    Number of Times Stooled 0 x -- 0 x -- -- --    Total Output 1875 3275 -- -- --       Net I/O     -730 -063 -2689 -- -- --            Intake/Output Summary (Last 24 hours) at  02/12/19 0801  Last data filed at 02/12/19 0600   Gross per 24 hour   Intake             1730 ml   Output             3100 ml   Net            -1370 ml            • QUEtiapine  25 mg TID   • methadone  5 mg Q8HRS   • fludrocortisone  0.1 mg QAM   • sodium chloride  2 g 4X/DAY   • levETIRAcetam  500 mg BID   • famotidine  20 mg BID   • artificial tear ointment  1 Application Q8HRS   • midazolam  2 mg Q HOUR PRN   • acetaminophen  650 mg Q4HRS PRN   • propranolol  10 mg Q8HRS   • senna-docusate  2 Tab DAILY AT NOON   • polyethylene glycol/lytes  1 Packet DAILY AT NOON   • enoxaparin (LOVENOX) injection  40 mg DAILY   • morphine injection  2 mg Q HOUR PRN    Or   • morphine injection  4 mg Q HOUR PRN   • Pharmacy  1 Each PHARMACY TO DOSE   • cloNIDine  0.1 mg Q4HRS PRN   • magnesium hydroxide  30 mL QDAY PRN   • oxyCODONE immediate-release  2.5 mg Q3HRS PRN   • oxyCODONE immediate-release  5 mg Q3HRS PRN   • polyethylene glycol/lytes  1 Packet BID PRN   • senna-docusate  1 Tab Q24HRS PRN   • Respiratory Care per Protocol   Continuous RT   • NS   Continuous   • Pharmacy Consult Request  1 Each PHARMACY TO DOSE   • MD ALERT...DO NOT ADMINISTER NSAIDS or ASPIRIN unless ORDERED By Neurosurgery  1 Each PRN   • ondansetron  4 mg Q4HRS PRN   • hydrALAZINE  10 mg Q HOUR PRN   • bisacodyl  10 mg Q24HRS PRN   • fleet  1 Each Once PRN   • docusate sodium 100mg/10mL  100 mg BID       Assessment and Plan:  POD#17 Left Craniectomy  Okay for Lovenox  Flap soft, exam stable,   Hold full anticoagulation, cleared for prophylaxis  Q2 hr neuro checks, Q4 overnight  Leave sutures until site is sunken  Replace flap in 3-4 weeks, patient can also take flap with him if he travels for rehab
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
## 2019-02-12 NOTE — CARE PLAN
Problem: Infection  Goal: Will remain free from infection  Outcome: PROGRESSING AS EXPECTED  Standard and VAP precautions in place. Assessing for signs and symptoms of infection     Problem: ACTIVITY INTOLERANCE/IMPAIRED MOBILITY  Intervention: Encourage mobilization to extent of ability  Patient able to mobilize to edge of bed with helmet in place, max assistance utilized.
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
## 2019-02-12 NOTE — PROGRESS NOTES
Physical Medicine and Rehabilitation Consultation         Follow up Consult      Date of Consultation: 2/12/2019  Consulting provider: Heber Brown D.O.  Reason for consultation:TBI, assess for acute inpatient rehab appropriateness      Chief complaint: TBI    S:   Unable to complete interview secondary to cognitive status, information derived from discussions with team family as well as chart review.    Nurse and father report patient is more awake today, tracking father's voice, periodically following commands, simple one-step  Patient is moving all 4 extremities    Nurse reports continued diaphoresis, tube feeding at goal  Respiratory therapy: Tolerating more time off of the ventilator, still producing significant secretions    Bladder: Greer  Bowel: Last BM 2/10    ROS:   unable to be obtained due to cognitive status.      Medications:  Current Facility-Administered Medications   Medication Dose   • QUEtiapine (SEROQUEL) tablet 25 mg  25 mg   • methadone (DOLOPHINE) 5 MG/5ML solution 5 mg  5 mg   • fludrocortisone (FLORINEF) tablet 0.1 mg  0.1 mg   • sodium chloride (SALT) tablet 2 g  2 g   • levETIRAcetam (KEPPRA) tablet 500 mg  500 mg   • famotidine (PEPCID) tablet 20 mg  20 mg   • artificial tear ointment (REFRESH,LACRI-LUBE) 1 Application  1 Application   • midazolam (VERSED) 2 MG/2ML injection 2 mg  2 mg   • acetaminophen (TYLENOL) tablet 650 mg  650 mg   • propranolol (INDERAL) tablet 10 mg  10 mg   • senna-docusate (PERICOLACE or SENOKOT S) 8.6-50 MG per tablet 2 Tab  2 Tab   • polyethylene glycol/lytes (MIRALAX) PACKET 1 Packet  1 Packet   • enoxaparin (LOVENOX) inj 40 mg  40 mg   • morphine (pf) 4 mg/ml injection 2 mg  2 mg    Or   • morphine (pf) 4 mg/ml injection 4 mg  4 mg   • Pharmacy Consult: Enteral tube insertion - review meds/change route/product selection  1 Each   • cloNIDine (CATAPRES) tablet 0.1 mg  0.1 mg   • magnesium hydroxide (MILK OF MAGNESIA) suspension 30 mL  " 30 mL   • oxyCODONE immediate-release (ROXICODONE) tablet 2.5 mg  2.5 mg   • oxyCODONE immediate-release (ROXICODONE) tablet 5 mg  5 mg   • polyethylene glycol/lytes (MIRALAX) PACKET 1 Packet  1 Packet   • senna-docusate (PERICOLACE or SENOKOT S) 8.6-50 MG per tablet 1 Tab  1 Tab   • Respiratory Care per Protocol     • NS infusion     • Pharmacy Consult Request ...Pain Management Review 1 Each  1 Each   • MD ALERT...DO NOT ADMINISTER NSAIDS or ASPIRIN unless ORDERED By Neurosurgery 1 Each  1 Each   • ondansetron (ZOFRAN) syringe/vial injection 4 mg  4 mg   • hydrALAZINE (APRESOLINE) injection 10 mg  10 mg   • bisacodyl (DULCOLAX) suppository 10 mg  10 mg   • fleet enema 133 mL  1 Each   • docusate sodium 100mg/10mL (COLACE) solution 100 mg  100 mg       Current level of function:  Pending physical and Occupational Therapy notes      Physical Exam:  Vitals: Blood pressure 123/62, pulse 79, temperature 36.7 °C (98 °F), temperature source Temporal, resp. rate 16, height 1.702 m (5' 7.01\"), weight 87.5 kg (192 lb 14.4 oz), SpO2 98 %.  Gen: Diaphoretic, Body mass index is 30.21 kg/m².  HEENT:  PERRLA, moist mucous membranes, incision clean dry and intact, decreased swelling over the left craniectomy site  Cardio: Sinus tachycardia, no mumurs  Pulm: Coarse breath sounds on the right, T-piece with normal respiratory effort, strong cough  Abd: Soft NTND, active bowel sounds,  Ext: No peripheral edema.+dorsal pedalis pulses bilaterally.  Spasticity: Clonus present bilaterally, sustained increased spasticity and right upper extremity    Mental status: Moving bilateral lower extremities spontaneously as per father moving left upper extremity spontaneously against gravity, withdrawing to the right upper extremety          Sensory:   Withdrawing to pain in all 4 extremities    DTRs: 3+ in bilateral biceps, triceps, brachioradialis, 3+ in bilateral patellar and achilles tendons  + babinski b/l  + Seay b/l "         Labs:  Recent Labs      02/10/19   0500  02/11/19   0552  02/12/19   0429   RBC  2.73*  2.72*  2.79*   HEMOGLOBIN  8.2*  8.3*  8.6*   HEMATOCRIT  27.2*  27.0*  27.6*   PLATELETCT  543*  545*  587*     Recent Labs      02/10/19   2353  02/11/19   0552  02/12/19   0429   SODIUM  143  142  143   POTASSIUM  3.4*  3.8  4.0   CHLORIDE  110  110  108   CO2  27  27  29   GLUCOSE  119*  117*  115*   BUN  21  22  27*   CREATININE  0.48*  0.46*  0.53   CALCIUM  8.5  8.6  8.2*     Recent Results (from the past 24 hour(s))   CBC WITH DIFFERENTIAL    Collection Time: 02/12/19  4:29 AM   Result Value Ref Range    WBC 16.5 (H) 4.8 - 10.8 K/uL    RBC 2.79 (L) 4.70 - 6.10 M/uL    Hemoglobin 8.6 (L) 14.0 - 18.0 g/dL    Hematocrit 27.6 (L) 42.0 - 52.0 %    MCV 98.9 (H) 81.4 - 97.8 fL    MCH 30.8 27.0 - 33.0 pg    MCHC 31.2 (L) 33.7 - 35.3 g/dL    RDW 48.3 35.9 - 50.0 fL    Platelet Count 587 (H) 164 - 446 K/uL    MPV 10.0 9.0 - 12.9 fL    Neutrophils-Polys 74.60 (H) 44.00 - 72.00 %    Lymphocytes 10.50 (L) 22.00 - 41.00 %    Monocytes 9.60 0.00 - 13.40 %    Eosinophils 1.70 0.00 - 6.90 %    Basophils 0.00 0.00 - 1.80 %    Nucleated RBC 0.00 /100 WBC    Neutrophils (Absolute) 12.46 (H) 1.82 - 7.42 K/uL    Lymphs (Absolute) 1.73 1.00 - 4.80 K/uL    Monos (Absolute) 1.58 (H) 0.00 - 0.85 K/uL    Eos (Absolute) 0.28 0.00 - 0.51 K/uL    Baso (Absolute) 0.00 0.00 - 0.12 K/uL    NRBC (Absolute) 0.00 K/uL    Anisocytosis 1+     Macrocytosis 1+    COMP METABOLIC PANEL    Collection Time: 02/12/19  4:29 AM   Result Value Ref Range    Sodium 143 135 - 145 mmol/L    Potassium 4.0 3.6 - 5.5 mmol/L    Chloride 108 96 - 112 mmol/L    Co2 29 20 - 33 mmol/L    Anion Gap 6.0 0.0 - 11.9    Glucose 115 (H) 65 - 99 mg/dL    Bun 27 (H) 8 - 22 mg/dL    Creatinine 0.53 0.50 - 1.40 mg/dL    Calcium 8.2 (L) 8.5 - 10.5 mg/dL    AST(SGOT) 82 (H) 12 - 45 U/L    ALT(SGPT) 208 (H) 2 - 50 U/L    Alkaline Phosphatase 63 30 - 99 U/L    Total Bilirubin 0.3 0.1 -  1.5 mg/dL    Albumin 3.2 3.2 - 4.9 g/dL    Total Protein 5.9 (L) 6.0 - 8.2 g/dL    Globulin 2.7 1.9 - 3.5 g/dL    A-G Ratio 1.2 g/dL   ESTIMATED GFR    Collection Time: 02/12/19  4:29 AM   Result Value Ref Range    GFR If African American >60 >60 mL/min/1.73 m 2    GFR If Non African American >60 >60 mL/min/1.73 m 2   DIFFERENTIAL MANUAL    Collection Time: 02/12/19  4:29 AM   Result Value Ref Range    Bands-Stabs 0.90 0.00 - 10.00 %    Metamyelocytes 1.80 %    Myelocytes 0.90 %    Manual Diff Status PERFORMED    PERIPHERAL SMEAR REVIEW    Collection Time: 02/12/19  4:29 AM   Result Value Ref Range    Peripheral Smear Review see below    PLATELET ESTIMATE    Collection Time: 02/12/19  4:29 AM   Result Value Ref Range    Plt Estimation Increased    MORPHOLOGY    Collection Time: 02/12/19  4:29 AM   Result Value Ref Range    RBC Morphology Present     Polychromia 1+     Basophilic Stippling Few          ASSESSMENT:    TBI: Significant improvement in cognitive status, tracking, Rancho IV  -Consider decreasing Seroquel to as needed, as is been shown to alter cognitive status, strong anticholinergic effects, minimal agitation present on physical exam.   - Consider starting amantadine 100 mg every morning and q. noon for neuro stimulation    Synthetic storming: Positive diaphoresis, episodes of tremor, increase in spasticity  -These are all consistent with sympathetic storming although heart rate has significantly improved.     Neurogenic bladder: Currently utilizing a Greer, usually in traumatic brain injury neurogenic bladder is overactive detrusor rather than urinary retention.  -Recommend discontinuing Greer start condom catheter  -Check PVRs 3 times a day if greater than 400 cc than    Rehab: Impaired ADLs  - DC bedrest  -Consult to physical and occupational pain-patient requires helmet when out of bed, mobilization will help with secretions      Dispo: After discussion with  patient's insurance is  willing to fly patient to [place] agree with recommendation for [facility].      Discussed with family, summarized hospitalization and care, options for next step of care    Discussed with team about recommendations    Patient was seen for 35 minutes on unit/floor of which > 50% of time was spent on counseling and coordination of care regarding the above, including prognosis, risk reduction, benefits of treatment, and options for next stage of care.      Heber Brown D.O.

## 2019-02-12 NOTE — CARE PLAN
Problem: Ventilation Defect:  Goal: Ability to achieve and maintain unassisted ventilation or tolerate decreased levels of ventilator support    Intervention: Manage ventilation therapy by monitoring diagnostic test results  Adult Ventilation Update    Total Vent Days: 17  Trach Days: 10    Patient Lines/Drains/Airways Status    Active Airway     Name: Placement date: Placement time: Site: Days:    Airway Trach Tracheostomy 8.0 02/03/19   1015   Tracheostomy   8              In the last 24 hours, the patient tolerated SBT for 8 hours  on settings of t-piece at 30%.    #FVC / Vital Capacity (liters) : 0 (02/03/19 0840)  NIF (cm H2O) : 0 (02/03/19 0840)  Rapid Shallow Breathing Index (RR/VT): 60 (02/03/19 0840)  Plateau Pressure (Q Shift):  (APRV) (02/06/19 0640)  Static Compliance (ml / cm H2O): 867 (02/11/19 1144)    Patient failed trials because of Barriers to Wean: No Order;FiO2 >60% or PEEP >10 CM H2O;Other (Comments) (APRV) (02/07/19 0639)  Barriers to SBT Weaning Trial Stopped due to:: Pt weaned for 1 hour and returned to rest settings per protocol (02/03/19 0840)  Length of Weaning Trial Length of Weaning Trial (Hours): 50 minutes (02/01/19 1050)    10 day post trache.  The patient is currently in slow wean according to protocol.    (ASV) % MIN VOL: 120 (02/11/19 2307)  ASV Inspiratory Pressures 6  % Spontaneous 100    Sputum/Suction   Cough: Productive;Strong (02/12/19 0000)  Sputum Amount: Small (02/12/19 0000)  Sputum Color: White;Clear (02/12/19 0000)  Sputum Consistency: Thin (02/12/19 0000)    Mobility  Level of Mobility: Level II (02/10/19 2000)  Activity Performed: Edge of bed (02/11/19 1200)  Time Activity Tolerated: 10 min (02/11/19 1200)  Assistance / Tolerance: Assistance of Two or More (02/11/19 1200)  Pt Calls for Assistance: No (02/11/19 1200)  Staff Present for Mobilization: RN (02/11/19 1200)  Gait: Unable to Ambulate (02/12/19 0000)  Reason Not Mobilized: Bed rest (02/11/19  0800)  Mobilization Comments: no bone flap, waiting for helmet (02/11/19 0800)    Events/Summary/Plan: The plan is 12 hours on t-piece today.

## 2019-02-12 NOTE — CARE PLAN
Problem: Infection  Goal: Will remain free from infection  Outcome: PROGRESSING AS EXPECTED  Pt assessed for signs and symptoms of infection, hand hygiene performed before and after patient contact, assessed necessity of lines. Orders to remove central line and billings catheter today.     Problem: Pain Management  Goal: Pain level will decrease to patient's comfort goal  Outcome: PROGRESSING AS EXPECTED  Pain assessed using appropriate pain scale, non pharmacologic measures implemented, medicated per MAR.

## 2019-02-12 NOTE — CARE PLAN
Problem: Bronchopulmonary Hygiene:  Goal: Increase mobilization of retained secretions  HMTP 5/30% during day rest on vent overnight

## 2019-02-12 NOTE — DIETARY
Nutrition support weekly update:  Day 17 of admit.  27 yo male following a snowboarding accident.  Tube feeding initiated on 1/27. Current TF Impact 1.5 @ 60 ml/hr providing 2160 kcals, 135 g protein, 1109 ml H20/day.     Assessment:  Weight today 87.5 kg is increased 10.5 kg since admit weight of 77.0 kg.  Pt is 10.5 liters fluid positive.     Evaluation:   1. Prealbumin 17 is decreased from 21 last week. CRP 25.4 is increased from 12.5 least week indicating increased inflammation.   2. Pt is starting to follow  3. Pt to transfer to [place] for rehab when ready  4. Current feeding meeting nutritional needs for healing    Malnutrition risk: na    Recommendations/Plan:   1. Continue same TF formula and rate  2. Po diet when appropriate

## 2019-02-13 ENCOUNTER — APPOINTMENT (OUTPATIENT)
Dept: RADIOLOGY | Facility: MEDICAL CENTER | Age: 29
DRG: 003 | End: 2019-02-13
Attending: NURSE PRACTITIONER
Payer: COMMERCIAL

## 2019-02-13 PROBLEM — Z78.9 NO CONTRAINDICATION TO DEEP VEIN THROMBOSIS (DVT) PROPHYLAXIS: Status: ACTIVE | Noted: 2019-01-26

## 2019-02-13 LAB
ALBUMIN SERPL BCP-MCNC: 3.3 G/DL (ref 3.2–4.9)
ALBUMIN/GLOB SERPL: 1.1 G/DL
ALP SERPL-CCNC: 61 U/L (ref 30–99)
ALT SERPL-CCNC: 162 U/L (ref 2–50)
ANION GAP SERPL CALC-SCNC: 10 MMOL/L (ref 0–11.9)
AST SERPL-CCNC: 57 U/L (ref 12–45)
BASOPHILS # BLD AUTO: 0 % (ref 0–1.8)
BASOPHILS # BLD: 0 K/UL (ref 0–0.12)
BILIRUB SERPL-MCNC: 0.4 MG/DL (ref 0.1–1.5)
BUN SERPL-MCNC: 30 MG/DL (ref 8–22)
CALCIUM SERPL-MCNC: 8.5 MG/DL (ref 8.5–10.5)
CHLORIDE SERPL-SCNC: 108 MMOL/L (ref 96–112)
CO2 SERPL-SCNC: 26 MMOL/L (ref 20–33)
CREAT SERPL-MCNC: 0.54 MG/DL (ref 0.5–1.4)
EOSINOPHIL # BLD AUTO: 0.38 K/UL (ref 0–0.51)
EOSINOPHIL NFR BLD: 2.6 % (ref 0–6.9)
ERYTHROCYTE [DISTWIDTH] IN BLOOD BY AUTOMATED COUNT: 49.5 FL (ref 35.9–50)
GLOBULIN SER CALC-MCNC: 2.9 G/DL (ref 1.9–3.5)
GLUCOSE SERPL-MCNC: 122 MG/DL (ref 65–99)
HCT VFR BLD AUTO: 30.5 % (ref 42–52)
HGB BLD-MCNC: 9.4 G/DL (ref 14–18)
LYMPHOCYTES # BLD AUTO: 1.81 K/UL (ref 1–4.8)
LYMPHOCYTES NFR BLD: 12.3 % (ref 22–41)
MANUAL DIFF BLD: NORMAL
MCH RBC QN AUTO: 30.7 PG (ref 27–33)
MCHC RBC AUTO-ENTMCNC: 30.8 G/DL (ref 33.7–35.3)
MCV RBC AUTO: 99.7 FL (ref 81.4–97.8)
METAMYELOCYTES NFR BLD MANUAL: 0.9 %
MONOCYTES # BLD AUTO: 0.65 K/UL (ref 0–0.85)
MONOCYTES NFR BLD AUTO: 4.4 % (ref 0–13.4)
MORPHOLOGY BLD-IMP: NORMAL
MYELOCYTES NFR BLD MANUAL: 1.7 %
NEUTROPHILS # BLD AUTO: 11.48 K/UL (ref 1.82–7.42)
NEUTROPHILS NFR BLD: 74.6 % (ref 44–72)
NEUTS BAND NFR BLD MANUAL: 3.5 % (ref 0–10)
NRBC # BLD AUTO: 0 K/UL
NRBC BLD-RTO: 0 /100 WBC
PLATELET # BLD AUTO: 505 K/UL (ref 164–446)
PLATELET BLD QL SMEAR: NORMAL
PMV BLD AUTO: 10 FL (ref 9–12.9)
POLYCHROMASIA BLD QL SMEAR: NORMAL
POTASSIUM SERPL-SCNC: 3.9 MMOL/L (ref 3.6–5.5)
PROT SERPL-MCNC: 6.2 G/DL (ref 6–8.2)
RBC # BLD AUTO: 3.06 M/UL (ref 4.7–6.1)
RBC BLD AUTO: PRESENT
SODIUM SERPL-SCNC: 144 MMOL/L (ref 135–145)
WBC # BLD AUTO: 14.7 K/UL (ref 4.8–10.8)

## 2019-02-13 PROCEDURE — 97163 PT EVAL HIGH COMPLEX 45 MIN: CPT

## 2019-02-13 PROCEDURE — L8501 TRACHEOSTOMY SPEAKING VALVE: HCPCS

## 2019-02-13 PROCEDURE — 700102 HCHG RX REV CODE 250 W/ 637 OVERRIDE(OP): Performed by: SURGERY

## 2019-02-13 PROCEDURE — 99291 CRITICAL CARE FIRST HOUR: CPT | Performed by: SURGERY

## 2019-02-13 PROCEDURE — 92522 EVALUATE SPEECH PRODUCTION: CPT

## 2019-02-13 PROCEDURE — A9270 NON-COVERED ITEM OR SERVICE: HCPCS | Performed by: SURGERY

## 2019-02-13 PROCEDURE — 700111 HCHG RX REV CODE 636 W/ 250 OVERRIDE (IP): Performed by: PHYSICIAN ASSISTANT

## 2019-02-13 PROCEDURE — 85007 BL SMEAR W/DIFF WBC COUNT: CPT

## 2019-02-13 PROCEDURE — A9270 NON-COVERED ITEM OR SERVICE: HCPCS | Performed by: PHYSICAL MEDICINE & REHABILITATION

## 2019-02-13 PROCEDURE — 97167 OT EVAL HIGH COMPLEX 60 MIN: CPT

## 2019-02-13 PROCEDURE — 94003 VENT MGMT INPAT SUBQ DAY: CPT

## 2019-02-13 PROCEDURE — 700112 HCHG RX REV CODE 229: Performed by: NEUROLOGICAL SURGERY

## 2019-02-13 PROCEDURE — 80053 COMPREHEN METABOLIC PANEL: CPT

## 2019-02-13 PROCEDURE — 94640 AIRWAY INHALATION TREATMENT: CPT

## 2019-02-13 PROCEDURE — A9270 NON-COVERED ITEM OR SERVICE: HCPCS | Performed by: NEUROLOGICAL SURGERY

## 2019-02-13 PROCEDURE — 770022 HCHG ROOM/CARE - ICU (200)

## 2019-02-13 PROCEDURE — 94760 N-INVAS EAR/PLS OXIMETRY 1: CPT

## 2019-02-13 PROCEDURE — 85027 COMPLETE CBC AUTOMATED: CPT

## 2019-02-13 PROCEDURE — 97763 ORTHC/PROSTC MGMT SBSQ ENC: CPT

## 2019-02-13 PROCEDURE — 700102 HCHG RX REV CODE 250 W/ 637 OVERRIDE(OP): Performed by: PHYSICAL MEDICINE & REHABILITATION

## 2019-02-13 PROCEDURE — 71045 X-RAY EXAM CHEST 1 VIEW: CPT

## 2019-02-13 PROCEDURE — 305246 HCHG SPEAKING VALVE ADAPTER

## 2019-02-13 RX ORDER — QUETIAPINE FUMARATE 25 MG/1
25 TABLET, FILM COATED ORAL EVERY EVENING
Status: DISCONTINUED | OUTPATIENT
Start: 2019-02-13 | End: 2019-02-13

## 2019-02-13 RX ORDER — QUETIAPINE FUMARATE 25 MG/1
25 TABLET, FILM COATED ORAL EVERY EVENING
Status: DISCONTINUED | OUTPATIENT
Start: 2019-02-13 | End: 2019-02-14

## 2019-02-13 RX ADMIN — ENOXAPARIN SODIUM 40 MG: 100 INJECTION SUBCUTANEOUS at 05:29

## 2019-02-13 RX ADMIN — QUETIAPINE FUMARATE 25 MG: 25 TABLET ORAL at 05:28

## 2019-02-13 RX ADMIN — SODIUM CHLORIDE TAB 1 GM 2 G: 1 TAB at 12:42

## 2019-02-13 RX ADMIN — METHADONE HYDROCHLORIDE 5 MG: 5 SOLUTION ORAL at 14:38

## 2019-02-13 RX ADMIN — MINERAL OIL AND WHITE PETROLATUM 1 APPLICATION: 150; 830 OINTMENT OPHTHALMIC at 22:05

## 2019-02-13 RX ADMIN — PROPRANOLOL HYDROCHLORIDE 10 MG: 10 TABLET ORAL at 22:05

## 2019-02-13 RX ADMIN — PROPRANOLOL HYDROCHLORIDE 10 MG: 10 TABLET ORAL at 14:37

## 2019-02-13 RX ADMIN — PROPRANOLOL HYDROCHLORIDE 10 MG: 10 TABLET ORAL at 05:29

## 2019-02-13 RX ADMIN — FAMOTIDINE 20 MG: 20 TABLET ORAL at 17:27

## 2019-02-13 RX ADMIN — SODIUM CHLORIDE TAB 1 GM 2 G: 1 TAB at 09:36

## 2019-02-13 RX ADMIN — POLYETHYLENE GLYCOL 3350 1 PACKET: 17 POWDER, FOR SOLUTION ORAL at 12:42

## 2019-02-13 RX ADMIN — FAMOTIDINE 20 MG: 20 TABLET ORAL at 05:29

## 2019-02-13 RX ADMIN — DOCUSATE SODIUM 100 MG: 50 LIQUID ORAL at 05:28

## 2019-02-13 RX ADMIN — METHADONE HYDROCHLORIDE 5 MG: 5 SOLUTION ORAL at 06:19

## 2019-02-13 RX ADMIN — QUETIAPINE FUMARATE 25 MG: 25 TABLET ORAL at 17:27

## 2019-02-13 RX ADMIN — FLUDROCORTISONE ACETATE 0.1 MG: 0.1 TABLET ORAL at 05:29

## 2019-02-13 RX ADMIN — SODIUM CHLORIDE TAB 1 GM 2 G: 1 TAB at 22:05

## 2019-02-13 RX ADMIN — DOCUSATE SODIUM 100 MG: 50 LIQUID ORAL at 17:27

## 2019-02-13 RX ADMIN — LEVETIRACETAM 500 MG: 500 TABLET, FILM COATED ORAL at 17:27

## 2019-02-13 RX ADMIN — MINERAL OIL AND WHITE PETROLATUM 1 APPLICATION: 150; 830 OINTMENT OPHTHALMIC at 05:29

## 2019-02-13 RX ADMIN — MINERAL OIL AND WHITE PETROLATUM 1 APPLICATION: 150; 830 OINTMENT OPHTHALMIC at 14:38

## 2019-02-13 RX ADMIN — LEVETIRACETAM 500 MG: 500 TABLET, FILM COATED ORAL at 05:28

## 2019-02-13 RX ADMIN — SODIUM CHLORIDE TAB 1 GM 2 G: 1 TAB at 17:27

## 2019-02-13 ASSESSMENT — COGNITIVE AND FUNCTIONAL STATUS - GENERAL
HELP NEEDED FOR BATHING: TOTAL
MOBILITY SCORE: 6
TURNING FROM BACK TO SIDE WHILE IN FLAT BAD: UNABLE
WALKING IN HOSPITAL ROOM: TOTAL
DRESSING REGULAR LOWER BODY CLOTHING: TOTAL
CLIMB 3 TO 5 STEPS WITH RAILING: TOTAL
DAILY ACTIVITIY SCORE: 6
PERSONAL GROOMING: TOTAL
MOVING TO AND FROM BED TO CHAIR: UNABLE
MOVING FROM LYING ON BACK TO SITTING ON SIDE OF FLAT BED: UNABLE
SUGGESTED CMS G CODE MODIFIER DAILY ACTIVITY: CN
STANDING UP FROM CHAIR USING ARMS: TOTAL
DRESSING REGULAR UPPER BODY CLOTHING: TOTAL
SUGGESTED CMS G CODE MODIFIER MOBILITY: CN
TOILETING: TOTAL
EATING MEALS: TOTAL

## 2019-02-13 ASSESSMENT — GAIT ASSESSMENTS: GAIT LEVEL OF ASSIST: UNABLE TO PARTICIPATE

## 2019-02-13 ASSESSMENT — ACTIVITIES OF DAILY LIVING (ADL): TOILETING: INDEPENDENT

## 2019-02-13 NOTE — CARE PLAN
Problem: Ventilation Defect:  Goal: Ability to achieve and maintain unassisted ventilation or tolerate decreased levels of ventilator support  Outcome: PROGRESSING AS EXPECTED  Adult Ventilation Update    Total Vent Days: 19    Patient Lines/Drains/Airways Status    Active Airway     Name: Placement date: Placement time: Site: Days:    Airway Trach Tracheostomy 8.0 02/03/19   1015   Tracheostomy   11                11  day post trache.  The patient is currently in  fast wean according to protocol.    (ASV) % MIN VOL: 100 (02/13/19 0149)  ASV Inspiratory Pressures  % Spontaneous less than 50  Sputum/Suction   Cough: Strong;Productive (02/13/19 0000)  Sputum Amount: Small (02/13/19 0000)  Sputum Color: White;Clear (02/13/19 0000)  Sputum Consistency: Thin (02/13/19 0000)    Mobility  Level of Mobility: Level II (02/13/19 0000)  Activity Performed: Edge of bed (02/13/19 0000)  Time Activity Tolerated: 10 min (02/13/19 0000)  Distance Per Occurrence (ft.): 0 feet (02/13/19 0000)  # of Times Distance was Traveled: 1 (02/13/19 0000)  Assistance / Tolerance: Assistance of Two or More;Tolerates Well (02/13/19 0000)  Pt Calls for Assistance: No (02/13/19 0000)  Staff Present for Mobilization: RN;CNA (02/13/19 0000)  Gait: Unable to Ambulate (02/12/19 1600)  Reason Not Mobilized: Bed rest (02/11/19 0800)  Mobilization Comments: no bone flap, waiting for helmet (02/11/19 0800)    Events/Summary/Plan:  Patient tolerated 12 hours on T Piece at 30%

## 2019-02-13 NOTE — PROGRESS NOTES
Neurosurgery Progress Note    Subjective:  Opens eyes  Tracking   Intermittently following on left  Lightly following in LUE this am  Flexes on the right to pain  Resting comfortably  On seroquel    PERRL  Na+ 144, Salt tabs 2gm QID  CT  was stable/improved  Crani site less full this morning    Exam:  As above    Pulse  Av.7  Min: 84  Max: 95  Resp  Av.7  Min: 12  Max: 27  Temp  Av.5 °C (97.7 °F)  Min: 35.8 °C (96.5 °F)  Max: 36.8 °C (98.3 °F)  Monitored Temp 2  Av.8 °C (98.2 °F)  Min: 36.5 °C (97.7 °F)  Max: 37.1 °C (98.8 °F)  SpO2  Av.3 %  Min: 94 %  Max: 100 %    No Data Recorded    Recent Labs      19   0552  19   WBC  17.3*  16.5*  14.7*   RBC  2.72*  2.79*  3.06*   HEMOGLOBIN  8.3*  8.6*  9.4*   HEMATOCRIT  27.0*  27.6*  30.5*   MCV  99.3*  98.9*  99.7*   MCH  30.5  30.8  30.7   MCHC  30.7*  31.2*  30.8*   RDW  47.1  48.3  49.5   PLATELETCT  545*  587*  505*   MPV  10.1  10.0  10.0     Recent Labs      19   0552  19   SODIUM  142  143  144   POTASSIUM  3.8  4.0  3.9   CHLORIDE  110  108  108   CO2  27  29  26   GLUCOSE  117*  115*  122*   BUN  22  27*  30*   CREATININE  0.46*  0.53  0.54   CALCIUM  8.6  8.2*  8.5               Intake/Output       19 - 19 - 1959       Total  Total       Intake    Other  660  460 1120  --  -- --    Medications (PO/Enteral Liquids) 270 400 670 -- -- --    Flush / Irrigation Volume (Cortrak) 390 60 450 -- -- --    NG/GT  720  720 1440  --  -- --    Intake (mL) (Enteral Tube 19 Cortrak - Gastric Left nare)  -- -- --    Total Intake 1380 1180 2560 -- -- --       Output    Urine  1320  600 1920  --  -- --    Urine Void (mL) -- 600 600 -- -- --    Output (mL) ([REMOVED] Urethral Catheter 18 Fr.) 1320 -- 1320 -- -- --    Stool  --  -- --  --  -- --    Number of Times Stooled 0 x -- 0 x  -- -- --    Total Output 4521 035 8426 -- -- --       Net I/O     60 580 640 -- -- --            Intake/Output Summary (Last 24 hours) at 02/13/19 0805  Last data filed at 02/13/19 0600   Gross per 24 hour   Intake             2350 ml   Output             1595 ml   Net              755 ml            • QUEtiapine  25 mg TID   • methadone  5 mg Q8HRS   • fludrocortisone  0.1 mg QAM   • sodium chloride  2 g 4X/DAY   • levETIRAcetam  500 mg BID   • famotidine  20 mg BID   • artificial tear ointment  1 Application Q8HRS   • midazolam  2 mg Q HOUR PRN   • acetaminophen  650 mg Q4HRS PRN   • propranolol  10 mg Q8HRS   • senna-docusate  2 Tab DAILY AT NOON   • polyethylene glycol/lytes  1 Packet DAILY AT NOON   • enoxaparin (LOVENOX) injection  40 mg DAILY   • morphine injection  2 mg Q HOUR PRN    Or   • morphine injection  4 mg Q HOUR PRN   • Pharmacy  1 Each PHARMACY TO DOSE   • cloNIDine  0.1 mg Q4HRS PRN   • magnesium hydroxide  30 mL QDAY PRN   • oxyCODONE immediate-release  2.5 mg Q3HRS PRN   • oxyCODONE immediate-release  5 mg Q3HRS PRN   • polyethylene glycol/lytes  1 Packet BID PRN   • senna-docusate  1 Tab Q24HRS PRN   • Respiratory Care per Protocol   Continuous RT   • NS   Continuous   • Pharmacy Consult Request  1 Each PHARMACY TO DOSE   • MD ALERT...DO NOT ADMINISTER NSAIDS or ASPIRIN unless ORDERED By Neurosurgery  1 Each PRN   • ondansetron  4 mg Q4HRS PRN   • hydrALAZINE  10 mg Q HOUR PRN   • bisacodyl  10 mg Q24HRS PRN   • fleet  1 Each Once PRN   • docusate sodium 100mg/10mL  100 mg BID       Assessment and Plan:  POD#18 Left Craniectomy  Okay for Lovenox  Flap soft, exam stable  ,   Hold full anticoagulation, cleared for prophylaxis  Q2 hr neuro checks, Q4 overnight  Leave sutures until site is sunken  Replace flap in 3-4 weeks, patient can also take flap with him if he travels for rehab  Work on reducing seroquel as tolerated

## 2019-02-13 NOTE — PROGRESS NOTES
2 RN skin assessment. Redness noted to urinary meatus. No other new concerns at this time, all interventions in place

## 2019-02-13 NOTE — PROGRESS NOTES
2 RN skin check performed. No new skin concerns noted at this time.    Crani site open to air; well approximated with small amount of old drainage.  Redness noted to urinary meatus.  Rash noted to back and bilateral upper extremities; noted in flowsheet charting.    Appropriate interventions in place to prevent skin breakdown. Pt turned q 2hr. Heel float boot and foot drop boot switched q 2hrs.

## 2019-02-13 NOTE — THERAPY
Speech Language Therapy Evaluation completed to address speech  Functional Status:  Patient seen for speaking valve evaluation this date.  Patient lying in bed with eyes open and would make eye contact with this writer when approached from his left side, but patient only tracking to midline with eyes briefly to right side X1 with max verbal/tactile cues, but otherwise no real attention to the right.  Patient intermittently following single step commands with right foot and hand, but not able to follow any oral motor commands.  He appears to be a Rancho Level II with emergence of Rancho Level III.  Patient on T-piece with FiO2 at 28%.  Cuff was already deflated by RT and subglottic suction revealed moderate amount of secretions.  Tracheal suction also yielded moderate amount of secretions.  Speaking valve placed in line with T-piece after education provided to patient.  He did have coughing episode and coughed up a moderate amount of secretions to oral cavity that were suctioned.  Patient tolerated speaking valve for ~7 minutes--saturations stayed in the mid 90s the entire time, but RR increased from the high teens into the high 20s with notable fatigue as session progressed.  Despite max verbal cues and strategies to elicit voice/speech, was not able to get patient to vocalize.  Patient had coughing during the trial and subglottic suction performed 3 times to clear pooled secretions during the trial.  Removed valve X2 with minimal backpressure both times.  After ~7 minutes, valve was removed as patient closing eyes and appearing fatigued.  Trache again suctioned with moderate secretions noted.  Cuff was left deflated at request of RT.  At this time, would recommend speaking valve use with RT or SLP only for now.  SLP will follow for speaking valve trials.  He will also need Cognitive Evaluation (Kristel Neuro Sensory Stimulation Profile) and eventually swallow evaluation.  Parents present at end and educated on role  "of SLP, results of today's session and plan of care.  Although patient is bilingual (English and Russian), will also have Russian speaking SLP see patient to see if this makes any difference in his level of responses.     Recommendations:  Speaking valve with RT/SLP ONLY at this time.  Plan of Care: Will benefit from Speech Therapy 5 times per week  Post-Acute Therapy: Discharge to a transitional care facility for continued skilled therapy services.    See \"Rehab Therapy-Acute\" Patient Summary Report for complete documentation.         "

## 2019-02-13 NOTE — THERAPY
"Physical Therapy Evaluation completed.   Bed Mobility:  Supine to Sit: Total Assist of 2  Transfers:  Sit<>stand: unable to participate   Gait: Level Of Assist: Unable to Participate     Plan of Care: Will benefit from Physical Therapy 3 times per week  Discharge Recommendations: Equipment: Will Continue to Assess for Equipment Needs.     Pt presents with impaired arousal, communication and tone s/p left SDH requiring left crani. Pt is most limited by lethargy, did receive seroquel overnight; does track to midline with max cueing and head rotation facilitation, following minimally in left UE and spontaneously moving left LE to light touch; significant clonus in right wrist and ankle with very strong patellar reflex on right; good resting tone; will require acute TBI rehab when medically appropriate for transfer; will follow.     See \"Rehab Therapy-Acute\" Patient Summary Report for complete documentation.     "

## 2019-02-13 NOTE — PROGRESS NOTES
Trauma / Surgical Daily Progress Note    Date of Service  2/13/2019    Chief Complaint  28 y.o. male admitted 1/26/2019 with Trauma    Interval Events  Slow progress weaning ventilator - short periods of T piece tolerated yesterday  Fewer periods of agitation, somewhat sedated per nursing  Tolerating tube feeds    Review of Systems  Review of Systems     Vital Signs for last 24 hours  Temp:  [35.8 °C (96.5 °F)-37.7 °C (99.9 °F)] 37.5 °C (99.5 °F)  Pulse:  [82-95] 90  Resp:  [12-27] 16  SpO2:  [93 %-100 %] 98 %    Hemodynamic parameters for last 24 hours       Respiratory Data  #Aerosol Therapy / Airway Management: T-Piece, Aerosol Humidity Temp (celsius): 35  Respiration: 16, Pulse Oximetry: 98 %, O2 Daily Delivery Respiratory : T-Piece     Work Of Breathing / Effort: Mild  RUL Breath Sounds: Crackles;Clear After Suction, RML Breath Sounds: Crackles;Clear After Suction, RLL Breath Sounds: Diminished, STACI Breath Sounds: Crackles;Clear After Suction, LLL Breath Sounds: Diminished    Physical Exam  Physical Exam   Constitutional: No distress.   HENT:   Crani site c/d/i   Eyes: Pupils are equal, round, and reactive to light. EOM are normal.   Neck: Normal range of motion.   Trach in place, clean   Cardiovascular: Normal rate and regular rhythm.    Pulmonary/Chest: Effort normal and breath sounds normal.   Abdominal: Soft. He exhibits no distension. There is no tenderness.   Genitourinary: Penis normal.   Musculoskeletal: Normal range of motion. He exhibits no edema.   Neurological:   Opens eyes spontaneously  Tracking   Intermittently follows commands on left  RUE flexes to pain  A bit sedated   Skin: Skin is warm and dry. He is not diaphoretic.   Psychiatric:   Unable to assess     Nursing note and vitals reviewed.      Laboratory  Recent Results (from the past 24 hour(s))   CBC WITH DIFFERENTIAL    Collection Time: 02/13/19  4:27 AM   Result Value Ref Range    WBC 14.7 (H) 4.8 - 10.8 K/uL    RBC 3.06 (L) 4.70 - 6.10  M/uL    Hemoglobin 9.4 (L) 14.0 - 18.0 g/dL    Hematocrit 30.5 (L) 42.0 - 52.0 %    MCV 99.7 (H) 81.4 - 97.8 fL    MCH 30.7 27.0 - 33.0 pg    MCHC 30.8 (L) 33.7 - 35.3 g/dL    RDW 49.5 35.9 - 50.0 fL    Platelet Count 505 (H) 164 - 446 K/uL    MPV 10.0 9.0 - 12.9 fL    Neutrophils-Polys 74.60 (H) 44.00 - 72.00 %    Lymphocytes 12.30 (L) 22.00 - 41.00 %    Monocytes 4.40 0.00 - 13.40 %    Eosinophils 2.60 0.00 - 6.90 %    Basophils 0.00 0.00 - 1.80 %    Nucleated RBC 0.00 /100 WBC    Neutrophils (Absolute) 11.48 (H) 1.82 - 7.42 K/uL    Lymphs (Absolute) 1.81 1.00 - 4.80 K/uL    Monos (Absolute) 0.65 0.00 - 0.85 K/uL    Eos (Absolute) 0.38 0.00 - 0.51 K/uL    Baso (Absolute) 0.00 0.00 - 0.12 K/uL    NRBC (Absolute) 0.00 K/uL   COMP METABOLIC PANEL    Collection Time: 02/13/19  4:27 AM   Result Value Ref Range    Sodium 144 135 - 145 mmol/L    Potassium 3.9 3.6 - 5.5 mmol/L    Chloride 108 96 - 112 mmol/L    Co2 26 20 - 33 mmol/L    Anion Gap 10.0 0.0 - 11.9    Glucose 122 (H) 65 - 99 mg/dL    Bun 30 (H) 8 - 22 mg/dL    Creatinine 0.54 0.50 - 1.40 mg/dL    Calcium 8.5 8.5 - 10.5 mg/dL    AST(SGOT) 57 (H) 12 - 45 U/L    ALT(SGPT) 162 (H) 2 - 50 U/L    Alkaline Phosphatase 61 30 - 99 U/L    Total Bilirubin 0.4 0.1 - 1.5 mg/dL    Albumin 3.3 3.2 - 4.9 g/dL    Total Protein 6.2 6.0 - 8.2 g/dL    Globulin 2.9 1.9 - 3.5 g/dL    A-G Ratio 1.1 g/dL   ESTIMATED GFR    Collection Time: 02/13/19  4:27 AM   Result Value Ref Range    GFR If African American >60 >60 mL/min/1.73 m 2    GFR If Non African American >60 >60 mL/min/1.73 m 2   DIFFERENTIAL MANUAL    Collection Time: 02/13/19  4:27 AM   Result Value Ref Range    Bands-Stabs 3.50 0.00 - 10.00 %    Metamyelocytes 0.90 %    Myelocytes 1.70 %    Manual Diff Status PERFORMED    PERIPHERAL SMEAR REVIEW    Collection Time: 02/13/19  4:27 AM   Result Value Ref Range    Peripheral Smear Review see below    PLATELET ESTIMATE    Collection Time: 02/13/19  4:27 AM   Result Value Ref  Range    Plt Estimation Increased    MORPHOLOGY    Collection Time: 02/13/19  4:27 AM   Result Value Ref Range    RBC Morphology Present     Polychromia 1+        Fluids    Intake/Output Summary (Last 24 hours) at 02/13/19 1528  Last data filed at 02/13/19 1400   Gross per 24 hour   Intake             2400 ml   Output             2370 ml   Net               30 ml       Core Measures & Quality Metrics  Labs reviewed, Medications reviewed and Radiology images reviewed  Greer catheter: Critically Ill - Requiring Accurate Measurement of Urinary Output      DVT Prophylaxis: Enoxaparin (Lovenox)  DVT prophylaxis - mechanical: SCDs  Ulcer prophylaxis: Yes        JOHN Score  ETOH Screening    Assessment/Plan  Pulmonary embolus, right (HCC)   Assessment & Plan    2/3  acute deterioration / inability to oxygenate precipitously 2 hours after tracheostomy.    Chest x-ray shows some increased infiltrate in the left lower lobe, patient refractory to bagging and increase PEEP, Flolan  2/5 - CTA shows proximal segmental and subsegmental pulmonary emboli in the right lower lobe. No RV strain.   Contraindication to systemic anticoagulation. DVT screen negative   2/5 - IVC filter placed  On prophylactic lovenox since 1/29     Acute respiratory failure following trauma and surgery (HCC)- (present on admission)   Assessment & Plan    Intubated in field  Continue full mechanical ventilatory support.   Ventilator bundle  2/3  Perc trach / APRV mode  / flolan  2/6 - weaned flolan off  2/7 - started APRV wean  2/9 - Spont trials started  2/12 - 2/13 T piece trials     Subdural hematoma (HCC)- (present on admission)   Assessment & Plan    Left sided holohemispheric subdural hematoma measuring approximately 9.4 mm in maximum thickness.   10 mm of left-to-right midline shift.   1/26 To OR for emergent craniotomy and evacuation of hematoma.   1/28 CT x 2 stable  2/2  MRI brainstem OK  Improving GCS  Following on left  On sumi Huntley MD.  Neurosurgery.     Anemia associated with acute blood loss   Assessment & Plan    Critical anemia  2/6 - Iron studies low - replaced per protocol.  2/7 - Transfused 1 PRBC   Transfuse 1 unit PRBC if Hb < 7.0     No contraindication to deep vein thrombosis (DVT) prophylaxis- (present on admission)   Assessment & Plan    Systemic anticoagulation contraindicated secondary to elevated bleeding risk.  1/27 - Trauma BLE duplex negative   1/29 - Initiated lovenox   2/4 - Trauma BLE duplex negative   2/5 - CT shows PE - IVC filter placed     Oropharyngeal dysphagia   Assessment & Plan    Cortrak in place  On TF  2/13/2019 - speech eval - speaking valve with therapists only     Trauma- (present on admission)   Assessment & Plan    [place]ed seizure like activity. GCS 3. No evidence of acute trauma.  Continuing [facility]  Trauma Red Activation.  Mor Roa MD, Trauma/General Surgery.     Plan:  Stop scheduled seroquel except for nocturnal dose. Needs to be following commands consistently prior to transfer to rehab.  Wean ventilator - decrease PEEP and FiO2 and increase spontaneous breathing percentages and increase T piece times. Needs to be off ventilator prior to rehab. Downsize trach once off ventliator.  Nutritional therapy  Follow sodium  Therapies    Discussed patient condition with RN, RT and Pharmacy.  The patient is/remains critically ill with respiratory failure, severe traumatic brain injury.    I provided the following critical care services: vent management as outlined above.    Critical care time spent exclusive of procedures: 40 minutes.    Yasmany Henning MD  [phone]

## 2019-02-13 NOTE — PROGRESS NOTES
2 RN skin assessment with WILDER Brown. Skin noted as follows.    Rash to right side of body and left sacrum.   Small dry skin patch on chin, moisturizer applied.   Scrotum edematous with blanchable redness.  Newly found wound on tip of urinary meatus under billings catheter. Picture uploaded, appropriate wound LDA charted, and wound consulted.   All lines and tubing positioned off of patient skin. All preventative measures in place.

## 2019-02-13 NOTE — DISCHARGE PLANNING
Anticipated Discharge Disposition: TBI Rehab    Action: LSW met with pt's parents at bedside about pt will need TBI Rehab once following commands more consistently. Pt's parents inquiring if pt can be transferred back to [place] for Rehab where they reside as well as pt's siblings. LSW asked pt's parents if it's okay to have flight company look over pt's insurance and determine if pt has a flight benefit. Pt's parents stated that was okay.     LSW contacted Manjinderghada with [facility] flight and provided pt's insurance. Manjinderchaymary had her insurance dept look over pt's policy and pt does have flight benefits. LSW updated pt's Parents and they would like a referral to go to [facility] once pt is participating in therapies.     LSW will send referral once more clinically appropriate.     Barriers to Discharge: None    Plan: Send Referral to TBI Rehab once pt is more clinically appropriate.

## 2019-02-13 NOTE — THERAPY
"Occupational Therapy Evaluation completed.   Functional Status:  Total A with ADLs and txfs  Plan of Care: Will benefit from Occupational Therapy 3 times per week  Discharge Recommendations:  Equipment: Will Continue to Assess for Equipment Needs. Post-acute therapy Discharge to a transitional care facility for continued therapy services.    See \"Rehab Therapy-Acute\" Patient Summary Report for complete documentation.    "

## 2019-02-14 ENCOUNTER — APPOINTMENT (OUTPATIENT)
Dept: RADIOLOGY | Facility: MEDICAL CENTER | Age: 29
DRG: 003 | End: 2019-02-14
Attending: SURGERY
Payer: COMMERCIAL

## 2019-02-14 ENCOUNTER — APPOINTMENT (OUTPATIENT)
Dept: RADIOLOGY | Facility: MEDICAL CENTER | Age: 29
DRG: 003 | End: 2019-02-14
Attending: NURSE PRACTITIONER
Payer: COMMERCIAL

## 2019-02-14 PROBLEM — Z02.9 DISCHARGE PLANNING ISSUES: Status: ACTIVE | Noted: 2019-02-14

## 2019-02-14 LAB
ALBUMIN SERPL BCP-MCNC: 4 G/DL (ref 3.2–4.9)
ALBUMIN/GLOB SERPL: 1.1 G/DL
ALP SERPL-CCNC: 73 U/L (ref 30–99)
ALT SERPL-CCNC: 134 U/L (ref 2–50)
ANION GAP SERPL CALC-SCNC: 10 MMOL/L (ref 0–11.9)
ANISOCYTOSIS BLD QL SMEAR: ABNORMAL
AST SERPL-CCNC: 44 U/L (ref 12–45)
BASOPHILS # BLD AUTO: 0 % (ref 0–1.8)
BASOPHILS # BLD: 0 K/UL (ref 0–0.12)
BILIRUB SERPL-MCNC: 0.5 MG/DL (ref 0.1–1.5)
BUN SERPL-MCNC: 25 MG/DL (ref 8–22)
CALCIUM SERPL-MCNC: 9.9 MG/DL (ref 8.5–10.5)
CHLORIDE SERPL-SCNC: 106 MMOL/L (ref 96–112)
CO2 SERPL-SCNC: 28 MMOL/L (ref 20–33)
CREAT SERPL-MCNC: 0.55 MG/DL (ref 0.5–1.4)
EOSINOPHIL # BLD AUTO: 0.13 K/UL (ref 0–0.51)
EOSINOPHIL NFR BLD: 0.8 % (ref 0–6.9)
ERYTHROCYTE [DISTWIDTH] IN BLOOD BY AUTOMATED COUNT: 50.8 FL (ref 35.9–50)
GLOBULIN SER CALC-MCNC: 3.7 G/DL (ref 1.9–3.5)
GLUCOSE SERPL-MCNC: 110 MG/DL (ref 65–99)
HCT VFR BLD AUTO: 38.1 % (ref 42–52)
HGB BLD-MCNC: 11.8 G/DL (ref 14–18)
LYMPHOCYTES # BLD AUTO: 2.19 K/UL (ref 1–4.8)
LYMPHOCYTES NFR BLD: 13.8 % (ref 22–41)
MACROCYTES BLD QL SMEAR: ABNORMAL
MAGNESIUM SERPL-MCNC: 2.3 MG/DL (ref 1.5–2.5)
MANUAL DIFF BLD: NORMAL
MCH RBC QN AUTO: 30.3 PG (ref 27–33)
MCHC RBC AUTO-ENTMCNC: 30.7 G/DL (ref 33.7–35.3)
MCV RBC AUTO: 98.7 FL (ref 81.4–97.8)
MONOCYTES # BLD AUTO: 0.83 K/UL (ref 0–0.85)
MONOCYTES NFR BLD AUTO: 5.2 % (ref 0–13.4)
MORPHOLOGY BLD-IMP: NORMAL
MYELOCYTES NFR BLD MANUAL: 2.6 %
NEUTROPHILS # BLD AUTO: 12.34 K/UL (ref 1.82–7.42)
NEUTROPHILS NFR BLD: 75.9 % (ref 44–72)
NEUTS BAND NFR BLD MANUAL: 1.7 % (ref 0–10)
NRBC # BLD AUTO: 0 K/UL
NRBC BLD-RTO: 0 /100 WBC
PHOSPHATE SERPL-MCNC: 4.4 MG/DL (ref 2.5–4.5)
PLATELET # BLD AUTO: 305 K/UL (ref 164–446)
PLATELET BLD QL SMEAR: NORMAL
PMV BLD AUTO: 10.9 FL (ref 9–12.9)
POLYCHROMASIA BLD QL SMEAR: NORMAL
POTASSIUM SERPL-SCNC: 4.3 MMOL/L (ref 3.6–5.5)
PROT SERPL-MCNC: 7.7 G/DL (ref 6–8.2)
RBC # BLD AUTO: 3.79 M/UL (ref 4.7–6.1)
RBC BLD AUTO: PRESENT
SODIUM SERPL-SCNC: 144 MMOL/L (ref 135–145)
WBC # BLD AUTO: 15.9 K/UL (ref 4.8–10.8)

## 2019-02-14 PROCEDURE — 94640 AIRWAY INHALATION TREATMENT: CPT

## 2019-02-14 PROCEDURE — A9270 NON-COVERED ITEM OR SERVICE: HCPCS | Performed by: SURGERY

## 2019-02-14 PROCEDURE — 92507 TX SP LANG VOICE COMM INDIV: CPT

## 2019-02-14 PROCEDURE — 700111 HCHG RX REV CODE 636 W/ 250 OVERRIDE (IP): Performed by: PHYSICIAN ASSISTANT

## 2019-02-14 PROCEDURE — 770022 HCHG ROOM/CARE - ICU (200)

## 2019-02-14 PROCEDURE — 700102 HCHG RX REV CODE 250 W/ 637 OVERRIDE(OP): Performed by: SURGERY

## 2019-02-14 PROCEDURE — A9270 NON-COVERED ITEM OR SERVICE: HCPCS | Performed by: NEUROLOGICAL SURGERY

## 2019-02-14 PROCEDURE — 84100 ASSAY OF PHOSPHORUS: CPT

## 2019-02-14 PROCEDURE — 85027 COMPLETE CBC AUTOMATED: CPT

## 2019-02-14 PROCEDURE — 71045 X-RAY EXAM CHEST 1 VIEW: CPT

## 2019-02-14 PROCEDURE — 83735 ASSAY OF MAGNESIUM: CPT

## 2019-02-14 PROCEDURE — 80053 COMPREHEN METABOLIC PANEL: CPT

## 2019-02-14 PROCEDURE — 700112 HCHG RX REV CODE 229: Performed by: NEUROLOGICAL SURGERY

## 2019-02-14 PROCEDURE — 85007 BL SMEAR W/DIFF WBC COUNT: CPT

## 2019-02-14 PROCEDURE — 99291 CRITICAL CARE FIRST HOUR: CPT | Performed by: SURGERY

## 2019-02-14 RX ADMIN — MINERAL OIL AND WHITE PETROLATUM 1 APPLICATION: 150; 830 OINTMENT OPHTHALMIC at 22:07

## 2019-02-14 RX ADMIN — DOCUSATE SODIUM 100 MG: 50 LIQUID ORAL at 07:41

## 2019-02-14 RX ADMIN — SODIUM CHLORIDE TAB 1 GM 2 G: 1 TAB at 13:35

## 2019-02-14 RX ADMIN — SODIUM CHLORIDE TAB 1 GM 2 G: 1 TAB at 17:00

## 2019-02-14 RX ADMIN — FLUDROCORTISONE ACETATE 0.1 MG: 0.1 TABLET ORAL at 07:41

## 2019-02-14 RX ADMIN — PROPRANOLOL HYDROCHLORIDE 10 MG: 10 TABLET ORAL at 22:07

## 2019-02-14 RX ADMIN — MINERAL OIL AND WHITE PETROLATUM 1 APPLICATION: 150; 830 OINTMENT OPHTHALMIC at 06:37

## 2019-02-14 RX ADMIN — SODIUM CHLORIDE TAB 1 GM 2 G: 1 TAB at 08:43

## 2019-02-14 RX ADMIN — ENOXAPARIN SODIUM 40 MG: 100 INJECTION SUBCUTANEOUS at 06:36

## 2019-02-14 RX ADMIN — PROPRANOLOL HYDROCHLORIDE 10 MG: 10 TABLET ORAL at 13:35

## 2019-02-14 RX ADMIN — FAMOTIDINE 20 MG: 20 TABLET ORAL at 17:00

## 2019-02-14 RX ADMIN — MINERAL OIL AND WHITE PETROLATUM 1 APPLICATION: 150; 830 OINTMENT OPHTHALMIC at 13:35

## 2019-02-14 RX ADMIN — METHADONE HYDROCHLORIDE 5 MG: 5 SOLUTION ORAL at 00:11

## 2019-02-14 RX ADMIN — SODIUM CHLORIDE TAB 1 GM 2 G: 1 TAB at 22:06

## 2019-02-14 RX ADMIN — LEVETIRACETAM 500 MG: 500 TABLET, FILM COATED ORAL at 07:51

## 2019-02-14 RX ADMIN — PROPRANOLOL HYDROCHLORIDE 10 MG: 10 TABLET ORAL at 07:41

## 2019-02-14 RX ADMIN — FAMOTIDINE 20 MG: 20 TABLET ORAL at 07:41

## 2019-02-14 RX ADMIN — METHADONE HYDROCHLORIDE 5 MG: 5 SOLUTION ORAL at 07:41

## 2019-02-14 RX ADMIN — LEVETIRACETAM 500 MG: 500 TABLET, FILM COATED ORAL at 17:00

## 2019-02-14 NOTE — PROGRESS NOTES
2 RN skin check complete.   Incision on head, sutures in place. Head on waffle cushion.   Trach site, some blanchable redness.   Meatus tear healing appropriately.   Generalized rash, pimples, and blanchable redness.   Sacrum intact with mepilex in place.     q2h turns, pillows for repositioning and support. Rotated cortrak. Heel float boot rotating with foot drop boot.  Will continue to monitor skin.

## 2019-02-14 NOTE — PROGRESS NOTES
Neurosurgery Progress Note    Subjective:  No NSX changes overnight  Opens eyes  Tracking   Intermittently following on left  -Not clearly following in LUE this am  Flexes on the right to pain  Resting comfortably  Seroquel reduced    PERRL   Salt tabs 2gm QID  CT  was stable/improved  Crani site less full this morning-some fluctuance. No drainage    Exam:  As above    Pulse  Av.9  Min: 81  Max: 93  Resp  Av.5  Min: 6  Max: 35  Temp  Av °C (98.6 °F)  Min: 36.1 °C (97 °F)  Max: 37.7 °C (99.9 °F)  SpO2  Av.9 %  Min: 93 %  Max: 99 %    No Data Recorded    Recent Labs      19   WBC  16.5*  14.7*   RBC  2.79*  3.06*   HEMOGLOBIN  8.6*  9.4*   HEMATOCRIT  27.6*  30.5*   MCV  98.9*  99.7*   MCH  30.8  30.7   MCHC  31.2*  30.8*   RDW  48.3  49.5   PLATELETCT  587*  505*   MPV  10.0  10.0     Recent Labs      19   04219   0635   SODIUM  143  144  144   POTASSIUM  4.0  3.9  4.3   CHLORIDE  108  108  106   CO2  29  26  28   GLUCOSE  115*  122*  110*   BUN  27*  30*  25*   CREATININE  0.53  0.54  0.55   CALCIUM  8.2*  8.5  9.9               Intake/Output       19 - 19 - 02/15/19 0659       Total  Total       Intake    Other  530  90 620  --  -- --    Medications (PO/Enteral Liquids) 500 -- 500 -- -- --    Flush / Irrigation Volume (Cortrak) 30 90 120 -- -- --    NG/GT  720  600 1320  --  -- --    Intake (mL) ([REMOVED] Enteral Tube 19 Cortrak - Gastric Left nare)  -- -- --    Total Intake 4856 817 6494 -- -- --       Output    Urine  1350  1390 2740  --  -- --    Number of Times Incontinent of Urine 1 x -- 1 x -- -- --    Urine Void (mL) 1350 1390 2740 -- -- --    Stool  0  -- 0  --  -- --    Number of Times Stooled 0 x 1 x 1 x -- -- --    Measurable Stool (mL) 0 -- 0 -- -- --    Total Output 1350 1390 2740 -- -- --       Net I/O     -100 -700 -800 --  -- --            Intake/Output Summary (Last 24 hours) at 02/14/19 0816  Last data filed at 02/14/19 0600   Gross per 24 hour   Intake             1790 ml   Output             2740 ml   Net             -950 ml            • QUEtiapine  25 mg Q EVENING   • methadone  5 mg Q8HRS   • fludrocortisone  0.1 mg QAM   • sodium chloride  2 g 4X/DAY   • levETIRAcetam  500 mg BID   • famotidine  20 mg BID   • artificial tear ointment  1 Application Q8HRS   • midazolam  2 mg Q HOUR PRN   • acetaminophen  650 mg Q4HRS PRN   • propranolol  10 mg Q8HRS   • senna-docusate  2 Tab DAILY AT NOON   • polyethylene glycol/lytes  1 Packet DAILY AT NOON   • enoxaparin (LOVENOX) injection  40 mg DAILY   • morphine injection  2 mg Q HOUR PRN    Or   • morphine injection  4 mg Q HOUR PRN   • Pharmacy  1 Each PHARMACY TO DOSE   • cloNIDine  0.1 mg Q4HRS PRN   • magnesium hydroxide  30 mL QDAY PRN   • oxyCODONE immediate-release  2.5 mg Q3HRS PRN   • oxyCODONE immediate-release  5 mg Q3HRS PRN   • polyethylene glycol/lytes  1 Packet BID PRN   • senna-docusate  1 Tab Q24HRS PRN   • Respiratory Care per Protocol   Continuous RT   • NS   Continuous   • Pharmacy Consult Request  1 Each PHARMACY TO DOSE   • MD ALERT...DO NOT ADMINISTER NSAIDS or ASPIRIN unless ORDERED By Neurosurgery  1 Each PRN   • ondansetron  4 mg Q4HRS PRN   • hydrALAZINE  10 mg Q HOUR PRN   • bisacodyl  10 mg Q24HRS PRN   • fleet  1 Each Once PRN   • docusate sodium 100mg/10mL  100 mg BID       Assessment and Plan:  POD#19 Left Craniectomy  Okay for Lovenox  Flap soft, exam stable  ,   Hold full anticoagulation, cleared for prophylaxis  Q2 hr neuro checks, Q4 overnight  Leave sutures until site is sunken  Replace flap in 3-4 weeks, patient can also take flap with him if he travels for rehab  Work on reducing seroquel as tolerated

## 2019-02-14 NOTE — THERAPY
"Speech Language Therapy speech treatment completed.     Functional Status: Patient was seen on this date for speaking valve placement/treatment. Patient's aunt and uncle at bedside. Session performed in Kitt[place] by this SLP.  Patient on 28% FiO2 at 5L via T-piece with stable vitals (SpO2 at 98% and RR at 22). RT at bedside performing subglottic and tracheal suctioning with removal of moderate amount of secretions. Cuff was already deflated by RT. Speaking valve placed in-line with T-piece which resulted in cough x1. Patient tolerated speaking valve for 20 minutes with no significant changes in vital signs or increase in respiratory distress.  Minimal back pressure with intermittent removal of speaking valve. Patient unable to vocalize or follow directives despite MAX verbal and tactile cues but able to track SLP across room. As session progressed, RR ranging from 9 to 30, patient becoming increasingly diaphoretic and closing eyes. Therefore, speaking valve was removed, subglottic suctioning performed with moderate amount of secretions removed, and tracheal suctioning performed with removal of minimal secretions. Cuff was left deflated at request of RT.      Recommendations: At this time, would recommend speaking valve use with RT or SLP for short periods at a time (10-15 minutes or as tolerated).  SLP will follow for speaking valve trials.  He will also need Cognitive Evaluation (Kristel Neuro Sensory Stimulation Profile) and eventually swallow evaluation. Education provided to patient and family members regarding results of evaluation, role of SLP, and SLP recs.     Plan of Care: Will benefit from Speech Therapy 5 times per week    Post-Acute Therapy: Recommend inpatient transitional care services for continued speech therapy services.      See \"Rehab Therapy-Acute\" Patient Summary Report for complete documentation.     "

## 2019-02-14 NOTE — CARE PLAN
Problem: Infection  Goal: Will remain free from infection  Outcome: PROGRESSING AS EXPECTED  Standard and VAP precautions in place     Problem: Respiratory:  Goal: Respiratory status will improve  Outcome: PROGRESSING AS EXPECTED

## 2019-02-14 NOTE — PROGRESS NOTES
Trauma / Surgical Daily Progress Note    Date of Service  2/14/2019    Chief Complaint  28 y.o. male admitted 1/26/2019 with Trauma    Interval Events  Slow progress weaning ventilator  But progress none the less  Seroquel weaned yesterday, still clinically understimulated or oversedated  Tolerating tube feeds  WBC downtrending    Review of Systems  Review of Systems       Vital Signs for last 24 hours  Temp:  [36.1 °C (97 °F)-37.3 °C (99.1 °F)] 36.3 °C (97.3 °F)  Pulse:  [77-92] 79  Resp:  [6-35] 11  SpO2:  [93 %-99 %] 94 %    Hemodynamic parameters for last 24 hours       Respiratory Data  #Aerosol Therapy / Airway Management: T-Piece, Aerosol Humidity Temp (celsius): 35  Respiration: (!) 11, Pulse Oximetry: 94 %, O2 Daily Delivery Respiratory : T-Piece     Work Of Breathing / Effort: Mild  RUL Breath Sounds: Diminished, RML Breath Sounds: Diminished, RLL Breath Sounds: Diminished, STACI Breath Sounds: Diminished, LLL Breath Sounds: Diminished    Physical Exam  Physical Exam   Constitutional: No distress.   HENT:   Crani site c/d/i   Eyes: Pupils are equal, round, and reactive to light. EOM are normal.   Neck: Normal range of motion.   Trach in place, clean   Cardiovascular: Normal rate and regular rhythm.    Pulmonary/Chest: Effort normal and breath sounds normal.   Abdominal: Soft. He exhibits no distension. There is no tenderness.   Genitourinary: Penis normal.   Musculoskeletal: Normal range of motion. He exhibits no edema.   Neurological:   Opens eyes spontaneously  Tracking   Intermittently follows commands on left  RUE flexes to pain  A bit sedated   Skin: Skin is warm and dry. He is not diaphoretic.   Psychiatric:   Unable to assess     Nursing note and vitals reviewed.      Laboratory  Recent Results (from the past 24 hour(s))   CBC WITH DIFFERENTIAL    Collection Time: 02/14/19  6:35 AM   Result Value Ref Range    WBC 15.9 (H) 4.8 - 10.8 K/uL    RBC 3.79 (L) 4.70 - 6.10 M/uL    Hemoglobin 11.8 (L) 14.0 -  18.0 g/dL    Hematocrit 38.1 (L) 42.0 - 52.0 %    MCV 98.7 (H) 81.4 - 97.8 fL    MCH 30.3 27.0 - 33.0 pg    MCHC 30.7 (L) 33.7 - 35.3 g/dL    RDW 50.8 (H) 35.9 - 50.0 fL    Platelet Count 305 164 - 446 K/uL    MPV 10.9 9.0 - 12.9 fL    Neutrophils-Polys 75.90 (H) 44.00 - 72.00 %    Lymphocytes 13.80 (L) 22.00 - 41.00 %    Monocytes 5.20 0.00 - 13.40 %    Eosinophils 0.80 0.00 - 6.90 %    Basophils 0.00 0.00 - 1.80 %    Nucleated RBC 0.00 /100 WBC    Neutrophils (Absolute) 12.34 (H) 1.82 - 7.42 K/uL    Lymphs (Absolute) 2.19 1.00 - 4.80 K/uL    Monos (Absolute) 0.83 0.00 - 0.85 K/uL    Eos (Absolute) 0.13 0.00 - 0.51 K/uL    Baso (Absolute) 0.00 0.00 - 0.12 K/uL    NRBC (Absolute) 0.00 K/uL    Anisocytosis 1+     Macrocytosis 1+    COMP METABOLIC PANEL    Collection Time: 02/14/19  6:35 AM   Result Value Ref Range    Sodium 144 135 - 145 mmol/L    Potassium 4.3 3.6 - 5.5 mmol/L    Chloride 106 96 - 112 mmol/L    Co2 28 20 - 33 mmol/L    Anion Gap 10.0 0.0 - 11.9    Glucose 110 (H) 65 - 99 mg/dL    Bun 25 (H) 8 - 22 mg/dL    Creatinine 0.55 0.50 - 1.40 mg/dL    Calcium 9.9 8.5 - 10.5 mg/dL    AST(SGOT) 44 12 - 45 U/L    ALT(SGPT) 134 (H) 2 - 50 U/L    Alkaline Phosphatase 73 30 - 99 U/L    Total Bilirubin 0.5 0.1 - 1.5 mg/dL    Albumin 4.0 3.2 - 4.9 g/dL    Total Protein 7.7 6.0 - 8.2 g/dL    Globulin 3.7 (H) 1.9 - 3.5 g/dL    A-G Ratio 1.1 g/dL   MAGNESIUM    Collection Time: 02/14/19  6:35 AM   Result Value Ref Range    Magnesium 2.3 1.5 - 2.5 mg/dL   PHOSPHORUS    Collection Time: 02/14/19  6:35 AM   Result Value Ref Range    Phosphorus 4.4 2.5 - 4.5 mg/dL   ESTIMATED GFR    Collection Time: 02/14/19  6:35 AM   Result Value Ref Range    GFR If African American >60 >60 mL/min/1.73 m 2    GFR If Non African American >60 >60 mL/min/1.73 m 2   MORPHOLOGY    Collection Time: 02/14/19  6:35 AM   Result Value Ref Range    RBC Morphology Present     Polychromia 1+    PERIPHERAL SMEAR REVIEW    Collection Time: 02/14/19   6:35 AM   Result Value Ref Range    Peripheral Smear Review see below    PLATELET ESTIMATE    Collection Time: 02/14/19  6:35 AM   Result Value Ref Range    Plt Estimation Normal    DIFFERENTIAL MANUAL    Collection Time: 02/14/19  6:35 AM   Result Value Ref Range    Bands-Stabs 1.70 0.00 - 10.00 %    Myelocytes 2.60 %    Manual Diff Status PERFORMED        Fluids    Intake/Output Summary (Last 24 hours) at 02/14/19 1458  Last data filed at 02/14/19 1400   Gross per 24 hour   Intake             1890 ml   Output             1390 ml   Net              500 ml       Core Measures & Quality Metrics  Labs reviewed, Medications reviewed and Radiology images reviewed  Greer catheter: Critically Ill - Requiring Accurate Measurement of Urinary Output      DVT Prophylaxis: Enoxaparin (Lovenox)  DVT prophylaxis - mechanical: SCDs  Ulcer prophylaxis: Yes        JOHN Score    ETOH Screening      Assessment/Plan  Discharge planning issues- (present on admission)   Assessment & Plan    Awaiting bone flap replacement (early March)  Has flight benefits through insurance  Family would like him in [place]  First choice - [facility]    involved      Pulmonary embolus, right (HCC)   Assessment & Plan    2/3 Acute deterioration / inability to oxygenate precipitously 2 hours after tracheostomy.    Chest x-ray shows some increased infiltrate in the left lower lobe, patient refractory to bagging and increase PEEP, Flolan  2/5 CTA shows proximal segmental and subsegmental pulmonary emboli in the right lower lobe. No RV strain.   - IVC filter placed  Prophylactic lovenox initiated 1/29     Subdural hematoma (HCC)- (present on admission)   Assessment & Plan    Left sided holohemispheric subdural hematoma measuring approximately 9.4 mm in maximum thickness. 10 mm of left-to-right midline shift.   1/26 To OR for emergent craniotomy and evacuation of hematoma.   1/28 CT x 2 stable  2/2 MRI brainstem complete   2/18-19  Follow up CT per neuro  Tentative bone flap replacement  - timing TBD  Following on left  On Ivelisse Huntley MD. Neurosurgery.     Acute respiratory failure following trauma and surgery (HCC)- (present on admission)   Assessment & Plan    Intubated in field  Continue full mechanical ventilatory support.   Ventilator bundle  2/3 Perc trach / APRV mode  / Folan  2/14 Tolerating T- Piece      No contraindication to deep vein thrombosis (DVT) prophylaxis- (present on admission)   Assessment & Plan    Systemic anticoagulation contraindicated secondary to elevated bleeding risk.  1/27 and 2/4  Trauma screening bilateral lower extremity venous duplex negative for above knee DVT.  1/29 Lovenox initiated      Oropharyngeal dysphagia   Assessment & Plan    Cortrak in place  On TF  2/13 Speech eval - speaking valve with therapists only     Anemia associated with acute blood loss   Assessment & Plan    Critical anemia  2/6 Iron studies low - replaced per protocol.  2/7 Transfused 1 PRBC   Transfuse 1 unit PRBC if Hb < 7.0     Trauma- (present on admission)   Assessment & Plan    [place]. [place]ed seizure like activity. GCS 3. No evidence of acute trauma.  Trauma Red Activation.  Mor Roa MD, Trauma/General Surgery.     Plan:  Stop seroquel and methdone altogether. Oxycodone prn for pain. Needs to be following commands consistently prior to transfer to rehab. Maybe add stimulants tomorrow.   Wean ventilator - decrease PEEP and FiO2 and increase spontaneous breathing percentages and increase T piece times. Needs to be off ventilator prior to rehab. Downsize trach once off ventliator.  May get flap replaced prior to transfer back to [place]. Will discuss timing with Dr. Huntley.   Nutritional therapy  Follow sodium  Therapies    Discussed patient condition with RN, RT and Pharmacy.  The patient is/remains critically ill with respiratory failure, severe traumatic brain injury.    I provided the following  critical care services: vent management as outlined above.    Critical care time spent exclusive of procedures: 38 minutes.    Yasmany Henning MD  [phone]

## 2019-02-14 NOTE — ASSESSMENT & PLAN NOTE
Awaiting bone flap replacement (early March)  Has flight benefits through insurance  Family would like him in [facility]   2/21 Will need G tube placement if unable to swallow  Case coordination involved

## 2019-02-14 NOTE — PROGRESS NOTES
Dr. Huntley at bedside evaluating patient. Will do a follow up scan on Monday or Tuesday, with the plan to put the bone flap back in the next couple of weeks.

## 2019-02-14 NOTE — CARE PLAN
Problem: Bowel/Gastric:  Goal: Normal bowel function is maintained or improved    Intervention: Collaborate with Interdisciplinary Team for optimal positioning for bowel evacuation  Giving bowel protocol medications PRN to maintain normal bowel function. Hyperactive bowel sounds. Will continue to assess and monitor.         Problem: Pain Management  Goal: Pain level will decrease to patient's comfort goal    Intervention: Follow pain managment plan developed in collaboration with patient and Interdisciplinary Team  Administering scheduled pain medication as ordered, using ice packs and rest as nonpharmacological interventions.         Problem: Skin Integrity  Goal: Risk for impaired skin integrity will decrease    Intervention: Implement precautions to protect skin integrity in collaboration with the interdisciplinary team  Q2h turns, pillows used for repositioning and support. Heel float boots in place, with rotating drop boot q2h. Mepilex in place on sacrum. Wound team following meatus tear, looks to be healing appropriately. Incision on head healing appropriately. Will continue to monitor.

## 2019-02-14 NOTE — FLOWSHEET NOTE
02/14/19 0612   Weaning Trial   Weaning Trial Initiated Yes   Trache Weaning   Placed on T-Piece/Collar and Stopped Vent Clock Yes   Length of T-Piece Trial (Hours) 23   T-Piece Trial Smart Text Completed? Yes

## 2019-02-14 NOTE — CARE PLAN
Problem: Ventilation Defect:  Goal: Ability to achieve and maintain unassisted ventilation or tolerate decreased levels of ventilator support  Outcome: MET Date Met: 02/13/19      Problem: Bronchopulmonary Hygiene:  Goal: Increase mobilization of retained secretions  Outcome: PROGRESSING AS EXPECTED

## 2019-02-14 NOTE — PROGRESS NOTES
Abdominal tube placement x ray not yet read at this time. [facility].

## 2019-02-14 NOTE — CARE PLAN
Problem: Bronchopulmonary Hygiene:  Goal: Increase mobilization of retained secretions    Intervention: Perform airway suctioning  T-Piece 30% 4lpm, Vebt D/Ignacio, over 24 hours on T-piece, PMV tolerated with SLP.

## 2019-02-14 NOTE — PROGRESS NOTES
Participated in interdisciplinary rounds to discuss patient care. D/c seroquel and methadone, and use oxycodone PRN to manage pain. Main goal is to wake patient up to assess neuro status. Plan is still to transfer patient to rehab in [place] when neurosurgery replaces bone flap. To continue q2h neuro.

## 2019-02-14 NOTE — PROGRESS NOTES
Cortrak Placement    Tube Team verified patient name and medical record number prior to tube placement.  Cortrak tube (43 inches, 10 Irish) placed at 60 cm in left nare.  Per Cortrak picture, tube appears to be in the stomach.  Nursing Instructions: Awaiting KUB to confirm placement before use for medications or feeding. Once placement confirmed, flush tube with 30 ml of water, and then remove and save stylet, in patient medication drawer.

## 2019-02-15 ENCOUNTER — APPOINTMENT (OUTPATIENT)
Dept: RADIOLOGY | Facility: MEDICAL CENTER | Age: 29
DRG: 003 | End: 2019-02-15
Attending: SURGERY
Payer: COMMERCIAL

## 2019-02-15 ENCOUNTER — APPOINTMENT (OUTPATIENT)
Dept: RADIOLOGY | Facility: MEDICAL CENTER | Age: 29
DRG: 003 | End: 2019-02-15
Attending: NURSE PRACTITIONER
Payer: COMMERCIAL

## 2019-02-15 LAB
ALBUMIN SERPL BCP-MCNC: 4 G/DL (ref 3.2–4.9)
ALBUMIN/GLOB SERPL: 1.1 G/DL
ALP SERPL-CCNC: 69 U/L (ref 30–99)
ALT SERPL-CCNC: 96 U/L (ref 2–50)
ANION GAP SERPL CALC-SCNC: 11 MMOL/L (ref 0–11.9)
AST SERPL-CCNC: 34 U/L (ref 12–45)
BASOPHILS # BLD AUTO: 0.6 % (ref 0–1.8)
BASOPHILS # BLD: 0.08 K/UL (ref 0–0.12)
BILIRUB SERPL-MCNC: 0.6 MG/DL (ref 0.1–1.5)
BUN SERPL-MCNC: 26 MG/DL (ref 8–22)
CALCIUM SERPL-MCNC: 10 MG/DL (ref 8.5–10.5)
CHLORIDE SERPL-SCNC: 105 MMOL/L (ref 96–112)
CO2 SERPL-SCNC: 25 MMOL/L (ref 20–33)
CREAT SERPL-MCNC: 0.58 MG/DL (ref 0.5–1.4)
EOSINOPHIL # BLD AUTO: 0.27 K/UL (ref 0–0.51)
EOSINOPHIL NFR BLD: 2 % (ref 0–6.9)
ERYTHROCYTE [DISTWIDTH] IN BLOOD BY AUTOMATED COUNT: 48.9 FL (ref 35.9–50)
GLOBULIN SER CALC-MCNC: 3.5 G/DL (ref 1.9–3.5)
GLUCOSE SERPL-MCNC: 109 MG/DL (ref 65–99)
HCT VFR BLD AUTO: 34.3 % (ref 42–52)
HGB BLD-MCNC: 10.6 G/DL (ref 14–18)
IMM GRANULOCYTES # BLD AUTO: 0.57 K/UL (ref 0–0.11)
IMM GRANULOCYTES NFR BLD AUTO: 4.3 % (ref 0–0.9)
LYMPHOCYTES # BLD AUTO: 2.26 K/UL (ref 1–4.8)
LYMPHOCYTES NFR BLD: 17 % (ref 22–41)
MCH RBC QN AUTO: 30.2 PG (ref 27–33)
MCHC RBC AUTO-ENTMCNC: 30.9 G/DL (ref 33.7–35.3)
MCV RBC AUTO: 97.7 FL (ref 81.4–97.8)
MONOCYTES # BLD AUTO: 0.97 K/UL (ref 0–0.85)
MONOCYTES NFR BLD AUTO: 7.3 % (ref 0–13.4)
NEUTROPHILS # BLD AUTO: 9.17 K/UL (ref 1.82–7.42)
NEUTROPHILS NFR BLD: 68.8 % (ref 44–72)
NRBC # BLD AUTO: 0 K/UL
NRBC BLD-RTO: 0 /100 WBC
PLATELET # BLD AUTO: 546 K/UL (ref 164–446)
PMV BLD AUTO: 10 FL (ref 9–12.9)
POTASSIUM SERPL-SCNC: 4 MMOL/L (ref 3.6–5.5)
PROT SERPL-MCNC: 7.5 G/DL (ref 6–8.2)
RBC # BLD AUTO: 3.51 M/UL (ref 4.7–6.1)
SODIUM SERPL-SCNC: 141 MMOL/L (ref 135–145)
WBC # BLD AUTO: 13.3 K/UL (ref 4.8–10.8)

## 2019-02-15 PROCEDURE — 71045 X-RAY EXAM CHEST 1 VIEW: CPT

## 2019-02-15 PROCEDURE — 85025 COMPLETE CBC W/AUTO DIFF WBC: CPT

## 2019-02-15 PROCEDURE — 770022 HCHG ROOM/CARE - ICU (200)

## 2019-02-15 PROCEDURE — 700111 HCHG RX REV CODE 636 W/ 250 OVERRIDE (IP): Performed by: PHYSICIAN ASSISTANT

## 2019-02-15 PROCEDURE — 97530 THERAPEUTIC ACTIVITIES: CPT

## 2019-02-15 PROCEDURE — 700111 HCHG RX REV CODE 636 W/ 250 OVERRIDE (IP): Performed by: NEUROLOGICAL SURGERY

## 2019-02-15 PROCEDURE — 94640 AIRWAY INHALATION TREATMENT: CPT

## 2019-02-15 PROCEDURE — A9270 NON-COVERED ITEM OR SERVICE: HCPCS | Performed by: SURGERY

## 2019-02-15 PROCEDURE — 700102 HCHG RX REV CODE 250 W/ 637 OVERRIDE(OP): Performed by: SURGERY

## 2019-02-15 PROCEDURE — 92507 TX SP LANG VOICE COMM INDIV: CPT

## 2019-02-15 PROCEDURE — 92526 ORAL FUNCTION THERAPY: CPT

## 2019-02-15 PROCEDURE — 97112 NEUROMUSCULAR REEDUCATION: CPT

## 2019-02-15 PROCEDURE — 99291 CRITICAL CARE FIRST HOUR: CPT | Performed by: SURGERY

## 2019-02-15 PROCEDURE — 80053 COMPREHEN METABOLIC PANEL: CPT

## 2019-02-15 RX ADMIN — LEVETIRACETAM 500 MG: 500 TABLET, FILM COATED ORAL at 17:42

## 2019-02-15 RX ADMIN — SODIUM CHLORIDE TAB 1 GM 2 G: 1 TAB at 23:10

## 2019-02-15 RX ADMIN — ONDANSETRON 4 MG: 2 INJECTION INTRAMUSCULAR; INTRAVENOUS at 10:30

## 2019-02-15 RX ADMIN — PROPRANOLOL HYDROCHLORIDE 10 MG: 10 TABLET ORAL at 17:43

## 2019-02-15 RX ADMIN — MINERAL OIL AND WHITE PETROLATUM 1 APPLICATION: 150; 830 OINTMENT OPHTHALMIC at 21:45

## 2019-02-15 RX ADMIN — SODIUM CHLORIDE TAB 1 GM 2 G: 1 TAB at 17:42

## 2019-02-15 RX ADMIN — PROPRANOLOL HYDROCHLORIDE 10 MG: 10 TABLET ORAL at 21:45

## 2019-02-15 RX ADMIN — FAMOTIDINE 20 MG: 20 TABLET ORAL at 17:42

## 2019-02-15 RX ADMIN — AMANTADINE HYDROCHLORIDE 100 MG: 50 SOLUTION ORAL at 17:43

## 2019-02-15 RX ADMIN — MINERAL OIL AND WHITE PETROLATUM 1 APPLICATION: 150; 830 OINTMENT OPHTHALMIC at 05:17

## 2019-02-15 RX ADMIN — ENOXAPARIN SODIUM 40 MG: 100 INJECTION SUBCUTANEOUS at 05:17

## 2019-02-15 ASSESSMENT — COGNITIVE AND FUNCTIONAL STATUS - GENERAL
SUGGESTED CMS G CODE MODIFIER DAILY ACTIVITY: CN
HELP NEEDED FOR BATHING: TOTAL
DRESSING REGULAR LOWER BODY CLOTHING: TOTAL
MOBILITY SCORE: 6
WALKING IN HOSPITAL ROOM: TOTAL
TOILETING: TOTAL
CLIMB 3 TO 5 STEPS WITH RAILING: TOTAL
MOVING FROM LYING ON BACK TO SITTING ON SIDE OF FLAT BED: UNABLE
PERSONAL GROOMING: TOTAL
STANDING UP FROM CHAIR USING ARMS: TOTAL
SUGGESTED CMS G CODE MODIFIER MOBILITY: CN
MOVING TO AND FROM BED TO CHAIR: UNABLE
TURNING FROM BACK TO SIDE WHILE IN FLAT BAD: UNABLE
EATING MEALS: TOTAL
DAILY ACTIVITIY SCORE: 6
DRESSING REGULAR UPPER BODY CLOTHING: TOTAL

## 2019-02-15 ASSESSMENT — GAIT ASSESSMENTS: GAIT LEVEL OF ASSIST: UNABLE TO PARTICIPATE

## 2019-02-15 NOTE — CARE PLAN
Problem: Knowledge Deficit  Goal: Knowledge of the prescribed therapeutic regimen will improve  Outcome: PROGRESSING AS EXPECTED  Discussed continued plan of care with family at bedside. Answered all questions.    Problem: Skin Integrity  Goal: Risk for impaired skin integrity will decrease  Outcome: PROGRESSING AS EXPECTED  Q2H turns, heels floated, lines checked. Appropriate interventions in place.

## 2019-02-15 NOTE — THERAPY
"Speech Language Therapy dysphagia treatment completed.     Functional Status: Patient was seen on this date for speaking valve/speech treatment. Patient's mother, father, and aunt at bedside for session. Patient with improved affect and smiling during interactions with his family members. Right facial asymmetry and reduced eye gaze to the right noted. Patient on 28% FiO2 at 4L via T-piece with stable vitals (SpO2 at 97-99% and RR at 15). Cuff was already deflated and to remain deflated per RT. Subglottic and tracheal suctioning was performed with removal of minimal amount of secretions. Speaking valve was placed in-line with T-piece which patient tolerated for ~25 minutes with no change in vitals or increase in respiratory distress. Pt slightly diaphoretic at baseline per family. No back pressure noted during intermittent removal of speaking valve. Patient was able to produce a \"thumbs up\" and point to object in a field of 2 during one instance. Oral stim was performed using cold spoon which revealed delayed but adequate responses and spontaneous delayed swallow trigger, intermittently. As session progressed, patient became slightly more diaphoretic, began closing eyes, and increase in back pressure noted during removal of speaking valve. Speaking valve was removed and RN at bedside administering medications stating she would perform tracheal suctioning. Extensive education provided to patient and family regarding tracheostomy, speaking valve for improved upper airway patency/sensation, dysphagia, cognition, role of SLP and POC.     Recommendations: At this time, recommend speaking valve to be placed by trained SLP/RT/RN for up to 20-30 minutes (or as tolerated) during the day given direct supervision. SLP will continue to follow for speaking valve trials.  He will also need Cognitive Evaluation (Kristel Neuro Sensory Stimulation Profile) and eventually swallow evaluation.     Plan of Care: Will benefit from Speech " "Therapy 5 times per week    Post-Acute Therapy: Recommend inpatient transitional care services for continued speech therapy services.      See \"Rehab Therapy-Acute\" Patient Summary Report for complete documentation.     "

## 2019-02-15 NOTE — PROGRESS NOTES
Trauma / Surgical Daily Progress Note    Date of Service  2/15/2019    Chief Complaint  28 y.o. male admitted 1/26/2019 with Trauma    Interval Events  Seroquel and methadone stopped yesterday.   Family updated  Neurosurgical clarification obtained regarding timing of flap replacement  Tolerating tube feeds  WBC downtrending    Review of Systems  Review of Systems       Vital Signs for last 24 hours  Temp:  [36.6 °C (97.9 °F)-37.2 °C (99 °F)] 37.2 °C (99 °F)  Pulse:  [77-97] 92  Resp:  [8-26] 19  BP: (117)/(72) 117/72  SpO2:  [92 %-98 %] 96 %    Hemodynamic parameters for last 24 hours       Respiratory Data  #Aerosol Therapy / Airway Management: T-Piece, Aerosol Humidity Temp (celsius): 35  Respiration: 19, Pulse Oximetry: 96 %, O2 Daily Delivery Respiratory : T-Piece     Work Of Breathing / Effort: Mild  RUL Breath Sounds: Clear After Suction, RML Breath Sounds: Diminished;Crackles, RLL Breath Sounds: Diminished;Crackles, STACI Breath Sounds: Clear After Suction, LLL Breath Sounds: Diminished;Crackles    Physical Exam  Physical Exam   Constitutional: No distress.   HENT:   Crani site c/d/i   Eyes: Pupils are equal, round, and reactive to light. EOM are normal.   Neck: Normal range of motion.   Trach in place, clean   Cardiovascular: Normal rate and regular rhythm.    Pulmonary/Chest: Effort normal and breath sounds normal.   Abdominal: Soft. He exhibits no distension. There is no tenderness.   Genitourinary: Penis normal.   Musculoskeletal: Normal range of motion. He exhibits no edema.   Neurological:   Opens eyes spontaneously  Tracking   Intermittently follows commands on left  RUE flexes to pain  A bit sedated   Skin: Skin is warm and dry. He is not diaphoretic.   Psychiatric:   Unable to assess     Nursing note and vitals reviewed.      Laboratory  Recent Results (from the past 24 hour(s))   CBC WITH DIFFERENTIAL    Collection Time: 02/15/19  4:25 AM   Result Value Ref Range    WBC 13.3 (H) 4.8 - 10.8 K/uL     RBC 3.51 (L) 4.70 - 6.10 M/uL    Hemoglobin 10.6 (L) 14.0 - 18.0 g/dL    Hematocrit 34.3 (L) 42.0 - 52.0 %    MCV 97.7 81.4 - 97.8 fL    MCH 30.2 27.0 - 33.0 pg    MCHC 30.9 (L) 33.7 - 35.3 g/dL    RDW 48.9 35.9 - 50.0 fL    Platelet Count 546 (H) 164 - 446 K/uL    MPV 10.0 9.0 - 12.9 fL    Neutrophils-Polys 68.80 44.00 - 72.00 %    Lymphocytes 17.00 (L) 22.00 - 41.00 %    Monocytes 7.30 0.00 - 13.40 %    Eosinophils 2.00 0.00 - 6.90 %    Basophils 0.60 0.00 - 1.80 %    Immature Granulocytes 4.30 (H) 0.00 - 0.90 %    Nucleated RBC 0.00 /100 WBC    Neutrophils (Absolute) 9.17 (H) 1.82 - 7.42 K/uL    Lymphs (Absolute) 2.26 1.00 - 4.80 K/uL    Monos (Absolute) 0.97 (H) 0.00 - 0.85 K/uL    Eos (Absolute) 0.27 0.00 - 0.51 K/uL    Baso (Absolute) 0.08 0.00 - 0.12 K/uL    Immature Granulocytes (abs) 0.57 (H) 0.00 - 0.11 K/uL    NRBC (Absolute) 0.00 K/uL   COMP METABOLIC PANEL    Collection Time: 02/15/19  4:25 AM   Result Value Ref Range    Sodium 141 135 - 145 mmol/L    Potassium 4.0 3.6 - 5.5 mmol/L    Chloride 105 96 - 112 mmol/L    Co2 25 20 - 33 mmol/L    Anion Gap 11.0 0.0 - 11.9    Glucose 109 (H) 65 - 99 mg/dL    Bun 26 (H) 8 - 22 mg/dL    Creatinine 0.58 0.50 - 1.40 mg/dL    Calcium 10.0 8.5 - 10.5 mg/dL    AST(SGOT) 34 12 - 45 U/L    ALT(SGPT) 96 (H) 2 - 50 U/L    Alkaline Phosphatase 69 30 - 99 U/L    Total Bilirubin 0.6 0.1 - 1.5 mg/dL    Albumin 4.0 3.2 - 4.9 g/dL    Total Protein 7.5 6.0 - 8.2 g/dL    Globulin 3.5 1.9 - 3.5 g/dL    A-G Ratio 1.1 g/dL   ESTIMATED GFR    Collection Time: 02/15/19  4:25 AM   Result Value Ref Range    GFR If African American >60 >60 mL/min/1.73 m 2    GFR If Non African American >60 >60 mL/min/1.73 m 2       Fluids    Intake/Output Summary (Last 24 hours) at 02/15/19 1358  Last data filed at 02/15/19 0600   Gross per 24 hour   Intake              870 ml   Output              910 ml   Net              -40 ml       Core Measures & Quality Metrics  Labs reviewed, Medications  reviewed and Radiology images reviewed  Greer catheter: Critically Ill - Requiring Accurate Measurement of Urinary Output      DVT Prophylaxis: Enoxaparin (Lovenox)  DVT prophylaxis - mechanical: SCDs  Ulcer prophylaxis: Yes        JOHN Score    ETOH Screening      Assessment/Plan  Discharge planning issues- (present on admission)   Assessment & Plan    Awaiting bone flap replacement (early March)  Has flight benefits through insurance  Family would like him in [facility]    involved      Pulmonary embolus, right (HCC)   Assessment & Plan    2/3 Acute deterioration / inability to oxygenate precipitously 2 hours after tracheostomy.    Chest x-ray shows some increased infiltrate in the left lower lobe, patient refractory to bagging and increase PEEP, Flolan  2/5 CTA shows proximal segmental and subsegmental pulmonary emboli in the right lower lobe. No RV strain.   - IVC filter placed  Prophylactic lovenox initiated 1/29     Subdural hematoma (HCC)- (present on admission)   Assessment & Plan    Left sided holohemispheric subdural hematoma measuring approximately 9.4 mm in maximum thickness. 10 mm of left-to-right midline shift.   1/26 To OR for emergent craniotomy and evacuation of hematoma.   1/28 CT x 2 stable  2/2 MRI brainstem complete   2/18-19 Follow up CT per neuro  Tentative bone flap replacement  - timing pending results of CT scan planned 2/19  Following on left  On Ivelisse Huntley MD. Neurosurgery.     Acute respiratory failure following trauma and surgery (HCC)- (present on admission)   Assessment & Plan    Intubated in field  Continue full mechanical ventilatory support.   Ventilator bundle  2/3 Perc trach / APRV mode  / Folan  2/14 Tolerating T- Piece      No contraindication to deep vein thrombosis (DVT) prophylaxis- (present on admission)   Assessment & Plan    Systemic anticoagulation contraindicated secondary to elevated bleeding risk.  1/27 and  2/4  Trauma screening bilateral lower extremity venous duplex negative for above knee DVT.  1/29 Lovenox initiated      Oropharyngeal dysphagia   Assessment & Plan    Cortrak in place  On TF  2/13 Speech eval - speaking valve with therapists only     Anemia associated with acute blood loss   Assessment & Plan    Critical anemia  2/6 Iron studies low - replaced per protocol.  2/7 Transfused 1 PRBC   Transfuse 1 unit PRBC if Hb < 7.0     Trauma- (present on admission)   Assessment & Plan    [place]. [place]ed [place] like activity. GCS 3. No evidence of acute trauma.  Trauma Red Activation.  Mor Roa MD, Trauma/General Surgery.     Plan:  Seroquel and methadone have been stopped and he is still a bit sedated so will add amantadine today.  Downsize trach. Still needs aggressive pulmonary toilet with frequent suctioning in the setting of severe TBI. He is at high risk of deterioration with loss of vital organ function.  Plan repeat CT head 2/19 and timing of transfer to [place] pending timing of flap replacement.    Nutritional therapy  Follow sodium  Therapies    Discussed patient condition with RN, RT and Pharmacy.  The patient is/remains critically ill with respiratory failure, severe traumatic brain injury.    I provided the following critical care services: management of above    Critical care time spent exclusive of procedures: 36 minutes.    Yasmany Henning MD  [phone]

## 2019-02-15 NOTE — PROGRESS NOTES
Cortrak Placement    Tube Team verified patient name and medical record number prior to tube placement.  Cortrak tube (43 inches, 10 [place]e) placed at 90 cm in left nare.  Per Cortrak picture, tube appears to be in the small bowel.  Nursing Instructions: Awaiting KUB to confirm placement before use for medications or feeding. Once placement confirmed, flush tube with 30 ml of water, and then remove and save stylet, in patient medication drawer.

## 2019-02-15 NOTE — THERAPY
"Occupational Therapy Treatment completed with focus on ADLs and ADL transfers.  Functional Status:  Dependent with ADLs and txfs  Plan of Care: Will benefit from Occupational Therapy 3 times per week  Discharge Recommendations:  Equipment Will Continue to Assess for Equipment Needs. Post-acute therapy Discharge to a transitional care facility for continued therapy services.    See \"Rehab Therapy-Acute\" Patient Summary Report for complete documentation.   "

## 2019-02-15 NOTE — PROGRESS NOTES
Neurosurgery Progress Note    Subjective:  No NSX changes overnight  Opens eyes   Tracking   Intermittently following on left - not this morning  Flexes on the right to pain  Resting comfortably  Vent and seroquel weaning    PERRL   Salt tabs 2gm QID - Na 141  CT  was stable/improved  Crani site only mildly swollen this morning - improved    Exam:  As above    BP  Min: 117/72  Max: 117/72  Pulse  Av.3  Min: 77  Max: 97  Resp  Avg: 15.4  Min: 9  Max: 26  Temp  Av.7 °C (98 °F)  Min: 36.3 °C (97.3 °F)  Max: 36.9 °C (98.4 °F)  SpO2  Av.2 %  Min: 92 %  Max: 99 %    No Data Recorded    Recent Labs      02/13/19   0427  02/14/19   0635  02/15/19   0425   WBC  14.7*  15.9*  13.3*   RBC  3.06*  3.79*  3.51*   HEMOGLOBIN  9.4*  11.8*  10.6*   HEMATOCRIT  30.5*  38.1*  34.3*   MCV  99.7*  98.7*  97.7   MCH  30.7  30.3  30.2   MCHC  30.8*  30.7*  30.9*   RDW  49.5  50.8*  48.9   PLATELETCT  505*  305  546*   MPV  10.0  10.9  10.0     Recent Labs      1935  02/15/19   0425   SODIUM  144  144  141   POTASSIUM  3.9  4.3  4.0   CHLORIDE  108  106  105   CO2  26  28  25   GLUCOSE  122*  110*  109*   BUN  30*  25*  26*   CREATININE  0.54  0.55  0.58   CALCIUM  8.5  9.9  10.0               Intake/Output       19 - 02/15/19 0659 02/15/19 0700 - 19 0659       Total  Total       Intake    Other  220  60 280  --  -- --    Medications (PO/Enteral Liquids) 70 -- 70 -- -- --    Flush / Irrigation Volume (Cortrak) 150 60 210 -- -- --    NG/GT  720  360 1080  --  -- --    Intake (mL) (Enteral Tube 02/14/19 Cortrak - Gastric 10 Fr. Left nare)  -- -- --    Total Intake  -- -- --       Output    Urine  610  600 1210  --  -- --    Number of Times Incontinent of Urine -- 3 x 3 x -- -- --    Urine Void (mL)  -- -- --    Emesis  --  -- --  --  -- --    Emesis - Number of Times 1 x -- 1 x -- -- --    Stool  --  --  --  --  -- --    Number of Times Stooled 2 x 0 x 2 x -- -- --    Total Output  -- -- --       Net I/O     330 -180 150 -- -- --            Intake/Output Summary (Last 24 hours) at 02/15/19 0759  Last data filed at 02/15/19 0600   Gross per 24 hour   Intake             1360 ml   Output             1210 ml   Net              150 ml            • fludrocortisone  0.1 mg QAM   • sodium chloride  2 g 4X/DAY   • levETIRAcetam  500 mg BID   • famotidine  20 mg BID   • artificial tear ointment  1 Application Q8HRS   • midazolam  2 mg Q HOUR PRN   • acetaminophen  650 mg Q4HRS PRN   • propranolol  10 mg Q8HRS   • senna-docusate  2 Tab DAILY AT NOON   • polyethylene glycol/lytes  1 Packet DAILY AT NOON   • enoxaparin (LOVENOX) injection  40 mg DAILY   • morphine injection  2 mg Q HOUR PRN    Or   • morphine injection  4 mg Q HOUR PRN   • Pharmacy  1 Each PHARMACY TO DOSE   • cloNIDine  0.1 mg Q4HRS PRN   • magnesium hydroxide  30 mL QDAY PRN   • oxyCODONE immediate-release  2.5 mg Q3HRS PRN   • oxyCODONE immediate-release  5 mg Q3HRS PRN   • polyethylene glycol/lytes  1 Packet BID PRN   • senna-docusate  1 Tab Q24HRS PRN   • Respiratory Care per Protocol   Continuous RT   • NS   Continuous   • Pharmacy Consult Request  1 Each PHARMACY TO DOSE   • MD ALERT...DO NOT ADMINISTER NSAIDS or ASPIRIN unless ORDERED By Neurosurgery  1 Each PRN   • ondansetron  4 mg Q4HRS PRN   • hydrALAZINE  10 mg Q HOUR PRN   • bisacodyl  10 mg Q24HRS PRN   • fleet  1 Each Once PRN   • docusate sodium 100mg/10mL  100 mg BID       Assessment and Plan:  POD#20 Left Craniectomy  Okay for Lovenox  Crani site soft, exam stable  ,   Hold full anticoagulation, cleared for prophylaxis  Q2 hr neuro checks, Q4 overnight  Leave sutures until site is sunken  Replace flap in 3-4 weeks, patient can also take flap with him if he travels for rehab  Work on reducing seroquel as tolerated  Repeat CT Head on Tuesday next week

## 2019-02-15 NOTE — CARE PLAN
Problem: Bronchopulmonary Hygiene:  Goal: Increase mobilization of retained secretions    Intervention: Perform bronchopulmonary therapy as indicated by assessment  Pt is on 4LPM   Fio2 is 28%  SPO2 is 97%   Suctioning as needed

## 2019-02-15 NOTE — CARE PLAN
Problem: Respiratory:  Goal: Respiratory status will improve  Outcome: PROGRESSING AS EXPECTED  Patient remains off the ventilator, increasing secretions. Suctioning PRN, performing q2h oral care, will continue to assess and work with RT.       Problem: Traumatic Brain Injury  Goal: Optimal care of the traumatic brain injury patient    Intervention: Baseline assessement documented to include neuro assessment  Assessing patient neuro status q2h. Encouraging purposeful movement. Using noxious stimuli when necessary.

## 2019-02-15 NOTE — PROGRESS NOTES
Participated in interdisciplinary rounds to discuss patient care. D/c IVMF, replace coretrak ok to place bridle, and MD to clean up some orders.

## 2019-02-15 NOTE — CARE PLAN
Problem: Bronchopulmonary Hygiene:  Goal: Increase mobilization of retained secretions  T piece at 28%. Patient tolerated well.

## 2019-02-16 ENCOUNTER — APPOINTMENT (OUTPATIENT)
Dept: RADIOLOGY | Facility: MEDICAL CENTER | Age: 29
DRG: 003 | End: 2019-02-16
Attending: NURSE PRACTITIONER
Payer: COMMERCIAL

## 2019-02-16 LAB
ALBUMIN SERPL BCP-MCNC: 4.4 G/DL (ref 3.2–4.9)
ALBUMIN/GLOB SERPL: 1.1 G/DL
ALP SERPL-CCNC: 77 U/L (ref 30–99)
ALT SERPL-CCNC: 99 U/L (ref 2–50)
ANION GAP SERPL CALC-SCNC: 8 MMOL/L (ref 0–11.9)
AST SERPL-CCNC: 44 U/L (ref 12–45)
BASOPHILS # BLD AUTO: 0.6 % (ref 0–1.8)
BASOPHILS # BLD: 0.08 K/UL (ref 0–0.12)
BILIRUB SERPL-MCNC: 0.6 MG/DL (ref 0.1–1.5)
BUN SERPL-MCNC: 30 MG/DL (ref 8–22)
CALCIUM SERPL-MCNC: 10.4 MG/DL (ref 8.5–10.5)
CHLORIDE SERPL-SCNC: 106 MMOL/L (ref 96–112)
CO2 SERPL-SCNC: 26 MMOL/L (ref 20–33)
CREAT SERPL-MCNC: 0.62 MG/DL (ref 0.5–1.4)
EOSINOPHIL # BLD AUTO: 0.28 K/UL (ref 0–0.51)
EOSINOPHIL NFR BLD: 2.1 % (ref 0–6.9)
ERYTHROCYTE [DISTWIDTH] IN BLOOD BY AUTOMATED COUNT: 48.2 FL (ref 35.9–50)
GLOBULIN SER CALC-MCNC: 3.9 G/DL (ref 1.9–3.5)
GLUCOSE SERPL-MCNC: 131 MG/DL (ref 65–99)
HCT VFR BLD AUTO: 38.6 % (ref 42–52)
HGB BLD-MCNC: 12.2 G/DL (ref 14–18)
IMM GRANULOCYTES # BLD AUTO: 0.34 K/UL (ref 0–0.11)
IMM GRANULOCYTES NFR BLD AUTO: 2.5 % (ref 0–0.9)
LYMPHOCYTES # BLD AUTO: 2.01 K/UL (ref 1–4.8)
LYMPHOCYTES NFR BLD: 15 % (ref 22–41)
MCH RBC QN AUTO: 30.3 PG (ref 27–33)
MCHC RBC AUTO-ENTMCNC: 31.6 G/DL (ref 33.7–35.3)
MCV RBC AUTO: 96 FL (ref 81.4–97.8)
MONOCYTES # BLD AUTO: 0.98 K/UL (ref 0–0.85)
MONOCYTES NFR BLD AUTO: 7.3 % (ref 0–13.4)
NEUTROPHILS # BLD AUTO: 9.69 K/UL (ref 1.82–7.42)
NEUTROPHILS NFR BLD: 72.5 % (ref 44–72)
NRBC # BLD AUTO: 0 K/UL
NRBC BLD-RTO: 0 /100 WBC
PLATELET # BLD AUTO: 675 K/UL (ref 164–446)
PMV BLD AUTO: 9.6 FL (ref 9–12.9)
POTASSIUM SERPL-SCNC: 4.1 MMOL/L (ref 3.6–5.5)
PROT SERPL-MCNC: 8.3 G/DL (ref 6–8.2)
RBC # BLD AUTO: 4.02 M/UL (ref 4.7–6.1)
SODIUM SERPL-SCNC: 140 MMOL/L (ref 135–145)
WBC # BLD AUTO: 13.4 K/UL (ref 4.8–10.8)

## 2019-02-16 PROCEDURE — 94640 AIRWAY INHALATION TREATMENT: CPT

## 2019-02-16 PROCEDURE — 99233 SBSQ HOSP IP/OBS HIGH 50: CPT | Performed by: SURGERY

## 2019-02-16 PROCEDURE — 302136 NUTRITION PUMP: Performed by: SURGERY

## 2019-02-16 PROCEDURE — 71045 X-RAY EXAM CHEST 1 VIEW: CPT

## 2019-02-16 PROCEDURE — 770001 HCHG ROOM/CARE - MED/SURG/GYN PRIV*

## 2019-02-16 PROCEDURE — A9270 NON-COVERED ITEM OR SERVICE: HCPCS | Performed by: SURGERY

## 2019-02-16 PROCEDURE — 700102 HCHG RX REV CODE 250 W/ 637 OVERRIDE(OP): Performed by: SURGERY

## 2019-02-16 PROCEDURE — 85025 COMPLETE CBC W/AUTO DIFF WBC: CPT

## 2019-02-16 PROCEDURE — A9270 NON-COVERED ITEM OR SERVICE: HCPCS | Performed by: NEUROLOGICAL SURGERY

## 2019-02-16 PROCEDURE — 80053 COMPREHEN METABOLIC PANEL: CPT

## 2019-02-16 PROCEDURE — 700112 HCHG RX REV CODE 229: Performed by: NEUROLOGICAL SURGERY

## 2019-02-16 PROCEDURE — 700111 HCHG RX REV CODE 636 W/ 250 OVERRIDE (IP): Performed by: PHYSICIAN ASSISTANT

## 2019-02-16 RX ADMIN — SODIUM CHLORIDE TAB 1 GM 2 G: 1 TAB at 09:27

## 2019-02-16 RX ADMIN — PROPRANOLOL HYDROCHLORIDE 10 MG: 10 TABLET ORAL at 04:57

## 2019-02-16 RX ADMIN — DOCUSATE SODIUM 100 MG: 50 LIQUID ORAL at 04:55

## 2019-02-16 RX ADMIN — LEVETIRACETAM 500 MG: 500 TABLET, FILM COATED ORAL at 16:47

## 2019-02-16 RX ADMIN — FLUDROCORTISONE ACETATE 0.1 MG: 0.1 TABLET ORAL at 04:57

## 2019-02-16 RX ADMIN — ENOXAPARIN SODIUM 40 MG: 100 INJECTION SUBCUTANEOUS at 04:55

## 2019-02-16 RX ADMIN — SODIUM CHLORIDE TAB 1 GM 2 G: 1 TAB at 21:33

## 2019-02-16 RX ADMIN — MINERAL OIL AND WHITE PETROLATUM 1 APPLICATION: 150; 830 OINTMENT OPHTHALMIC at 04:57

## 2019-02-16 RX ADMIN — DOCUSATE SODIUM 100 MG: 50 LIQUID ORAL at 16:47

## 2019-02-16 RX ADMIN — MINERAL OIL AND WHITE PETROLATUM 1 APPLICATION: 150; 830 OINTMENT OPHTHALMIC at 21:37

## 2019-02-16 RX ADMIN — OXYCODONE HYDROCHLORIDE 5 MG: 5 TABLET ORAL at 12:08

## 2019-02-16 RX ADMIN — PROPRANOLOL HYDROCHLORIDE 10 MG: 10 TABLET ORAL at 16:40

## 2019-02-16 RX ADMIN — FAMOTIDINE 20 MG: 20 TABLET ORAL at 16:47

## 2019-02-16 RX ADMIN — MINERAL OIL AND WHITE PETROLATUM 1 APPLICATION: 150; 830 OINTMENT OPHTHALMIC at 16:47

## 2019-02-16 RX ADMIN — PROPRANOLOL HYDROCHLORIDE 10 MG: 10 TABLET ORAL at 21:33

## 2019-02-16 RX ADMIN — FAMOTIDINE 20 MG: 20 TABLET ORAL at 04:55

## 2019-02-16 RX ADMIN — SODIUM CHLORIDE TAB 1 GM 2 G: 1 TAB at 12:07

## 2019-02-16 RX ADMIN — AMANTADINE HYDROCHLORIDE 200 MG: 50 SOLUTION ORAL at 16:49

## 2019-02-16 RX ADMIN — AMANTADINE HYDROCHLORIDE 100 MG: 50 SOLUTION ORAL at 04:56

## 2019-02-16 RX ADMIN — SODIUM CHLORIDE TAB 1 GM 2 G: 1 TAB at 16:47

## 2019-02-16 RX ADMIN — LEVETIRACETAM 500 MG: 500 TABLET, FILM COATED ORAL at 04:55

## 2019-02-16 ASSESSMENT — ENCOUNTER SYMPTOMS: CONSTITUTIONAL NEGATIVE: 1

## 2019-02-16 NOTE — CARE PLAN
Problem: Infection  Goal: Will remain free from infection  Standard precautions utilized.    Problem: Venous Thromboembolism (VTW)/Deep Vein Thrombosis (DVT) Prevention:  Goal: Patient will participate in Venous Thrombosis (VTE)/Deep Vein Thrombosis (DVT)Prevention Measures  SCDs inflating on BLE    Problem: Pain Management  Goal: Pain level will decrease to patient's comfort goal  Pain assessed q2h and PRN. Pt medicated per MAR. Non-pharmacologic measures utilized.

## 2019-02-16 NOTE — WOUND TEAM
Wound consult received for achilles and tip of penis. Patient seen at bedside. Bilateral achilles assessed, area is pink but blanching. Patient's left foot is currently in heel float boot. Right foot is in a ankle contracture boot. Condom cath removed. Cleansed penis w/ warm wash cloth. Tip of penis noted to be pink, blanching when palpated. No sting barrier applied to penis, new condom cath applied. No advanced wound care needs at this time. Pt's mom and bedside RN updated.

## 2019-02-16 NOTE — PROGRESS NOTES
Trauma / Surgical Daily Progress Note    Date of Service  2/16/2019    Chief Complaint  28 y.o. male admitted 1/26/2019 as [place]  POD # 21 - Emergent crani with evacuation of hematoma     Interval Events  Limited tertiary performed   RAP/SBIRT complete   Trach day 13 - downsize today  Tolerating intermittent PMV trials  Tolerating T- piece  CT pending 2/19 for eval of bone flap replacement   Amantadine initiated   Transfer to adan - Dr. Sanon (covering for Dr. Roa) notified     Review of Systems  Review of Systems   Unable to perform ROS: Mental status change   Constitutional: Negative.         Vital Signs  Temp:  [37.2 °C (99 °F)-37.7 °C (99.8 °F)] 37.7 °C (99.8 °F)  Pulse:  [] 105  Resp:  [14-43] 27  SpO2:  [94 %-98 %] 97 %    Physical Exam  Physical Exam   Constitutional: He appears well-developed. No distress.   HENT:   Left head soft, sutures in place - no bone flap   Neck:   Trach site clean - T piece   Pulmonary/Chest: Effort normal and breath sounds normal. No respiratory distress.   Abdominal: Soft. There is no tenderness.   Genitourinary:   Genitourinary Comments: Condom cath - clear yellow urine    Neurological:   Open eyes to voice  Not following commands this morning   Spontaneous left upper extremity movement    Skin: Skin is warm and dry.   Nursing note and vitals reviewed.      Laboratory  Recent Results (from the past 24 hour(s))   CBC WITH DIFFERENTIAL    Collection Time: 02/16/19  5:05 AM   Result Value Ref Range    WBC 13.4 (H) 4.8 - 10.8 K/uL    RBC 4.02 (L) 4.70 - 6.10 M/uL    Hemoglobin 12.2 (L) 14.0 - 18.0 g/dL    Hematocrit 38.6 (L) 42.0 - 52.0 %    MCV 96.0 81.4 - 97.8 fL    MCH 30.3 27.0 - 33.0 pg    MCHC 31.6 (L) 33.7 - 35.3 g/dL    RDW 48.2 35.9 - 50.0 fL    Platelet Count 675 (H) 164 - 446 K/uL    MPV 9.6 9.0 - 12.9 fL    Neutrophils-Polys 72.50 (H) 44.00 - 72.00 %    Lymphocytes 15.00 (L) 22.00 - 41.00 %    Monocytes 7.30 0.00 - 13.40 %     Eosinophils 2.10 0.00 - 6.90 %    Basophils 0.60 0.00 - 1.80 %    Immature Granulocytes 2.50 (H) 0.00 - 0.90 %    Nucleated RBC 0.00 /100 WBC    Neutrophils (Absolute) 9.69 (H) 1.82 - 7.42 K/uL    Lymphs (Absolute) 2.01 1.00 - 4.80 K/uL    Monos (Absolute) 0.98 (H) 0.00 - 0.85 K/uL    Eos (Absolute) 0.28 0.00 - 0.51 K/uL    Baso (Absolute) 0.08 0.00 - 0.12 K/uL    Immature Granulocytes (abs) 0.34 (H) 0.00 - 0.11 K/uL    NRBC (Absolute) 0.00 K/uL   COMP METABOLIC PANEL    Collection Time: 02/16/19  5:05 AM   Result Value Ref Range    Sodium 140 135 - 145 mmol/L    Potassium 4.1 3.6 - 5.5 mmol/L    Chloride 106 96 - 112 mmol/L    Co2 26 20 - 33 mmol/L    Anion Gap 8.0 0.0 - 11.9    Glucose 131 (H) 65 - 99 mg/dL    Bun 30 (H) 8 - 22 mg/dL    Creatinine 0.62 0.50 - 1.40 mg/dL    Calcium 10.4 8.5 - 10.5 mg/dL    AST(SGOT) 44 12 - 45 U/L    ALT(SGPT) 99 (H) 2 - 50 U/L    Alkaline Phosphatase 77 30 - 99 U/L    Total Bilirubin 0.6 0.1 - 1.5 mg/dL    Albumin 4.4 3.2 - 4.9 g/dL    Total Protein 8.3 (H) 6.0 - 8.2 g/dL    Globulin 3.9 (H) 1.9 - 3.5 g/dL    A-G Ratio 1.1 g/dL   ESTIMATED GFR    Collection Time: 02/16/19  5:05 AM   Result Value Ref Range    GFR If African American >60 >60 mL/min/1.73 m 2    GFR If Non African American >60 >60 mL/min/1.73 m 2       Fluids    Intake/Output Summary (Last 24 hours) at 02/16/19 1059  Last data filed at 02/16/19 0600   Gross per 24 hour   Intake             1140 ml   Output              990 ml   Net              150 ml       Core Measures & Quality Metrics  Labs reviewed  Greer catheter: No Greer      DVT Prophylaxis: Enoxaparin (Lovenox) (IVC filter )  DVT prophylaxis - mechanical: SCDs  Ulcer prophylaxis: Yes    Assessed for rehab: Patient was assess for and/or received rehabilitation services during this hospitalization    Total Score: 3    ETOH Screening     Reason for no ETOH Intervention: Traumatic Brain Injury  ETOH Screening     Intervention complete date:  2/16/2019        Assessment/Plan  Discharge planning issues- (present on admission)   Assessment & Plan    Awaiting bone flap replacement (early March)  Has flight benefits through insurance  Family would like him in [place]  First choice - [facility]    involved      Pulmonary embolus, right (HCC)   Assessment & Plan    2/3 Acute deterioration / inability to oxygenate precipitously 2 hours after tracheostomy.    Chest x-ray shows some increased infiltrate in the left lower lobe, patient refractory to bagging and increase PEEP, Flolan  2/5 CTA shows proximal segmental and subsegmental pulmonary emboli in the right lower lobe. No RV strain.   - IVC filter placed  Prophylactic lovenox initiated 1/29     Subdural hematoma (HCC)- (present on admission)   Assessment & Plan    Left sided holohemispheric subdural hematoma measuring approximately 9.4 mm in maximum thickness. 10 mm of left-to-right midline shift.   1/26 To OR for emergent craniotomy and evacuation of hematoma.   1/28 CT x 2 stable  2/2 MRI brainstem complete   2/18-19 Follow up CT per neuro  Tentative bone flap replacement  - timing pending results of CT scan planned 2/19  Following on left  On Ivelisse Huntley MD. Neurosurgery.     Acute respiratory failure following trauma and surgery (HCC)- (present on admission)   Assessment & Plan    Intubated in field  Continue full mechanical ventilatory support.   Ventilator bundle  2/3 Perc trach / APRV mode  / Folan  2/14 Tolerating T- Piece   2/16 Downsize trach to 6.0 cuffless, non fenestrated      No contraindication to deep vein thrombosis (DVT) prophylaxis- (present on admission)   Assessment & Plan    Systemic anticoagulation contraindicated secondary to elevated bleeding risk.  1/27 and 2/4  Trauma screening bilateral lower extremity venous duplex negative for above knee DVT.  1/29 Lovenox initiated      Oropharyngeal dysphagia   Assessment & Plan    Cortrak in place  On  TF  2/13 Speech eval - speaking valve with therapists only     Anemia associated with acute blood loss   Assessment & Plan    Critical anemia  2/6 Iron studies low - replaced per protocol.  2/7 Transfused 1 PRBC   Transfuse 1 unit PRBC if Hb < 7.0     Trauma- (present on admission)   Assessment & Plan    [place] Witnessed seizure like activity. GCS 3. No evidence of acute trauma.  Trauma Red Activation.  Mor Roa MD, Trauma/General Surgery.     Discussed patient condition with RN, Patient and trauma surgery. Dr. Henning     Patient seen, data reviewed and discussed.  Agree with assessment and plan.   Slow continued improvement. Slightly more awake and engaging today. Will increase amantadine dose.  Appropriate for adan transfer. CT head 1/19 to determine timing of bone flap replacement and transfer back to [facility].    Critical care time: 38 minutes.    Yasmany Henning MD  [phone]

## 2019-02-16 NOTE — PROGRESS NOTES
2 RN skin check complete.     - L crani incision site; approximated w/ sutures; JODY; old drainage noted  - scattered red papulae on skin  - penile skin tear; red, pink; appropriate wound interventions in place

## 2019-02-16 NOTE — PROGRESS NOTES
Neurosurgery Progress Note    Subjective:  No events    Exam:  Opens eyes to voice  Perrl, dysconjugate  No motor x4  C/d/i flap soft    Pulse  Av.4  Min: 84  Max: 122  Resp  Av.1  Min: 14  Max: 43  Temp  Av.4 °C (99.3 °F)  Min: 37.2 °C (99 °F)  Max: 37.7 °C (99.8 °F)  SpO2  Av.6 %  Min: 94 %  Max: 98 %    No Data Recorded    Recent Labs      19   0635  02/15/19   0425  19   0505   WBC  15.9*  13.3*  13.4*   RBC  3.79*  3.51*  4.02*   HEMOGLOBIN  11.8*  10.6*  12.2*   HEMATOCRIT  38.1*  34.3*  38.6*   MCV  98.7*  97.7  96.0   MCH  30.3  30.2  30.3   MCHC  30.7*  30.9*  31.6*   RDW  50.8*  48.9  48.2   PLATELETCT  305  546*  675*   MPV  10.9  10.0  9.6     Recent Labs      19   0635  02/15/19   0425  19   0505   SODIUM  144  141  140   POTASSIUM  4.3  4.0  4.1   CHLORIDE  106  105  106   CO2  28  25  26   GLUCOSE  110*  109*  131*   BUN  25*  26*  30*   CREATININE  0.55  0.58  0.62   CALCIUM  9.9  10.0  10.4               Intake/Output       02/15/19 0700 - 1959 19 - 19 0659       Total  Total       Intake    Other  30  270 300  --  -- --    Medications (PO/Enteral Liquids) -- 60 60 -- -- --    Flush / Irrigation Volume (Cortrak) 30 210 240 -- -- --    NG/GT  120  720 840  --  -- --    Intake (mL) (Enteral Tube 02/15/19 Cortrak - Gastric 10 Fr. Right nare) 120 720 840 -- -- --    Total Intake  -- -- --       Output    Urine  660  550 1210  --  -- --    Urine Void (mL)  -- -- --    Stool  --  -- --  --  -- --    Number of Times Stooled 1 x -- 1 x -- -- --    Total Output  -- -- --       Net I/O     -510 440 -70 -- -- --            Intake/Output Summary (Last 24 hours) at 19 1040  Last data filed at 19 0600   Gross per 24 hour   Intake             1140 ml   Output              990 ml   Net              150 ml            • amantadine  200 mg BID   • fludrocortisone   0.1 mg QAM   • sodium chloride  2 g 4X/DAY   • levETIRAcetam  500 mg BID   • famotidine  20 mg BID   • artificial tear ointment  1 Application Q8HRS   • midazolam  2 mg Q HOUR PRN   • acetaminophen  650 mg Q4HRS PRN   • propranolol  10 mg Q8HRS   • senna-docusate  2 Tab DAILY AT NOON   • polyethylene glycol/lytes  1 Packet DAILY AT NOON   • enoxaparin (LOVENOX) injection  40 mg DAILY   • morphine injection  2 mg Q HOUR PRN    Or   • morphine injection  4 mg Q HOUR PRN   • Pharmacy  1 Each PHARMACY TO DOSE   • cloNIDine  0.1 mg Q4HRS PRN   • magnesium hydroxide  30 mL QDAY PRN   • oxyCODONE immediate-release  2.5 mg Q3HRS PRN   • oxyCODONE immediate-release  5 mg Q3HRS PRN   • polyethylene glycol/lytes  1 Packet BID PRN   • senna-docusate  1 Tab Q24HRS PRN   • Respiratory Care per Protocol   Continuous RT   • NS   Continuous   • Pharmacy Consult Request  1 Each PHARMACY TO DOSE   • MD ALERT...DO NOT ADMINISTER NSAIDS or ASPIRIN unless ORDERED By Neurosurgery  1 Each PRN   • ondansetron  4 mg Q4HRS PRN   • hydrALAZINE  10 mg Q HOUR PRN   • bisacodyl  10 mg Q24HRS PRN   • fleet  1 Each Once PRN   • docusate sodium 100mg/10mL  100 mg BID       Assessment and Plan:  POD#21 Left Craniectomy  Crani site soft, exam stable  Hold full anticoagulation, cleared for prophylaxis  Q4 hr neuro checks  Leave sutures until site is sunken  Replace flap in 3-4 weeks, patient can also take flap with him if he travels for rehab

## 2019-02-16 NOTE — PROGRESS NOTES
Cortrak Placement    Tube Team verified patient name and medical record number prior to tube placement.  Cortrak tube (43 inches, 10 Macedonian) placed at 93 cm in right nare.  Per Cortrak picture, tube appears to be in the small bowel.  Nursing Instructions: Awaiting KUB to confirm placement before use for medications or feeding. Once placement confirmed, flush tube with 30 ml of water, and then remove and save stylet, in patient medication drawer.

## 2019-02-16 NOTE — PROGRESS NOTES
Report given to RN on Neurosurgery. Pt transported to unit with ACLS RN and transport staff. All patient belongings, chart, and medications were sent with patient and safely delivered to patient new room S-142.

## 2019-02-16 NOTE — CARE PLAN
Problem: Bronchopulmonary Hygiene:  Goal: Increase mobilization of retained secretions  Outcome: PROGRESSING AS EXPECTED  T-piece, 4L 28%

## 2019-02-16 NOTE — CARE PLAN
Problem: Safety  Goal: Will remain free from injury    Intervention: Provide assistance with mobility  Q1h ROM with upper extremities and lower extremities. Educated the family on ROM exercises they can do with the patient to help decrease patient stiffness and loss of muscle.           Problem: Respiratory:  Goal: Respiratory status will improve    Intervention: Assess and monitor pulmonary status  Suctioning as needed, encouraging deep breathing and coughing. Oral care q2h. HOB at 30 degrees. Will continue to work with RT to titrated oxygen as appropriate.

## 2019-02-16 NOTE — THERAPY
"Physical Therapy Treatment completed.   Bed Mobility:  Supine to Sit: Total Assist X 2  Transfers: Sit to Stand: Unable to Participate  Gait: Level Of Assist: Unable to Participate with No Equipment Needed       Plan of Care: Will benefit from Physical Therapy 3 times per week  Discharge Recommendations: Equipment: Will Continue to Assess for Equipment Needs. Post-acute therapy Recommend inpatient transitional care services for continued physical therapy services.    See \"Rehab Therapy-Acute\" Patient Summary Report for complete documentation.       "

## 2019-02-16 NOTE — DISCHARGE PLANNING
Rehab monitoring. Chart notes reflect family choice [facility].

## 2019-02-17 PROBLEM — D62 ANEMIA ASSOCIATED WITH ACUTE BLOOD LOSS: Status: RESOLVED | Noted: 2019-02-06 | Resolved: 2019-02-17

## 2019-02-17 LAB
ALBUMIN SERPL BCP-MCNC: 4.2 G/DL (ref 3.2–4.9)
ALBUMIN/GLOB SERPL: 1.1 G/DL
ALP SERPL-CCNC: 72 U/L (ref 30–99)
ALT SERPL-CCNC: 81 U/L (ref 2–50)
ANION GAP SERPL CALC-SCNC: 12 MMOL/L (ref 0–11.9)
AST SERPL-CCNC: 43 U/L (ref 12–45)
BASOPHILS # BLD AUTO: 0.7 % (ref 0–1.8)
BASOPHILS # BLD: 0.09 K/UL (ref 0–0.12)
BILIRUB SERPL-MCNC: 0.6 MG/DL (ref 0.1–1.5)
BUN SERPL-MCNC: 31 MG/DL (ref 8–22)
CALCIUM SERPL-MCNC: 9.9 MG/DL (ref 8.5–10.5)
CHLORIDE SERPL-SCNC: 109 MMOL/L (ref 96–112)
CO2 SERPL-SCNC: 25 MMOL/L (ref 20–33)
CREAT SERPL-MCNC: 0.77 MG/DL (ref 0.5–1.4)
EOSINOPHIL # BLD AUTO: 0.28 K/UL (ref 0–0.51)
EOSINOPHIL NFR BLD: 2.1 % (ref 0–6.9)
ERYTHROCYTE [DISTWIDTH] IN BLOOD BY AUTOMATED COUNT: 50 FL (ref 35.9–50)
GLOBULIN SER CALC-MCNC: 3.8 G/DL (ref 1.9–3.5)
GLUCOSE SERPL-MCNC: 129 MG/DL (ref 65–99)
HCT VFR BLD AUTO: 40.7 % (ref 42–52)
HGB BLD-MCNC: 12.8 G/DL (ref 14–18)
IMM GRANULOCYTES # BLD AUTO: 0.3 K/UL (ref 0–0.11)
IMM GRANULOCYTES NFR BLD AUTO: 2.3 % (ref 0–0.9)
LYMPHOCYTES # BLD AUTO: 2 K/UL (ref 1–4.8)
LYMPHOCYTES NFR BLD: 15.3 % (ref 22–41)
MCH RBC QN AUTO: 30.3 PG (ref 27–33)
MCHC RBC AUTO-ENTMCNC: 31.4 G/DL (ref 33.7–35.3)
MCV RBC AUTO: 96.4 FL (ref 81.4–97.8)
MONOCYTES # BLD AUTO: 1.02 K/UL (ref 0–0.85)
MONOCYTES NFR BLD AUTO: 7.8 % (ref 0–13.4)
NEUTROPHILS # BLD AUTO: 9.37 K/UL (ref 1.82–7.42)
NEUTROPHILS NFR BLD: 71.8 % (ref 44–72)
NRBC # BLD AUTO: 0 K/UL
NRBC BLD-RTO: 0 /100 WBC
PLATELET # BLD AUTO: 648 K/UL (ref 164–446)
PMV BLD AUTO: 9.6 FL (ref 9–12.9)
POTASSIUM SERPL-SCNC: 4 MMOL/L (ref 3.6–5.5)
PROT SERPL-MCNC: 8 G/DL (ref 6–8.2)
RBC # BLD AUTO: 4.22 M/UL (ref 4.7–6.1)
SODIUM SERPL-SCNC: 146 MMOL/L (ref 135–145)
WBC # BLD AUTO: 13.1 K/UL (ref 4.8–10.8)

## 2019-02-17 PROCEDURE — 94760 N-INVAS EAR/PLS OXIMETRY 1: CPT

## 2019-02-17 PROCEDURE — A9270 NON-COVERED ITEM OR SERVICE: HCPCS | Performed by: SURGERY

## 2019-02-17 PROCEDURE — 36415 COLL VENOUS BLD VENIPUNCTURE: CPT

## 2019-02-17 PROCEDURE — 700112 HCHG RX REV CODE 229: Performed by: NEUROLOGICAL SURGERY

## 2019-02-17 PROCEDURE — 700102 HCHG RX REV CODE 250 W/ 637 OVERRIDE(OP): Performed by: SURGERY

## 2019-02-17 PROCEDURE — 85025 COMPLETE CBC W/AUTO DIFF WBC: CPT

## 2019-02-17 PROCEDURE — 94640 AIRWAY INHALATION TREATMENT: CPT

## 2019-02-17 PROCEDURE — 80053 COMPREHEN METABOLIC PANEL: CPT

## 2019-02-17 PROCEDURE — A9270 NON-COVERED ITEM OR SERVICE: HCPCS | Performed by: NURSE PRACTITIONER

## 2019-02-17 PROCEDURE — A9270 NON-COVERED ITEM OR SERVICE: HCPCS | Performed by: NEUROLOGICAL SURGERY

## 2019-02-17 PROCEDURE — 770001 HCHG ROOM/CARE - MED/SURG/GYN PRIV*

## 2019-02-17 PROCEDURE — 700102 HCHG RX REV CODE 250 W/ 637 OVERRIDE(OP): Performed by: NURSE PRACTITIONER

## 2019-02-17 PROCEDURE — 700111 HCHG RX REV CODE 636 W/ 250 OVERRIDE (IP): Performed by: PHYSICIAN ASSISTANT

## 2019-02-17 RX ORDER — MORPHINE SULFATE 4 MG/ML
2 INJECTION, SOLUTION INTRAMUSCULAR; INTRAVENOUS EVERY 4 HOURS PRN
Status: DISCONTINUED | OUTPATIENT
Start: 2019-02-17 | End: 2019-02-21 | Stop reason: HOSPADM

## 2019-02-17 RX ORDER — SODIUM CHLORIDE 1 G/1
1 TABLET ORAL 4 TIMES DAILY
Status: DISCONTINUED | OUTPATIENT
Start: 2019-02-17 | End: 2019-02-18

## 2019-02-17 RX ADMIN — MINERAL OIL AND WHITE PETROLATUM 1 APPLICATION: 150; 830 OINTMENT OPHTHALMIC at 06:01

## 2019-02-17 RX ADMIN — ENOXAPARIN SODIUM 40 MG: 100 INJECTION SUBCUTANEOUS at 06:07

## 2019-02-17 RX ADMIN — FAMOTIDINE 20 MG: 20 TABLET ORAL at 17:48

## 2019-02-17 RX ADMIN — PROPRANOLOL HYDROCHLORIDE 10 MG: 10 TABLET ORAL at 13:07

## 2019-02-17 RX ADMIN — FLUDROCORTISONE ACETATE 0.1 MG: 0.1 TABLET ORAL at 06:00

## 2019-02-17 RX ADMIN — DOCUSATE SODIUM 100 MG: 50 LIQUID ORAL at 17:48

## 2019-02-17 RX ADMIN — FAMOTIDINE 20 MG: 20 TABLET ORAL at 06:06

## 2019-02-17 RX ADMIN — LEVETIRACETAM 500 MG: 500 TABLET, FILM COATED ORAL at 17:48

## 2019-02-17 RX ADMIN — SODIUM CHLORIDE TAB 1 GM 1 G: 1 TAB at 13:06

## 2019-02-17 RX ADMIN — DOCUSATE SODIUM 100 MG: 50 LIQUID ORAL at 06:00

## 2019-02-17 RX ADMIN — SODIUM CHLORIDE TAB 1 GM 1 G: 1 TAB at 10:13

## 2019-02-17 RX ADMIN — MINERAL OIL AND WHITE PETROLATUM 1 APPLICATION: 150; 830 OINTMENT OPHTHALMIC at 13:07

## 2019-02-17 RX ADMIN — PROPRANOLOL HYDROCHLORIDE 10 MG: 10 TABLET ORAL at 06:00

## 2019-02-17 RX ADMIN — SODIUM CHLORIDE TAB 1 GM 1 G: 1 TAB at 22:13

## 2019-02-17 RX ADMIN — LEVETIRACETAM 500 MG: 500 TABLET, FILM COATED ORAL at 06:07

## 2019-02-17 RX ADMIN — MINERAL OIL AND WHITE PETROLATUM 1 APPLICATION: 150; 830 OINTMENT OPHTHALMIC at 22:13

## 2019-02-17 RX ADMIN — PROPRANOLOL HYDROCHLORIDE 10 MG: 10 TABLET ORAL at 22:13

## 2019-02-17 RX ADMIN — AMANTADINE HYDROCHLORIDE 200 MG: 50 SOLUTION ORAL at 10:16

## 2019-02-17 RX ADMIN — AMANTADINE HYDROCHLORIDE 200 MG: 50 SOLUTION ORAL at 18:59

## 2019-02-17 RX ADMIN — SODIUM CHLORIDE TAB 1 GM 1 G: 1 TAB at 17:48

## 2019-02-17 NOTE — PROGRESS NOTES
Trauma / Surgical Daily Progress Note    Date of Service  2/17/2019    Chief Complaint  28 y.o. male admitted 1/26/2019 with Trauma    Interval Events    Transferred to adan.  Tolerating T-piece and tube feeds  Serum sodium trend up     - PT/OT/Speech   - DC Marta. Wean oral sodium.  - Counseled     Review of Systems  Review of Systems   Unable to perform ROS: Medical condition   Gastrointestinal:        2/17 (+) BM        Vital Signs  Temp:  [36.7 °C (98.1 °F)-37.7 °C (99.8 °F)] 36.8 °C (98.2 °F)  Pulse:  [] 92  Resp:  [13-29] 18  BP: (106-138)/(79-83) 116/82  SpO2:  [93 %-97 %] 94 %    Physical Exam  Physical Exam   Constitutional: He is intubated.   HENT:   Left bone flap, head soft. Incision approximated.   Cortrak   Eyes:   Opens eyes to voice   Neck:   Tracheostomy, site clean    Cardiovascular: Normal rate and regular rhythm.    Pulmonary/Chest: He is intubated. No respiratory distress.   T-piece   Abdominal: He exhibits no distension. There is no tenderness. There is no rebound and no guarding.   Genitourinary:   Genitourinary Comments: Condom catheter, urine clear yellow    Neurological:   Opens eyes to voice   No following commands  Spontaneous movement left upper extremity    Skin: Skin is warm. He is diaphoretic.   Nursing note and vitals reviewed.      Laboratory  Recent Results (from the past 24 hour(s))   CBC WITH DIFFERENTIAL    Collection Time: 02/17/19  2:55 AM   Result Value Ref Range    WBC 13.1 (H) 4.8 - 10.8 K/uL    RBC 4.22 (L) 4.70 - 6.10 M/uL    Hemoglobin 12.8 (L) 14.0 - 18.0 g/dL    Hematocrit 40.7 (L) 42.0 - 52.0 %    MCV 96.4 81.4 - 97.8 fL    MCH 30.3 27.0 - 33.0 pg    MCHC 31.4 (L) 33.7 - 35.3 g/dL    RDW 50.0 35.9 - 50.0 fL    Platelet Count 648 (H) 164 - 446 K/uL    MPV 9.6 9.0 - 12.9 fL    Neutrophils-Polys 71.80 44.00 - 72.00 %    Lymphocytes 15.30 (L) 22.00 - 41.00 %    Monocytes 7.80 0.00 - 13.40 %    Eosinophils 2.10 0.00 - 6.90 %    Basophils 0.70 0.00 - 1.80 %     Immature Granulocytes 2.30 (H) 0.00 - 0.90 %    Nucleated RBC 0.00 /100 WBC    Neutrophils (Absolute) 9.37 (H) 1.82 - 7.42 K/uL    Lymphs (Absolute) 2.00 1.00 - 4.80 K/uL    Monos (Absolute) 1.02 (H) 0.00 - 0.85 K/uL    Eos (Absolute) 0.28 0.00 - 0.51 K/uL    Baso (Absolute) 0.09 0.00 - 0.12 K/uL    Immature Granulocytes (abs) 0.30 (H) 0.00 - 0.11 K/uL    NRBC (Absolute) 0.00 K/uL   COMP METABOLIC PANEL    Collection Time: 02/17/19  2:55 AM   Result Value Ref Range    Sodium 146 (H) 135 - 145 mmol/L    Potassium 4.0 3.6 - 5.5 mmol/L    Chloride 109 96 - 112 mmol/L    Co2 25 20 - 33 mmol/L    Anion Gap 12.0 (H) 0.0 - 11.9    Glucose 129 (H) 65 - 99 mg/dL    Bun 31 (H) 8 - 22 mg/dL    Creatinine 0.77 0.50 - 1.40 mg/dL    Calcium 9.9 8.5 - 10.5 mg/dL    AST(SGOT) 43 12 - 45 U/L    ALT(SGPT) 81 (H) 2 - 50 U/L    Alkaline Phosphatase 72 30 - 99 U/L    Total Bilirubin 0.6 0.1 - 1.5 mg/dL    Albumin 4.2 3.2 - 4.9 g/dL    Total Protein 8.0 6.0 - 8.2 g/dL    Globulin 3.8 (H) 1.9 - 3.5 g/dL    A-G Ratio 1.1 g/dL   ESTIMATED GFR    Collection Time: 02/17/19  2:55 AM   Result Value Ref Range    GFR If African American >60 >60 mL/min/1.73 m 2    GFR If Non African American >60 >60 mL/min/1.73 m 2       Fluids    Intake/Output Summary (Last 24 hours) at 02/17/19 0806  Last data filed at 02/17/19 0000   Gross per 24 hour   Intake              740 ml   Output              760 ml   Net              -20 ml       Core Measures & Quality Metrics  Labs reviewed  Greer catheter: No Greer      DVT Prophylaxis: Enoxaparin (Lovenox) (IVC filter )  DVT prophylaxis - mechanical: SCDs  Ulcer prophylaxis: Yes    Assessed for rehab: Patient was assess for and/or received rehabilitation services during this hospitalization    Total Score: 3    ETOH Screening     Reason for no ETOH Intervention: Traumatic Brain Injury  ETOH Screening     Intervention complete date: 2/16/2019        Assessment/Plan  Discharge planning issues- (present on admission)    Assessment & Plan    Awaiting bone flap replacement (early March)  Has flight benefits through insurance  Family would like him in [facility]    involved       Pulmonary embolus, right (HCC)   Assessment & Plan    1/29 Prophylactic lovenox initiated   2/3 Acute deterioration / inability to oxygenate precipitously 2 hours after tracheostomy.    Chest x-ray shows some increased infiltrate in the left lower lobe, patient refractory to bagging and increase PEEP, Flolan  2/5 CTA shows proximal segmental and subsegmental pulmonary emboli in the right lower lobe. No RV strain.   - IVC filter placed     Subdural hematoma (HCC)- (present on admission)   Assessment & Plan    Left sided holohemispheric subdural hematoma measuring approximately 9.4 mm in maximum thickness. 10 mm of left-to-right midline shift.   1/26 To OR for emergent craniotomy and evacuation of hematoma.   1/28 CT x 2 stable  2/2 MRI brainstem complete   2/18-19 Follow up CT per neuro  Tentative bone flap replacement  - timing pending results of CT scan planned 2/19  On Ivelisse Huntley MD. Neurosurgery.     Acute respiratory failure following trauma and surgery (HCC)- (present on admission)   Assessment & Plan    Intubated in field  Continue full mechanical ventilatory support.   Ventilator bundle  2/3 Perc trach / APRV mode  / Folan  2/14 Tolerating T- Piece   2/16 Downsize trach to 6.0 cuffless, non fenestrated       Oropharyngeal dysphagia   Assessment & Plan    Cortrak in place  On TF  2/13 Speech eval - speaking valve with therapists only      No contraindication to deep vein thrombosis (DVT) prophylaxis- (present on admission)   Assessment & Plan    Systemic anticoagulation contraindicated secondary to elevated bleeding risk.  1/27 and 2/4  Trauma screening bilateral lower extremity venous duplex negative for above knee DVT.  1/29 Lovenox initiated       Trauma- (present on admission)    Assessment & Plan    [place]ed seizure like activity. GCS 3. No evidence of acute trauma.  Trauma Red Activation.  Mor Roa MD, Trauma/General Surgery.          Discussed patient condition with Patient and trauma surgery, Dr. Jerry Sanon .    Seen  On t-piece  Stable  Will need placement  Discussed with nursing and Nathan Sanon MD

## 2019-02-17 NOTE — PROGRESS NOTES
Neurosurgery Progress Note    Subjective:  No events. Parents at bedside    Exam:  Opens eyes to voice  Perrl, dysconjugate  Fc's to left side  C/d/i flap soft    BP  Min: 106/79  Max: 138/83  Pulse  Av.7  Min: 89  Max: 106  Resp  Av.9  Min: 13  Max: 29  Temp  Av.2 °C (98.9 °F)  Min: 36.7 °C (98.1 °F)  Max: 37.7 °C (99.8 °F)  SpO2  Av.5 %  Min: 93 %  Max: 97 %    No Data Recorded    Recent Labs      02/15/19   04219   0505  19   0255   WBC  13.3*  13.4*  13.1*   RBC  3.51*  4.02*  4.22*   HEMOGLOBIN  10.6*  12.2*  12.8*   HEMATOCRIT  34.3*  38.6*  40.7*   MCV  97.7  96.0  96.4   MCH  30.2  30.3  30.3   MCHC  30.9*  31.6*  31.4*   RDW  48.9  48.2  50.0   PLATELETCT  546*  675*  648*   MPV  10.0  9.6  9.6     Recent Labs      02/15/19   0425  19   0505  19   0255   SODIUM  141  140  146*   POTASSIUM  4.0  4.1  4.0   CHLORIDE  105  106  109   CO2  25  26  25   GLUCOSE  109*  131*  129*   BUN  26*  30*  31*   CREATININE  0.58  0.62  0.77   CALCIUM  10.0  10.4  9.9               Intake/Output       19 - 19 0659 19 07 - 19 0659       Total  Total       Intake    Other  530  -- 530  --  -- --    Medications (PO/Enteral Liquids) 470 -- 470 -- -- --    Flush / Irrigation Volume (Cortrak) 60 -- 60 -- -- --    NG/GT  360  240 600  --  -- --    Intake (mL) (Enteral Tube 02/15/19 Cortrak - Gastric 10 Fr. Right nare) 360 240 600 -- -- --    Total Intake  -- -- --       Output    Urine  540  400 940  --  -- --    Urine Void (mL) 540 400 940 -- -- --    Stool  --  -- --  --  -- --    Number of Times Stooled 1 x 1 x 2 x -- -- --    Total Output 540 400 940 -- -- --       Net I/O     350 -160 190 -- -- --            Intake/Output Summary (Last 24 hours) at 19 0928  Last data filed at 19 0600   Gross per 24 hour   Intake              980 ml   Output              760 ml   Net              220 ml             • sodium chloride  1 g 4X/DAY   • morphine injection  2 mg Q4HRS PRN   • amantadine  200 mg BID   • levETIRAcetam  500 mg BID   • famotidine  20 mg BID   • artificial tear ointment  1 Application Q8HRS   • acetaminophen  650 mg Q4HRS PRN   • propranolol  10 mg Q8HRS   • senna-docusate  2 Tab DAILY AT NOON   • polyethylene glycol/lytes  1 Packet DAILY AT NOON   • enoxaparin (LOVENOX) injection  40 mg DAILY   • Pharmacy  1 Each PHARMACY TO DOSE   • cloNIDine  0.1 mg Q4HRS PRN   • magnesium hydroxide  30 mL QDAY PRN   • oxyCODONE immediate-release  2.5 mg Q3HRS PRN   • oxyCODONE immediate-release  5 mg Q3HRS PRN   • polyethylene glycol/lytes  1 Packet BID PRN   • senna-docusate  1 Tab Q24HRS PRN   • Respiratory Care per Protocol   Continuous RT   • NS   Continuous   • Pharmacy Consult Request  1 Each PHARMACY TO DOSE   • MD ALERT...DO NOT ADMINISTER NSAIDS or ASPIRIN unless ORDERED By Neurosurgery  1 Each PRN   • ondansetron  4 mg Q4HRS PRN   • bisacodyl  10 mg Q24HRS PRN   • fleet  1 Each Once PRN   • docusate sodium 100mg/10mL  100 mg BID       Assessment and Plan:  POD#22 Left Craniectomy  Crani site soft, exam stable  Hold full anticoagulation, cleared for prophylaxis  Q4 hr neuro checks  Leave sutures until site is sunken  Replace flap in 3-4 weeks, patient can also take flap with him if he travels for rehab

## 2019-02-17 NOTE — PROGRESS NOTES
Assessment completed. VSS. Pt is non-verbal and unable to comply with directions appropriately. No signs of respiratory distress or pain. Discussed plan of care at bedside with family. All needs have been met at this time.

## 2019-02-17 NOTE — PROGRESS NOTES
1410 Report received from Yari HDZ.     1500 Received pt and assessment completed. VSS. Pt is non-verbal and unable to comply with directions appropriately. No signs of respiratory distress or pain.     Pt has a 6.0 cuffless, non-fenestrated trach in place. Extra inner cannulas at bedside. Patient has a cortrak in place and flushed; tube feedings have been started.     Family is at bedside and plan of care was discussed. All needs have been met at this time.

## 2019-02-18 ENCOUNTER — APPOINTMENT (OUTPATIENT)
Dept: RADIOLOGY | Facility: MEDICAL CENTER | Age: 29
DRG: 003 | End: 2019-02-18
Attending: NURSE PRACTITIONER
Payer: COMMERCIAL

## 2019-02-18 LAB
ALBUMIN SERPL BCP-MCNC: 4.4 G/DL (ref 3.2–4.9)
ALBUMIN/GLOB SERPL: 1.3 G/DL
ALP SERPL-CCNC: 68 U/L (ref 30–99)
ALT SERPL-CCNC: 71 U/L (ref 2–50)
ANION GAP SERPL CALC-SCNC: 10 MMOL/L (ref 0–11.9)
AST SERPL-CCNC: 39 U/L (ref 12–45)
BASOPHILS # BLD AUTO: 1.1 % (ref 0–1.8)
BASOPHILS # BLD: 0.14 K/UL (ref 0–0.12)
BILIRUB SERPL-MCNC: 0.6 MG/DL (ref 0.1–1.5)
BUN SERPL-MCNC: 33 MG/DL (ref 8–22)
CALCIUM SERPL-MCNC: 9.9 MG/DL (ref 8.5–10.5)
CHLORIDE SERPL-SCNC: 108 MMOL/L (ref 96–112)
CO2 SERPL-SCNC: 26 MMOL/L (ref 20–33)
CREAT SERPL-MCNC: 0.56 MG/DL (ref 0.5–1.4)
CRP SERPL HS-MCNC: 0.37 MG/DL (ref 0–0.75)
EOSINOPHIL # BLD AUTO: 0.34 K/UL (ref 0–0.51)
EOSINOPHIL NFR BLD: 2.7 % (ref 0–6.9)
ERYTHROCYTE [DISTWIDTH] IN BLOOD BY AUTOMATED COUNT: 49.9 FL (ref 35.9–50)
GLOBULIN SER CALC-MCNC: 3.4 G/DL (ref 1.9–3.5)
GLUCOSE SERPL-MCNC: 131 MG/DL (ref 65–99)
HCT VFR BLD AUTO: 40 % (ref 42–52)
HGB BLD-MCNC: 12.7 G/DL (ref 14–18)
IMM GRANULOCYTES # BLD AUTO: 0.34 K/UL (ref 0–0.11)
IMM GRANULOCYTES NFR BLD AUTO: 2.7 % (ref 0–0.9)
LYMPHOCYTES # BLD AUTO: 2.64 K/UL (ref 1–4.8)
LYMPHOCYTES NFR BLD: 20.7 % (ref 22–41)
MAGNESIUM SERPL-MCNC: 2.1 MG/DL (ref 1.5–2.5)
MCH RBC QN AUTO: 30.7 PG (ref 27–33)
MCHC RBC AUTO-ENTMCNC: 31.8 G/DL (ref 33.7–35.3)
MCV RBC AUTO: 96.6 FL (ref 81.4–97.8)
MONOCYTES # BLD AUTO: 1.12 K/UL (ref 0–0.85)
MONOCYTES NFR BLD AUTO: 8.8 % (ref 0–13.4)
NEUTROPHILS # BLD AUTO: 8.19 K/UL (ref 1.82–7.42)
NEUTROPHILS NFR BLD: 64 % (ref 44–72)
NRBC # BLD AUTO: 0 K/UL
NRBC BLD-RTO: 0 /100 WBC
PHOSPHATE SERPL-MCNC: 3.8 MG/DL (ref 2.5–4.5)
PLATELET # BLD AUTO: 634 K/UL (ref 164–446)
PMV BLD AUTO: 9.5 FL (ref 9–12.9)
POTASSIUM SERPL-SCNC: 3.8 MMOL/L (ref 3.6–5.5)
PREALB SERPL-MCNC: 45 MG/DL (ref 18–38)
PROT SERPL-MCNC: 7.8 G/DL (ref 6–8.2)
RBC # BLD AUTO: 4.14 M/UL (ref 4.7–6.1)
SODIUM SERPL-SCNC: 144 MMOL/L (ref 135–145)
WBC # BLD AUTO: 12.8 K/UL (ref 4.8–10.8)

## 2019-02-18 PROCEDURE — A9270 NON-COVERED ITEM OR SERVICE: HCPCS | Performed by: NURSE PRACTITIONER

## 2019-02-18 PROCEDURE — 83735 ASSAY OF MAGNESIUM: CPT

## 2019-02-18 PROCEDURE — 700102 HCHG RX REV CODE 250 W/ 637 OVERRIDE(OP): Performed by: NURSE PRACTITIONER

## 2019-02-18 PROCEDURE — 700111 HCHG RX REV CODE 636 W/ 250 OVERRIDE (IP): Performed by: PHYSICIAN ASSISTANT

## 2019-02-18 PROCEDURE — 84100 ASSAY OF PHOSPHORUS: CPT

## 2019-02-18 PROCEDURE — 71045 X-RAY EXAM CHEST 1 VIEW: CPT

## 2019-02-18 PROCEDURE — A9270 NON-COVERED ITEM OR SERVICE: HCPCS | Performed by: SURGERY

## 2019-02-18 PROCEDURE — 80053 COMPREHEN METABOLIC PANEL: CPT

## 2019-02-18 PROCEDURE — 84134 ASSAY OF PREALBUMIN: CPT

## 2019-02-18 PROCEDURE — 94640 AIRWAY INHALATION TREATMENT: CPT

## 2019-02-18 PROCEDURE — 700102 HCHG RX REV CODE 250 W/ 637 OVERRIDE(OP): Performed by: SURGERY

## 2019-02-18 PROCEDURE — 700112 HCHG RX REV CODE 229: Performed by: NEUROLOGICAL SURGERY

## 2019-02-18 PROCEDURE — 86140 C-REACTIVE PROTEIN: CPT

## 2019-02-18 PROCEDURE — 85025 COMPLETE CBC W/AUTO DIFF WBC: CPT

## 2019-02-18 PROCEDURE — 770001 HCHG ROOM/CARE - MED/SURG/GYN PRIV*

## 2019-02-18 PROCEDURE — A9270 NON-COVERED ITEM OR SERVICE: HCPCS | Performed by: NEUROLOGICAL SURGERY

## 2019-02-18 PROCEDURE — 36415 COLL VENOUS BLD VENIPUNCTURE: CPT

## 2019-02-18 RX ADMIN — AMANTADINE HYDROCHLORIDE 200 MG: 50 SOLUTION ORAL at 16:52

## 2019-02-18 RX ADMIN — PROPRANOLOL HYDROCHLORIDE 10 MG: 10 TABLET ORAL at 06:06

## 2019-02-18 RX ADMIN — MINERAL OIL AND WHITE PETROLATUM 1 APPLICATION: 150; 830 OINTMENT OPHTHALMIC at 13:41

## 2019-02-18 RX ADMIN — MINERAL OIL AND WHITE PETROLATUM 1 APPLICATION: 150; 830 OINTMENT OPHTHALMIC at 06:05

## 2019-02-18 RX ADMIN — AMANTADINE HYDROCHLORIDE 200 MG: 50 SOLUTION ORAL at 06:04

## 2019-02-18 RX ADMIN — FAMOTIDINE 20 MG: 20 TABLET ORAL at 06:06

## 2019-02-18 RX ADMIN — SODIUM CHLORIDE TAB 1 GM 1 G: 1 TAB at 10:08

## 2019-02-18 RX ADMIN — LEVETIRACETAM 500 MG: 500 TABLET, FILM COATED ORAL at 16:52

## 2019-02-18 RX ADMIN — ENOXAPARIN SODIUM 40 MG: 100 INJECTION SUBCUTANEOUS at 06:05

## 2019-02-18 RX ADMIN — MINERAL OIL AND WHITE PETROLATUM 1 APPLICATION: 150; 830 OINTMENT OPHTHALMIC at 21:15

## 2019-02-18 RX ADMIN — PROPRANOLOL HYDROCHLORIDE 10 MG: 10 TABLET ORAL at 21:15

## 2019-02-18 RX ADMIN — LEVETIRACETAM 500 MG: 500 TABLET, FILM COATED ORAL at 06:05

## 2019-02-18 RX ADMIN — PROPRANOLOL HYDROCHLORIDE 10 MG: 10 TABLET ORAL at 13:41

## 2019-02-18 RX ADMIN — DOCUSATE SODIUM 100 MG: 50 LIQUID ORAL at 06:05

## 2019-02-18 NOTE — PROGRESS NOTES
Neurosurgery Progress Note    Subjective:  No events    Exam:  Opens eyes to voice  Perrl, dysconjugate  Tracks, did not follow commands for me  C/d/i flap soft  W/d to pain       BP  Min: 107/83  Max: 119/83  Pulse  Av.3  Min: 90  Max: 100  Resp  Av.5  Min: 16  Max: 20  Temp  Av.6 °C (97.8 °F)  Min: 36.1 °C (97 °F)  Max: 37.1 °C (98.8 °F)  SpO2  Av.3 %  Min: 92 %  Max: 96 %    No Data Recorded    Recent Labs      19   0505  19   0255  19   0308   WBC  13.4*  13.1*  12.8*   RBC  4.02*  4.22*  4.14*   HEMOGLOBIN  12.2*  12.8*  12.7*   HEMATOCRIT  38.6*  40.7*  40.0*   MCV  96.0  96.4  96.6   MCH  30.3  30.3  30.7   MCHC  31.6*  31.4*  31.8*   RDW  48.2  50.0  49.9   PLATELETCT  675*  648*  634*   MPV  9.6  9.6  9.5     Recent Labs      19   0505  19   0255  19   0308   SODIUM  140  146*  144   POTASSIUM  4.1  4.0  3.8   CHLORIDE  106  109  108   CO2  26  25  26   GLUCOSE  131*  129*  131*   BUN  30*  31*  33*   CREATININE  0.62  0.77  0.56   CALCIUM  10.4  9.9  9.9               Intake/Output       19 0700 - 19 0659 19 07 - 19 0659       Total  Total       Intake    P.O.  0  -- 0  --  -- --    P.O. 0 -- 0 -- -- --    Other  295  -- 295  --  -- --    Medications (PO/Enteral Liquids) 295 -- 295 -- -- --    NG/GT  450  300 750  --  -- --    Intake (mL) (Enteral Tube 02/15/19 Cortrak - Gastric 10 Fr. Right nare) 450 300 750 -- -- --    Total Intake  -- -- --       Output    Urine  420  -- 420  --  -- --    Urine Void (mL) 420 -- 420 -- -- --    Stool  --  -- --  --  -- --    Number of Times Stooled -- -- -- 1 x -- 1 x    Total Output 420 -- 420 -- -- --       Net I/O     325 300 625 -- -- --            Intake/Output Summary (Last 24 hours) at 19 0957  Last data filed at 19 0000   Gross per 24 hour   Intake             1045 ml   Output              420 ml   Net              625 ml             • sodium chloride  1 g 4X/DAY   • morphine injection  2 mg Q4HRS PRN   • amantadine  200 mg BID   • levETIRAcetam  500 mg BID   • famotidine  20 mg BID   • artificial tear ointment  1 Application Q8HRS   • acetaminophen  650 mg Q4HRS PRN   • propranolol  10 mg Q8HRS   • senna-docusate  2 Tab DAILY AT NOON   • polyethylene glycol/lytes  1 Packet DAILY AT NOON   • enoxaparin (LOVENOX) injection  40 mg DAILY   • Pharmacy  1 Each PHARMACY TO DOSE   • cloNIDine  0.1 mg Q4HRS PRN   • magnesium hydroxide  30 mL QDAY PRN   • oxyCODONE immediate-release  2.5 mg Q3HRS PRN   • oxyCODONE immediate-release  5 mg Q3HRS PRN   • polyethylene glycol/lytes  1 Packet BID PRN   • senna-docusate  1 Tab Q24HRS PRN   • Respiratory Care per Protocol   Continuous RT   • NS   Continuous   • Pharmacy Consult Request  1 Each PHARMACY TO DOSE   • MD ALERT...DO NOT ADMINISTER NSAIDS or ASPIRIN unless ORDERED By Neurosurgery  1 Each PRN   • ondansetron  4 mg Q4HRS PRN   • bisacodyl  10 mg Q24HRS PRN   • fleet  1 Each Once PRN   • docusate sodium 100mg/10mL  100 mg BID       Assessment and Plan:  POD#23 Left Craniectomy  Crani site soft, exam stable  Hold full anticoagulation, cleared for prophylaxis  Q4 hr neuro checks  Leave sutures until site is sunken  Replace flap in 3-4 weeks, patient can also take flap with him if he travels for rehab

## 2019-02-18 NOTE — PROGRESS NOTES
Assessment completed. VSS. Pt is non-verbal and unable to comply with directions appropriately. No signs of respiratory distress or pain. Suctioning and oral care has been provided. Pt was repositioned and turned. All needs have been met at this time.    1000 Pt was cleaned and repositioned, all needs were met at this time.    1200 Speaking valve was placed by RT at 1130. Pt is calm and tolerating it well. Does not appear to be in respiratory distress or fatigued. All needs have been met at this time.     1400 Speaking valve still in place, tolerating well. Pt is sleeping and does not appear to be in respiratory distress or have any pain. No other concerns at this time.     1600 Pt is calm and resting. Pt is still tolerating the speaking valve. Does not appear to be in any pain or respiratory distress, will continue to monitor.       1800 Pt remains on speaking valve, tolerating well. Suctioned as appropriate. No visual signs of pain or respiratory distress. Repositioned and sleeping. Will continue to monitor.

## 2019-02-18 NOTE — PROGRESS NOTES
2 RN skin assessment done; skin not WDL. See Wound flowsheet.  Surgical incision left side of head sutures CDI and bilateral dry flaky feet.

## 2019-02-18 NOTE — PROGRESS NOTES
Trauma / Surgical Daily Progress Note    Date of Service  2/18/2019    Chief Complaint  28 y.o. male admitted 1/26/2019 with Trauma    Interval Events    No critical events overnight   Tolerating trach/TF   DC oral sodium supplementation  Disposition: [facility].  Medically cleared to attend  Counseled    Review of Systems  Review of Systems   Unable to perform ROS: Medical condition   Gastrointestinal:        2/18(+) BM        Vital Signs  Temp:  [36.1 °C (97 °F)-37.1 °C (98.8 °F)] 36.6 °C (97.9 °F)  Pulse:  [] 93  Resp:  [16-20] 16  BP: (107-119)/(75-83) 109/75  SpO2:  [92 %-96 %] 94 %    Physical Exam  Physical Exam   Constitutional: He is intubated.   HENT:   Left bone flap, head soft. Incision approximated.   Cortrak   Eyes:   Opens eyes to voice  Disconjugate    Neck:   Tracheostomy, site clean    Cardiovascular: Normal rate and regular rhythm.    Pulmonary/Chest: He is intubated. No respiratory distress.   T-piece   Abdominal: He exhibits no distension. There is no tenderness. There is no rebound and no guarding.   Genitourinary:   Genitourinary Comments: Condom catheter, urine clear yellow    Neurological:   Opens eyes to voice   Nonverbal  No following commands      Skin: Skin is warm. He is diaphoretic.   Nursing note and vitals reviewed.      Laboratory  Recent Results (from the past 24 hour(s))   CBC WITH DIFFERENTIAL    Collection Time: 02/18/19  3:08 AM   Result Value Ref Range    WBC 12.8 (H) 4.8 - 10.8 K/uL    RBC 4.14 (L) 4.70 - 6.10 M/uL    Hemoglobin 12.7 (L) 14.0 - 18.0 g/dL    Hematocrit 40.0 (L) 42.0 - 52.0 %    MCV 96.6 81.4 - 97.8 fL    MCH 30.7 27.0 - 33.0 pg    MCHC 31.8 (L) 33.7 - 35.3 g/dL    RDW 49.9 35.9 - 50.0 fL    Platelet Count 634 (H) 164 - 446 K/uL    MPV 9.5 9.0 - 12.9 fL    Neutrophils-Polys 64.00 44.00 - 72.00 %    Lymphocytes 20.70 (L) 22.00 - 41.00 %    Monocytes 8.80 0.00 - 13.40 %    Eosinophils 2.70 0.00 - 6.90 %    Basophils 1.10 0.00 - 1.80 %    Immature  Granulocytes 2.70 (H) 0.00 - 0.90 %    Nucleated RBC 0.00 /100 WBC    Neutrophils (Absolute) 8.19 (H) 1.82 - 7.42 K/uL    Lymphs (Absolute) 2.64 1.00 - 4.80 K/uL    Monos (Absolute) 1.12 (H) 0.00 - 0.85 K/uL    Eos (Absolute) 0.34 0.00 - 0.51 K/uL    Baso (Absolute) 0.14 (H) 0.00 - 0.12 K/uL    Immature Granulocytes (abs) 0.34 (H) 0.00 - 0.11 K/uL    NRBC (Absolute) 0.00 K/uL   COMP METABOLIC PANEL    Collection Time: 02/18/19  3:08 AM   Result Value Ref Range    Sodium 144 135 - 145 mmol/L    Potassium 3.8 3.6 - 5.5 mmol/L    Chloride 108 96 - 112 mmol/L    Co2 26 20 - 33 mmol/L    Anion Gap 10.0 0.0 - 11.9    Glucose 131 (H) 65 - 99 mg/dL    Bun 33 (H) 8 - 22 mg/dL    Creatinine 0.56 0.50 - 1.40 mg/dL    Calcium 9.9 8.5 - 10.5 mg/dL    AST(SGOT) 39 12 - 45 U/L    ALT(SGPT) 71 (H) 2 - 50 U/L    Alkaline Phosphatase 68 30 - 99 U/L    Total Bilirubin 0.6 0.1 - 1.5 mg/dL    Albumin 4.4 3.2 - 4.9 g/dL    Total Protein 7.8 6.0 - 8.2 g/dL    Globulin 3.4 1.9 - 3.5 g/dL    A-G Ratio 1.3 g/dL   MAGNESIUM    Collection Time: 02/18/19  3:08 AM   Result Value Ref Range    Magnesium 2.1 1.5 - 2.5 mg/dL   PHOSPHORUS    Collection Time: 02/18/19  3:08 AM   Result Value Ref Range    Phosphorus 3.8 2.5 - 4.5 mg/dL   ESTIMATED GFR    Collection Time: 02/18/19  3:08 AM   Result Value Ref Range    GFR If African American >60 >60 mL/min/1.73 m 2    GFR If Non African American >60 >60 mL/min/1.73 m 2   CRP Quantitive (Non-Cardiac)    Collection Time: 02/18/19 10:19 AM   Result Value Ref Range    Stat C-Reactive Protein 0.37 0.00 - 0.75 mg/dL   Prealbumin    Collection Time: 02/18/19 10:19 AM   Result Value Ref Range    Pre-Albumin 45.0 (H) 18.0 - 38.0 mg/dL       Fluids    Intake/Output Summary (Last 24 hours) at 02/18/19 1150  Last data filed at 02/18/19 1000   Gross per 24 hour   Intake             1020 ml   Output              420 ml   Net              600 ml       Core Measures & Quality Metrics  Labs reviewed  Greer catheter: No  Greer      DVT Prophylaxis: Enoxaparin (Lovenox) (IVC filter )  DVT prophylaxis - mechanical: SCDs  Ulcer prophylaxis: Yes    Assessed for rehab: Patient was assess for and/or received rehabilitation services during this hospitalization    Total Score: 3    ETOH Screening     Reason for no ETOH Intervention: Traumatic Brain Injury  ETOH Screening     Intervention complete date: 2/16/2019        Assessment/Plan  Discharge planning issues- (present on admission)   Assessment & Plan    Awaiting bone flap replacement (early March)  Has flight benefits through insurance  Family would like him in [place]  First choice - [facility]    involved        Pulmonary embolus, right (HCC)   Assessment & Plan    1/29 Prophylactic lovenox initiated   2/3 Acute deterioration / inability to oxygenate precipitously 2 hours after tracheostomy.    Chest x-ray shows some increased infiltrate in the left lower lobe, patient refractory to bagging and increase PEEP, Flolan  2/5 CTA shows proximal segmental and subsegmental pulmonary emboli in the right lower lobe. No RV strain.   - IVC filter placed   2/18 Hold full anticoagulation per neurosurgery     Subdural hematoma (HCC)- (present on admission)   Assessment & Plan    Left sided holohemispheric subdural hematoma measuring approximately 9.4 mm in maximum thickness. 10 mm of left-to-right midline shift.   1/26 To OR for emergent craniotomy and evacuation of hematoma.   1/28 CT x 2 stable  2/2 MRI brainstem complete   2/18-19 Follow up CT per neuro  Tentative bone flap replacement  - timing pending results of CT scan planned 2/19  On Ivelisse Huntley MD. Neurosurgery.      Acute respiratory failure following trauma and surgery (HCC)- (present on admission)   Assessment & Plan    Intubated in field  2/3 Perc trach / APRV mode  / Folan  2/14 Tolerating T- Piece   2/16 Downsize trach to 6.0 cuffless, non fenestrated.      Oropharyngeal dysphagia   Assessment &  Plan    Cortrak in place  On TF  2/13 Speech eval - speaking valve with therapists only.      No contraindication to deep vein thrombosis (DVT) prophylaxis- (present on admission)   Assessment & Plan    Systemic anticoagulation contraindicated secondary to elevated bleeding risk.  1/27 and 2/4  Trauma screening bilateral lower extremity venous duplex negative for above knee DVT.  1/29 Pharmacological DVT prophalxis, Lovenox initiated          Trauma- (present on admission)   Assessment & Plan    [place]. [place]ed seizure like activity. GCS 3. No evidence of acute trauma.  Trauma Red Activation.  Mor Roa MD, Trauma/General Surgery.           Discussed patient condition with Patient and trauma surgery, Dr. Jerry Sanon.    Seen  Stable  Awaiting placement  Continue therapies  Discussed with Nathan Sanon MD

## 2019-02-18 NOTE — PROGRESS NOTES
2 RN skin assessment done; skin not WDL. See Wound flowsheet.  Sutures to left side of head and bilateral dry flaky feet.

## 2019-02-18 NOTE — DISCHARGE PLANNING
Anticipated Discharge Disposition:   [facility]    Action:   Talked to mom today. She gave her verbal consent to send the referral to [facility] for CM to start communicating with the admissions department there.Mom said that pt's family live in [place] and although pt works in [place] and close to [facility] they would prefer pt to be flown to [facility].     Mom said that she was told med flight will be covered. CM will get more details about this from previous SW.     Barriers to Discharge:   Pending trauma clearance  Acceptance by IRF in [place]    Plan:   Follow up with [facility]  Follow with Eileen WARD  Update family PRN  Discuss recommendation with Dr. Brown.

## 2019-02-18 NOTE — DISCHARGE PLANNING
Agency/Facility Name:  [facility]   Spoke To:    Outcome: Rehab is closed to due holiday. WILDER [place] notified.    Facility direct number to admissions: [phone]

## 2019-02-19 ENCOUNTER — HOSPITAL ENCOUNTER (OUTPATIENT)
Dept: RADIOLOGY | Facility: MEDICAL CENTER | Age: 29
End: 2019-02-19
Attending: PHYSICIAN ASSISTANT

## 2019-02-19 ENCOUNTER — APPOINTMENT (OUTPATIENT)
Dept: RADIOLOGY | Facility: MEDICAL CENTER | Age: 29
DRG: 003 | End: 2019-02-19
Attending: NURSE PRACTITIONER
Payer: COMMERCIAL

## 2019-02-19 LAB
ALBUMIN SERPL BCP-MCNC: 4.5 G/DL (ref 3.2–4.9)
ALBUMIN/GLOB SERPL: 1.3 G/DL
ALP SERPL-CCNC: 64 U/L (ref 30–99)
ALT SERPL-CCNC: 64 U/L (ref 2–50)
ANION GAP SERPL CALC-SCNC: 11 MMOL/L (ref 0–11.9)
APPEARANCE UR: CLEAR
AST SERPL-CCNC: 43 U/L (ref 12–45)
BASOPHILS # BLD AUTO: 0.6 % (ref 0–1.8)
BASOPHILS # BLD: 0.09 K/UL (ref 0–0.12)
BILIRUB SERPL-MCNC: 0.6 MG/DL (ref 0.1–1.5)
BILIRUB UR QL STRIP.AUTO: NEGATIVE
BUN SERPL-MCNC: 34 MG/DL (ref 8–22)
CALCIUM SERPL-MCNC: 9.9 MG/DL (ref 8.5–10.5)
CHLORIDE SERPL-SCNC: 104 MMOL/L (ref 96–112)
CO2 SERPL-SCNC: 26 MMOL/L (ref 20–33)
COLOR UR: YELLOW
CREAT SERPL-MCNC: 0.63 MG/DL (ref 0.5–1.4)
EOSINOPHIL # BLD AUTO: 0.35 K/UL (ref 0–0.51)
EOSINOPHIL NFR BLD: 2.3 % (ref 0–6.9)
ERYTHROCYTE [DISTWIDTH] IN BLOOD BY AUTOMATED COUNT: 49.1 FL (ref 35.9–50)
GLOBULIN SER CALC-MCNC: 3.5 G/DL (ref 1.9–3.5)
GLUCOSE SERPL-MCNC: 139 MG/DL (ref 65–99)
GLUCOSE UR STRIP.AUTO-MCNC: NEGATIVE MG/DL
HCT VFR BLD AUTO: 42.2 % (ref 42–52)
HGB BLD-MCNC: 13.3 G/DL (ref 14–18)
IMM GRANULOCYTES # BLD AUTO: 0.26 K/UL (ref 0–0.11)
IMM GRANULOCYTES NFR BLD AUTO: 1.7 % (ref 0–0.9)
KETONES UR STRIP.AUTO-MCNC: NEGATIVE MG/DL
LEUKOCYTE ESTERASE UR QL STRIP.AUTO: NEGATIVE
LYMPHOCYTES # BLD AUTO: 2.71 K/UL (ref 1–4.8)
LYMPHOCYTES NFR BLD: 17.7 % (ref 22–41)
MCH RBC QN AUTO: 30.3 PG (ref 27–33)
MCHC RBC AUTO-ENTMCNC: 31.5 G/DL (ref 33.7–35.3)
MCV RBC AUTO: 96.1 FL (ref 81.4–97.8)
MICRO URNS: NORMAL
MONOCYTES # BLD AUTO: 1.01 K/UL (ref 0–0.85)
MONOCYTES NFR BLD AUTO: 6.6 % (ref 0–13.4)
NEUTROPHILS # BLD AUTO: 10.93 K/UL (ref 1.82–7.42)
NEUTROPHILS NFR BLD: 71.1 % (ref 44–72)
NITRITE UR QL STRIP.AUTO: NEGATIVE
NRBC # BLD AUTO: 0 K/UL
NRBC BLD-RTO: 0 /100 WBC
PH UR STRIP.AUTO: 5 [PH]
PLATELET # BLD AUTO: 607 K/UL (ref 164–446)
PMV BLD AUTO: 9.4 FL (ref 9–12.9)
POTASSIUM SERPL-SCNC: 3.9 MMOL/L (ref 3.6–5.5)
PROT SERPL-MCNC: 8 G/DL (ref 6–8.2)
PROT UR QL STRIP: NEGATIVE MG/DL
RBC # BLD AUTO: 4.39 M/UL (ref 4.7–6.1)
RBC UR QL AUTO: NEGATIVE
SODIUM SERPL-SCNC: 141 MMOL/L (ref 135–145)
SP GR UR STRIP.AUTO: 1.04
UROBILINOGEN UR STRIP.AUTO-MCNC: 0.2 MG/DL
WBC # BLD AUTO: 15.4 K/UL (ref 4.8–10.8)

## 2019-02-19 PROCEDURE — 700102 HCHG RX REV CODE 250 W/ 637 OVERRIDE(OP): Performed by: SURGERY

## 2019-02-19 PROCEDURE — 770001 HCHG ROOM/CARE - MED/SURG/GYN PRIV*

## 2019-02-19 PROCEDURE — 97112 NEUROMUSCULAR REEDUCATION: CPT

## 2019-02-19 PROCEDURE — A9270 NON-COVERED ITEM OR SERVICE: HCPCS | Performed by: SURGERY

## 2019-02-19 PROCEDURE — 94640 AIRWAY INHALATION TREATMENT: CPT

## 2019-02-19 PROCEDURE — 36415 COLL VENOUS BLD VENIPUNCTURE: CPT

## 2019-02-19 PROCEDURE — 70450 CT HEAD/BRAIN W/O DYE: CPT

## 2019-02-19 PROCEDURE — 97530 THERAPEUTIC ACTIVITIES: CPT

## 2019-02-19 PROCEDURE — 85025 COMPLETE CBC W/AUTO DIFF WBC: CPT

## 2019-02-19 PROCEDURE — 80053 COMPREHEN METABOLIC PANEL: CPT

## 2019-02-19 PROCEDURE — 74018 RADEX ABDOMEN 1 VIEW: CPT

## 2019-02-19 PROCEDURE — 81003 URINALYSIS AUTO W/O SCOPE: CPT

## 2019-02-19 PROCEDURE — 700111 HCHG RX REV CODE 636 W/ 250 OVERRIDE (IP): Performed by: PHYSICIAN ASSISTANT

## 2019-02-19 RX ADMIN — ENOXAPARIN SODIUM 40 MG: 100 INJECTION SUBCUTANEOUS at 04:00

## 2019-02-19 RX ADMIN — AMANTADINE HYDROCHLORIDE 200 MG: 50 SOLUTION ORAL at 04:02

## 2019-02-19 RX ADMIN — MINERAL OIL AND WHITE PETROLATUM 1 APPLICATION: 150; 830 OINTMENT OPHTHALMIC at 14:00

## 2019-02-19 RX ADMIN — PROPRANOLOL HYDROCHLORIDE 10 MG: 10 TABLET ORAL at 21:35

## 2019-02-19 RX ADMIN — PROPRANOLOL HYDROCHLORIDE 10 MG: 10 TABLET ORAL at 04:05

## 2019-02-19 RX ADMIN — MINERAL OIL AND WHITE PETROLATUM 1 APPLICATION: 150; 830 OINTMENT OPHTHALMIC at 23:03

## 2019-02-19 RX ADMIN — LEVETIRACETAM 500 MG: 500 TABLET, FILM COATED ORAL at 04:03

## 2019-02-19 RX ADMIN — LEVETIRACETAM 500 MG: 500 TABLET, FILM COATED ORAL at 21:34

## 2019-02-19 RX ADMIN — MINERAL OIL AND WHITE PETROLATUM 1 APPLICATION: 150; 830 OINTMENT OPHTHALMIC at 04:07

## 2019-02-19 ASSESSMENT — COGNITIVE AND FUNCTIONAL STATUS - GENERAL
WALKING IN HOSPITAL ROOM: TOTAL
MOVING FROM LYING ON BACK TO SITTING ON SIDE OF FLAT BED: UNABLE
TOILETING: TOTAL
EATING MEALS: TOTAL
DAILY ACTIVITIY SCORE: 6
CLIMB 3 TO 5 STEPS WITH RAILING: TOTAL
STANDING UP FROM CHAIR USING ARMS: TOTAL
DRESSING REGULAR UPPER BODY CLOTHING: TOTAL
SUGGESTED CMS G CODE MODIFIER DAILY ACTIVITY: CN
MOBILITY SCORE: 6
DRESSING REGULAR LOWER BODY CLOTHING: TOTAL
HELP NEEDED FOR BATHING: TOTAL
TURNING FROM BACK TO SIDE WHILE IN FLAT BAD: UNABLE
MOVING TO AND FROM BED TO CHAIR: UNABLE
PERSONAL GROOMING: TOTAL
SUGGESTED CMS G CODE MODIFIER MOBILITY: CN

## 2019-02-19 ASSESSMENT — GAIT ASSESSMENTS: GAIT LEVEL OF ASSIST: UNABLE TO PARTICIPATE

## 2019-02-19 NOTE — PROGRESS NOTES
Neurosurgery Progress Note    Subjective:  No events    Exam:  Opens eyes to voice  Perrl, dysconjugate  Tracks, did not follow commands for me  BRYANT L>R  C/d/i flap soft        BP  Min: 109/75  Max: 121/78  Pulse  Av.2  Min: 61  Max: 96  Resp  Av.9  Min: 15  Max: 18  Temp  Av.9 °C (98.5 °F)  Min: 36.6 °C (97.9 °F)  Max: 37.2 °C (99 °F)  SpO2  Av.4 %  Min: 94 %  Max: 100 %    No Data Recorded    Recent Labs      19   0308  19   0434   WBC  13.1*  12.8*  15.4*   RBC  4.22*  4.14*  4.39*   HEMOGLOBIN  12.8*  12.7*  13.3*   HEMATOCRIT  40.7*  40.0*  42.2   MCV  96.4  96.6  96.1   MCH  30.3  30.7  30.3   MCHC  31.4*  31.8*  31.5*   RDW  50.0  49.9  49.1   PLATELETCT  648*  634*  607*   MPV  9.6  9.5  9.4     Recent Labs      19   0308  19   0434   SODIUM  146*  144  141   POTASSIUM  4.0  3.8  3.9   CHLORIDE  109  108  104   CO2  25  26  26   GLUCOSE  129*  131*  139*   BUN  31*  33*  34*   CREATININE  0.77  0.56  0.63   CALCIUM  9.9  9.9  9.9               Intake/Output       19 07 - 19 0659 19 07 - 19 0659      [id number] Total  4897-2632 Total       Intake    Other  300  450 750  --  -- --    Medications (PO/Enteral Liquids) 300 -- 300 -- -- --    Flush / Irrigation Volume (Cortrak) -- 450 450 -- -- --    NG/GT  450  720 1170  --  -- --    Intake (mL) (Enteral Tube 02/15/19 Cortrak - Gastric 10 Fr. Right nare)  -- -- --    Total Intake 750 1170 1920 -- -- --       Output    Urine  250  100 350  --  -- --    Output (mL) (Condom Urinary Catheter) 250 100 350 -- -- --    Stool  --  -- --  --  -- --    Number of Times Stooled 1 x 1 x 2 x -- -- --    Total Output 250 100 350 -- -- --       Net I/O     500 1070 1570 -- -- --            Intake/Output Summary (Last 24 hours) at 19 0756  Last data filed at 19 0600   Gross per 24 hour   Intake             1920 ml   Output               350 ml   Net             1570 ml            • morphine injection  2 mg Q4HRS PRN   • amantadine  200 mg BID   • levETIRAcetam  500 mg BID   • artificial tear ointment  1 Application Q8HRS   • acetaminophen  650 mg Q4HRS PRN   • propranolol  10 mg Q8HRS   • senna-docusate  2 Tab DAILY AT NOON   • polyethylene glycol/lytes  1 Packet DAILY AT NOON   • enoxaparin (LOVENOX) injection  40 mg DAILY   • Pharmacy  1 Each PHARMACY TO DOSE   • cloNIDine  0.1 mg Q4HRS PRN   • magnesium hydroxide  30 mL QDAY PRN   • oxyCODONE immediate-release  2.5 mg Q3HRS PRN   • oxyCODONE immediate-release  5 mg Q3HRS PRN   • polyethylene glycol/lytes  1 Packet BID PRN   • senna-docusate  1 Tab Q24HRS PRN   • Respiratory Care per Protocol   Continuous RT   • Pharmacy Consult Request  1 Each PHARMACY TO DOSE   • MD ALERT...DO NOT ADMINISTER NSAIDS or ASPIRIN unless ORDERED By Neurosurgery  1 Each PRN   • ondansetron  4 mg Q4HRS PRN   • bisacodyl  10 mg Q24HRS PRN   • fleet  1 Each Once PRN   • docusate sodium 100mg/10mL  100 mg BID       Assessment and Plan:  POD#24 Left Craniectomy    Crani site soft, exam stable  Hold full anticoagulation, cleared for prophylaxis  Q4 hr neuro checks  Leave sutures until site is sunken  Replace flap in 3-4 weeks, patient can also take flap with him if he travels for rehab  Renew PT/OT, rehab consultation

## 2019-02-19 NOTE — PROGRESS NOTES
Patient is resting in room with family member at bedside. Restraint was off when I first saw patient. Patient is not pulling on any lines, including his trache and cortrak. Restraint was left off.

## 2019-02-19 NOTE — THERAPY
"Occupational Therapy Treatment completed with focus on ADLs and upper extremity function.  Functional Status:  Max A x2 for supine>sit; total A for LB dressing; max A for UB dressing; total Ax2 for sit>supine  Plan of Care: Will benefit from Occupational Therapy 3 times per week  Discharge Recommendations:  Equipment Will Continue to Assess for Equipment Needs. Recommend inpatient transitional care services for continued occupational therapy services.     See \"Rehab Therapy-Acute\" Patient Summary Report for complete documentation.     Pt participated in OT tx session. Pt continues to be limited due to poor seated balance, activity tolerance, poor trunk control and decreased RUE AROM/strength. Pt progressing with LUE AROM/strength and is able to bring LUE to head in gravity minimized posture. Pt with posterior and R lateral lean and initially able to attempt to follow commands for upright seated posture however pt unable to maintain and fatigued quickly. Pt's mother present and educated on positioning of RUE and PROM. Will continue to follow for acute OT services while in-house.   "

## 2019-02-19 NOTE — DIETARY
Nutrition Support Weekly Update: Day 24 of admit.  Socrates Villeda is a 28 y.o. male with admitting DX of Trauma. Tube feeding initiated on . Current TF via Small Bowel Cortrak is Impact Peptide 1.5 @ 60 ml/hr, providing 2160 kcals, 135 grams protein, 1109 mL free water per day. Pt is on Kefir and Nutrisource Fiber flushes TID via feeding tube.      Assessment:  Wt : 72.3 kg via bed scale - wt decreased since admit. Suspect could be related to fluids and/or items on bed when weighed. Wt loss percent from wt on  is 8.5% in 3 weeks, which is severe.      Calculation/Equation: REE per MSJ x 1.2 = 1984 kcal/day  Total Calories / day: 1950 - 2250  (Calories / k - 31)  Total Grams Protein / day: 87 - 108  (Grams Protein / k.2 - 1.5)  Total Fluids ml / day: 2172.8 ml    Evaluation:   1. Pt has a trach. TF running @ goal   2. Labs: Glucose 139, BUN 34  3. Meds: symmetrel, colace, lovenox, keppra, miralax, inderal, pericolace  4. Last BM:   5. Will transition to standard TF formula.      Malnutrition Risk: At risk due to severe wt loss since admit, no other criteria noted at this time.      Recommendations/Plan:  Change TF to Fibersource HN @ 25 ml/hr and advance per protocol to 75 ml/hr (goal rate) to provide 2160 kcal (30 kcal/kg), 97 grams protein (1.3 gm/kg), and 1458 ml free water per day.   Fluids per MD.      RD following

## 2019-02-19 NOTE — DISCHARGE PLANNING
Agency/Facility Name: [facility]  Spoke To: Teresa (Admissions)   Outcome: Fax number for facility is [phone]. Acute Rehab referral faxed at [phone].

## 2019-02-19 NOTE — PROGRESS NOTES
Trauma / Surgical Daily Progress Note    Date of Service  2/19/2019    Chief Complaint  28 y.o. male admitted 1/26/2019 with Trauma    Interval Events    Follow up CT head completed  Cortrak out  Persistent leukocytosis. Upward trend. Afebrile  CXR with no acute cardiopulmonary process yesterday.     - Replace cortrak.  Bridle ok.   - Continue trach capping trials.  - Check UA. AM CXR.  - [facility]  - Pt and pt's mother counseled     Review of Systems  Review of Systems   Unable to perform ROS: Medical condition   Gastrointestinal:        2/18(+) BM        Vital Signs  Temp:  [37 °C (98.6 °F)-37.2 °C (99 °F)] 37 °C (98.6 °F)  Pulse:  [66-96] 96  Resp:  [15-20] 20  BP: (111-121)/(69-87) 112/81  SpO2:  [94 %-99 %] 96 %    Physical Exam  Physical Exam   Constitutional: He is intubated.   HENT:   Left bone flap, head soft. Incision approximated.   Cortrak   Eyes:   Opens eyes to voice  Disconjugate    Neck:   Tracheostomy, site clean    Cardiovascular: Normal rate and regular rhythm.    Pulmonary/Chest: He is intubated. No respiratory distress.   T-piece   Abdominal: He exhibits no distension. There is no tenderness. There is no rebound and no guarding.   Genitourinary:   Genitourinary Comments: Condom catheter, urine clear yellow    Musculoskeletal:   Spontaneous movement left upper extremity    Neurological:   Opens eyes to voice   Nonverbal  Following left upper extremity       Skin: Skin is warm. He is diaphoretic.   Nursing note and vitals reviewed.      Laboratory  Recent Results (from the past 24 hour(s))   CBC WITH DIFFERENTIAL    Collection Time: 02/19/19  4:34 AM   Result Value Ref Range    WBC 15.4 (H) 4.8 - 10.8 K/uL    RBC 4.39 (L) 4.70 - 6.10 M/uL    Hemoglobin 13.3 (L) 14.0 - 18.0 g/dL    Hematocrit 42.2 42.0 - 52.0 %    MCV 96.1 81.4 - 97.8 fL    MCH 30.3 27.0 - 33.0 pg    MCHC 31.5 (L) 33.7 - 35.3 g/dL    RDW 49.1 35.9 - 50.0 fL    Platelet Count 607 (H) 164 -  446 K/uL    MPV 9.4 9.0 - 12.9 fL    Neutrophils-Polys 71.10 44.00 - 72.00 %    Lymphocytes 17.70 (L) 22.00 - 41.00 %    Monocytes 6.60 0.00 - 13.40 %    Eosinophils 2.30 0.00 - 6.90 %    Basophils 0.60 0.00 - 1.80 %    Immature Granulocytes 1.70 (H) 0.00 - 0.90 %    Nucleated RBC 0.00 /100 WBC    Neutrophils (Absolute) 10.93 (H) 1.82 - 7.42 K/uL    Lymphs (Absolute) 2.71 1.00 - 4.80 K/uL    Monos (Absolute) 1.01 (H) 0.00 - 0.85 K/uL    Eos (Absolute) 0.35 0.00 - 0.51 K/uL    Baso (Absolute) 0.09 0.00 - 0.12 K/uL    Immature Granulocytes (abs) 0.26 (H) 0.00 - 0.11 K/uL    NRBC (Absolute) 0.00 K/uL   COMP METABOLIC PANEL    Collection Time: 02/19/19  4:34 AM   Result Value Ref Range    Sodium 141 135 - 145 mmol/L    Potassium 3.9 3.6 - 5.5 mmol/L    Chloride 104 96 - 112 mmol/L    Co2 26 20 - 33 mmol/L    Anion Gap 11.0 0.0 - 11.9    Glucose 139 (H) 65 - 99 mg/dL    Bun 34 (H) 8 - 22 mg/dL    Creatinine 0.63 0.50 - 1.40 mg/dL    Calcium 9.9 8.5 - 10.5 mg/dL    AST(SGOT) 43 12 - 45 U/L    ALT(SGPT) 64 (H) 2 - 50 U/L    Alkaline Phosphatase 64 30 - 99 U/L    Total Bilirubin 0.6 0.1 - 1.5 mg/dL    Albumin 4.5 3.2 - 4.9 g/dL    Total Protein 8.0 6.0 - 8.2 g/dL    Globulin 3.5 1.9 - 3.5 g/dL    A-G Ratio 1.3 g/dL   ESTIMATED GFR    Collection Time: 02/19/19  4:34 AM   Result Value Ref Range    GFR If African American >60 >60 mL/min/1.73 m 2    GFR If Non African American >60 >60 mL/min/1.73 m 2       Fluids    Intake/Output Summary (Last 24 hours) at 02/19/19 1429  Last data filed at 02/19/19 0600   Gross per 24 hour   Intake             1500 ml   Output              100 ml   Net             1400 ml       Core Measures & Quality Metrics  Labs reviewed  Greer catheter: No Greer      DVT Prophylaxis: Enoxaparin (Lovenox) (IVC filter )  DVT prophylaxis - mechanical: SCDs  Ulcer prophylaxis: Yes    Assessed for rehab: Patient was assess for and/or received rehabilitation services during this hospitalization    Total Score:  3    ETOH Screening     Reason for no ETOH Intervention: Traumatic Brain Injury  ETOH Screening     Intervention complete date: 2/16/2019        Assessment/Plan  Discharge planning issues- (present on admission)   Assessment & Plan    Awaiting bone flap replacement (early March)  Has flight benefits through insurance  Family would like him in [facility] choice - [facility]   Case coordination involved         Pulmonary embolus, right (HCC)   Assessment & Plan    1/29 Prophylactic lovenox initiated   2/3 Acute deterioration / inability to oxygenate precipitously 2 hours after tracheostomy.    Chest x-ray shows some increased infiltrate in the left lower lobe, patient refractory to bagging and increase PEEP, Flolan  2/5 CTA shows proximal segmental and subsegmental pulmonary emboli in the right lower lobe. No RV strain.   - IVC filter placed   Prophylactic pharmacological DVT prophylaxis cleared by neurosurgery. Hold full anticoagulation.     Leukocytosis   Assessment & Plan    2/5 Multifocal pneumonia and new small bilateral pleural effusions.  Bronchoscopy with BAL and blood cultures for purulent secretions, fever, leukocytosis and chest x-ray infiltrate. Empiric linezolid and cefepime initiated.  2/7  BAL - klebsiella. De-escalated to ceftriaxone monotherapy  2/11 Antibiotic day 7 of 7  2/18 Persistent leukocytosis. CXR with No acute cardiac or pulmonary abnormality is noted.  2/19 WBC trend up. Check urine analysis.      Subdural hematoma (HCC)- (present on admission)   Assessment & Plan    Left sided holohemispheric subdural hematoma measuring approximately 9.4 mm in maximum thickness. 10 mm of left-to-right midline shift.   1/26 To OR for emergent craniotomy and evacuation of hematoma.   1/28 CT x 2 stable  2/2 MRI brainstem complete   2/19 Follow up CT head with partial herniation of the left hemisphere, herniation appears somewhat decreased  Tentative bone flap replacement in 3-4 weeks  On  Ivelisse Huntley MD. Neurosurgery.      Acute respiratory failure following trauma and surgery (HCC)- (present on admission)   Assessment & Plan    Intubated in field  2/3 Perc trach / APRV mode  / Folan  2/14 Tolerating T- Piece   2/16 Downsize trach to 6.0 cuffless, non fenestrated.    Capping trials     Oropharyngeal dysphagia   Assessment & Plan    Cortrak in place  On TF  2/13 Speech eval - speaking valve with therapists only.       No contraindication to deep vein thrombosis (DVT) prophylaxis- (present on admission)   Assessment & Plan    Systemic anticoagulation contraindicated secondary to elevated bleeding risk.  1/27 and 2/4  Trauma screening bilateral lower extremity venous duplex negative for above knee DVT.  1/29 Pharmacological DVT prophalxis, Lovenox initiated.     Trauma- (present on admission)   Assessment & Plan    [place] Witnessed seizure like activity. GCS 3. No evidence of acute trauma.  Trauma Red Activation.   Mor Rao MD, Trauma/General Surgery.           Discussed patient condition with Patient and trauma surgery, Dr. Jerry Sanon.    Seen  Stable  Continue therapies  Discussed with Nathan Sanon MD

## 2019-02-19 NOTE — PROGRESS NOTES
There is a sheet of paper at patient's bedside that says the speaking valve should not be in place when patient is sleeping. RRT Leobardo at [phone] was notified of the above. RRT said that sheet is an old sheet.      0006: RRT Leobardo notified patient is producing sputum and that I suctioned with 14f suction catheter but patient is still coughing with sputum coming from trache and mouth. RRT came to bedside and demonstrated to me suctioning with t-piece on pt

## 2019-02-19 NOTE — DISCHARGE PLANNING
Anticipated Discharge Disposition:   IRF in [place]    Action:   Discussed with CCA, she will send referral today to [facility]    Barriers to Discharge:   Pending acceptance.    Plan:   Will follow up with [facility]

## 2019-02-19 NOTE — DISCHARGE PLANNING
Anticipated Discharge Disposition:   IRF at [facility]    Action:   Talked to Teresa at [facility], she confirmed receiving the referral but it is still under review.     Addendum:   Questions of mom answered . Explained to her my role as a discharge planner.     Barriers to Discharge:   Pending acceptance    Plan:   Will call Teresa tomorrow to follow up.     Addendum:  Promised mom that CM will update her about discharge planning tomorrow after conversation with [facility].

## 2019-02-20 ENCOUNTER — APPOINTMENT (OUTPATIENT)
Dept: RADIOLOGY | Facility: MEDICAL CENTER | Age: 29
DRG: 003 | End: 2019-02-20
Attending: NURSE PRACTITIONER
Payer: COMMERCIAL

## 2019-02-20 LAB
ALBUMIN SERPL BCP-MCNC: 4.7 G/DL (ref 3.2–4.9)
ALBUMIN/GLOB SERPL: 1.4 G/DL
ALP SERPL-CCNC: 68 U/L (ref 30–99)
ALT SERPL-CCNC: 52 U/L (ref 2–50)
ANION GAP SERPL CALC-SCNC: 11 MMOL/L (ref 0–11.9)
AST SERPL-CCNC: 34 U/L (ref 12–45)
BASOPHILS # BLD AUTO: 2.9 % (ref 0–1.8)
BASOPHILS # BLD: 0.35 K/UL (ref 0–0.12)
BILIRUB SERPL-MCNC: 1 MG/DL (ref 0.1–1.5)
BUN SERPL-MCNC: 33 MG/DL (ref 8–22)
CALCIUM SERPL-MCNC: 10.7 MG/DL (ref 8.5–10.5)
CHLORIDE SERPL-SCNC: 104 MMOL/L (ref 96–112)
CO2 SERPL-SCNC: 26 MMOL/L (ref 20–33)
CREAT SERPL-MCNC: 0.63 MG/DL (ref 0.5–1.4)
EOSINOPHIL # BLD AUTO: 0.51 K/UL (ref 0–0.51)
EOSINOPHIL NFR BLD: 4.2 % (ref 0–6.9)
ERYTHROCYTE [DISTWIDTH] IN BLOOD BY AUTOMATED COUNT: 48.8 FL (ref 35.9–50)
GLOBULIN SER CALC-MCNC: 3.3 G/DL (ref 1.9–3.5)
GLUCOSE SERPL-MCNC: 120 MG/DL (ref 65–99)
HCT VFR BLD AUTO: 42.2 % (ref 42–52)
HGB BLD-MCNC: 13.2 G/DL (ref 14–18)
IMM GRANULOCYTES # BLD AUTO: 0.18 K/UL (ref 0–0.11)
IMM GRANULOCYTES NFR BLD AUTO: 1.5 % (ref 0–0.9)
LYMPHOCYTES # BLD AUTO: 2.71 K/UL (ref 1–4.8)
LYMPHOCYTES NFR BLD: 22.1 % (ref 22–41)
MCH RBC QN AUTO: 30.3 PG (ref 27–33)
MCHC RBC AUTO-ENTMCNC: 31.3 G/DL (ref 33.7–35.3)
MCV RBC AUTO: 96.8 FL (ref 81.4–97.8)
MONOCYTES # BLD AUTO: 1.09 K/UL (ref 0–0.85)
MONOCYTES NFR BLD AUTO: 8.9 % (ref 0–13.4)
NEUTROPHILS # BLD AUTO: 7.44 K/UL (ref 1.82–7.42)
NEUTROPHILS NFR BLD: 60.4 % (ref 44–72)
NRBC # BLD AUTO: 0.4 K/UL
NRBC BLD-RTO: 3.3 /100 WBC
PLATELET # BLD AUTO: 545 K/UL (ref 164–446)
PMV BLD AUTO: 9.6 FL (ref 9–12.9)
POTASSIUM SERPL-SCNC: 3.7 MMOL/L (ref 3.6–5.5)
PROT SERPL-MCNC: 8 G/DL (ref 6–8.2)
RBC # BLD AUTO: 4.36 M/UL (ref 4.7–6.1)
SODIUM SERPL-SCNC: 141 MMOL/L (ref 135–145)
WBC # BLD AUTO: 12.3 K/UL (ref 4.8–10.8)

## 2019-02-20 PROCEDURE — 92610 EVALUATE SWALLOWING FUNCTION: CPT

## 2019-02-20 PROCEDURE — 36415 COLL VENOUS BLD VENIPUNCTURE: CPT

## 2019-02-20 PROCEDURE — A9270 NON-COVERED ITEM OR SERVICE: HCPCS | Performed by: SURGERY

## 2019-02-20 PROCEDURE — 85025 COMPLETE CBC W/AUTO DIFF WBC: CPT

## 2019-02-20 PROCEDURE — 80053 COMPREHEN METABOLIC PANEL: CPT

## 2019-02-20 PROCEDURE — 700102 HCHG RX REV CODE 250 W/ 637 OVERRIDE(OP): Performed by: SURGERY

## 2019-02-20 PROCEDURE — A9270 NON-COVERED ITEM OR SERVICE: HCPCS | Performed by: NEUROLOGICAL SURGERY

## 2019-02-20 PROCEDURE — 71045 X-RAY EXAM CHEST 1 VIEW: CPT

## 2019-02-20 PROCEDURE — 700111 HCHG RX REV CODE 636 W/ 250 OVERRIDE (IP): Performed by: PHYSICIAN ASSISTANT

## 2019-02-20 PROCEDURE — 700112 HCHG RX REV CODE 229: Performed by: NEUROLOGICAL SURGERY

## 2019-02-20 PROCEDURE — 770001 HCHG ROOM/CARE - MED/SURG/GYN PRIV*

## 2019-02-20 RX ADMIN — LEVETIRACETAM 500 MG: 500 TABLET, FILM COATED ORAL at 18:00

## 2019-02-20 RX ADMIN — LEVETIRACETAM 500 MG: 500 TABLET, FILM COATED ORAL at 04:08

## 2019-02-20 RX ADMIN — PROPRANOLOL HYDROCHLORIDE 10 MG: 10 TABLET ORAL at 13:27

## 2019-02-20 RX ADMIN — AMANTADINE HYDROCHLORIDE 200 MG: 50 SOLUTION ORAL at 04:05

## 2019-02-20 RX ADMIN — PROPRANOLOL HYDROCHLORIDE 10 MG: 10 TABLET ORAL at 04:07

## 2019-02-20 RX ADMIN — PROPRANOLOL HYDROCHLORIDE 10 MG: 10 TABLET ORAL at 21:30

## 2019-02-20 RX ADMIN — AMANTADINE HYDROCHLORIDE 200 MG: 50 SOLUTION ORAL at 17:04

## 2019-02-20 RX ADMIN — MINERAL OIL AND WHITE PETROLATUM 1 APPLICATION: 150; 830 OINTMENT OPHTHALMIC at 21:32

## 2019-02-20 RX ADMIN — ENOXAPARIN SODIUM 40 MG: 100 INJECTION SUBCUTANEOUS at 04:04

## 2019-02-20 RX ADMIN — DOCUSATE SODIUM 100 MG: 50 LIQUID ORAL at 04:10

## 2019-02-20 RX ADMIN — MINERAL OIL AND WHITE PETROLATUM 1 APPLICATION: 150; 830 OINTMENT OPHTHALMIC at 04:11

## 2019-02-20 NOTE — FLOWSHEET NOTE
02/20/19 0256   Events/Summary/Plan   Events/Summary/Plan pt remains capped trail began at 1715 2/19/2019, rayna well on 1L/NC   Respiratory WDL   Respiratory (WDL) X   Chest Exam   Work Of Breathing / Effort Mild   Respiration 16   Pulse 83   Breath Sounds   RUL Breath Sounds Clear   RML Breath Sounds Clear;Diminished   RLL Breath Sounds Diminished   STACI Breath Sounds Clear   LLL Breath Sounds Clear;Diminished   Airway Trach Tracheostomy 6.0   Placement Date/Time: 02/16/19 1241   Airway Type: Trach  Brand: Mayra  Style: Uncuffed  Airway Location: Tracheostomy  Airway Size: 6.0  Inserted In: Unit  Inserted by: Respiratory care practitioner   Site Assessment Intact   Airway Tube Secured Velcro attachment   Cuffless Yes   Status Capped   Extra Tracheostomy Tube at Bedside Yes   Oxygen   Pulse Oximetry 98 %   O2 (LPM) 1   O2 Daily Delivery Respiratory  Silicone Nasal Cannula

## 2019-02-20 NOTE — PROGRESS NOTES
I was notified by my charge that the pt pulled out his cortrak. Cortrak is currently outside of the nare of the nose. I notified SHARLENE Huddleston via telephone that the patient had pulled his tube. New order received:    Soft restraint left wrist

## 2019-02-20 NOTE — DISCHARGE PLANNING
Anticipated Discharge Disposition:   IRF at [facility]    Action:   Talked to Teresa at [facility], they are still in the process of reviewing case. They will call CM back with determination.    Barriers to Discharge:   Pending acceptance    Plan:   Update mom.  Follow up with Teresa.     Addendum:  Received a call from Mary Jane from [facility].  She wants pdated Respiratory Notes.  Updated ST/PT/OT notes  They will not accept pt with an NGT, if so, pt will need a PEG tube.   Then she will forward referral to the medical team.   Notified Trauma APRN team.

## 2019-02-20 NOTE — PROGRESS NOTES
Trauma / Surgical Daily Progress Note    Date of Service  2/20/2019    Chief Complaint  28 y.o. male admitted 1/26/2019 with Trauma    Interval Events  Speech to assess swallow/cognition  -will require G tube prior to rehab transfer  -continue capping trials    Cleared from neuro for transfer to Rehab in [place]    WBC trending down  -decreased oxygen requirements  -chest xray in am    Following with eyes      Review of Systems  Review of Systems   Unable to perform ROS: Medical condition   Gastrointestinal:        Last bowel movement 2/20        Vital Signs  Temp:  [36.7 °C (98 °F)-36.8 °C (98.2 °F)] 36.7 °C (98 °F)  Pulse:  [77-98] 83  Resp:  [16-22] 16  BP: (105-122)/(75-82) 120/78  SpO2:  [93 %-99 %] 93 %    Physical Exam  Physical Exam   Constitutional: He appears well-nourished. He is active.   HENT:   Left bone flap, head soft. Incision approximated.   Cortrak   Eyes:   Opens eyes to voice  Disconjugate    Neck:   Tracheostomy, site clean    Cardiovascular: Normal rate and regular rhythm.    Pulmonary/Chest: No respiratory distress.   T-piece   Abdominal: He exhibits no distension. There is no tenderness. There is no rebound and no guarding.   Genitourinary:   Genitourinary Comments: Condom catheter, urine clear yellow    Musculoskeletal:   Spontaneous movement left upper extremity   Neurological:   Opens eyes to voice   Nonverbal  Following left upper extremity    Skin: Skin is warm. He is not diaphoretic.   Nursing note and vitals reviewed.      Laboratory  Recent Results (from the past 24 hour(s))   URINALYSIS    Collection Time: 02/19/19  5:02 PM   Result Value Ref Range    Color Yellow     Character Clear     Specific Gravity 1.039 <1.035    Ph 5.0 5.0 - 8.0    Glucose Negative Negative mg/dL    Ketones Negative Negative mg/dL    Protein Negative Negative mg/dL    Bilirubin Negative Negative    Urobilinogen, Urine 0.2 Negative    Nitrite Negative Negative    Leukocyte Esterase Negative Negative     Occult Blood Negative Negative    Micro Urine Req see below    CBC WITH DIFFERENTIAL    Collection Time: 02/20/19  4:56 AM   Result Value Ref Range    WBC 12.3 (H) 4.8 - 10.8 K/uL    RBC 4.36 (L) 4.70 - 6.10 M/uL    Hemoglobin 13.2 (L) 14.0 - 18.0 g/dL    Hematocrit 42.2 42.0 - 52.0 %    MCV 96.8 81.4 - 97.8 fL    MCH 30.3 27.0 - 33.0 pg    MCHC 31.3 (L) 33.7 - 35.3 g/dL    RDW 48.8 35.9 - 50.0 fL    Platelet Count 545 (H) 164 - 446 K/uL    MPV 9.6 9.0 - 12.9 fL    Neutrophils-Polys 60.40 44.00 - 72.00 %    Lymphocytes 22.10 22.00 - 41.00 %    Monocytes 8.90 0.00 - 13.40 %    Eosinophils 4.20 0.00 - 6.90 %    Basophils 2.90 (H) 0.00 - 1.80 %    Immature Granulocytes 1.50 (H) 0.00 - 0.90 %    Nucleated RBC 3.30 /100 WBC    Neutrophils (Absolute) 7.44 (H) 1.82 - 7.42 K/uL    Lymphs (Absolute) 2.71 1.00 - 4.80 K/uL    Monos (Absolute) 1.09 (H) 0.00 - 0.85 K/uL    Eos (Absolute) 0.51 0.00 - 0.51 K/uL    Baso (Absolute) 0.35 (H) 0.00 - 0.12 K/uL    Immature Granulocytes (abs) 0.18 (H) 0.00 - 0.11 K/uL    NRBC (Absolute) 0.40 K/uL   COMP METABOLIC PANEL    Collection Time: 02/20/19  4:56 AM   Result Value Ref Range    Sodium 141 135 - 145 mmol/L    Potassium 3.7 3.6 - 5.5 mmol/L    Chloride 104 96 - 112 mmol/L    Co2 26 20 - 33 mmol/L    Anion Gap 11.0 0.0 - 11.9    Glucose 120 (H) 65 - 99 mg/dL    Bun 33 (H) 8 - 22 mg/dL    Creatinine 0.63 0.50 - 1.40 mg/dL    Calcium 10.7 (H) 8.5 - 10.5 mg/dL    AST(SGOT) 34 12 - 45 U/L    ALT(SGPT) 52 (H) 2 - 50 U/L    Alkaline Phosphatase 68 30 - 99 U/L    Total Bilirubin 1.0 0.1 - 1.5 mg/dL    Albumin 4.7 3.2 - 4.9 g/dL    Total Protein 8.0 6.0 - 8.2 g/dL    Globulin 3.3 1.9 - 3.5 g/dL    A-G Ratio 1.4 g/dL   ESTIMATED GFR    Collection Time: 02/20/19  4:56 AM   Result Value Ref Range    GFR If African American >60 >60 mL/min/1.73 m 2    GFR If Non African American >60 >60 mL/min/1.73 m 2       Fluids    Intake/Output Summary (Last 24 hours) at 02/20/19 1031  Last data filed at  02/20/19 0733   Gross per 24 hour   Intake              805 ml   Output              650 ml   Net              155 ml       Core Measures & Quality Metrics  Labs reviewed  Greer catheter: No Greer      DVT Prophylaxis: Enoxaparin (Lovenox) (IVC filter )  DVT prophylaxis - mechanical: SCDs  Ulcer prophylaxis: Yes    Assessed for rehab: Patient was assess for and/or received rehabilitation services during this hospitalization    Total Score: 3    ETOH Screening     Reason for no ETOH Intervention: Traumatic Brain Injury  ETOH Screening     Intervention complete date: 2/16/2019        Assessment/Plan  Discharge planning issues- (present on admission)   Assessment & Plan    Awaiting bone flap replacement (early March)  Has flight benefits through insurance  Family would like him in [facility]   Case coordination involved         Pulmonary embolus, right (HCC)   Assessment & Plan    1/29 Prophylactic lovenox initiated   2/3 Acute deterioration / inability to oxygenate precipitously 2 hours after tracheostomy.    Chest x-ray shows some increased infiltrate in the left lower lobe, patient refractory to bagging and increase PEEP, Flolan  2/5 CTA shows proximal segmental and subsegmental pulmonary emboli in the right lower lobe. No RV strain.   - IVC filter placed   Prophylactic pharmacological DVT prophylaxis cleared by neurosurgery. Hold full anticoagulation.     Leukocytosis   Assessment & Plan    2/5 Multifocal pneumonia and new small bilateral pleural effusions.  Bronchoscopy with BAL and blood cultures for purulent secretions, fever, leukocytosis and chest x-ray infiltrate. Empiric linezolid and cefepime initiated.  2/7  BAL - klebsiella. De-escalated to ceftriaxone monotherapy  2/11 Antibiotic day 7 of 7  2/18 Persistent leukocytosis. CXR with No acute cardiac or pulmonary abnormality is noted.  2/19 WBC trend up. Check urine analysis.      Subdural hematoma (HCC)- (present on  admission)   Assessment & Plan    Left sided holohemispheric subdural hematoma measuring approximately 9.4 mm in maximum thickness. 10 mm of left-to-right midline shift.   1/26 To OR for emergent craniotomy and evacuation of hematoma.   1/28 CT x 2 stable  2/2 MRI brainstem complete   2/19 Follow up CT head with partial herniation of the left hemisphere, herniation appears somewhat decreased  Tentative bone flap replacement in 3-4 weeks  On Ivelisse Huntley MD. Neurosurgery.      Acute respiratory failure following trauma and surgery (HCC)- (present on admission)   Assessment & Plan    Intubated in field  2/3 Perc trach / APRV mode  / Folan  2/14 Tolerating T- Piece   2/16 Downsize trach to 6.0 cuffless, non fenestrated.    Capping trials     Oropharyngeal dysphagia   Assessment & Plan    Cortrak in place  On TF  2/13 Speech eval - speaking valve with therapists only.    2/20 Swallow evaluation would require G tube for rehab if unable to swallow     No contraindication to deep vein thrombosis (DVT) prophylaxis- (present on admission)   Assessment & Plan    Systemic anticoagulation contraindicated secondary to elevated bleeding risk.  1/27 and 2/4  Trauma screening bilateral lower extremity venous duplex negative for above knee DVT.  1/29 Pharmacological DVT prophalxis, Lovenox initiated.     Trauma- (present on admission)   Assessment & Plan    [place]ed seizure like activity. GCS 3. No evidence of acute trauma.  Trauma Red Activation.   Mor Roa MD, Trauma/General Surgery.           Discussed patient condition with RN, Patient and trauma surgery.

## 2019-02-20 NOTE — THERAPY
"Speech Language Therapy Clinical Swallow Evaluation completed.    Functional Status: Patient was seen on this date for a blue dye swallow evaluation. Mother at bedside for session. Patient localizing to sound but unable to follow directives for oral motor examination with the exception of partial opening of oral cavity x1. Administration of blue dyed ice chips, NTL via tsp and cup sips, and puree were administered. Swallow trigger minimally delayed and required 2-3 swallows to clear single bolus. Bolus loss to right in 2/3 trials with NTL via cup sips. No overt s/sx of aspiration appreciated. Tracheal suctioning performed with removal of minimal amount of clear secretions - no blue appreciated.     Recommendations - Diet: At this time, patient remains at high risk for silent aspiration given TBI and tracheostomy and would recommend continue NPO/cortrak. Blue dye swallow evaluation in progress. Please document any blue dye from trach in the next 24 hours. RN and RT aware.                             Strategies: To be determined                             Medication Administration: Via Gastric Tube     Plan of Care: Will benefit from Speech Therapy 5 times per week    Post-Acute Therapy: Recommend inpatient transitional care services for continued speech therapy services.     See \"Rehab Therapy-Acute\" Patient Summary Report for complete documentation. Thank you for the consult.   "

## 2019-02-20 NOTE — CARE PLAN
Problem: Discharge Barriers/Planning  Goal: Patient's continuum of care needs will be met  Outcome: PROGRESSING SLOWER THAN EXPECTED  Pt needs to be out of restraints for 24 hours before eligable for discharge.

## 2019-02-20 NOTE — PROGRESS NOTES
Pt currently sleeping in room without signs of distress. LUE is resting on pillow. Patient is making no attempt to pull on lines or touch his incision while sleeping

## 2019-02-20 NOTE — DISCHARGE PLANNING
Anticipated Discharge Disposition:   IRF    Action:   Talked to ST to who CM explained that updated ST notes are needed. ST is planning to see pt today.     Notified APRN that pt will need a PEG and [facility] will not take pt with a cortrack.    Met with mom. Updated her with [facility] that they are screening her. Asked mom if she would be agreeable for me to send a referral to [facility] and to IRF @ [facility]. She is agreeable.     Talked to Gary @ IRF to follow up.  Talked to Gisselle @ [facility]    Barriers to Discharge:   Pending acceptance by a rehab facility    Plan:   Follow up with IRF and [facility]   Update mom.

## 2019-02-20 NOTE — PROGRESS NOTES
Pt tracks with eyes, does not follow commands. BRYANT, L side stronger than R side. Trache in place. Suctioned as needed. Also did oral suctioning. Mouth and trach care provided. RT placed trache cap at 1730.  in use. +BS, condom cath in place, patent. UA sent to lab per orders. Cortrak came out while pt working with therapy. Okay to place bridle per Nathan SU. Unable to place cortrak after 3 attempts. Notified Charge RN on Neuroscience- came and placed cortrak with bridle. KUB ordered. Q 2 hour turns done. Alternating moon/prafo boots. Waffle mattress placed. Reviewed poc with pts mother-verbalized understanding. Bed alarm in use. Call light in reach.

## 2019-02-20 NOTE — DISCHARGE PLANNING
Agency/Facility Name: [facility]  Outcome: Updated notes faxed to [facility].

## 2019-02-20 NOTE — PROGRESS NOTES
Neurosurgery Progress Note    Subjective:  No events    Exam:  Opens eyes to voice  Perrl, dysconjugate  Tracks  Following this morning on the left upper extremity  BRYANT L>R  C/d/i flap soft, sunken at superior portion        BP  Min: 105/77  Max: 122/82  Pulse  Av  Min: 77  Max: 98  Resp  Av.4  Min: 15  Max: 22  Temp  Av.8 °C (98.2 °F)  Min: 36.7 °C (98.1 °F)  Max: 36.9 °C (98.4 °F)  SpO2  Av.4 %  Min: 93 %  Max: 99 %    No Data Recorded    Recent Labs      19   WBC  12.8*  15.4*  12.3*   RBC  4.14*  4.39*  4.36*   HEMOGLOBIN  12.7*  13.3*  13.2*   HEMATOCRIT  40.0*  42.2  42.2   MCV  96.6  96.1  96.8   MCH  30.7  30.3  30.3   MCHC  31.8*  31.5*  31.3*   RDW  49.9  49.1  48.8   PLATELETCT  634*  607*  545*   MPV  9.5  9.4  9.6     Recent Labs      196   SODIUM  144  141  141   POTASSIUM  3.8  3.9  3.7   CHLORIDE  108  104  104   CO2  26  26  26   GLUCOSE  131*  139*  120*   BUN  33*  34*  33*   CREATININE  0.56  0.63  0.63   CALCIUM  9.9  9.9  10.7*               Intake/Output       19 07 - 19 0659 19 07 - 19 0659      2730-4635 5665-7888 Total 1900-0659 Total       Intake    Other  150  420 570  --  -- --    Medications (PO/Enteral Liquids) -- 120 120 -- -- --    Flush / Irrigation Volume (Cortrak) 150 300 450 -- -- --    NG/GT  180  175 355  --  -- --    Intake (mL) ([REMOVED] Enteral Tube 02/15/19 Cortrak - Gastric 10 Fr. Right nare) 180 -- 180 -- -- --    Intake (mL) ([REMOVED] Enteral Tube 19 Cortrak - Gastric 10 Fr. Left nare) -- 175 175 -- -- --    Total Intake 330 595 925 -- -- --       Output    Urine  850  -- 850  --  -- --    Output (mL) (Condom Urinary Catheter) 850 -- 850 -- -- --    Drains  0  -- 0  --  -- --    Residual Amount (mL) (Discarded) ([REMOVED] Enteral Tube 02/15/19 Cortrak - Gastric 10 Fr. Right nare) 0 -- 0 -- -- --    Total Output 850  -- 850 -- -- --       Net I/O     -520 595 75 -- -- --            Intake/Output Summary (Last 24 hours) at 02/20/19 0721  Last data filed at 02/20/19 0510   Gross per 24 hour   Intake              925 ml   Output              850 ml   Net               75 ml            • morphine injection  2 mg Q4HRS PRN   • amantadine  200 mg BID   • levETIRAcetam  500 mg BID   • artificial tear ointment  1 Application Q8HRS   • acetaminophen  650 mg Q4HRS PRN   • propranolol  10 mg Q8HRS   • senna-docusate  2 Tab DAILY AT NOON   • polyethylene glycol/lytes  1 Packet DAILY AT NOON   • enoxaparin (LOVENOX) injection  40 mg DAILY   • Pharmacy  1 Each PHARMACY TO DOSE   • cloNIDine  0.1 mg Q4HRS PRN   • magnesium hydroxide  30 mL QDAY PRN   • oxyCODONE immediate-release  2.5 mg Q3HRS PRN   • oxyCODONE immediate-release  5 mg Q3HRS PRN   • polyethylene glycol/lytes  1 Packet BID PRN   • senna-docusate  1 Tab Q24HRS PRN   • Respiratory Care per Protocol   Continuous RT   • Pharmacy Consult Request  1 Each PHARMACY TO DOSE   • MD ALERT...DO NOT ADMINISTER NSAIDS or ASPIRIN unless ORDERED By Neurosurgery  1 Each PRN   • ondansetron  4 mg Q4HRS PRN   • bisacodyl  10 mg Q24HRS PRN   • fleet  1 Each Once PRN   • docusate sodium 100mg/10mL  100 mg BID       Assessment and Plan:  POD#25 Left Craniectomy    Crani site soft, exam stable  Hold full anticoagulation, cleared for prophylaxis  Q4 hr neuro checks  Leave sutures until Thursday  Replace flap in 3-4 weeks, patient can also take flap with him if he travels for rehab  Renew PT/OT, rehab consultation, can go to rehab when arranged from our perspective

## 2019-02-20 NOTE — THERAPY
"Physical Therapy Treatment completed.   Bed Mobility:  Supine to Sit: Maximal Assist (x2)  Transfers: Sit to Stand: Unable to Participate  Gait: Level Of Assist: Unable to Participate      Plan of Care: Will benefit from Physical Therapy 3 times per week  Discharge Recommendations: Equipment: Will Continue to Assess for Equipment Needs. Post-acute therapy Recommend inpatient transitional care services for continued physical therapy services.      See \"Rehab Therapy-Acute\" Patient Summary Report for complete documentation.     Pt continues to present w/ decreased functional mobility. Pt initially able to answer yes/no questions w/ head nods and hand squeezing however once fatigued, pt unable. Pt pushing heavily w/ L UE in sitting and w/ a posterior lean requiring MaxA to hold balance. With propping and blocking on L elbow for weight bearing, pt was able to hold balance for 3 seconds. Pt w/ very little head control in sitting. Trace contractions of neck muscles w/ tactile input and verbal cues. As per initial eval, pt will benefit from post acute therapy.  "

## 2019-02-20 NOTE — PROGRESS NOTES
Cortrak Placement    Tube Team verified patient name and medical record number prior to tube placement.  Cortrak tube (43 inches, 10 Kuwaiti) placed at 60 cm in left nare.  Per Cortrak picture, tube appears to be in the stomach.  Nursing Instructions: Awaiting KUB to confirm placement before use for medications or feeding. Once placement confirmed, flush tube with 30 ml of water, and then remove and save stylet, in patient medication drawer.

## 2019-02-20 NOTE — CARE PLAN
Problem: Safety  Goal: Will remain free from falls    Intervention: Implement fall precautions  Bed alarm in use. Call light w/in reach.      Problem: Respiratory:  Goal: Respiratory status will improve    Intervention: Collaborate with respiratory therapist and Interdisciplinary Team on treatment measures to improve respiratory function  Suctioned as needed

## 2019-02-21 ENCOUNTER — HOSPITAL ENCOUNTER (OUTPATIENT)
Facility: MEDICAL CENTER | Age: 29
End: 2019-03-20
Attending: INTERNAL MEDICINE | Admitting: INTERNAL MEDICINE
Payer: COMMERCIAL

## 2019-02-21 ENCOUNTER — APPOINTMENT (OUTPATIENT)
Dept: RADIOLOGY | Facility: MEDICAL CENTER | Age: 29
DRG: 003 | End: 2019-02-21
Attending: NURSE PRACTITIONER
Payer: COMMERCIAL

## 2019-02-21 VITALS
OXYGEN SATURATION: 97 % | DIASTOLIC BLOOD PRESSURE: 81 MMHG | HEIGHT: 67 IN | RESPIRATION RATE: 18 BRPM | SYSTOLIC BLOOD PRESSURE: 113 MMHG | BODY MASS INDEX: 25.02 KG/M2 | TEMPERATURE: 97.8 F | WEIGHT: 159.39 LBS | HEART RATE: 99 BPM

## 2019-02-21 LAB
ALBUMIN SERPL BCP-MCNC: 4.8 G/DL (ref 3.2–4.9)
ALBUMIN/GLOB SERPL: 1.5 G/DL
ALP SERPL-CCNC: 73 U/L (ref 30–99)
ALT SERPL-CCNC: 62 U/L (ref 2–50)
ANION GAP SERPL CALC-SCNC: 8 MMOL/L (ref 0–11.9)
AST SERPL-CCNC: 38 U/L (ref 12–45)
BASOPHILS # BLD AUTO: 0.8 % (ref 0–1.8)
BASOPHILS # BLD: 0.11 K/UL (ref 0–0.12)
BILIRUB SERPL-MCNC: 0.9 MG/DL (ref 0.1–1.5)
BUN SERPL-MCNC: 26 MG/DL (ref 8–22)
CALCIUM SERPL-MCNC: 10.4 MG/DL (ref 8.5–10.5)
CHLORIDE SERPL-SCNC: 102 MMOL/L (ref 96–112)
CO2 SERPL-SCNC: 28 MMOL/L (ref 20–33)
CREAT SERPL-MCNC: 0.67 MG/DL (ref 0.5–1.4)
EOSINOPHIL # BLD AUTO: 0.52 K/UL (ref 0–0.51)
EOSINOPHIL NFR BLD: 3.6 % (ref 0–6.9)
ERYTHROCYTE [DISTWIDTH] IN BLOOD BY AUTOMATED COUNT: 47.8 FL (ref 35.9–50)
GLOBULIN SER CALC-MCNC: 3.3 G/DL (ref 1.9–3.5)
GLUCOSE SERPL-MCNC: 100 MG/DL (ref 65–99)
GRAM STN SPEC: NORMAL
HCT VFR BLD AUTO: 43.9 % (ref 42–52)
HGB BLD-MCNC: 14.1 G/DL (ref 14–18)
IMM GRANULOCYTES # BLD AUTO: 0.15 K/UL (ref 0–0.11)
IMM GRANULOCYTES NFR BLD AUTO: 1 % (ref 0–0.9)
LYMPHOCYTES # BLD AUTO: 2.48 K/UL (ref 1–4.8)
LYMPHOCYTES NFR BLD: 17.1 % (ref 22–41)
MAGNESIUM SERPL-MCNC: 2.2 MG/DL (ref 1.5–2.5)
MCH RBC QN AUTO: 30.9 PG (ref 27–33)
MCHC RBC AUTO-ENTMCNC: 32.1 G/DL (ref 33.7–35.3)
MCV RBC AUTO: 96.3 FL (ref 81.4–97.8)
MONOCYTES # BLD AUTO: 1.07 K/UL (ref 0–0.85)
MONOCYTES NFR BLD AUTO: 7.4 % (ref 0–13.4)
NEUTROPHILS # BLD AUTO: 10.14 K/UL (ref 1.82–7.42)
NEUTROPHILS NFR BLD: 70.1 % (ref 44–72)
NRBC # BLD AUTO: 0 K/UL
NRBC BLD-RTO: 0 /100 WBC
PHOSPHATE SERPL-MCNC: 4.2 MG/DL (ref 2.5–4.5)
PLATELET # BLD AUTO: 494 K/UL (ref 164–446)
PMV BLD AUTO: 9.7 FL (ref 9–12.9)
POTASSIUM SERPL-SCNC: 3.5 MMOL/L (ref 3.6–5.5)
PROT SERPL-MCNC: 8.1 G/DL (ref 6–8.2)
RBC # BLD AUTO: 4.56 M/UL (ref 4.7–6.1)
SIGNIFICANT IND 70042: NORMAL
SITE SITE: NORMAL
SODIUM SERPL-SCNC: 138 MMOL/L (ref 135–145)
SOURCE SOURCE: NORMAL
WBC # BLD AUTO: 14.5 K/UL (ref 4.8–10.8)

## 2019-02-21 PROCEDURE — A9270 NON-COVERED ITEM OR SERVICE: HCPCS | Performed by: SURGERY

## 2019-02-21 PROCEDURE — 84100 ASSAY OF PHOSPHORUS: CPT

## 2019-02-21 PROCEDURE — 700112 HCHG RX REV CODE 229: Performed by: NEUROLOGICAL SURGERY

## 2019-02-21 PROCEDURE — 97530 THERAPEUTIC ACTIVITIES: CPT

## 2019-02-21 PROCEDURE — 83735 ASSAY OF MAGNESIUM: CPT

## 2019-02-21 PROCEDURE — 85025 COMPLETE CBC W/AUTO DIFF WBC: CPT

## 2019-02-21 PROCEDURE — 71045 X-RAY EXAM CHEST 1 VIEW: CPT

## 2019-02-21 PROCEDURE — 700102 HCHG RX REV CODE 250 W/ 637 OVERRIDE(OP): Performed by: SURGERY

## 2019-02-21 PROCEDURE — 87077 CULTURE AEROBIC IDENTIFY: CPT

## 2019-02-21 PROCEDURE — 36415 COLL VENOUS BLD VENIPUNCTURE: CPT

## 2019-02-21 PROCEDURE — 87070 CULTURE OTHR SPECIMN AEROBIC: CPT

## 2019-02-21 PROCEDURE — 700111 HCHG RX REV CODE 636 W/ 250 OVERRIDE (IP): Performed by: PHYSICIAN ASSISTANT

## 2019-02-21 PROCEDURE — 87186 SC STD MICRODIL/AGAR DIL: CPT

## 2019-02-21 PROCEDURE — A9270 NON-COVERED ITEM OR SERVICE: HCPCS | Performed by: NEUROLOGICAL SURGERY

## 2019-02-21 PROCEDURE — 80053 COMPREHEN METABOLIC PANEL: CPT

## 2019-02-21 PROCEDURE — 97112 NEUROMUSCULAR REEDUCATION: CPT

## 2019-02-21 PROCEDURE — 87205 SMEAR GRAM STAIN: CPT

## 2019-02-21 RX ORDER — LEVETIRACETAM 500 MG/1
500 TABLET ORAL 2 TIMES DAILY
Qty: 60 TAB
Start: 2019-02-21

## 2019-02-21 RX ORDER — POLYETHYLENE GLYCOL 3350 17 G/17G
17 POWDER, FOR SOLUTION ORAL ONCE
Start: 2019-02-21 | End: 2019-02-21

## 2019-02-21 RX ORDER — PROPRANOLOL HYDROCHLORIDE 10 MG/1
10 TABLET ORAL EVERY 8 HOURS
Qty: 90 TAB | Refills: 11 | Status: ON HOLD
Start: 2019-02-21 | End: 2019-03-25

## 2019-02-21 RX ORDER — AMOXICILLIN 250 MG
2 CAPSULE ORAL 2 TIMES DAILY
Qty: 30 TAB | Refills: 0
Start: 2019-02-21

## 2019-02-21 RX ORDER — BISACODYL 10 MG
10 SUPPOSITORY, RECTAL RECTAL
Refills: 0
Start: 2019-02-21

## 2019-02-21 RX ORDER — ACETAMINOPHEN 325 MG/1
650 TABLET ORAL EVERY 4 HOURS PRN
Qty: 30 TAB | Refills: 0
Start: 2019-02-21

## 2019-02-21 RX ORDER — DOCUSATE SODIUM 50 MG/5ML
100 LIQUID ORAL 2 TIMES DAILY
Qty: 450 ML
Start: 2019-02-21

## 2019-02-21 RX ORDER — OXYCODONE HYDROCHLORIDE 5 MG/1
2.5 TABLET ORAL
Qty: 30 TAB | Refills: 0
Start: 2019-02-21 | End: 2019-02-24

## 2019-02-21 RX ADMIN — DOCUSATE SODIUM 100 MG: 50 LIQUID ORAL at 05:23

## 2019-02-21 RX ADMIN — LEVETIRACETAM 500 MG: 500 TABLET, FILM COATED ORAL at 05:23

## 2019-02-21 RX ADMIN — ENOXAPARIN SODIUM 40 MG: 100 INJECTION SUBCUTANEOUS at 05:29

## 2019-02-21 RX ADMIN — AMANTADINE HYDROCHLORIDE 200 MG: 50 SOLUTION ORAL at 05:23

## 2019-02-21 RX ADMIN — PROPRANOLOL HYDROCHLORIDE 10 MG: 10 TABLET ORAL at 05:23

## 2019-02-21 RX ADMIN — MINERAL OIL AND WHITE PETROLATUM 1 APPLICATION: 150; 830 OINTMENT OPHTHALMIC at 05:31

## 2019-02-21 ASSESSMENT — COGNITIVE AND FUNCTIONAL STATUS - GENERAL
MOVING FROM LYING ON BACK TO SITTING ON SIDE OF FLAT BED: UNABLE
MOBILITY SCORE: 7
MOVING TO AND FROM BED TO CHAIR: UNABLE
WALKING IN HOSPITAL ROOM: TOTAL
TURNING FROM BACK TO SIDE WHILE IN FLAT BAD: UNABLE
SUGGESTED CMS G CODE MODIFIER MOBILITY: CM
STANDING UP FROM CHAIR USING ARMS: A LOT
CLIMB 3 TO 5 STEPS WITH RAILING: TOTAL

## 2019-02-21 ASSESSMENT — GAIT ASSESSMENTS: GAIT LEVEL OF ASSIST: UNABLE TO PARTICIPATE

## 2019-02-21 NOTE — PROGRESS NOTES
Pt taken by [facility] to [facility]. Mother verbalized understanding of discharge instructions, signed paperwork, accompanied patient in [facility] ambulance. Cortrak still in place, disconnected from tube feed. IV still in place. Float boot and traction boot in place. Trach is capped. Report called to [facility].

## 2019-02-21 NOTE — PROGRESS NOTES
Skin check: Bony prominences grossly intact. Sacrum is without breakdown. Craniotomy incision noted on left side of head. Trache site on midline of anterior neck noted. Float boot on left foot, waffle cushion, and use of barrier wipes in place.

## 2019-02-21 NOTE — PROGRESS NOTES
Neurosurgery Progress Note    Subjective:  Seen with Dr. Yuko Kline in the works    Exam:  Opens eyes to voice  Perrl, dysconjugate  Tracks  Following this morning on the left upper extremity  BRYANT L>R  C/d/i flap soft, sunken         BP  Min: 105/78  Max: 118/81  Pulse  Av.4  Min: 81  Max: 106  Resp  Av.3  Min: 16  Max: 18  Temp  Av.4 °C (97.5 °F)  Min: 36 °C (96.8 °F)  Max: 36.7 °C (98 °F)  SpO2  Av.6 %  Min: 93 %  Max: 98 %    No Data Recorded    Recent Labs      19   WBC  15.4*  12.3*  14.5*   RBC  4.39*  4.36*  4.56*   HEMOGLOBIN  13.3*  13.2*  14.1   HEMATOCRIT  42.2  42.2  43.9   MCV  96.1  96.8  96.3   MCH  30.3  30.3  30.9   MCHC  31.5*  31.3*  32.1*   RDW  49.1  48.8  47.8   PLATELETCT  607*  545*  494*   MPV  9.4  9.6  9.7     Recent Labs      19   SODIUM  141  141  138   POTASSIUM  3.9  3.7  3.5*   CHLORIDE  104  104  102   CO2  26  26  28   GLUCOSE  139*  120*  100*   BUN  34*  33*  26*   CREATININE  0.63  0.63  0.67   CALCIUM  9.9  10.7*  10.4               Intake/Output       19 07 - 19 0659 19 07 - 19 0659      6492-9058 2110-5159 Total 1900-0659 Total       Intake    Other  30  570 600  --  -- --    Medications (PO/Enteral Liquids) -- 120 120 -- -- --    Flush / Irrigation Volume (Cortrak) 30 450 480 -- -- --    NG/GT  --  775 775  --  -- --    Intake (mL) (Enteral Tube 19 Cortrak - Gastric 10 Fr. Left nare) -- 954 895 -- -- --    Total Intake 30 1345 1375 -- -- --       Output    Urine  600  -- 600  --  -- --    Urine Void (mL) 600 -- 600 -- -- --    Drains  0  -- 0  --  -- --    Residual Amount (ml) (Discarded) 0 -- 0 -- -- --    Stool  --  -- --  --  -- --    Number of Times Stooled 1 x -- 1 x -- -- --    Total Output 600 -- 600 -- -- --       Net I/O     -570 1345 775 -- -- --            Intake/Output Summary (Last 24 hours) at 19  0824  Last data filed at 02/21/19 0600   Gross per 24 hour   Intake             1345 ml   Output              600 ml   Net              745 ml            • morphine injection  2 mg Q4HRS PRN   • amantadine  200 mg BID   • levETIRAcetam  500 mg BID   • artificial tear ointment  1 Application Q8HRS   • acetaminophen  650 mg Q4HRS PRN   • propranolol  10 mg Q8HRS   • senna-docusate  2 Tab DAILY AT NOON   • polyethylene glycol/lytes  1 Packet DAILY AT NOON   • enoxaparin (LOVENOX) injection  40 mg DAILY   • Pharmacy  1 Each PHARMACY TO DOSE   • cloNIDine  0.1 mg Q4HRS PRN   • magnesium hydroxide  30 mL QDAY PRN   • oxyCODONE immediate-release  2.5 mg Q3HRS PRN   • oxyCODONE immediate-release  5 mg Q3HRS PRN   • polyethylene glycol/lytes  1 Packet BID PRN   • senna-docusate  1 Tab Q24HRS PRN   • Respiratory Care per Protocol   Continuous RT   • Pharmacy Consult Request  1 Each PHARMACY TO DOSE   • MD ALERT...DO NOT ADMINISTER NSAIDS or ASPIRIN unless ORDERED By Neurosurgery  1 Each PRN   • ondansetron  4 mg Q4HRS PRN   • bisacodyl  10 mg Q24HRS PRN   • fleet  1 Each Once PRN   • docusate sodium 100mg/10mL  100 mg BID       Assessment and Plan:  POD#26 Left Craniectomy    Crani site soft, sunken, exam stable      Hold full anticoagulation, cleared for prophylaxis  Q4 hr neuro checks    Plan:  1. Remove sutures today  2. Repeat CT Monday  3. Hold blood thinners next week, plan to replace bone flap tentatively next Wednesday    Renew PT/OT, rehab consultation, can go to rehab when arranged from our perspective, if he leaves sooner, he can take bone flap with him

## 2019-02-21 NOTE — THERAPY
"Pt making gradual progress toward acute PT goals. Continues to demonstrate impaired command following, functional mobility, balance, strength and initiation. Able to stand today at EOB with Max -> Mod A x2 people. Pt gave therapist a small smirk, but not full smile and would engage to lift head upright but unable to come to midline or maintain for > 30 seconds - 1 min. Pt will continue to benefit from acute PT interventions.       Physical Therapy Treatment completed.   Bed Mobility:  Supine to Sit: Maximal Assist (x2 people)  Transfers: Sit to Stand: Maximal Assist (-> Mod A x2)  Gait: Level Of Assist: Unable to Participate       Plan of Care: Will benefit from Physical Therapy 3 times per week  Discharge Recommendations: Equipment: Will Continue to Assess for Equipment Needs. Post-acute therapy:  Continue to recommend post acute placement to optimize recovery.       See \"Rehab Therapy-Acute\" Patient Summary Report for complete documentation.       "

## 2019-02-21 NOTE — CARE PLAN
Problem: Oxygenation:  Goal: Maintain adequate oxygenation dependent on patient condition  Outcome: PROGRESSING AS EXPECTED    Intervention: Manage oxygen therapy by monitoring pulse oximetry and/or ABG values  Tracheostomy Update           PT has been capped since 2/19/19 at 1715. Pt remains capped and now on RA, no signs of distress good productive cough at times. No  dye seen by RT overnight.
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
## 2019-02-21 NOTE — DISCHARGE PLANNING
Received Transport Form at 1420.   Spoke to Daniela at [facility].    Transport is scheduled for 2/21 at 1500 going to [facility]. OWEN Buenrostro notified of transportation time.

## 2019-02-21 NOTE — DISCHARGE PLANNING
Agency/Facility Name: [facility]   Outcome: Updated speech notes faxed to facility at 1015.

## 2019-02-21 NOTE — DISCHARGE INSTRUCTIONS
Discharge Instructions    Discharged to [facility] by ambulance with escort. Discharged via ambulance, hospital escort: Yes.  Special equipment needed: Not Applicable    Be sure to schedule a follow-up appointment with your primary care doctor or any specialists as instructed.     Discharge Plan:   Influenza Vaccine Indication: Not indicated: Previously immunized this influenza season and > 8 years of age    I understand that a diet low in cholesterol, fat, and sodium is recommended for good health. Unless I have been given specific instructions below for another diet, I accept this instruction as my diet prescription.   Other diet: NPO Tube feed    Special Instructions: None    · Is patient discharged on Warfarin / Coumadin?   No     Depression / Suicide Risk    As you are discharged from this [facility], it is important to learn how to keep safe from harming yourself.    Recognize the warning signs:  · Abrupt changes in personality, positive or negative- including increase in energy   · Giving away possessions  · Change in eating patterns- significant weight changes-  positive or negative  · Change in sleeping patterns- unable to sleep or sleeping all the time   · Unwillingness or inability to communicate  · Depression  · Unusual sadness, discouragement and loneliness  · Talk of wanting to die  · Neglect of personal appearance   · Rebelliousness- reckless behavior  · Withdrawal from people/activities they love  · Confusion- inability to concentrate     If you or a loved one observes any of these behaviors or has concerns about self-harm, here's what you can do:  · Talk about it- your feelings and reasons for harming yourself  · Remove any means that you might use to hurt yourself (examples: pills, rope, extension cords, firearm)  · Get professional help from the community (Mental Health, Substance Abuse, psychological counseling)  · Do not be alone:Call your Safe Contact- someone whom you trust who  will be there for you.  · Call your local CRISIS HOTLINE 629-9257 or 841-056-1538  · Call your local Children's Mobile Crisis Response Team [place] (869) 108-5776 or www.[employer]  · Call the toll free National Suicide Prevention Hotlines   · National Suicide Prevention Lifeline 235-920-ROOX (9211)  · National [employer] Line Network 800-SUICIDE (759-6718)

## 2019-02-21 NOTE — THERAPY
OT tx attempted, Per RN, pt discharging to [facility] at 3:00 pm today. Should pt end up staying acute today, will check on pt tomorrow.

## 2019-02-21 NOTE — DISCHARGE PLANNING
Anticipated Discharge Disposition: [facility] vs IRF at [facility]    Action:   Talked to Gisselle from [facility] who said that they have auth to transfer pt.   CM sent message to Ronald DE LA PAZ knowing that there might be plans to replace flap next week.   Introduced Gisselle from [facility] to mom     CM also talked to Mary Jane at [facility]   They are still screening pt but will not take pt without PEG and will need to be after the flap replacement. They are still screening pt and [facility] is aware of this.     Addendum:   Discussed with Ronald DE LA PAZ and Dr. Huntley is aware per Ronald. They are both agreeable for pt to go to [facility] today.   Valeria HARTLEY is aware and agreeable.     [facility] is ready to accept pt today.     Barriers to Discharge:   Surgical clearance  placement    Plan:   Follow up with Ronald Gardner and Trauma APRROBERTO.   Update mom.     Addendum:  Updates to mother and pt.   RN aware.   CN aware.   Transport form with [facility]

## 2019-02-21 NOTE — CARE PLAN
Problem: Urinary Elimination:  Goal: Ability to reestablish a normal urinary elimination pattern will improve  Outcome: PROGRESSING SLOWER THAN EXPECTED  Pt still using condom catheter. Urine output adequate.

## 2019-02-21 NOTE — PROGRESS NOTES
Trauma / Surgical Daily Progress Note    Date of Service  2/21/2019    Chief Complaint  28 y.o. male admitted 1/26/2019 with Trauma    Interval Events  Capping trials in progress. Capped since 1715- 2/19  -Potential decannulation if tolerating capped >48 hours    Blue dye study in progress  -recheck swallow once decannulation is completed   -if risk for aspiration, will need G tube for transfer to rehab    Potential bone flap replacement Wednesday 2/27  -Hold Lovenox prior        Review of Systems  Review of Systems   Unable to perform ROS: Medical condition   Gastrointestinal:        Last bowel movement 2/20        Vital Signs  Temp:  [36 °C (96.8 °F)-36.7 °C (98 °F)] 36.5 °C (97.7 °F)  Pulse:  [] 106  Resp:  [16-18] 18  BP: (105-118)/(66-81) 105/78  SpO2:  [93 %-98 %] 96 %    Physical Exam  Physical Exam   Constitutional: He appears well-nourished. He is active. No distress.   HENT:   Left bone flap, head soft sunken  Cortrak   Eyes:   Opens eyes to voice  Disconjugate    Neck:   Tracheostomy, site clean    Cardiovascular: Normal rate and regular rhythm.    Pulmonary/Chest: Effort normal. No respiratory distress.   T-piece- capped   Abdominal: Soft. He exhibits no distension. There is no tenderness. There is no rebound and no guarding.   Musculoskeletal: He exhibits no edema or deformity.   Spontaneous movement left upper extremity   Neurological:   Opens eyes to voice   Nonverbal  Following left upper extremity    Skin: Skin is warm. He is not diaphoretic.   Nursing note and vitals reviewed.      Laboratory  Recent Results (from the past 24 hour(s))   CBC WITH DIFFERENTIAL    Collection Time: 02/21/19  2:29 AM   Result Value Ref Range    WBC 14.5 (H) 4.8 - 10.8 K/uL    RBC 4.56 (L) 4.70 - 6.10 M/uL    Hemoglobin 14.1 14.0 - 18.0 g/dL    Hematocrit 43.9 42.0 - 52.0 %    MCV 96.3 81.4 - 97.8 fL    MCH 30.9 27.0 - 33.0 pg    MCHC 32.1 (L) 33.7 - 35.3 g/dL    RDW 47.8 35.9 - 50.0 fL    Platelet Count 494 (H)  164 - 446 K/uL    MPV 9.7 9.0 - 12.9 fL    Neutrophils-Polys 70.10 44.00 - 72.00 %    Lymphocytes 17.10 (L) 22.00 - 41.00 %    Monocytes 7.40 0.00 - 13.40 %    Eosinophils 3.60 0.00 - 6.90 %    Basophils 0.80 0.00 - 1.80 %    Immature Granulocytes 1.00 (H) 0.00 - 0.90 %    Nucleated RBC 0.00 /100 WBC    Neutrophils (Absolute) 10.14 (H) 1.82 - 7.42 K/uL    Lymphs (Absolute) 2.48 1.00 - 4.80 K/uL    Monos (Absolute) 1.07 (H) 0.00 - 0.85 K/uL    Eos (Absolute) 0.52 (H) 0.00 - 0.51 K/uL    Baso (Absolute) 0.11 0.00 - 0.12 K/uL    Immature Granulocytes (abs) 0.15 (H) 0.00 - 0.11 K/uL    NRBC (Absolute) 0.00 K/uL   COMP METABOLIC PANEL    Collection Time: 02/21/19  2:29 AM   Result Value Ref Range    Sodium 138 135 - 145 mmol/L    Potassium 3.5 (L) 3.6 - 5.5 mmol/L    Chloride 102 96 - 112 mmol/L    Co2 28 20 - 33 mmol/L    Anion Gap 8.0 0.0 - 11.9    Glucose 100 (H) 65 - 99 mg/dL    Bun 26 (H) 8 - 22 mg/dL    Creatinine 0.67 0.50 - 1.40 mg/dL    Calcium 10.4 8.5 - 10.5 mg/dL    AST(SGOT) 38 12 - 45 U/L    ALT(SGPT) 62 (H) 2 - 50 U/L    Alkaline Phosphatase 73 30 - 99 U/L    Total Bilirubin 0.9 0.1 - 1.5 mg/dL    Albumin 4.8 3.2 - 4.9 g/dL    Total Protein 8.1 6.0 - 8.2 g/dL    Globulin 3.3 1.9 - 3.5 g/dL    A-G Ratio 1.5 g/dL   MAGNESIUM    Collection Time: 02/21/19  2:29 AM   Result Value Ref Range    Magnesium 2.2 1.5 - 2.5 mg/dL   PHOSPHORUS    Collection Time: 02/21/19  2:29 AM   Result Value Ref Range    Phosphorus 4.2 2.5 - 4.5 mg/dL   ESTIMATED GFR    Collection Time: 02/21/19  2:29 AM   Result Value Ref Range    GFR If African American >60 >60 mL/min/1.73 m 2    GFR If Non African American >60 >60 mL/min/1.73 m 2       Fluids    Intake/Output Summary (Last 24 hours) at 02/21/19 0851  Last data filed at 02/21/19 0600   Gross per 24 hour   Intake             1345 ml   Output              600 ml   Net              745 ml       Core Measures & Quality Metrics  Labs reviewed  Greer catheter: No Greer      DVT  Prophylaxis: Enoxaparin (Lovenox) (IVC filter )  DVT prophylaxis - mechanical: SCDs  Ulcer prophylaxis: Yes    Assessed for rehab: Patient was assess for and/or received rehabilitation services during this hospitalization    Total Score: 3    ETOH Screening     Reason for no ETOH Intervention: Traumatic Brain Injury  ETOH Screening     Intervention complete date: 2/16/2019        Assessment/Plan  Discharge planning issues- (present on admission)   Assessment & Plan    Awaiting bone flap replacement (early March)  Has flight benefits through insurance  Family would like him in [place]  [facility]   2/21 Will need G tube placement if unable to swallow  Case coordination involved         Pulmonary embolus, right (HCC)   Assessment & Plan    1/29 Prophylactic lovenox initiated   2/3 Acute deterioration / inability to oxygenate precipitously 2 hours after tracheostomy.    Chest x-ray shows some increased infiltrate in the left lower lobe, patient refractory to bagging and increase PEEP, Flolan  2/5 CTA shows proximal segmental and subsegmental pulmonary emboli in the right lower lobe. No RV strain.   - IVC filter placed   Prophylactic pharmacological DVT prophylaxis cleared by neurosurgery. Hold full anticoagulation.     Leukocytosis   Assessment & Plan    2/5 Multifocal pneumonia and new small bilateral pleural effusions.  Bronchoscopy with BAL and blood cultures for purulent secretions, fever, leukocytosis and chest x-ray infiltrate. Empiric linezolid and cefepime initiated.  2/7  BAL - klebsiella. De-escalated to ceftriaxone monotherapy  2/11 Antibiotic day 7 of 7  2/18 Persistent leukocytosis. CXR with No acute cardiac or pulmonary abnormality is noted.  2/19 WBC trend up.  2/20 WBC trend down.     Subdural hematoma (HCC)- (present on admission)   Assessment & Plan    Left sided holohemispheric subdural hematoma measuring approximately 9.4 mm in maximum thickness. 10 mm of left-to-right  midline shift.   1/26 To OR for emergent craniotomy and evacuation of hematoma.   1/28 CT x 2 stable  2/2 MRI brainstem complete   2/19 Follow up CT head with partial herniation of the left hemisphere, herniation appears somewhat decreased  2/25 Repeat heat CT  Tentative bone flap replacement on Wednesday 2/27/19  On Ivelisse Huntley MD. Neurosurgery.      Acute respiratory failure following trauma and surgery (HCC)- (present on admission)   Assessment & Plan    Intubated in field  2/3 Perc trach / APRV mode  / Folan  2/14 Tolerating T- Piece   2/16 Downsize trach to 6.0 cuffless, non fenestrated.    2/21 Potential decannulation   Capping trials     Oropharyngeal dysphagia   Assessment & Plan    Cortrak in place  On TF  2/13 Speech eval - speaking valve with therapists only.    2/20 Swallow evaluation would require G tube for rehab if unable to swallow     No contraindication to deep vein thrombosis (DVT) prophylaxis- (present on admission)   Assessment & Plan    Systemic anticoagulation contraindicated secondary to elevated bleeding risk.  1/27 and 2/4  Trauma screening bilateral lower extremity venous duplex negative for above knee DVT.  1/29 Pharmacological DVT prophalxis, Lovenox initiated.     Trauma- (present on admission)   Assessment & Plan    [place]ed [place] like activity. GCS 3. No evidence of acute trauma.  Trauma Red Activation.   Mor Roa MD, Trauma/General Surgery.           Discussed patient condition with RN, Patient and trauma surgery.

## 2019-02-22 LAB
ANION GAP SERPL CALC-SCNC: 11 MMOL/L (ref 0–11.9)
BUN SERPL-MCNC: 20 MG/DL (ref 8–22)
CALCIUM SERPL-MCNC: 9.4 MG/DL (ref 8.4–10.2)
CHLORIDE SERPL-SCNC: 102 MMOL/L (ref 96–112)
CO2 SERPL-SCNC: 25 MMOL/L (ref 20–33)
CREAT SERPL-MCNC: 0.65 MG/DL (ref 0.5–1.4)
ERYTHROCYTE [DISTWIDTH] IN BLOOD BY AUTOMATED COUNT: 45.6 FL (ref 35.9–50)
GLUCOSE SERPL-MCNC: 115 MG/DL (ref 65–99)
HCT VFR BLD AUTO: 40 % (ref 42–52)
HGB BLD-MCNC: 12.7 G/DL (ref 14–18)
MCH RBC QN AUTO: 30 PG (ref 27–33)
MCHC RBC AUTO-ENTMCNC: 31.8 G/DL (ref 33.7–35.3)
MCV RBC AUTO: 94.3 FL (ref 81.4–97.8)
PLATELET # BLD AUTO: 453 K/UL (ref 164–446)
PMV BLD AUTO: 10.5 FL (ref 9–12.9)
POTASSIUM SERPL-SCNC: 4.1 MMOL/L (ref 3.6–5.5)
RBC # BLD AUTO: 4.24 M/UL (ref 4.7–6.1)
SODIUM SERPL-SCNC: 138 MMOL/L (ref 135–145)
WBC # BLD AUTO: 14.3 K/UL (ref 4.8–10.8)

## 2019-02-22 PROCEDURE — 80048 BASIC METABOLIC PNL TOTAL CA: CPT

## 2019-02-22 PROCEDURE — 85027 COMPLETE CBC AUTOMATED: CPT

## 2019-02-22 NOTE — [facility]
Hospital Medicine Progress Note, Adult, Complex               Author: Lashay Campbell Date & Time created: 2/22/2019  9:05 AM     CC: subdural hematoma and acute hypoxic respiratory failure after trauma from skiing accident    Interval History:  [place]  He had a craniotomy for subdural hemorrhage on Jan 26  He does move his left arm spontaneously but neglects the other extremities and is nonverbal    Review of Systems:  Review of Systems   Unable to perform ROS: Mental acuity       Physical Exam:  Physical Exam   Constitutional: No distress.   HENT:   Mouth/Throat: Oropharynx is clear and moist.   Eyes: Conjunctivae are normal. No scleral icterus.   Neck: Neck supple. No tracheal deviation present.   Cardiovascular: Normal rate, regular rhythm and intact distal pulses.    No murmur heard.  Pulmonary/Chest: Effort normal. No respiratory distress. He has no wheezes.   Abdominal: Soft. Bowel sounds are normal. He exhibits no distension.   Musculoskeletal: He exhibits no edema.   Neurological: Coordination abnormal.   Skin: Skin is warm and dry. No rash noted. He is not diaphoretic.   Psychiatric: Cognition and memory are impaired. He expresses impulsivity.   Vitals reviewed.      Labs:          Recent Labs      02/20/19 0456 02/21/19 0229 02/22/19 0250   SODIUM  141  138  138   POTASSIUM  3.7  3.5*  4.1   CHLORIDE  104  102  102   CO2  26  28  25   BUN  33*  26*  20   CREATININE  0.63  0.67  0.65   MAGNESIUM   --   2.2   --    PHOSPHORUS   --   4.2   --    CALCIUM  10.7*  10.4  9.4     Recent Labs      02/20/19 0456 02/21/19 0229 02/22/19 0250   ALTSGPT  52*  62*   --    ASTSGOT  34  38   --    ALKPHOSPHAT  68  73   --    TBILIRUBIN  1.0  0.9   --    GLUCOSE  120*  100*  115*     Recent Labs      02/20/19 0456 02/21/19 0229 02/22/19   0250   RBC  4.36*  4.56*  4.24*   HEMOGLOBIN  13.2*  14.1  12.7*   HEMATOCRIT  42.2  43.9  40.0*   PLATELETCT  545*   494*  453*     Recent Labs      02/20/19   0456  02/21/19   0229  02/22/19   0250   WBC  12.3*  14.5*  14.3*   NEUTSPOLYS  60.40  70.10   --    LYMPHOCYTES  22.10  17.10*   --    MONOCYTES  8.90  7.40   --    EOSINOPHILS  4.20  3.60   --    BASOPHILS  2.90*  0.80   --    ASTSGOT  34  38   --    ALTSGPT  52*  62*   --    ALKPHOSPHAT  68  73   --    TBILIRUBIN  1.0  0.9   --            Hemodynamics:  T98 /79 HR96 RR21 O2 sat 95% with trach capped    Medical Decision Making, by Problem:  Subdural hematoma due to traumatic brain injury   Drained with craniotomy    Bone flap placement 2/27 with Dr. Huntley scheduled   PT/OT/ speech therapy   [facility]    Acute hypoxic respiratory failure after trauma   Trach capped, possibly decanulate after bone flap placement    oropharyngeal dysphagia, tube feeds    Right pulmonary embolus, ivc filter placed   No full anticoagulation due to head bleed      Quality-Core Measures   Reviewed items::  Labs reviewed, Medications reviewed and Radiology images reviewed  DVT prophylaxis pharmacological::  Enoxaparin (Lovenox)  Assessed for rehabilitation services:  Patient was assess for and/or received rehabilitation services during this hospitalization

## 2019-02-23 LAB
BACTERIA SPEC RESP CULT: ABNORMAL
BACTERIA SPEC RESP CULT: ABNORMAL
GRAM STN SPEC: ABNORMAL
SIGNIFICANT IND 70042: ABNORMAL
SITE SITE: ABNORMAL
SOURCE SOURCE: ABNORMAL

## 2019-02-23 NOTE — [facility]
"Hospital Medicine Progress Note, Adult, Complex               Author: Lashay Campbell Date & Time created: 2/23/2019  10:09 AM     CC: subdural hematoma and acute hypoxic respiratory failure after trauma from skiing accident    Interval History:  [place]  He had a craniotomy for subdural hemorrhage on Jan 26  The patient is moving his left side  He did speak \"yes\" to liking to snowboard when asked    Review of Systems:  Review of Systems   Unable to perform ROS: Mental acuity       Physical Exam:  Physical Exam   Constitutional: No distress.   HENT:   Mouth/Throat: No oropharyngeal exudate.   Left scalp incision healed   Eyes: No scleral icterus.   Neck: Neck supple. No tracheal deviation present.   Cardiovascular: Normal rate, regular rhythm and intact distal pulses.    No murmur heard.  Pulmonary/Chest: Effort normal. No respiratory distress. He has no wheezes.   Abdominal: Soft. He exhibits no distension.   Musculoskeletal: He exhibits no edema.   Neurological: He is alert. Coordination abnormal.   Moves left arm and leg, follows some commands   Skin: Skin is dry. No rash noted. He is not diaphoretic. No erythema.   Psychiatric: Cognition and memory are impaired. He expresses impulsivity.   Vitals reviewed.      Labs:          Recent Labs      02/21/19 0229 02/22/19 0250   SODIUM  138  138   POTASSIUM  3.5*  4.1   CHLORIDE  102  102   CO2  28  25   BUN  26*  20   CREATININE  0.67  0.65   MAGNESIUM  2.2   --    PHOSPHORUS  4.2   --    CALCIUM  10.4  9.4     Recent Labs      02/21/19 0229 02/22/19 0250   ALTSGPT  62*   --    ASTSGOT  38   --    ALKPHOSPHAT  73   --    TBILIRUBIN  0.9   --    GLUCOSE  100*  115*     Recent Labs      02/21/19 0229 02/22/19 0250   RBC  4.56*  4.24*   HEMOGLOBIN  14.1  12.7*   HEMATOCRIT  43.9  40.0*   PLATELETCT  494*  453*     Recent Labs      02/21/19 0229 02/22/19 0250   WBC  14.5*  14.3*   NEUTSPOLYS  70.10   --  "   LYMPHOCYTES  17.10*   --    MONOCYTES  7.40   --    EOSINOPHILS  3.60   --    BASOPHILS  0.80   --    ASTSGOT  38   --    ALTSGPT  62*   --    ALKPHOSPHAT  73   --    TBILIRUBIN  0.9   --            Hemodynamics:  T98.6 /70 HR82 RR20 O2 sat 95% with trach capped    Medical Decision Making, by Problem:  Subdural hematoma due to traumatic brain injury   Drained with craniotomy    Bone flap placement 2/27 with Dr. Huntley scheduled   PT/OT/ speech therapy   kera with  No seizures and patient alert    Acute hypoxic respiratory failure after trauma   Trach capped, possibly decanulate after bone flap surgery    oropharyngeal dysphagia, tube feeds    Right pulmonary embolus, ivc filter placed   Low dose anticoagulation only per neurosurgery      Quality-Core Measures   Reviewed items::  Labs reviewed and Medications reviewed  Greer catheter::  Urinary Tract Retention or Urinary Tract Obstruction  DVT prophylaxis pharmacological::  Enoxaparin (Lovenox)  Assessed for rehabilitation services:  Patient was assess for and/or received rehabilitation services during this hospitalization

## 2019-02-24 NOTE — [facility]
Hospital Medicine Progress Note, Adult, Complex               Author: Lashay Campbell Date & Time created: 2/24/2019  8:56 AM     CC: subdural hematoma and acute hypoxic respiratory failure after trauma from skiing accident    Interval History:  [place]  He had a craniotomy for subdural hemorrhage on Jan 26  The patient is moving his left side  He is afebrile and tolerating tube feeds    Review of Systems:  Review of Systems   Unable to perform ROS: Mental acuity       Physical Exam:  Physical Exam   Constitutional: No distress.   HENT:   Mouth/Throat: Oropharynx is clear and moist.   Left scalp incision healed   Eyes: Conjunctivae are normal. No scleral icterus.   Neck: No tracheal deviation present.   Cardiovascular: Normal rate and regular rhythm.    No murmur heard.  Pulmonary/Chest: Effort normal. He has no wheezes. He has no rales.   Abdominal: Soft. Bowel sounds are normal. He exhibits no distension.   Musculoskeletal: He exhibits no edema.   Neurological: He is alert. Coordination abnormal.   Moves left arm and leg, follows some commands   Skin: Skin is warm and dry. No rash noted. He is not diaphoretic. No erythema.   Psychiatric: Cognition and memory are impaired. He expresses impulsivity.   Vitals reviewed.      Labs:          Recent Labs      02/22/19   0250   SODIUM  138   POTASSIUM  4.1   CHLORIDE  102   CO2  25   BUN  20   CREATININE  0.65   CALCIUM  9.4     Recent Labs      02/22/19   0250   GLUCOSE  115*     Recent Labs      02/22/19   0250   RBC  4.24*   HEMOGLOBIN  12.7*   HEMATOCRIT  40.0*   PLATELETCT  453*     Recent Labs      02/22/19   0250   WBC  14.3*           Hemodynamics:  T99.2 /78 HR82 RR18 O2 sat 98% with trach capped    Medical Decision Making, by Problem:  Subdural hematoma due to traumatic brain injury   Drained with craniotomy    Bone flap placement 2/27 with Dr. Huntley scheduled    CT head ordered for tomorrow per Dr. Huntley  request   PT/OT/ speech therapy   keppra to prevent seizures    Acute hypoxic respiratory failure after trauma   Trach capped, possibly decanulate after bone flap surgery    oropharyngeal dysphagia, tube feeds    Right pulmonary embolus, ivc filter placed   Low dose anticoagulation only per neurosurgery      Quality-Core Measures   Reviewed items::  Labs reviewed and Medications reviewed  Greer catheter::  Urinary Tract Retention or Urinary Tract Obstruction  DVT prophylaxis pharmacological::  Enoxaparin (Lovenox)  Assessed for rehabilitation services:  Patient was assess for and/or received rehabilitation services during this hospitalization

## 2019-02-25 ENCOUNTER — APPOINTMENT (OUTPATIENT)
Dept: RADIOLOGY | Facility: MEDICAL CENTER | Age: 29
End: 2019-02-25
Attending: INTERNAL MEDICINE
Payer: COMMERCIAL

## 2019-02-25 LAB
ANION GAP SERPL CALC-SCNC: 10 MMOL/L (ref 0–11.9)
BUN SERPL-MCNC: 18 MG/DL (ref 8–22)
CALCIUM SERPL-MCNC: 9.1 MG/DL (ref 8.4–10.2)
CHLORIDE SERPL-SCNC: 102 MMOL/L (ref 96–112)
CO2 SERPL-SCNC: 24 MMOL/L (ref 20–33)
CREAT SERPL-MCNC: 0.68 MG/DL (ref 0.5–1.4)
ERYTHROCYTE [DISTWIDTH] IN BLOOD BY AUTOMATED COUNT: 43.7 FL (ref 35.9–50)
GLUCOSE SERPL-MCNC: 95 MG/DL (ref 65–99)
HCT VFR BLD AUTO: 39.4 % (ref 42–52)
HGB BLD-MCNC: 12.7 G/DL (ref 14–18)
MCH RBC QN AUTO: 29.8 PG (ref 27–33)
MCHC RBC AUTO-ENTMCNC: 32.2 G/DL (ref 33.7–35.3)
MCV RBC AUTO: 92.5 FL (ref 81.4–97.8)
PLATELET # BLD AUTO: 316 K/UL (ref 164–446)
PMV BLD AUTO: 10.3 FL (ref 9–12.9)
POTASSIUM SERPL-SCNC: 4.4 MMOL/L (ref 3.6–5.5)
RBC # BLD AUTO: 4.26 M/UL (ref 4.7–6.1)
SODIUM SERPL-SCNC: 136 MMOL/L (ref 135–145)
WBC # BLD AUTO: 14.6 K/UL (ref 4.8–10.8)

## 2019-02-25 PROCEDURE — 85027 COMPLETE CBC AUTOMATED: CPT

## 2019-02-25 PROCEDURE — 80048 BASIC METABOLIC PNL TOTAL CA: CPT

## 2019-02-25 PROCEDURE — 70450 CT HEAD/BRAIN W/O DYE: CPT

## 2019-02-25 NOTE — PROGRESS NOTES
NSX  Plan for replacement of bone flap on 2/27/19 at [facility]    Plan:  1. CT scan head today  2. Hold lovenox today  3. PT/INR labs  4. NPO at midnight 2/27/19

## 2019-02-25 NOTE — [facility]
Hospital Medicine Progress Note, Adult, Complex               Author: Lashay Campbell Date & Time created: 2/25/2019  9:49 AM     CC: subdural hematoma and acute hypoxic respiratory failure after trauma from skiing accident    Interval History:  [place]  He had a craniotomy for subdural hemorrhage on Jan 26  The patient is moving his left side  He has no fever or vomiting    Review of Systems:  Review of Systems   Unable to perform ROS: Mental acuity       Physical Exam:  Physical Exam   HENT:   Mouth/Throat: No oropharyngeal exudate.   Left scalp incision healed   Eyes: No scleral icterus.   Neck: No tracheal deviation present.   Cardiovascular: Normal rate and regular rhythm.    No murmur heard.  Pulmonary/Chest: Effort normal and breath sounds normal.   Abdominal: Soft. He exhibits no distension.   Musculoskeletal: He exhibits no edema.   Neurological: He is alert. Coordination abnormal.   Moves left arm and leg, follows some commands   Skin: Skin is warm. No rash noted. He is not diaphoretic. No erythema.   Psychiatric: Cognition and memory are impaired.   Vitals reviewed.      Labs:          Recent Labs      02/25/19   0125   SODIUM  136   POTASSIUM  4.4   CHLORIDE  102   CO2  24   BUN  18   CREATININE  0.68   CALCIUM  9.1     Recent Labs      02/25/19   0125   GLUCOSE  95     Recent Labs      02/25/19   0125   RBC  4.26*   HEMOGLOBIN  12.7*   HEMATOCRIT  39.4*   PLATELETCT  316     Recent Labs      02/25/19   0125   WBC  14.6*           Hemodynamics:  T99 /78 HR72 RR17 O2 sat 94% with trach capped    Medical Decision Making, by Problem:  Subdural hematoma due to traumatic brain injury   Drained with craniotomy    Bone flap placement 2/27 with Dr. Huntley scheduled    CT head today, discussed with neurosurgery   Will transfer to [facility] tomorrow for bone flap placement 2/27   Stop lovenox for surgery   PT/OT/ speech therapy   keppra to prevent seizures    Acute  hypoxic respiratory failure after trauma   Trach capped, decanulate after bone flap placed    oropharyngeal dysphagia, tube feeds    Right pulmonary embolus, ivc filter placed   Low dose anticoagulation only per neurosurgery   Stop for now for planned surgery      Quality-Core Measures   Reviewed items::  Labs reviewed and Medications reviewed  Greer catheter::  Urinary Tract Retention or Urinary Tract Obstruction  DVT prophylaxis pharmacological::  Enoxaparin (Lovenox)  Assessed for rehabilitation services:  Patient was assess for and/or received rehabilitation services during this hospitalization

## 2019-02-26 ASSESSMENT — ENCOUNTER SYMPTOMS: FEVER: 0

## 2019-02-26 NOTE — [facility]
Critical Care Progress Note    Date of admission  2/21/2019    Chief Complaint  28 y.o. male admitted 2/21/2019 with head trauma.    Hospital Course    evacuation of subdural 1/26  Trached  Awaiting for bone flap   Has been capped since 2/20      Interval Problem Update  Reviewed last 24 hour events:  Trach is capped. CT head demonstrates persistent swelling, bone flap procedure deferred by Neurosurgery.    Review of Systems  ROS   Pt not communicative.    Vital Signs for last 24 hours    88 120/70 80 reg sats 94% RA      Physical Exam   Physical Exam   HENT:   Head: Normocephalic and atraumatic.   Right Ear: External ear normal.   Left Ear: External ear normal.   Nose: Nose normal.   Mouth/Throat: Oropharynx is clear and moist. No oropharyngeal exudate.   Eyes: Pupils are equal, round, and reactive to light. Conjunctivae are normal.   Neck: No tracheal deviation present. No thyromegaly present.   Trach site clean   Cardiovascular: Normal rate and regular rhythm.    Pulmonary/Chest: No respiratory distress. He has no wheezes. He has no rales. He exhibits no tenderness.   Abdominal: He exhibits no distension and no mass. There is no tenderness. There is no rebound and no guarding.   Neurological: He is alert.   Tracking but not following commands for me   Skin: He is not diaphoretic.         Laboratory          Recent Labs      02/25/19   0125   SODIUM  136   POTASSIUM  4.4   CHLORIDE  102   CO2  24   BUN  18   CREATININE  0.68   CALCIUM  9.1     Recent Labs      02/25/19   0125   GLUCOSE  95     Recent Labs      02/25/19   0125   WBC  14.6*     Recent Labs      02/25/19   0125   RBC  4.26*   HEMOGLOBIN  12.7*   HEMATOCRIT  39.4*   PLATELETCT  316        Assessment/Plan  #Acute hypoxic resp failure post trauma  On room air and capped  Awaiting bone flap to decannulate, perhaps next week.    #R PE s/p IVC filter    #Dysphagia: Tube feeds    Discussed with respiratory therapy.  I have performed a physical exam and  reviewed and updated ROS and Plan today (2/26/2019). In review of yesterday's note (2/25/2019), there are no changes except as documented above.

## 2019-02-26 NOTE — [facility]
Critical Care Progress Note    Date of admission  2/21/2019    Chief Complaint  28 y.o. male admitted 2/21/2019 with head trauma wi    Hospital Course    evacuation of subdural 1/26  Trached  Awaiting for bone flap   Has been capped since 2/20      Interval Problem Update  Reviewed last 24 hour events:  Trach is capped    Review of Systems  ROS   Pt not communicative this am but has been with one word answers but not saying anything to me    Vital Signs for last 24 hours    99 119/78 72 reg sats 94% RA      Physical Exam   Physical Exam   HENT:   Head: Normocephalic and atraumatic.   Right Ear: External ear normal.   Left Ear: External ear normal.   Nose: Nose normal.   Mouth/Throat: Oropharynx is clear and moist. No oropharyngeal exudate.   Eyes: Pupils are equal, round, and reactive to light. Conjunctivae are normal.   Neck: No tracheal deviation present. No thyromegaly present.   Trach site clean   Cardiovascular: Normal rate and regular rhythm.    Pulmonary/Chest: No respiratory distress. He has no wheezes. He has no rales. He exhibits no tenderness.   Abdominal: He exhibits no distension and no mass. There is no tenderness. There is no rebound and no guarding.   Neurological: He is alert.   Tracking but not following commands for me   Skin: He is not diaphoretic.         Laboratory          Recent Labs      02/25/19   0125   SODIUM  136   POTASSIUM  4.4   CHLORIDE  102   CO2  24   BUN  18   CREATININE  0.68   CALCIUM  9.1     Recent Labs      02/25/19   0125   GLUCOSE  95     Recent Labs      02/25/19   0125   WBC  14.6*     Recent Labs      02/25/19   0125   RBC  4.26*   HEMOGLOBIN  12.7*   HEMATOCRIT  39.4*   PLATELETCT  316        Assessment/Plan  #Acute hypoxic resp failure post trauma  RA and capped  Awaiting bone flap to decannulate    #R PE s/p IVC filter    #Dysphagia: Tube feeds    I have performed a physical exam and reviewed and updated ROS and Plan today (2/25/2019). In review of yesterday's note  (2/24/2019), there are no changes except as documented above.

## 2019-02-26 NOTE — PROGRESS NOTES
NSX  CT scan reviewed with Dr. Huntley.  -Swelling remains  -Not ready for replacement of bone flat at this time.    Plan:  1. Ok to restart lovenox  2. Repeat CT scan head on 3/5/19  3. Will try for surgery to replace bone flap some time next week.

## 2019-02-26 NOTE — [facility]
Hospital Medicine Progress Note, Adult, Complex               Author: Rayne YANETH Johnson Date & Time created: 2/26/2019  8:19 AM     CC: traumatic ICH    Interval History:  CT done, not ready for bone flap  No complaints, moves L side spontaneously  capped    Review of Systems:  Review of Systems   Unable to perform ROS: Medical condition   Constitutional: Negative for fever.       T 97.8 P 84 /73 RR 16 SaO2 93% I/O 2.4/1 BM 2/25 tele SR  Physical Exam   Constitutional: He appears well-developed. No distress.   HENT:   Craniectomy defect   Eyes: Conjunctivae are normal. Right eye exhibits no discharge. Left eye exhibits no discharge. No scleral icterus.   cortrack   Neck: No tracheal deviation present.   Capped trach   Cardiovascular: Normal rate, regular rhythm and intact distal pulses.    Pulmonary/Chest: Effort normal. No respiratory distress. He has no wheezes.   Abdominal: Soft. Bowel sounds are normal. He exhibits no distension. There is no tenderness.   Musculoskeletal: He exhibits no edema.   RUE paresis   Neurological: He is alert.   Skin: Skin is warm.       Labs:          Recent Labs      02/25/19   0125   SODIUM  136   POTASSIUM  4.4   CHLORIDE  102   CO2  24   BUN  18   CREATININE  0.68   CALCIUM  9.1     Recent Labs      02/25/19   0125   GLUCOSE  95     Recent Labs      02/25/19   0125   RBC  4.26*   HEMOGLOBIN  12.7*   HEMATOCRIT  39.4*   PLATELETCT  316     Recent Labs      02/25/19   0125   WBC  14.6*          GI/Nutrition:  Orders Placed This Encounter   Procedures   • Diet NPO     Standing Status:   Standing     Number of Occurrences:   1     Order Specific Question:   Restrict to:     Answer:   Strict [1]     CT head  1.  LEFT frontoparietal craniectomy with brain material extruding through the defect.  Evolving hemorrhage and encephalomalacia as described.  2.  LEFT hemispheric low-density subdural fluid again noted and unchanged.  3.  Compensatory enlargement of LEFT lateral ventricle  again seen.  4.  No new hemorrhage since prior exam.    Medical Decision Making, by Problem:  Traumatic SDH s/p evacuation 1/26 (Yuko)   -repeat CT next week   -bone flap when able   -amantadine  Seizure secondary to above   -keppra  S/p post op hypoxic VDRF   -capped, no decanulation until after flap placed  Chronic leukocytosis   -monitor for infection   -cx if fever  R PE s/p IVC filter 2/5  Resolved PTX  Dysphagia   -TF  Debility   -PT/OT/SLP    Quality-Core Measures   Reviewed items::  Medications reviewed and Radiology images reviewed  DVT prophylaxis pharmacological::  Enoxaparin (Lovenox)

## 2019-02-27 LAB
ERYTHROCYTE [DISTWIDTH] IN BLOOD BY AUTOMATED COUNT: 43.6 FL (ref 35.9–50)
HCT VFR BLD AUTO: 40.5 % (ref 42–52)
HGB BLD-MCNC: 13.2 G/DL (ref 14–18)
MCH RBC QN AUTO: 30.2 PG (ref 27–33)
MCHC RBC AUTO-ENTMCNC: 32.6 G/DL (ref 33.7–35.3)
MCV RBC AUTO: 92.7 FL (ref 81.4–97.8)
PLATELET # BLD AUTO: 258 K/UL (ref 164–446)
PMV BLD AUTO: 10.7 FL (ref 9–12.9)
RBC # BLD AUTO: 4.37 M/UL (ref 4.7–6.1)
WBC # BLD AUTO: 14.2 K/UL (ref 4.8–10.8)

## 2019-02-27 PROCEDURE — 85027 COMPLETE CBC AUTOMATED: CPT

## 2019-02-27 NOTE — [facility]
Hospital Medicine Progress Note, Adult, Complex               Author: Rayne WOLFE Elizabeth Date & Time created: 2/27/2019  6:11 AM     CC: traumatic ICH    Interval History:  Met with patient's mother, updated 2/26  Low grade temp yesterday, WBC remains high  Capped  Following more commands with PT    Review of Systems:  Review of Systems   Unable to perform ROS: Medical condition       Tm 99.5 P 92 /73RR 16 SaO2 94% I/O 2.1/1.3 BM 2/26 tele SR  Physical Exam   Constitutional: He appears well-developed. No distress.   HENT:   Head: Normocephalic.   Craniectomy defect   Eyes: Right eye exhibits no discharge. Left eye exhibits no discharge.   cortrack   Neck: No tracheal deviation present.   Capped trach   Cardiovascular: Normal rate, regular rhythm and intact distal pulses.    Pulmonary/Chest: Effort normal. No respiratory distress. He has no wheezes.   Abdominal: Soft. Bowel sounds are normal. He exhibits no distension. There is no tenderness.   Musculoskeletal: He exhibits no edema.   RUE paresis   Neurological:   resting   Skin: Skin is warm.       Labs:          Recent Labs      02/25/19   0125   SODIUM  136   POTASSIUM  4.4   CHLORIDE  102   CO2  24   BUN  18   CREATININE  0.68   CALCIUM  9.1     Recent Labs      02/25/19   0125   GLUCOSE  95     Recent Labs      02/25/19   0125  02/27/19   0138   RBC  4.26*  4.37*   HEMOGLOBIN  12.7*  13.2*   HEMATOCRIT  39.4*  40.5*   PLATELETCT  316  258     Recent Labs      02/25/19   0125  02/27/19   0138   WBC  14.6*  14.2*          GI/Nutrition:  Orders Placed This Encounter   Procedures   • Diet NPO     Standing Status:   Standing     Number of Occurrences:   1     Order Specific Question:   Restrict to:     Answer:   Strict [1]     CT head  1.  LEFT frontoparietal craniectomy with brain material extruding through the defect.  Evolving hemorrhage and encephalomalacia as described.  2.  LEFT hemispheric low-density subdural fluid again noted and unchanged.  3.   Compensatory enlargement of LEFT lateral ventricle again seen.  4.  No new hemorrhage since prior exam.    Medical Decision Making, by Problem:  Traumatic SDH s/p evacuation 1/26 (Yuko)   -repeat CT next week (3/5)   -bone flap when able at [facility]   -amantadine  Seizure secondary to above   -keppra  S/p post op hypoxic VDRF   -capped, no decanulation until after flap placed  Chronic leukocytosis   -with low grade temp will treat serratia in sputum  Serratia tracheitis   -Rocephin 5 days  R PE s/p IVC filter 2/5  Resolved PTX  Dysphagia   -TF  Debility   -PT/OT/SLP    Quality-Core Measures   Reviewed items::  Medications reviewed and Labs reviewed  DVT prophylaxis pharmacological::  Enoxaparin (Lovenox)       negative detailed exam pharynx/mouth

## 2019-02-28 NOTE — [facility]
Hospital Medicine Progress Note, Adult, Complex               Author: Rayne WOLFE Faison Date & Time created: 2/28/2019  6:16 AM     CC: traumatic ICH    Interval History:  Started Rocephin yesterday  SLP started puree diet (no intake per graphics)  Increased cough overnight    Review of Systems:  Review of Systems   Unable to perform ROS: Medical condition       Tm 99 P85 /76RR 18 SaO2 91% I/O1.8/900  BM 2/27 tele SR  Physical Exam   Constitutional: He appears well-developed. No distress.   HENT:   Head: Normocephalic.   Craniectomy defect   Eyes: Right eye exhibits no discharge. Left eye exhibits no discharge.   cortrack   Neck: No tracheal deviation present.   Capped trach   Cardiovascular: Normal rate, regular rhythm and intact distal pulses.    Pulmonary/Chest: Effort normal. No respiratory distress. He has no wheezes.   Abdominal: Soft. Bowel sounds are normal. He exhibits no distension. There is no tenderness.   Musculoskeletal: He exhibits no edema.   RUE paresis   Neurological:   resting   Skin: Skin is warm.       Labs:          No results for input(s): SODIUM, POTASSIUM, CHLORIDE, CO2, BUN, CREATININE, MAGNESIUM, PHOSPHORUS, CALCIUM in the last 72 hours.  No results for input(s): ALTSGPT, ASTSGOT, ALKPHOSPHAT, TBILIRUBIN, DBILIRUBIN, GAMMAGT, AMYLASE, LIPASE, ALB, PREALBUMIN, GLUCOSE in the last 72 hours.  Recent Labs      02/27/19   0138   RBC  4.37*   HEMOGLOBIN  13.2*   HEMATOCRIT  40.5*   PLATELETCT  258     Recent Labs      02/27/19 0138   WBC  14.2*          GI/Nutrition:  Orders Placed This Encounter   Procedures   • Diet Order Regular     Standing Status:   Standing     Number of Occurrences:   1     Order Specific Question:   Diet:     Answer:   Regular [1]     Order Specific Question:   Texture/Fiber modifications:     Answer:   Dysphagia 1(Pureed)specify fluid consistency(question 6) [1]     Comments:   Supervised     Order Specific Question:   Consistency/Fluid modifications:     Answer:    Thin Liquids [3]     CT head  1.  LEFT frontoparietal craniectomy with brain material extruding through the defect.  Evolving hemorrhage and encephalomalacia as described.  2.  LEFT hemispheric low-density subdural fluid again noted and unchanged.  3.  Compensatory enlargement of LEFT lateral ventricle again seen.  4.  No new hemorrhage since prior exam.    Medical Decision Making, by Problem:  Traumatic SDH s/p evacuation 1/26 (Yuko)   -repeat CT next week (3/5)   -bone flap when able at [facility]   -amantadine  Seizure secondary to above   -keppra  S/p post op hypoxic VDRF   -capped, no decanulation until after flap placed  Chronic leukocytosis   -follow up in [facility] tracheitis   -Rocephin thru 3/3   -add mucinex with new cough and monitor for aspiration  R PE s/p IVC filter 2/5  Resolved PTX  Dysphagia   -TF  Debility   -PT/OT/SLP    Quality-Core Measures   Reviewed items::  Medications reviewed  DVT prophylaxis pharmacological::  Enoxaparin (Lovenox)  Antibiotics:  Treating active infection/contamination beyond 24 hours perioperative coverage

## 2019-02-28 NOTE — [facility]
Critical Care Progress Note    Date of admission  2/21/2019    Chief Complaint  28 y.o. male admitted 2/21/2019 with head trauma.     Hospital Course  Evacuation of subdural 1/26  Trached  Awaiting for bone flap   Has been capped since 2/20       Interval Problem Update  Reviewed last 24 hour events:  Trach is capped and good oral suctioning, no oxygen requirement  Good cough   Stable hemodynamics         Review of Systems  Review of Systems   Unable to perform ROS: Patient nonverbal        Physical Exam   Physical Exam   Constitutional: No distress.   HENT:   Head: Normocephalic and atraumatic.   Neck:   Trach in place    Cardiovascular: Normal rate, regular rhythm, normal heart sounds and intact distal pulses.  Exam reveals no gallop and no friction rub.    No murmur heard.  Pulmonary/Chest: Effort normal and breath sounds normal. No respiratory distress. He has no wheezes. He has no rales. He exhibits no tenderness.   Abdominal: Soft. He exhibits no distension. There is no tenderness.   Musculoskeletal: He exhibits no edema, tenderness or deformity.   Neurological: He is alert.   Skin: Skin is warm. No rash noted. He is not diaphoretic. No erythema. No pallor.   Psychiatric: He has a normal mood and affect. His behavior is normal.   Nursing note and vitals reviewed.      Medications  No current facility-administered medications for this encounter.        Fluids  No intake or output data in the 24 hours ending 02/27/19 1948    Laboratory          Recent Labs      02/25/19   0125   SODIUM  136   POTASSIUM  4.4   CHLORIDE  102   CO2  24   BUN  18   CREATININE  0.68   CALCIUM  9.1     Recent Labs      02/25/19   0125   GLUCOSE  95     Recent Labs      02/25/19   0125  02/27/19   0138   WBC  14.6*  14.2*     Recent Labs      02/25/19   0125  02/27/19   0138   RBC  4.26*  4.37*   HEMOGLOBIN  12.7*  13.2*   HEMATOCRIT  39.4*  40.5*   PLATELETCT  316  258       Imaging  X-Ray:  I have personally reviewed the images and  compared with prior images.    Assessment/Plan  #Acute hypoxic resp failure post trauma  On room air and capped x48 hrs +  Awaiting bone flap to decannulate, perhaps next week.  Oral suction as needed      #Rt PE s/p IVC filter     #Dysphagia: s/p NG, Tube feeds    I have performed a physical exam and reviewed and updated ROS and Plan today (2/27/2019). In review of yesterday's note (2/26/2019), there are no changes except as documented above.

## 2019-03-01 LAB
BASOPHILS # BLD AUTO: 0.8 % (ref 0–1.8)
BASOPHILS # BLD: 0.09 K/UL (ref 0–0.12)
EOSINOPHIL # BLD AUTO: 0.69 K/UL (ref 0–0.51)
EOSINOPHIL NFR BLD: 6.3 % (ref 0–6.9)
ERYTHROCYTE [DISTWIDTH] IN BLOOD BY AUTOMATED COUNT: 44.9 FL (ref 35.9–50)
HCT VFR BLD AUTO: 40.1 % (ref 42–52)
HGB BLD-MCNC: 13 G/DL (ref 14–18)
IMM GRANULOCYTES # BLD AUTO: 0.14 K/UL (ref 0–0.11)
IMM GRANULOCYTES NFR BLD AUTO: 1.3 % (ref 0–0.9)
LYMPHOCYTES # BLD AUTO: 2.92 K/UL (ref 1–4.8)
LYMPHOCYTES NFR BLD: 26.5 % (ref 22–41)
MCH RBC QN AUTO: 30.4 PG (ref 27–33)
MCHC RBC AUTO-ENTMCNC: 32.4 G/DL (ref 33.7–35.3)
MCV RBC AUTO: 93.9 FL (ref 81.4–97.8)
MONOCYTES # BLD AUTO: 0.98 K/UL (ref 0–0.85)
MONOCYTES NFR BLD AUTO: 8.9 % (ref 0–13.4)
NEUTROPHILS # BLD AUTO: 6.2 K/UL (ref 1.82–7.42)
NEUTROPHILS NFR BLD: 56.2 % (ref 44–72)
NRBC # BLD AUTO: 0 K/UL
NRBC BLD-RTO: 0 /100 WBC
PLATELET # BLD AUTO: 238 K/UL (ref 164–446)
PMV BLD AUTO: 10.9 FL (ref 9–12.9)
RBC # BLD AUTO: 4.27 M/UL (ref 4.7–6.1)
WBC # BLD AUTO: 11 K/UL (ref 4.8–10.8)

## 2019-03-01 PROCEDURE — 85025 COMPLETE CBC W/AUTO DIFF WBC: CPT

## 2019-03-01 ASSESSMENT — ENCOUNTER SYMPTOMS
BACK PAIN: 0
VOMITING: 0
NAUSEA: 0
ABDOMINAL PAIN: 0
COUGH: 0

## 2019-03-01 NOTE — [facility]
Hospital Medicine Progress Note, Adult, Complex               Author: Rayne WOLFE Elizabeth Date & Time created: 3/1/2019  5:08 AM     CC: traumatic ICH    Interval History:  Patient ate well yesterday, >50%  Wbc near normal after Rocephin started  No complaints    Review of Systems:  Review of Systems   Respiratory: Negative for cough.    Cardiovascular: Negative for chest pain.   Gastrointestinal: Negative for abdominal pain, nausea and vomiting.   Musculoskeletal: Negative for back pain.       T97.7 P78BP 115/68RR 16 SaO2 96% I/O2.3/1.7 BM 2/28 tele SR  Physical Exam   Constitutional: He appears well-developed. No distress.   HENT:   Head: Normocephalic.   Craniectomy defect   Eyes: Conjunctivae are normal. Right eye exhibits no discharge. Left eye exhibits no discharge. No scleral icterus.   Neck: No tracheal deviation present.   Capped trach   Cardiovascular: Normal rate, regular rhythm and intact distal pulses.    Pulmonary/Chest: Effort normal. No respiratory distress. He has no wheezes.   Abdominal: Soft. Bowel sounds are normal. He exhibits no distension. There is no tenderness.   Musculoskeletal: He exhibits no edema.   RUE paresis   Neurological: He is alert.   Skin: Skin is warm.       Labs:          No results for input(s): SODIUM, POTASSIUM, CHLORIDE, CO2, BUN, CREATININE, MAGNESIUM, PHOSPHORUS, CALCIUM in the last 72 hours.  No results for input(s): ALTSGPT, ASTSGOT, ALKPHOSPHAT, TBILIRUBIN, DBILIRUBIN, GAMMAGT, AMYLASE, LIPASE, ALB, PREALBUMIN, GLUCOSE in the last 72 hours.  Recent Labs      02/27/19   0138  03/01/19   0300   RBC  4.37*  4.27*   HEMOGLOBIN  13.2*  13.0*   HEMATOCRIT  40.5*  40.1*   PLATELETCT  258  238     Recent Labs      02/27/19 0138  03/01/19   0300   WBC  14.2*  11.0*   NEUTSPOLYS   --   56.20   LYMPHOCYTES   --   26.50   MONOCYTES   --   8.90   EOSINOPHILS   --   6.30   BASOPHILS   --   0.80          GI/Nutrition:  Orders Placed This Encounter   Procedures   • Diet Order Regular      Standing Status:   Standing     Number of Occurrences:   1     Order Specific Question:   Diet:     Answer:   Regular [1]     Order Specific Question:   Texture/Fiber modifications:     Answer:   Dysphagia 1(Pureed)specify fluid consistency(question 6) [1]     Comments:   Supervised     Order Specific Question:   Consistency/Fluid modifications:     Answer:   Thin Liquids [3]     CT head  1.  LEFT frontoparietal craniectomy with brain material extruding through the defect.  Evolving hemorrhage and encephalomalacia as described.  2.  LEFT hemispheric low-density subdural fluid again noted and unchanged.  3.  Compensatory enlargement of LEFT lateral ventricle again seen.  4.  No new hemorrhage since prior exam.    Medical Decision Making, by Problem:  Traumatic SDH s/p evacuation 1/26 (Yuko)   -repeat CT next week (3/5)    -bone flap [facility] able at [facility]   [facility]  Seizure secondary to above   -keppra  S/p post op hypoxic VDRF   -capped, no decanulation until after flap placed  Chronic leukocytosis   -improved with treatment of tracheitis  Serratia tracheitis   -Rocephin thru 3/3  R PE s/p IVC filter 2/5  Resolved PTX  Dysphagia   -po   -DC cortrack if can swallow pills  Debility   -improving daliy    Quality-Core Measures   Reviewed items::  Medications reviewed and Labs reviewed  DVT prophylaxis pharmacological::  Enoxaparin (Lovenox)  Antibiotics:  Treating active infection/contamination beyond 24 hours perioperative coverage

## 2019-03-02 ASSESSMENT — ENCOUNTER SYMPTOMS
COUGH: 0
ABDOMINAL PAIN: 0
SORE THROAT: 0
NAUSEA: 0
VOMITING: 0
BACK PAIN: 0
FEVER: 0

## 2019-03-02 NOTE — [facility]
Hospital Medicine Progress Note, Adult, Complex               Author: Rayne YANETH Johnson Date & Time created: 3/2/2019  9:35 AM     CC: traumatic ICH    Interval History:  Having breakfast  No complaints of pain  Did not get therapy yesterday    Review of Systems:  Review of Systems   Constitutional: Negative for fever.   HENT: Negative for sore throat.    Respiratory: Negative for cough.    Cardiovascular: Negative for chest pain.   Gastrointestinal: Negative for abdominal pain, nausea and vomiting.   Musculoskeletal: Negative for back pain.       T97.7 P79BP 110/68RR 20 SaO2 98% I/O1.5/2.1 BM 3/1 tele SR  Physical Exam   Constitutional: He appears well-developed. No distress.   HENT:   Head: Normocephalic.   Craniectomy defect   Eyes: Conjunctivae are normal. Right eye exhibits no discharge. Left eye exhibits no discharge. No scleral icterus.   Neck: No tracheal deviation present.   Capped trach   Cardiovascular: Normal rate, regular rhythm and intact distal pulses.    Pulmonary/Chest: Effort normal. No respiratory distress. He has no wheezes. He exhibits no tenderness.   Abdominal: Soft. Bowel sounds are normal. He exhibits no distension. There is no tenderness.   Musculoskeletal: He exhibits no edema.   RUE paresis   Neurological: He is alert.   Skin: Skin is warm.       Labs:          No results for input(s): SODIUM, POTASSIUM, CHLORIDE, CO2, BUN, CREATININE, MAGNESIUM, PHOSPHORUS, CALCIUM in the last 72 hours.  No results for input(s): ALTSGPT, ASTSGOT, ALKPHOSPHAT, TBILIRUBIN, DBILIRUBIN, GAMMAGT, AMYLASE, LIPASE, ALB, PREALBUMIN, GLUCOSE in the last 72 hours.  Recent Labs      03/01/19   0300   RBC  4.27*   HEMOGLOBIN  13.0*   HEMATOCRIT  40.1*   PLATELETCT  238     Recent Labs      03/01/19   0300   WBC  11.0*   NEUTSPOLYS  56.20   LYMPHOCYTES  26.50   MONOCYTES  8.90   EOSINOPHILS  6.30   BASOPHILS  0.80          GI/Nutrition:  Orders Placed This Encounter   Procedures   • Diet Order Regular     Standing  Status:   Standing     Number of Occurrences:   1     Order Specific Question:   Diet:     Answer:   Regular [1]     Order Specific Question:   Texture/Fiber modifications:     Answer:   Dysphagia 1(Pureed)specify fluid consistency(question 6) [1]     Comments:   Supervised     Order Specific Question:   Consistency/Fluid modifications:     Answer:   Thin Liquids [3]     CT head  1.  LEFT frontoparietal craniectomy with brain material extruding through the defect.  Evolving hemorrhage and encephalomalacia as described.  2.  LEFT hemispheric low-density subdural fluid again noted and unchanged.  3.  Compensatory enlargement of LEFT lateral ventricle again seen.  4.  No new hemorrhage since prior exam.    Medical Decision Making, by Problem:  Traumatic SDH s/p evacuation 1/26 (Yuko)   -repeat CT next week (3/5)    -bone flap when able at [facility]   -amantadine  Seizure secondary to above   -keppra  S/p post op hypoxic VDRF   -capped, no decanulation until after flap placed  Chronic leukocytosis   -improved with treatment of tracheitis  Serratia tracheitis   -Rocephin thru 3/3  R PE s/p IVC filter 2/5  Resolved PTX  Dysphagia   -po  Debility   -PT/OT ongoing    Quality-Core Measures   Reviewed items::  Medications reviewed  DVT prophylaxis pharmacological::  Enoxaparin (Lovenox)  Antibiotics:  Treating active infection/contamination beyond 24 hours perioperative coverage

## 2019-03-03 ASSESSMENT — ENCOUNTER SYMPTOMS
ABDOMINAL PAIN: 0
COUGH: 0
INSOMNIA: 1
SORE THROAT: 0
FEVER: 0
BACK PAIN: 0
VOMITING: 0
NAUSEA: 0

## 2019-03-03 NOTE — [facility]
Hospital Medicine Progress Note, Adult, Complex               Author: Rayne WOLFE [place] Date & Time created: 3/3/2019  9:41 AM     CC: traumatic ICH    Interval History:  Didn't sleep well  No complaints of pain  Low grade temp yesterday    Review of Systems:  Review of Systems   Constitutional: Negative for fever.   HENT: Negative for sore throat.    Respiratory: Negative for cough.    Cardiovascular: Negative for chest pain.   Gastrointestinal: Negative for abdominal pain, nausea and vomiting.   Musculoskeletal: Negative for back pain.   Psychiatric/Behavioral: The patient has insomnia.        T97.1 P83BP 126/83RR 20 SaO2 93% I/O1.1/1.2BM 3/1 tele SR  Physical Exam   Constitutional: He appears well-developed. No distress.   HENT:   Head: Normocephalic.   Craniectomy defect   Eyes: Conjunctivae are normal. Right eye exhibits no discharge. Left eye exhibits no discharge. No scleral icterus.   Neck: No tracheal deviation present.   Capped trach   Cardiovascular: Normal rate, regular rhythm and intact distal pulses.    Pulmonary/Chest: Effort normal. No respiratory distress. He has no wheezes. He exhibits no tenderness.   Abdominal: Soft. Bowel sounds are normal. He exhibits no distension. There is no tenderness.   Musculoskeletal: He exhibits no edema.   RUE paresis   Neurological: He is alert.   Skin: Skin is warm.       Labs:          No results for input(s): SODIUM, POTASSIUM, CHLORIDE, CO2, BUN, CREATININE, MAGNESIUM, PHOSPHORUS, CALCIUM in the last 72 hours.  No results for input(s): ALTSGPT, ASTSGOT, ALKPHOSPHAT, TBILIRUBIN, DBILIRUBIN, GAMMAGT, AMYLASE, LIPASE, ALB, PREALBUMIN, GLUCOSE in the last 72 hours.  Recent Labs      03/01/19   0300   RBC  4.27*   HEMOGLOBIN  13.0*   HEMATOCRIT  40.1*   PLATELETCT  238     Recent Labs      03/01/19   0300   WBC  11.0*   NEUTSPOLYS  56.20   LYMPHOCYTES  26.50   MONOCYTES  8.90   EOSINOPHILS  6.30   BASOPHILS  0.80          GI/Nutrition:  Orders Placed This Encounter    Procedures   • Diet Order Regular     Standing Status:   Standing     Number of Occurrences:   1     Order Specific Question:   Diet:     Answer:   Regular [1]     Order Specific Question:   Texture/Fiber modifications:     Answer:   Dysphagia 1(Pureed)specify fluid consistency(question 6) [1]     Comments:   Supervised     Order Specific Question:   Consistency/Fluid modifications:     Answer:   Thin Liquids [3]     CT head  1.  LEFT frontoparietal craniectomy with brain material extruding through the defect.  Evolving hemorrhage and encephalomalacia as described.  2.  LEFT hemispheric low-density subdural fluid again noted and unchanged.  3.  Compensatory enlargement of LEFT lateral ventricle again seen.  4.  No new hemorrhage since prior exam.    Medical Decision Making, by Problem:  Traumatic SDH s/p evacuation 1/26 (Yuko)   -repeat CT 3/5 per NSG    -bone flap when able at [facility]   -amantadine  Seizure secondary to above   -keppra  S/p post op hypoxic VDRF   -capped, no decanulation until after flap placed  Chronic leukocytosis   -improved with treatment of tracheitis   -repeat in am  Serratia tracheitis   -Rocephin thru today  R PE s/p IVC filter 2/5  Resolved PTX  Dysphagia   -po  Debility   -PT/OT     Add trazedone for sleep    Quality-Core Measures   Reviewed items::  Medications reviewed  DVT prophylaxis pharmacological::  Enoxaparin (Lovenox)  Antibiotics:  Treating active infection/contamination beyond 24 hours perioperative coverage

## 2019-03-04 LAB
ANION GAP SERPL CALC-SCNC: 9 MMOL/L (ref 0–11.9)
BUN SERPL-MCNC: 13 MG/DL (ref 8–22)
CALCIUM SERPL-MCNC: 9.6 MG/DL (ref 8.4–10.2)
CHLORIDE SERPL-SCNC: 103 MMOL/L (ref 96–112)
CO2 SERPL-SCNC: 26 MMOL/L (ref 20–33)
CREAT SERPL-MCNC: 0.8 MG/DL (ref 0.5–1.4)
ERYTHROCYTE [DISTWIDTH] IN BLOOD BY AUTOMATED COUNT: 45.1 FL (ref 35.9–50)
GLUCOSE SERPL-MCNC: 93 MG/DL (ref 65–99)
HCT VFR BLD AUTO: 42.9 % (ref 42–52)
HGB BLD-MCNC: 13.8 G/DL (ref 14–18)
MCH RBC QN AUTO: 30.3 PG (ref 27–33)
MCHC RBC AUTO-ENTMCNC: 32.2 G/DL (ref 33.7–35.3)
MCV RBC AUTO: 94.1 FL (ref 81.4–97.8)
PLATELET # BLD AUTO: 265 K/UL (ref 164–446)
PMV BLD AUTO: 10.6 FL (ref 9–12.9)
POTASSIUM SERPL-SCNC: 3.7 MMOL/L (ref 3.6–5.5)
RBC # BLD AUTO: 4.56 M/UL (ref 4.7–6.1)
SODIUM SERPL-SCNC: 138 MMOL/L (ref 135–145)
WBC # BLD AUTO: 8.4 K/UL (ref 4.8–10.8)

## 2019-03-04 PROCEDURE — 85027 COMPLETE CBC AUTOMATED: CPT

## 2019-03-04 PROCEDURE — 80048 BASIC METABOLIC PNL TOTAL CA: CPT

## 2019-03-04 ASSESSMENT — ENCOUNTER SYMPTOMS
BACK PAIN: 0
INSOMNIA: 0
COUGH: 0
CONSTIPATION: 0
DIARRHEA: 0
VOMITING: 0
SORE THROAT: 0
NAUSEA: 0
CHILLS: 0
ABDOMINAL PAIN: 0
FEVER: 0

## 2019-03-04 NOTE — [facility]
Critical Care Progress Note    Date of admission  2/21/2019    Chief Complaint  28 y.o. male admitted 2/21/2019 with head trauma.     Hospital Course  Evacuation of subdural 1/26  Trached  Awaiting for bone flap   Has been capped since 2/20       Interval Problem Update  Reviewed last 24 hour events:  Trach is capped and good oral suctioning, no oxygen requirement  Good cough   Stable hemodynamics      Review of Systems  Review of Systems   Patient delayed in responding but able to answer yes and no  Denies pain  Smiling    Physical Exam   108/62, 74 96% RA afebrile  Physical Exam   Constitutional: No distress.   HENT:   Head: Normocephalic and atraumatic.   Neck:   Trach in place    Cardiovascular: Normal rate, regular rhythm, normal heart sounds and intact distal pulses.  Exam reveals no gallop and no friction rub.    No murmur heard.  Pulmonary/Chest: Effort normal and breath sounds normal. No respiratory distress. He has no wheezes. He has no rales. He exhibits no tenderness.   Abdominal: Soft. He exhibits no distension. There is no tenderness.   Musculoskeletal: He exhibits no edema, tenderness or deformity.   Neurological: He is alert.   Skin: Skin is warm. No rash noted. He is not diaphoretic. No erythema. No pallor.   Psychiatric: He has a normal mood and affect. His behavior is normal.   Nursing note and vitals reviewed.    Laboratory          Recent Labs      03/04/19   0535   SODIUM  138   POTASSIUM  3.7   CHLORIDE  103   CO2  26   BUN  13   CREATININE  0.80   CALCIUM  9.6     Recent Labs      03/04/19   0535   GLUCOSE  93     Recent Labs      03/04/19   0535   WBC  8.4     Recent Labs      03/04/19   0535   RBC  4.56*   HEMOGLOBIN  13.8*   HEMATOCRIT  42.9   PLATELETCT  265       Imaging  No new images    Assessment/Plan  #Acute hypoxic resp failure post trauma  On room air and capped   Awaiting bone flap to decannulate   Oral suction as needed      #Rt PE s/p IVC filter     #Dysphagia: s/p NG, Tube  feeds    I have performed a physical exam and reviewed and updated ROS and Plan today (3/4/2019). In review of yesterday's note (3/3/2019), there are no changes except as documented above.

## 2019-03-04 NOTE — [facility]
Hospital Medicine Progress Note, Adult, Complex               Author: Rayne YANETH Turciosen Date & Time created: 3/4/2019  7:18 AM     CC: traumatic ICH    Interval History:  Completed tracheitis tx with normalization of WBC  CT head tomorrow  D/w family yesterday-they are planning on neuro rehab at [facility] when released from surgeon after flap placement  Trazedone with good result    Review of Systems:  Review of Systems   Constitutional: Negative for chills and fever.   HENT: Negative for sore throat.    Respiratory: Negative for cough.    Cardiovascular: Negative for chest pain.   Gastrointestinal: Negative for abdominal pain, constipation, diarrhea, nausea and vomiting.   Musculoskeletal: Negative for back pain.   Psychiatric/Behavioral: The patient does not have insomnia.        T98 P74BP 108/62RR 16 SaO2 96% I/O1.5/1 BM 3/3 tele SR  Physical Exam   Constitutional: He appears well-developed. No distress.   HENT:   Head: Normocephalic.   Craniectomy defect   Eyes: Conjunctivae are normal. Right eye exhibits no discharge. Left eye exhibits no discharge. No scleral icterus.   Neck: No tracheal deviation present.   Capped trach   Cardiovascular: Normal rate, regular rhythm and intact distal pulses.    Pulmonary/Chest: Effort normal. No respiratory distress. He has no wheezes. He exhibits no tenderness.   Abdominal: Soft. Bowel sounds are normal. He exhibits no distension. There is no tenderness.   Musculoskeletal: He exhibits no edema.   RUE paresis   Neurological: He is alert.   Skin: Skin is warm.       Labs:          Recent Labs      03/04/19   0535   SODIUM  138   POTASSIUM  3.7   CHLORIDE  103   CO2  26   BUN  13   CREATININE  0.80   CALCIUM  9.6     Recent Labs      03/04/19   0535   GLUCOSE  93     Recent Labs      03/04/19   0535   RBC  4.56*   HEMOGLOBIN  13.8*   HEMATOCRIT  42.9   PLATELETCT  265     Recent Labs      03/04/19   0535   WBC  8.4          GI/Nutrition:  Orders Placed This Encounter   Procedures   •  Diet Order Regular     Standing Status:   Standing     Number of Occurrences:   1     Order Specific Question:   Diet:     Answer:   Regular [1]     Order Specific Question:   Texture/Fiber modifications:     Answer:   Dysphagia 1(Pureed)specify fluid consistency(question 6) [1]     Comments:   Supervised     Order Specific Question:   Consistency/Fluid modifications:     Answer:   Thin Liquids [3]     CT head  1.  LEFT frontoparietal craniectomy with brain material extruding through the defect.  Evolving hemorrhage and encephalomalacia as described.  2.  LEFT hemispheric low-density subdural fluid again noted and unchanged.  3.  Compensatory enlargement of LEFT lateral ventricle again seen.  4.  No new hemorrhage since prior exam.    Medical Decision Making, by Problem:  Traumatic SDH s/p evacuation 1/26 ([place])   -repeat CT 3/5 per NSG    -bone flap when able at [facility]   -neuro rehab at [facility] per family request (said referral process and insurance auth in progress from before transfer to [facility])  Seizure secondary to above   -keppra  S/p post op hypoxic VDRF   -capped, no decanulation until after flap placed  Chronic leukocytosis-resolved  Serratia tracheitis-treated    R PE s/p IVC filter 2/5  Resolved PTX  Dysphagia   -po  Debility   -PT/OT       Quality-Core Measures   Reviewed items::  Medications reviewed and Labs reviewed  DVT prophylaxis pharmacological::  Enoxaparin (Lovenox)

## 2019-03-05 ENCOUNTER — APPOINTMENT (OUTPATIENT)
Dept: RADIOLOGY | Facility: MEDICAL CENTER | Age: 29
End: 2019-03-05
Attending: PHYSICIAN ASSISTANT
Payer: COMMERCIAL

## 2019-03-05 PROCEDURE — 70450 CT HEAD/BRAIN W/O DYE: CPT

## 2019-03-05 NOTE — [facility]
Hospital Medicine Progress Note, Adult, Complex               Author: Lashay Campbell Date & Time created: 3/5/2019  8:59 AM     CC: subdural hematoma and acute hypoxic respiratory failure after trauma from skiing accident    Interval History:  [place]  He had a craniotomy for subdural hemorrhage on Jan 26  He moves his left side  Bone flap is scheduled for March 8    Review of Systems:  Review of Systems   Unable to perform ROS: Mental acuity       Physical Exam:  Physical Exam   Constitutional: No distress.   HENT:   Mouth/Throat: Oropharynx is clear and moist.   Left scalp incision healed   Eyes: Conjunctivae are normal. No scleral icterus.   Neck: Neck supple.   Cardiovascular: Normal rate and regular rhythm.    No murmur heard.  Pulmonary/Chest: Effort normal and breath sounds normal.   Abdominal: Soft. He exhibits no distension.   Musculoskeletal: He exhibits no edema.   Neurological: He is alert. Coordination abnormal.   Moves left arm and leg, follows some commands   Skin: Skin is warm and dry. No rash noted. He is not diaphoretic.   Psychiatric: Cognition and memory are impaired. He expresses impulsivity.   Vitals reviewed.      Labs:          Recent Labs      03/04/19   0535   SODIUM  138   POTASSIUM  3.7   CHLORIDE  103   CO2  26   BUN  13   CREATININE  0.80   CALCIUM  9.6     Recent Labs      03/04/19   0535   GLUCOSE  93     Recent Labs      03/04/19   0535   RBC  4.56*   HEMOGLOBIN  13.8*   HEMATOCRIT  42.9   PLATELETCT  265     Recent Labs      03/04/19   0535   WBC  8.4           Hemodynamics:  T98.1 /81 HR80 RR18 O2 sat 95% with trach capped    Medical Decision Making, by Problem:  Subdural hematoma due to traumatic brain injury   Drained with craniotomy    Bone flap placement scheduled 3/8   Stop lovenox for surgery   PT/OT/ speech therapy continue   keppra to prevent seizures    Acute hypoxic respiratory failure after trauma   Trach capped,  decanulate after bone flap     oropharyngeal dysphagia, tube feeds    Right pulmonary embolus, ivc filter placed   Low dose anticoagulation only per neurosurgery         Quality-Core Measures   Reviewed items::  Labs reviewed and Medications reviewed  Greer catheter::  Urinary Tract Retention or Urinary Tract Obstruction  DVT prophylaxis pharmacological::  Enoxaparin (Lovenox)  Assessed for rehabilitation services:  Patient was assess for and/or received rehabilitation services during this hospitalization

## 2019-03-05 NOTE — [facility]
Critical Care Progress Note    Date of admission  2/21/2019    Chief Complaint  28 y.o. male admitted 2/21/2019 with head trauma.     Hospital Course  Evacuation of subdural 1/26  Trached  Awaiting for bone flap   Has been capped since 2/20       Interval Problem Update  Reviewed last 24 hour events:  Trach is capped and good oral suctioning, no oxygen requirement  Good cough   Stable hemodynamics  Bone flap scheduled  For March 8      Review of Systems  ROSPatient delayed in responding but able to answer yes and no but able to and aslo saying words  Denies pain  Smiling    Physical Exam   Afebrie, 117/81 80 95% RA  Physical Exam   Constitutional: No distress.   HENT:   Head: Normocephalic and atraumatic.   Neck:   Trach in place    Cardiovascular: Normal rate, regular rhythm, normal heart sounds and intact distal pulses.  Exam reveals no gallop and no friction rub.    No murmur heard.  Pulmonary/Chest: Effort normal and breath sounds normal. No respiratory distress. He has no wheezes. He has no rales. He exhibits no tenderness.   Abdominal: Soft. He exhibits no distension. There is no tenderness.   Musculoskeletal: He exhibits no edema, tenderness or deformity.   Neurological: He is alert.   Moves left side   Skin: Skin is warm. No rash noted. He is not diaphoretic. No erythema. No pallor.   Psychiatric: He has a normal mood and affect. His behavior is normal.   Nursing note and vitals reviewed.    Laboratory          Recent Labs      03/04/19   0535   SODIUM  138   POTASSIUM  3.7   CHLORIDE  103   CO2  26   BUN  13   CREATININE  0.80   CALCIUM  9.6     Recent Labs      03/04/19   0535   GLUCOSE  93     Recent Labs      03/04/19   0535   WBC  8.4     Recent Labs      03/04/19   0535   RBC  4.56*   HEMOGLOBIN  13.8*   HEMATOCRIT  42.9   PLATELETCT  265       Imaging  No new images    Assessment/Plan  #Acute hypoxic resp failure post trauma  On room air and capped   Awaiting bone flap to decannulate currently  scheduled for March 8  Oral suction as needed   D/w Dr. Campbell    #Rt PE s/p IVC filter     #Dysphagia: s/p NG, Tube feeds    I have performed a physical exam and reviewed and updated ROS and Plan today (3/5/2019). In review of yesterday's note (3/4/2019), there are no changes except as documented above.

## 2019-03-06 NOTE — [facility]
Hospital Medicine Progress Note, Adult, Complex               Author: Lashay Campbell Date & Time created: 3/6/2019  10:49 AM     CC: subdural hematoma and acute hypoxic respiratory failure after trauma from skiing accident    Interval History:  [place]  He had a craniotomy for subdural hemorrhage on Jan 26    Bone flap is cancelled for Friday due to site swelling    Review of Systems:  Review of Systems   Unable to perform ROS: Mental acuity       Physical Exam:  Physical Exam   Constitutional: No distress.   HENT:   Mouth/Throat: No oropharyngeal exudate.   Left scalp incision healed   Eyes: Pupils are equal, round, and reactive to light. Conjunctivae are normal. No scleral icterus.   Neck: No JVD present. No tracheal deviation present.   Cardiovascular: Normal rate and regular rhythm.    No murmur heard.  Pulmonary/Chest: Effort normal and breath sounds normal.   Abdominal: Soft. He exhibits no distension.   Musculoskeletal: He exhibits no edema.   Neurological: He is alert. Coordination abnormal.   Moves left arm and leg, follows commands   Skin: Skin is dry. No rash noted. No erythema.   Psychiatric: Cognition and memory are impaired. He expresses impulsivity.   Vitals reviewed.      Labs:          Recent Labs      03/04/19   0535   SODIUM  138   POTASSIUM  3.7   CHLORIDE  103   CO2  26   BUN  13   CREATININE  0.80   CALCIUM  9.6     Recent Labs      03/04/19   0535   GLUCOSE  93     Recent Labs      03/04/19   0535   RBC  4.56*   HEMOGLOBIN  13.8*   HEMATOCRIT  42.9   PLATELETCT  265     Recent Labs      03/04/19   0535   WBC  8.4           Hemodynamics:  T98.5 /71 HR70 RR16 O2 sat 96% with trach capped    Medical Decision Making, by Problem:  Subdural hematoma due to traumatic brain injury   Drained with craniotomy    Bone flap placement possibly next week depending upon site swelling   PT/OT/ speech therapy    keppra to prevent seizures    Acute hypoxic  respiratory failure after trauma   Trach capped, decanulate after bone flap     oropharyngeal dysphagia, dysphagia diet    Right pulmonary embolus, ivc filter placed   Low dose anticoagulation only per neurosurgery         Quality-Core Measures   Reviewed items::  Labs reviewed and Medications reviewed  Greer catheter::  Urinary Tract Retention or Urinary Tract Obstruction  DVT prophylaxis pharmacological::  Enoxaparin (Lovenox)  Assessed for rehabilitation services:  Patient was assess for and/or received rehabilitation services during this hospitalization

## 2019-03-06 NOTE — PROGRESS NOTES
Neurosurgery  Opens eyes and follows commands x 4   Left greater than right    Crani site examined today  -Site depressed anteriorly, still with swelling posteriorly, soft    CT from yesterday reviewed.   -Still with Craniectomy site swelling    Site not ready for bone flap replacement    Plan:  1. Hold on surgery this week  2. Will review next week. Possible surgery next week

## 2019-03-06 NOTE — [facility]
Critical Care Progress Note    Date of admission  2/21/2019    Chief Complaint  28 y.o. male admitted 2/21/2019 with head trauma.     Hospital Course  Evacuation of subdural 1/26  Trached  Awaiting for bone flap   Has been capped since 2/20       Interval Problem Update  Reviewed last 24 hour events:  Trach is capped and good oral suctioning, no oxygen requirement  Moved rooms   Stable hemodynamics  Bone flap scheduled  For March 8      Review of Systems  ROS pt asleep and did not awaken    Physical Exam   Afebrie, 110/76 95% RA  Physical Exam   Constitutional: No distress.   HENT:   Head: Normocephalic and atraumatic.   Neck:   Trach in place    Cardiovascular: Normal rate, regular rhythm, normal heart sounds and intact distal pulses.  Exam reveals no gallop and no friction rub.    No murmur heard.  Pulmonary/Chest: Effort normal and breath sounds normal. No respiratory distress. He has no wheezes. He has no rales. He exhibits no tenderness.   Abdominal: Soft. He exhibits no distension. There is no tenderness.   Musculoskeletal: He exhibits no edema, tenderness or deformity.   Neurological: He is alert.   Moves left side   Skin: Skin is warm. No rash noted. He is not diaphoretic. No erythema. No pallor.   Psychiatric: He has a normal mood and affect. His behavior is normal.   Nursing note and vitals reviewed.    Laboratory          Recent Labs      03/04/19   0535   SODIUM  138   POTASSIUM  3.7   CHLORIDE  103   CO2  26   BUN  13   CREATININE  0.80   CALCIUM  9.6     Recent Labs      03/04/19   0535   GLUCOSE  93     Recent Labs      03/04/19   0535   WBC  8.4     Recent Labs      03/04/19   0535   RBC  4.56*   HEMOGLOBIN  13.8*   HEMATOCRIT  42.9   PLATELETCT  265       Imaging  No new images    Assessment/Plan  #Acute hypoxic resp failure post trauma  On room air and capped   Awaiting bone flap to decannulate currently scheduled for March 8  Oral suction as needed     #Rt PE s/p IVC filter     #Dysphagia: s/p NG,  Tube feeds    I have performed a physical exam and reviewed and updated ROS and Plan today (3/6/2019). In review of yesterday's note (3/5/2019), there are no changes except as documented above.

## 2019-03-07 NOTE — [facility]
Critical Care Progress Note    Date of admission  2/21/2019    Chief Complaint  28 y.o. male admitted 2/21/2019 with head trauma.     Hospital Course  Evacuation of subdural 1/26  Trached  Awaiting for bone flap   Has been capped since 2/20       Interval Problem Update  Reviewed last 24 hour events:  Trach is capped and good oral suctioning, no oxygen requirement  Moved rooms   Stable hemodynamics  Bone flap has been rescheduled      Review of Systems  ROS  ptt denies pain  Eating apple sause   Doing well    Physical Exam   98.1, 107/71 18 94% RA capped  Physical Exam   Constitutional: No distress.   HENT:   Head: Normocephalic and atraumatic.   Neck:   Trach in place    Cardiovascular: Normal rate, regular rhythm, normal heart sounds and intact distal pulses.  Exam reveals no gallop and no friction rub.    No murmur heard.  Pulmonary/Chest: Effort normal and breath sounds normal. No respiratory distress. He has no wheezes. He has no rales. He exhibits no tenderness.   Abdominal: Soft. He exhibits no distension. There is no tenderness.   Musculoskeletal: He exhibits no edema, tenderness or deformity.   Neurological: He is alert.   Moves left side   Skin: Skin is warm. No rash noted. He is not diaphoretic. No erythema. No pallor.   Psychiatric: He has a normal mood and affect. His behavior is normal.   Nursing note and vitals reviewed.    Laboratory   none to review    Imaging  No new images    Assessment/Plan  #Acute hypoxic resp failure post trauma  On room air and capped   Awaiting bone flap to decannulate  - currently too much swelling to proceed with flap  Oral suction as needed     #Rt PE s/p IVC filter     #Dysphagia: s/p NG, Tube feeds    I have performed a physical exam and reviewed and updated ROS and Plan today (3/7/2019). In review of yesterday's note (3/6/2019), there are no changes except as documented above.

## 2019-03-07 NOTE — [facility]
Hospital Medicine Progress Note, Adult, Complex               Author: Lashay Campbell Date & Time created: 3/7/2019  12:42 PM     CC: subdural hematoma and acute hypoxic respiratory failure after trauma from skiing accident    Interval History:  [place]  He had a craniotomy for subdural hemorrhage on Jan 26    Bone flap is cancelled due to site swelling  The patient is afebrile and tolerating an oral diet    Review of Systems:  Review of Systems   Unable to perform ROS: Mental acuity       Physical Exam:  Physical Exam   Constitutional: No distress.   HENT:   Mouth/Throat: No oropharyngeal exudate.   Left scalp incision healed some swelling near craniotomy site, less than previous   Eyes: Pupils are equal, round, and reactive to light. Conjunctivae are normal. No scleral icterus.   Neck: No JVD present. No tracheal deviation present.   Cardiovascular: Normal rate and regular rhythm.    No murmur heard.  Pulmonary/Chest: Effort normal and breath sounds normal.   Abdominal: Soft. Bowel sounds are normal. He exhibits no distension.   Musculoskeletal: He exhibits no edema.   Neurological: He is alert. Coordination abnormal.   Moves left arm and leg, follows commands   Skin: Skin is warm and dry. No rash noted. He is not diaphoretic.   Psychiatric: Cognition and memory are impaired. He expresses impulsivity.   Vitals reviewed.      Labs:          No results for input(s): SODIUM, POTASSIUM, CHLORIDE, CO2, BUN, CREATININE, MAGNESIUM, PHOSPHORUS, CALCIUM in the last 72 hours.  No results for input(s): ALTSGPT, ASTSGOT, ALKPHOSPHAT, TBILIRUBIN, DBILIRUBIN, GAMMAGT, AMYLASE, LIPASE, ALB, PREALBUMIN, GLUCOSE in the last 72 hours.  No results for input(s): RBC, HEMOGLOBIN, HEMATOCRIT, PLATELETCT, PROTHROMBTM, APTT, INR, IRON, FERRITIN, TOTIRONBC in the last 72 hours.            Hemodynamics:  T98.1 /71 HR81 RR18 O2 sat 94% with trach capped    Medical Decision Making, by  Problem:  Subdural hematoma due to traumatic brain injury   Drained with craniotomy    Bone flap placement possibly next week depending upon site swelling, discussed with mother at the bedside   PT/OT/ speech therapy    keppra to prevent seizures    Acute hypoxic respiratory failure after trauma   Trach capped, decanulate after bone flap     oropharyngeal dysphagia, dysphagia diet   Adequate caloric intake    Right pulmonary embolus, ivc filter placed   Low dose anticoagulation only per neurosurgery         Quality-Core Measures   Reviewed items::  Labs reviewed and Medications reviewed  Greer catheter::  Urinary Tract Retention or Urinary Tract Obstruction  DVT prophylaxis pharmacological::  Enoxaparin (Lovenox)  Assessed for rehabilitation services:  Patient was assess for and/or received rehabilitation services during this hospitalization

## 2019-03-08 ENCOUNTER — APPOINTMENT (OUTPATIENT)
Dept: RADIOLOGY | Facility: MEDICAL CENTER | Age: 29
End: 2019-03-08
Attending: INTERNAL MEDICINE
Payer: COMMERCIAL

## 2019-03-08 PROCEDURE — 70450 CT HEAD/BRAIN W/O DYE: CPT

## 2019-03-08 NOTE — [facility]
Critical Care Progress Note    Date of admission  2/21/2019    Chief Complaint  28 y.o. male admitted 2/21/2019 with head trauma.     Hospital Course  Evacuation of subdural 1/26  Trached  Awaiting for bone flap   Has been capped since 2/20       Interval Problem Update  Reviewed last 24 hour events:  Trach is capped and good oral suctioning, no oxygen requirement  Patient now feeding himself      Review of Systems  ROS  ptt denies pain  Eating full breakfast  Doing well    Physical Exam   98.1, 110/62 82 94% RA capped  Physical Exam   Constitutional: No distress.   HENT:   Head: Normocephalic and atraumatic.   Neck:   Trach in place    Cardiovascular: Normal rate, regular rhythm, normal heart sounds and intact distal pulses.  Exam reveals no gallop and no friction rub.    No murmur heard.  Pulmonary/Chest: Effort normal and breath sounds normal. No respiratory distress. He has no wheezes. He has no rales. He exhibits no tenderness.   Abdominal: Soft. He exhibits no distension. There is no tenderness.   Musculoskeletal: He exhibits no edema, tenderness or deformity.   Neurological: He is alert.   Moves left side   Skin: Skin is warm. No rash noted. He is not diaphoretic. No erythema. No pallor.   Psychiatric: He has a normal mood and affect. His behavior is normal.   Nursing note and vitals reviewed.    Laboratory   none to review    Imaging  No new images    Assessment/Plan  #Acute hypoxic resp failure post trauma  On room air and capped   Awaiting bone flap to decannulate  - currently too much swelling to proceed with flap  Oral suction as needed     #Rt PE s/p IVC filter       I have performed a physical exam and reviewed and updated ROS and Plan today (3/8/2019). In review of yesterday's note (3/7/2019), there are no changes except as documented above.

## 2019-03-08 NOTE — [facility]
Hospital Medicine Progress Note, Adult, Complex               Author: Lashay Campbell Date & Time created: 3/8/2019  12:19 PM     CC: subdural hematoma and acute hypoxic respiratory failure after trauma from skiing accident    Interval History:  [place]  He had a craniotomy for subdural hemorrhage on Jan 26    Bone flap is pending decreased scalp swelling  The patient is eating well and verbalizing  More often although 1-2 words only    Review of Systems:  Review of Systems   Unable to perform ROS: Mental acuity       Physical Exam:  Physical Exam   Constitutional: No distress.   HENT:   Mouth/Throat: Oropharynx is clear and moist. No oropharyngeal exudate.   Left scalp incision healed some swelling near craniotomy site   Eyes: Pupils are equal, round, and reactive to light. No scleral icterus.   Neck: Neck supple. No JVD present. No tracheal deviation present.   Cardiovascular: Normal rate and regular rhythm.    No murmur heard.  Pulmonary/Chest: Effort normal. No respiratory distress. He has no wheezes. He has no rales.   Abdominal: Soft. Bowel sounds are normal. He exhibits no distension.   Musculoskeletal: He exhibits no edema.   Neurological: He is alert. Coordination abnormal.   Moves left arm and leg, follows commands   Skin: Skin is warm and dry. No rash noted. No erythema.   Psychiatric: Cognition and memory are impaired. He expresses impulsivity.   Vitals reviewed.      Labs:          No results for input(s): SODIUM, POTASSIUM, CHLORIDE, CO2, BUN, CREATININE, MAGNESIUM, PHOSPHORUS, CALCIUM in the last 72 hours.  No results for input(s): ALTSGPT, ASTSGOT, ALKPHOSPHAT, TBILIRUBIN, DBILIRUBIN, GAMMAGT, AMYLASE, LIPASE, ALB, PREALBUMIN, GLUCOSE in the last 72 hours.  No results for input(s): RBC, HEMOGLOBIN, HEMATOCRIT, PLATELETCT, PROTHROMBTM, APTT, INR, IRON, FERRITIN, TOTIRONBC in the last 72 hours.            Hemodynamics:  T98.4 /71 HR89 RR18 O2 sat 98%  with trach capped    Medical Decision Making, by Problem:  Subdural hematoma due to traumatic brain injury   Drained with craniotomy    Bone flap placement possibly next week per neurosurgery   PT/OT/ speech therapy    keppra to prevent seizures    Acute hypoxic respiratory failure after trauma   Trach capped, decanulate after bone flap replacement surgery    oropharyngeal dysphagia, dysphagia diet, improving    Right pulmonary embolus, ivc filter placed   Low dose anticoagulation only per neurosurgery         Quality-Core Measures   Reviewed items::  Labs reviewed and Medications reviewed  Greer catheter::  Urinary Tract Retention or Urinary Tract Obstruction  DVT prophylaxis pharmacological::  Enoxaparin (Lovenox)  Assessed for rehabilitation services:  Patient was assess for and/or received rehabilitation services during this hospitalization

## 2019-03-09 ASSESSMENT — ENCOUNTER SYMPTOMS
WHEEZING: 0
VOMITING: 0
COUGH: 0
DIAPHORESIS: 0
NAUSEA: 0
HEADACHES: 1
CONSTIPATION: 0
CHILLS: 0
FEVER: 0
ABDOMINAL PAIN: 0
DIZZINESS: 0
SHORTNESS OF BREATH: 0
SORE THROAT: 0
SINUS PAIN: 0

## 2019-03-09 NOTE — [facility]
Hospital Medicine Progress Note, Adult, Complex               Author: Lashay Campbell Date & Time created: 3/9/2019  9:46 AM     CC: subdural hematoma and acute hypoxic respiratory failure after trauma from skiing accident    Interval History:  [place]  He had a craniotomy for subdural hemorrhage on Jan 26    Bone flap is pending decreased scalp swelling  Last night his left jaw started hurting while chewing    Review of Systems:  Review of Systems   Constitutional: Negative for chills, diaphoresis and fever.   HENT: Negative for congestion, hearing loss, sinus pain and sore throat.         Jaw pain mostly with chewing   Respiratory: Negative for cough, shortness of breath and wheezing.    Cardiovascular: Negative for chest pain.   Gastrointestinal: Negative for abdominal pain, constipation, nausea and vomiting.   Genitourinary: Negative for dysuria.   Neurological: Positive for headaches. Negative for dizziness.       Physical Exam:  Physical Exam   Constitutional: No distress.   HENT:   Mouth/Throat: No oropharyngeal exudate.   Left scalp incision healed some swelling near craniotomy site  No gum swelling  Jaw opens and closes with no clicks or clunks   Eyes: Pupils are equal, round, and reactive to light. Conjunctivae are normal. No scleral icterus.   Neck: Neck supple. No JVD present. No tracheal deviation present.   Cardiovascular: Normal rate and regular rhythm.    No murmur heard.  Pulmonary/Chest: Effort normal and breath sounds normal. No respiratory distress.   Abdominal: Soft. Bowel sounds are normal. He exhibits no distension.   Musculoskeletal: He exhibits no edema.   Neurological: He is alert. Coordination abnormal.   Moves left arm and leg, follows commands   Skin: Skin is warm. No rash noted. He is not diaphoretic.   Psychiatric: Cognition and memory are impaired. He expresses impulsivity.   Vitals reviewed.      Labs:          No results for input(s):  SODIUM, POTASSIUM, CHLORIDE, CO2, BUN, CREATININE, MAGNESIUM, PHOSPHORUS, CALCIUM in the last 72 hours.  No results for input(s): ALTSGPT, ASTSGOT, ALKPHOSPHAT, TBILIRUBIN, DBILIRUBIN, GAMMAGT, AMYLASE, LIPASE, ALB, PREALBUMIN, GLUCOSE in the last 72 hours.  No results for input(s): RBC, HEMOGLOBIN, HEMATOCRIT, PLATELETCT, PROTHROMBTM, APTT, INR, IRON, FERRITIN, TOTIRONBC in the last 72 hours.            Hemodynamics:  T98.1 /65 HR71 RR16 O2 sat 95% with trach capped    Medical Decision Making, by Problem:  Subdural hematoma due to traumatic brain injury   Drained with craniotomy    Bone flap placement possibly next week per neurosurgery depending upon swelling   PT/OT/ speech therapy    keppra for seizure prevention    Jaw pain, less today, likely due to restarted chewing, no findings on CT scan    Acute hypoxic respiratory failure after trauma   Trach capped, decanulate after bone flap placed    oropharyngeal dysphagia, dysphagia diet    Right pulmonary embolus, ivc filter placed   Low dose anticoagulation only per neurosurgery         Quality-Core Measures   Reviewed items::  Labs reviewed and Medications reviewed  Greer catheter::  Urinary Tract Retention or Urinary Tract Obstruction  DVT prophylaxis pharmacological::  Enoxaparin (Lovenox)  Assessed for rehabilitation services:  Patient was assess for and/or received rehabilitation services during this hospitalization

## 2019-03-09 NOTE — [facility]
Critical Care Progress Note    Date of admission  2/21/2019    Chief Complaint  28 y.o. male admitted 2/21/2019 with head trauma.     Hospital Course  Evacuation of subdural 1/26  Trached  Awaiting for bone flap   Has been capped since 2/20       Interval Problem Update  Reviewed last 24 hour events:  Trach is capped and good oral suctioning, no oxygen requirement      Review of Systems  ROS  pt denies pain  Doing well     112/65 71 94% RA capped afebrile  Physical Exam   Constitutional: No distress.   HENT:   Head: Normocephalic and atraumatic.   Neck:   Trach in place    Cardiovascular: Normal rate, regular rhythm, normal heart sounds and intact distal pulses.  Exam reveals no gallop and no friction rub.    No murmur heard.  Pulmonary/Chest: Effort normal and breath sounds normal. No respiratory distress. He has no wheezes. He has no rales. He exhibits no tenderness.   Abdominal: Soft. He exhibits no distension. There is no tenderness.   Musculoskeletal: He exhibits no edema, tenderness or deformity.   Neurological: He is alert.   Moves left side   Skin: Skin is warm. No rash noted. He is not diaphoretic. No erythema. No pallor.   Psychiatric: He has a normal mood and affect. His behavior is normal.   Nursing note and vitals reviewed.       Assessment/Plan  #Acute hypoxic resp failure post trauma  On room air and capped   Awaiting bone flap to decannulate  - currently too much swelling to proceed with flap  Reassessing next week  Oral suction as needed     #Rt PE s/p IVC filter       I have performed a physical exam and reviewed and updated ROS and Plan today (3/9/2019). In review of yesterday's note (3/8/2019), there are no changes except as documented above.

## 2019-03-10 ASSESSMENT — ENCOUNTER SYMPTOMS
VOMITING: 0
NAUSEA: 0
ABDOMINAL PAIN: 0
SINUS PAIN: 0
SORE THROAT: 0
CONSTIPATION: 0
FEVER: 0
SHORTNESS OF BREATH: 0
PALPITATIONS: 0
DIAPHORESIS: 0
HEADACHES: 0
DIZZINESS: 0
COUGH: 0

## 2019-03-10 NOTE — [facility]
Hospital Medicine Progress Note, Adult, Complex               Author: Lashay Campbell Date & Time created: 3/10/2019  2:03 PM     CC: subdural hematoma and acute hypoxic respiratory failure after trauma from skiing accident    Interval History:  [place]  He had a craniotomy for subdural hemorrhage on Jan 26    The patient asks for his iv to be removed    Review of Systems:  Review of Systems   Constitutional: Negative for diaphoresis and fever.   HENT: Negative for congestion, hearing loss, sinus pain and sore throat.    Respiratory: Negative for cough and shortness of breath.    Cardiovascular: Negative for chest pain and palpitations.   Gastrointestinal: Negative for abdominal pain, constipation, nausea and vomiting.   Genitourinary: Negative for dysuria.   Musculoskeletal:        Left arm iv uncomfortable   Skin: Negative for rash.   Neurological: Negative for dizziness and headaches.       Physical Exam:  Physical Exam   Constitutional: No distress.   HENT:   Mouth/Throat: Oropharynx is clear and moist. No oropharyngeal exudate.   Eyes: Pupils are equal, round, and reactive to light. No scleral icterus.   Neck: Neck supple. No JVD present. No tracheal deviation present.   Cardiovascular: Normal rate and regular rhythm.    No murmur heard.  Pulmonary/Chest: Effort normal and breath sounds normal. No respiratory distress.   Abdominal: Soft. He exhibits no distension.   Musculoskeletal: He exhibits no edema.   Neurological: He is alert. Coordination abnormal.   Moves left arm and leg, follows commands   Skin: Skin is warm and dry. No rash noted. He is not diaphoretic. No erythema.   Psychiatric: Cognition and memory are impaired. He expresses impulsivity.   Vitals reviewed.      Labs:          No results for input(s): SODIUM, POTASSIUM, CHLORIDE, CO2, BUN, CREATININE, MAGNESIUM, PHOSPHORUS, CALCIUM in the last 72 hours.  No results for input(s): ALTSGPT, ASTSGOT,  ALKPHOSPHAT, TBILIRUBIN, DBILIRUBIN, GAMMAGT, AMYLASE, LIPASE, ALB, PREALBUMIN, GLUCOSE in the last 72 hours.  No results for input(s): RBC, HEMOGLOBIN, HEMATOCRIT, PLATELETCT, PROTHROMBTM, APTT, INR, IRON, FERRITIN, TOTIRONBC in the last 72 hours.            Hemodynamics:  T97.4 /79 HR75 RR20 O2 sat 95% with trach capped    Medical Decision Making, by Problem:  Subdural hematoma due to traumatic brain injury   Drained with craniotomy    Bone flap placement possibly later this week week per neurosurgery, depending upon swelling   PT/OT/ speech therapy    keppra     Jaw pain, resolved, muscular in nature    Acute hypoxic respiratory failure after trauma   Trach capped, decanulate after bone flap placed    oropharyngeal dysphagia, dysphagia diet    Right pulmonary embolus, ivc filter placed   Low dose anticoagulation only per neurosurgery     Ok to leave iv out until surgery    Quality-Core Measures   Reviewed items::  Labs reviewed and Medications reviewed  Greer catheter::  Urinary Tract Retention or Urinary Tract Obstruction  DVT prophylaxis pharmacological::  Enoxaparin (Lovenox)  Assessed for rehabilitation services:  Patient was assess for and/or received rehabilitation services during this hospitalization

## 2019-03-10 NOTE — [facility]
Critical Care Progress Note    Date of admission  2/21/2019    Chief Complaint  28 y.o. male admitted 2/21/2019 with head trauma.     Hospital Course  Evacuation of subdural 1/26  Trached  Awaiting for bone flap   Has been capped since 2/20       Interval Problem Update  Reviewed last 24 hour events:  Trach is capped and good oral suctioning, no oxygen requirement      Review of Systems  ROS  pt denies pain  Says he did not sleep well and feels tired     97.4 116/79  75 95% RA capped afebrile  Physical Exam   Constitutional: No distress.   HENT:   Head: Normocephalic and atraumatic.   Neck:   Trach in place    Cardiovascular: Normal rate, regular rhythm, normal heart sounds and intact distal pulses.  Exam reveals no gallop and no friction rub.    No murmur heard.  Pulmonary/Chest: Effort normal and breath sounds normal. No respiratory distress. He has no wheezes. He has no rales. He exhibits no tenderness.   Abdominal: Soft. He exhibits no distension. There is no tenderness.   Musculoskeletal: He exhibits no edema, tenderness or deformity.   Neurological: He is alert.   Skin: Skin is warm. No rash noted. He is not diaphoretic. No erythema. No pallor.   Psychiatric: He has a normal mood and affect. His behavior is normal.   Nursing note and vitals reviewed.       Assessment/Plan  #Acute hypoxic resp failure post trauma  On room air and capped   Awaiting bone flap to decannulate  - currently too much swelling to proceed with flap  Reassessing next week  Oral suction as needed     #Rt PE s/p IVC filter      I have performed a physical exam and reviewed and updated ROS and Plan today (3/10/2019). In review of yesterday's note (3/9/2019), there are no changes except as documented above.

## 2019-03-11 ASSESSMENT — ENCOUNTER SYMPTOMS
CHILLS: 0
DIZZINESS: 0
FEVER: 0
SPUTUM PRODUCTION: 0
HEADACHES: 0
ABDOMINAL PAIN: 0
COUGH: 0
NAUSEA: 0
DIAPHORESIS: 0
SHORTNESS OF BREATH: 0
CONSTIPATION: 0

## 2019-03-11 NOTE — PROGRESS NOTES
Neurosurgery  Opens eyes and follows commands x 4   Left greater than right   RUE weakness    Crani site examined today  -Site depressed anteriorly, still with swelling posteriorly and superior to ear, soft    Site not ready for bone flap replacement  -He continues with swelling at craniectomy site    Plan:  1. Continue to hold on surgery for now  2. Will try for surgery later this week or next week

## 2019-03-11 NOTE — [facility]
Hospital Medicine Progress Note, Adult, Complex               Author: Lashay Campbell Date & Time created: 3/11/2019  11:13 AM     CC: subdural hematoma and acute hypoxic respiratory failure after trauma from skiing accident    Interval History:  [place]  He had a craniotomy for subdural hemorrhage on Jan 26    The patient has transient jaw pain after chewing food but none today    Review of Systems:  Review of Systems   Constitutional: Negative for chills, diaphoresis and fever.   HENT: Negative for congestion and hearing loss.    Respiratory: Negative for cough, sputum production and shortness of breath.    Cardiovascular: Negative for chest pain.   Gastrointestinal: Negative for abdominal pain, constipation and nausea.   Genitourinary: Negative for dysuria and urgency.   Skin: Negative for itching and rash.   Neurological: Negative for dizziness and headaches.       Physical Exam:  Physical Exam   Constitutional: No distress.   HENT:   Mouth/Throat: Oropharynx is clear and moist. No oropharyngeal exudate.   Eyes: Conjunctivae and EOM are normal. No scleral icterus.   Neck: Neck supple. No JVD present. No tracheal deviation present.   Cardiovascular: Normal rate and regular rhythm.    No murmur heard.  Pulmonary/Chest: Effort normal and breath sounds normal. No respiratory distress.   Abdominal: Soft. Bowel sounds are normal. He exhibits no distension.   Musculoskeletal: He exhibits no edema.   Neurological: He is alert. Coordination abnormal.   Moves left arm and leg, follows commands   Skin: Skin is warm. No rash noted. He is not diaphoretic. No erythema.   Psychiatric: Cognition and memory are impaired. He expresses impulsivity.   Vitals reviewed.      Labs:          No results for input(s): SODIUM, POTASSIUM, CHLORIDE, CO2, BUN, CREATININE, MAGNESIUM, PHOSPHORUS, CALCIUM in the last 72 hours.  No results for input(s): ALTSGPT, ASTSGOT, ALKPHOSPHAT, TBILIRUBIN,  DBILIRUBIN, GAMMAGT, AMYLASE, LIPASE, ALB, PREALBUMIN, GLUCOSE in the last 72 hours.  No results for input(s): RBC, HEMOGLOBIN, HEMATOCRIT, PLATELETCT, PROTHROMBTM, APTT, INR, IRON, FERRITIN, TOTIRONBC in the last 72 hours.            Hemodynamics:  T97.4 /67 HR72 RR18 O2 sat 94% with trach capped    Medical Decision Making, by Problem:  Subdural hematoma due to traumatic brain injury   Drained with craniotomy    Bone flap placement possibly later this week or next due to swelling clinically   Repeat head CT with no new pathology   PT/OT/ speech therapy    keppra     Jaw pain, resolved, muscular     Acute hypoxic respiratory failure after trauma   Trach capped, decanulate after bone flap placed    oropharyngeal dysphagia, dysphagia diet    Right pulmonary embolus, ivc filter placed   Low dose anticoagulation only per neurosurgery         Quality-Core Measures   Reviewed items::  Labs reviewed and Medications reviewed  Greer catheter::  Urinary Tract Retention or Urinary Tract Obstruction  DVT prophylaxis pharmacological::  Enoxaparin (Lovenox)  Assessed for rehabilitation services:  Patient was assess for and/or received rehabilitation services during this hospitalization

## 2019-03-11 NOTE — [facility]
Critical Care Progress Note    Date of admission  2/21/2019    Chief Complaint  28 y.o. male admitted 2/21/2019 with ***    Hospital Course    ***      Interval Problem Update  Reviewed last 24 hour events:  ***    Review of Systems  ROS     Vital Signs for last 24 hours        Hemodynamic parameters for last 24 hours       Respiratory Information for the last 24 hours       Physical Exam   Physical Exam    Medications  No current facility-administered medications for this encounter.        Fluids  No intake or output data in the 24 hours ending 03/11/19 1145    Laboratory          No results for input(s): SODIUM, POTASSIUM, CHLORIDE, CO2, BUN, CREATININE, MAGNESIUM, PHOSPHORUS, CALCIUM in the last 72 hours.  No results for input(s): ALTSGPT, ASTSGOT, ALKPHOSPHAT, TBILIRUBIN, DBILIRUBIN, GAMMAGT, AMYLASE, LIPASE, ALB, PREALBUMIN, GLUCOSE in the last 72 hours.      No results for input(s): RBC, HEMOGLOBIN, HEMATOCRIT, PLATELETCT, PROTHROMBTM, APTT, INR, IRON, FERRITIN, TOTIRONBC in the last 72 hours.    Imaging  {IP IMAGING OPTIONS:3622314}    Assessment/Plan  No new Assessment & Plan notes have been filed under this hospital service since the last note was generated.  Service: Pulmonary       VTE:  {VTE SMARTLIST:109901882}  Ulcer: {ULCER SMARTLIST:313862001}  Lines: {LINE SMARLIST:822521844}    I have performed a physical exam and reviewed and updated ROS and Plan today (3/11/2019). In review of yesterday's note (3/10/2019), there are no changes except as documented above.     Discussed patient condition and risk of morbidity and/or mortality with {Discussed with...:266775}  The patient remains critically ill.  Critical care time = *** minutes in directly providing and coordinating critical care and extensive data review.  No time overlap and excludes procedures.

## 2019-03-12 ASSESSMENT — ENCOUNTER SYMPTOMS
CONSTIPATION: 0
SORE THROAT: 0
FEVER: 0
HEADACHES: 1
INSOMNIA: 0
VOMITING: 0
BACK PAIN: 0
NAUSEA: 0
DIARRHEA: 0
COUGH: 0
ABDOMINAL PAIN: 0

## 2019-03-12 NOTE — [facility]
Hospital Medicine Progress Note, Adult, Complex               Author: Rayne WOLFE Silva Date & Time created: 3/12/2019  8:34 AM     CC: traumatic ICH    Interval History:  Eating breakfast  No concerns  Feels he is getting stronger    Review of Systems:  Review of Systems   Constitutional: Negative for fever.   HENT: Negative for sore throat.    Respiratory: Negative for cough.    Cardiovascular: Negative for chest pain.   Gastrointestinal: Negative for abdominal pain, constipation, diarrhea, nausea and vomiting.   Musculoskeletal: Negative for back pain.   Neurological: Positive for headaches (ocassional).   Psychiatric/Behavioral: The patient does not have insomnia.        T98 P75BP 115/71RR 17SaO2 95% I/O1.5/1.4 BM 3/11   Physical Exam   Constitutional: He appears well-developed. No distress.   HENT:   Head: Normocephalic.   Craniectomy defect   Eyes: Conjunctivae are normal. Right eye exhibits no discharge. Left eye exhibits no discharge. No scleral icterus.   Neck: No tracheal deviation present.   Capped trach   Cardiovascular: Normal rate, regular rhythm and intact distal pulses.    Pulmonary/Chest: Effort normal. No respiratory distress. He has no wheezes. He exhibits no tenderness.   Abdominal: Soft. Bowel sounds are normal. He exhibits no distension. There is no tenderness.   Musculoskeletal: He exhibits no edema.   RUE paresis   Neurological: He is alert.   Skin: Skin is warm.       Labs:          No results for input(s): SODIUM, POTASSIUM, CHLORIDE, CO2, BUN, CREATININE, MAGNESIUM, PHOSPHORUS, CALCIUM in the last 72 hours.  No results for input(s): ALTSGPT, ASTSGOT, ALKPHOSPHAT, TBILIRUBIN, DBILIRUBIN, GAMMAGT, AMYLASE, LIPASE, ALB, PREALBUMIN, GLUCOSE in the last 72 hours.  No results for input(s): RBC, HEMOGLOBIN, HEMATOCRIT, PLATELETCT, PROTHROMBTM, APTT, INR, IRON, FERRITIN, TOTIRONBC in the last 72 hours.           GI/Nutrition:  Orders Placed This Encounter   Procedures   • Diet Order Regular      Standing Status:   Standing     Number of Occurrences:   1     Order Specific Question:   Diet:     Answer:   Regular [1]     Order Specific Question:   Texture/Fiber modifications:     Answer:   Dysphagia 2(Pureed/Chopped)specify fluid consistency(question 6) [2]     Order Specific Question:   Consistency/Fluid modifications:     Answer:   Thin Liquids [3]     Comments:   supervision       Medical Decision Making, by Problem:  Traumatic SDH s/p evacuation 1/26 (Yuko)   -swelling precluding bone flap, deferring to NSG   [facility] family [facility] (said referral process and insurance auth in progress from before transfer to [facility])  Seizure secondary to above   -keppra  S/p post op hypoxic VDRF   -capped, no decanulation until after flap placed  Chronic leukocytosis-resolved  Serratia tracheitis-treated  R PE s/p IVC filter 2/5  Resolved PTX  Debility   -PT/OT       Quality-Core Measures   Reviewed items::  Medications reviewed  DVT prophylaxis pharmacological::  Enoxaparin (Lovenox)

## 2019-03-12 NOTE — ADDENDUM NOTE
Encounter addended by: Valorie Denis on: 3/12/2019  1:42 PM<BR>    Actions taken: Charge Capture section accepted

## 2019-03-13 ASSESSMENT — ENCOUNTER SYMPTOMS
INSOMNIA: 0
SORE THROAT: 0
BACK PAIN: 0
NAUSEA: 0
COUGH: 0
ABDOMINAL PAIN: 0
FEVER: 0
VOMITING: 0

## 2019-03-13 NOTE — [facility]
Hospital Medicine Progress Note, Adult, Complex               Author: Rayne WOLFE [place] Date & Time created: 3/13/2019  7:40 AM     CC: traumatic ICH    Interval History:  Slept well  Therapy went well yesterday, multiple sit->stand    Review of Systems:  Review of Systems   Constitutional: Negative for fever.   HENT: Negative for sore throat.    Respiratory: Negative for cough.    Cardiovascular: Negative for chest pain.   Gastrointestinal: Negative for abdominal pain, nausea and vomiting.   Musculoskeletal: Negative for back pain.   Psychiatric/Behavioral: The patient does not have insomnia.        T98.1 P74BP 107/70RR 17SaO2 96% I/O2/1.5 BM 3/11   Physical Exam   Constitutional: He appears well-developed. No distress.   HENT:   Head: Normocephalic.   Craniectomy defect   Eyes: Conjunctivae are normal. Right eye exhibits no discharge. Left eye exhibits no discharge. No scleral icterus.   Neck: No tracheal deviation present.   Capped trach   Cardiovascular: Normal rate, regular rhythm and intact distal pulses.    Pulmonary/Chest: Effort normal. No respiratory distress. He has no wheezes. He exhibits no tenderness.   Abdominal: Soft. Bowel sounds are normal. He exhibits no distension. There is no tenderness.   Musculoskeletal: He exhibits no edema.   RUE paresis   Neurological: He is alert.   Skin: Skin is warm.       Labs:          No results for input(s): SODIUM, POTASSIUM, CHLORIDE, CO2, BUN, CREATININE, MAGNESIUM, PHOSPHORUS, CALCIUM in the last 72 hours.  No results for input(s): ALTSGPT, ASTSGOT, ALKPHOSPHAT, TBILIRUBIN, DBILIRUBIN, GAMMAGT, AMYLASE, LIPASE, ALB, PREALBUMIN, GLUCOSE in the last 72 hours.  No results for input(s): RBC, HEMOGLOBIN, HEMATOCRIT, PLATELETCT, PROTHROMBTM, APTT, INR, IRON, FERRITIN, TOTIRONBC in the last 72 hours.           GI/Nutrition:  Orders Placed This Encounter   Procedures   • Diet Order Regular     Standing Status:   Standing     Number of Occurrences:   1     Order Specific  Question:   Diet:     Answer:   Regular [1]     Order Specific Question:   Consistency/Fluid modifications:     Answer:   Thin Liquids [3]     Comments:   supervision       Medical Decision Making, by Problem:  Traumatic SDH s/p evacuation 1/26 (Yuko)   -swelling precluding bone flap, deferring to NSG   -bone flap when able at [facility]   -neuro rehab at [facility] per family request (said referral process and insurance auth in progress from before transfer to [facility])  Seizure secondary to above   -keppra  S/p post op hypoxic VDRF   -capped, no decanulation until after flap placed  Serratia tracheitis-treated  R PE s/p IVC filter 2/5  Resolved PTX  Debility   -PT/OT   Am labs    Quality-Core Measures   Reviewed items::  Medications reviewed  DVT prophylaxis pharmacological::  Enoxaparin (Lovenox)

## 2019-03-14 LAB
ALBUMIN SERPL BCP-MCNC: 3.5 G/DL (ref 3.2–4.9)
ALBUMIN/GLOB SERPL: 1.3 G/DL
ALP SERPL-CCNC: 61 U/L (ref 30–99)
ALT SERPL-CCNC: 21 U/L (ref 2–50)
ANION GAP SERPL CALC-SCNC: 10 MMOL/L (ref 0–11.9)
AST SERPL-CCNC: 17 U/L (ref 12–45)
BILIRUB SERPL-MCNC: 0.9 MG/DL (ref 0.1–1.5)
BUN SERPL-MCNC: 15 MG/DL (ref 8–22)
CALCIUM SERPL-MCNC: 9.3 MG/DL (ref 8.4–10.2)
CHLORIDE SERPL-SCNC: 105 MMOL/L (ref 96–112)
CO2 SERPL-SCNC: 26 MMOL/L (ref 20–33)
CREAT SERPL-MCNC: 0.76 MG/DL (ref 0.5–1.4)
ERYTHROCYTE [DISTWIDTH] IN BLOOD BY AUTOMATED COUNT: 44.6 FL (ref 35.9–50)
GLOBULIN SER CALC-MCNC: 2.8 G/DL (ref 1.9–3.5)
GLUCOSE SERPL-MCNC: 96 MG/DL (ref 65–99)
HCT VFR BLD AUTO: 40.2 % (ref 42–52)
HGB BLD-MCNC: 12.9 G/DL (ref 14–18)
MCH RBC QN AUTO: 30.2 PG (ref 27–33)
MCHC RBC AUTO-ENTMCNC: 32.1 G/DL (ref 33.7–35.3)
MCV RBC AUTO: 94.1 FL (ref 81.4–97.8)
PLATELET # BLD AUTO: 340 K/UL (ref 164–446)
PMV BLD AUTO: 9.8 FL (ref 9–12.9)
POTASSIUM SERPL-SCNC: 3.6 MMOL/L (ref 3.6–5.5)
PROT SERPL-MCNC: 6.3 G/DL (ref 6–8.2)
RBC # BLD AUTO: 4.27 M/UL (ref 4.7–6.1)
SODIUM SERPL-SCNC: 141 MMOL/L (ref 135–145)
WBC # BLD AUTO: 9.3 K/UL (ref 4.8–10.8)

## 2019-03-14 PROCEDURE — 80053 COMPREHEN METABOLIC PANEL: CPT

## 2019-03-14 PROCEDURE — 85027 COMPLETE CBC AUTOMATED: CPT

## 2019-03-14 NOTE — [facility]
Hospital Medicine Progress Note, Adult, Complex               Author: Rayne Johnson Date & Time created: 3/14/2019  8:39 AM     CC: traumatic ICH    Interval History:  No events  Labs reviewed  Afebrile  Unable to ambulate due to R leg    Review of Systems:  Review of Systems   Unable to perform ROS: Other       T98.7 P71BP 108/68RR 17SaO2 98% I/O1.7/1 BM 3/13   Physical Exam   Constitutional: He appears well-developed. No distress.   HENT:   Head: Normocephalic.   Craniectomy defect   Eyes: Right eye exhibits no discharge. Left eye exhibits no discharge.   Neck: No tracheal deviation present.   Capped trach   Cardiovascular: Normal rate, regular rhythm and intact distal pulses.    Pulmonary/Chest: Effort normal. No respiratory distress. He has no wheezes. He exhibits no tenderness.   Abdominal: Soft. Bowel sounds are normal. He exhibits no distension. There is no tenderness.   Musculoskeletal: He exhibits no edema.   RUE paresis   Skin: Skin is warm.       Labs:          Recent Labs      03/14/19   0330   SODIUM  141   POTASSIUM  3.6   CHLORIDE  105   CO2  26   BUN  15   CREATININE  0.76   CALCIUM  9.3     Recent Labs      03/14/19   0330   ALTSGPT  21   ASTSGOT  17   ALKPHOSPHAT  61   TBILIRUBIN  0.9   GLUCOSE  96     Recent Labs      03/14/19   0330   RBC  4.27*   HEMOGLOBIN  12.9*   HEMATOCRIT  40.2*   PLATELETCT  340     Recent Labs      03/14/19   0330   WBC  9.3   ASTSGOT  17   ALTSGPT  21   ALKPHOSPHAT  61   TBILIRUBIN  0.9          GI/Nutrition:  Orders Placed This Encounter   Procedures   • Diet Order Regular     Standing Status:   Standing     Number of Occurrences:   1     Order Specific Question:   Diet:     Answer:   Regular [1]     Order Specific Question:   Consistency/Fluid modifications:     Answer:   Thin Liquids [3]     Comments:   supervision       Medical Decision Making, by Problem:  Traumatic SDH s/p evacuation 1/26 (Yuko)   -swelling precluding bone flap, deferring to NSG   -bone flap  when able at [facility]   -amantadine   -neuro rehab at [facility] per family request (said referral process and insurance auth in progress from before transfer to [facility])   -deficits with aphasia, R sided paresis  Seizure secondary to above   -keppra  S/p post op hypoxic VDRF   -capped, no decanulation until after flap placed  Serratia tracheitis-treated  R PE s/p IVC filter 2/5  Resolved PTX  Debility   -PT/OT       Quality-Core Measures   Reviewed items::  Medications reviewed and Labs reviewed  DVT prophylaxis pharmacological::  Enoxaparin (Lovenox)

## 2019-03-15 PROCEDURE — 302244 HCHG LTACH STAT

## 2019-03-15 ASSESSMENT — ENCOUNTER SYMPTOMS
SHORTNESS OF BREATH: 0
NAUSEA: 0
DEPRESSION: 0
ABDOMINAL PAIN: 0
FEVER: 0
COUGH: 0
SORE THROAT: 0
VOMITING: 0
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
## 2019-03-16 ASSESSMENT — ENCOUNTER SYMPTOMS
SHORTNESS OF BREATH: 0
NAUSEA: 0
ABDOMINAL PAIN: 0
SORE THROAT: 0
COUGH: 0
PALPITATIONS: 0
HEADACHES: 0
VOMITING: 0
FEVER: 0
CONSTIPATION: 0
DEPRESSION: 0
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
## 2019-03-17 ASSESSMENT — ENCOUNTER SYMPTOMS
CONSTIPATION: 0
FEVER: 0
SHORTNESS OF BREATH: 0
SORE THROAT: 0
HEADACHES: 0
NAUSEA: 0
VOMITING: 0
ABDOMINAL PAIN: 0
CHILLS: 0
COUGH: 0
DEPRESSION: 0
PALPITATIONS: 0
DIARRHEA: 0
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
## 2019-03-18 ENCOUNTER — APPOINTMENT (OUTPATIENT)
Dept: RADIOLOGY | Facility: MEDICAL CENTER | Age: 29
End: 2019-03-18
Attending: HOSPITALIST
Payer: COMMERCIAL

## 2019-03-18 ENCOUNTER — APPOINTMENT (OUTPATIENT)
Dept: RADIOLOGY | Facility: MEDICAL CENTER | Age: 29
End: 2019-03-18
Attending: PHYSICIAN ASSISTANT
Payer: COMMERCIAL

## 2019-03-18 PROCEDURE — 70450 CT HEAD/BRAIN W/O DYE: CPT

## 2019-03-18 ASSESSMENT — ENCOUNTER SYMPTOMS
HEADACHES: 0
FEVER: 0
COUGH: 0
VOMITING: 0
CONSTIPATION: 0
SORE THROAT: 0
NAUSEA: 0
DIARRHEA: 0
DEPRESSION: 0
SHORTNESS OF BREATH: 0
ABDOMINAL PAIN: 0

## 2019-03-18 NOTE — PROGRESS NOTES
Neurosurgery  -Case discussed with Dr. Huntley.  Opens eyes and follows commands x 4   Left greater than right   RUE weakness    Crani site examined today  Swelling improved  -Site depressed anteriorly, Mild swelling remains      Plan:  1. Hold lovenox today  2. Repeat Head CT today-If swelling improved we will plan for surgery Wednesday
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
## 2019-03-19 ENCOUNTER — HOSPITAL ENCOUNTER (OUTPATIENT)
Facility: MEDICAL CENTER | Age: 29
End: 2019-03-19
Attending: NEUROLOGICAL SURGERY | Admitting: NEUROLOGICAL SURGERY
Payer: COMMERCIAL

## 2019-03-19 LAB
INR PPP: 1.05 (ref 0.87–1.13)
PROTHROMBIN TIME: 13.6 SEC (ref 12–14.6)

## 2019-03-19 PROCEDURE — 85610 PROTHROMBIN TIME: CPT

## 2019-03-19 ASSESSMENT — ENCOUNTER SYMPTOMS
COUGH: 0
SORE THROAT: 0
FEVER: 0
VOMITING: 0
CONSTIPATION: 0
PALPITATIONS: 0
DIAPHORESIS: 0
NAUSEA: 0
DIZZINESS: 0
SHORTNESS OF BREATH: 0
HEADACHES: 0
ABDOMINAL PAIN: 0
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
## 2019-03-19 NOTE — PROGRESS NOTES
Direct admit from [facility], Dr. Campbell for cranioplasty 3/20 by Dr. Guerrero.  Discharge and readmit orders signed and held, need to be released upon pt arrival. Please page the direct admit on call hospitalist when patient arrives. Pt coming by ground.

## 2019-03-20 ENCOUNTER — HOSPITAL ENCOUNTER (INPATIENT)
Facility: MEDICAL CENTER | Age: 29
LOS: 7 days | DRG: 515 | End: 2019-03-27
Attending: INTERNAL MEDICINE | Admitting: INTERNAL MEDICINE
Payer: COMMERCIAL

## 2019-03-20 ENCOUNTER — ANESTHESIA (OUTPATIENT)
Dept: SURGERY | Facility: MEDICAL CENTER | Age: 29
DRG: 515 | End: 2019-03-20
Payer: COMMERCIAL

## 2019-03-20 ENCOUNTER — ANESTHESIA EVENT (OUTPATIENT)
Dept: SURGERY | Facility: MEDICAL CENTER | Age: 29
DRG: 515 | End: 2019-03-20
Payer: COMMERCIAL

## 2019-03-20 DIAGNOSIS — S06.5XAA SUBDURAL HEMATOMA (HCC): ICD-10-CM

## 2019-03-20 DIAGNOSIS — T14.90XA TRAUMA: ICD-10-CM

## 2019-03-20 PROBLEM — R56.9 SEIZURES (HCC): Status: ACTIVE | Noted: 2019-03-20

## 2019-03-20 PROCEDURE — 160002 HCHG RECOVERY MINUTES (STAT): Performed by: NEUROLOGICAL SURGERY

## 2019-03-20 PROCEDURE — 160041 HCHG SURGERY MINUTES - EA ADDL 1 MIN LEVEL 4: Performed by: NEUROLOGICAL SURGERY

## 2019-03-20 PROCEDURE — 700111 HCHG RX REV CODE 636 W/ 250 OVERRIDE (IP): Performed by: ANESTHESIOLOGY

## 2019-03-20 PROCEDURE — 700111 HCHG RX REV CODE 636 W/ 250 OVERRIDE (IP)

## 2019-03-20 PROCEDURE — 160036 HCHG PACU - EA ADDL 30 MINS PHASE I: Performed by: NEUROLOGICAL SURGERY

## 2019-03-20 PROCEDURE — 500331 HCHG COTTONOID, SURG PATTIE: Performed by: NEUROLOGICAL SURGERY

## 2019-03-20 PROCEDURE — 160035 HCHG PACU - 1ST 60 MINS PHASE I: Performed by: NEUROLOGICAL SURGERY

## 2019-03-20 PROCEDURE — 500445 HCHG HEMOSTAT, SURGICEL 4X8: Performed by: NEUROLOGICAL SURGERY

## 2019-03-20 PROCEDURE — 700102 HCHG RX REV CODE 250 W/ 637 OVERRIDE(OP): Performed by: PHYSICIAN ASSISTANT

## 2019-03-20 PROCEDURE — 770022 HCHG ROOM/CARE - ICU (200)

## 2019-03-20 PROCEDURE — A9270 NON-COVERED ITEM OR SERVICE: HCPCS | Performed by: PHYSICIAN ASSISTANT

## 2019-03-20 PROCEDURE — 160048 HCHG OR STATISTICAL LEVEL 1-5: Performed by: NEUROLOGICAL SURGERY

## 2019-03-20 PROCEDURE — 160009 HCHG ANES TIME/MIN: Performed by: NEUROLOGICAL SURGERY

## 2019-03-20 PROCEDURE — 0NR40JZ REPLACEMENT OF LEFT PARIETAL BONE WITH SYNTHETIC SUBSTITUTE, OPEN APPROACH: ICD-10-PCS | Performed by: NEUROLOGICAL SURGERY

## 2019-03-20 PROCEDURE — 700101 HCHG RX REV CODE 250: Performed by: PHYSICIAN ASSISTANT

## 2019-03-20 PROCEDURE — 502240 HCHG MISC OR SUPPLY RC 0272: Performed by: NEUROLOGICAL SURGERY

## 2019-03-20 PROCEDURE — 500440 HCHG DRESSING, STERILE ROLL (KERLIX): Performed by: NEUROLOGICAL SURGERY

## 2019-03-20 PROCEDURE — 110454 HCHG SHELL REV 250: Performed by: NEUROLOGICAL SURGERY

## 2019-03-20 PROCEDURE — 160029 HCHG SURGERY MINUTES - 1ST 30 MINS LEVEL 4: Performed by: NEUROLOGICAL SURGERY

## 2019-03-20 PROCEDURE — 501838 HCHG SUTURE GENERAL: Performed by: NEUROLOGICAL SURGERY

## 2019-03-20 PROCEDURE — A6222 GAUZE <=16 IN NO W/SAL W/O B: HCPCS | Performed by: NEUROLOGICAL SURGERY

## 2019-03-20 PROCEDURE — 0NR10JZ REPLACEMENT OF FRONTAL BONE WITH SYNTHETIC SUBSTITUTE, OPEN APPROACH: ICD-10-PCS | Performed by: NEUROLOGICAL SURGERY

## 2019-03-20 PROCEDURE — 700101 HCHG RX REV CODE 250: Performed by: ANESTHESIOLOGY

## 2019-03-20 PROCEDURE — 700105 HCHG RX REV CODE 258: Performed by: ANESTHESIOLOGY

## 2019-03-20 PROCEDURE — 501445 HCHG STAPLER, SKIN DISP: Performed by: NEUROLOGICAL SURGERY

## 2019-03-20 PROCEDURE — C1713 ANCHOR/SCREW BN/BN,TIS/BN: HCPCS | Performed by: NEUROLOGICAL SURGERY

## 2019-03-20 PROCEDURE — 00U20KZ SUPPLEMENT DURA MATER WITH NONAUTOLOGOUS TISSUE SUBSTITUTE, OPEN APPROACH: ICD-10-PCS | Performed by: NEUROLOGICAL SURGERY

## 2019-03-20 PROCEDURE — 500889 HCHG PACK, NEURO: Performed by: NEUROLOGICAL SURGERY

## 2019-03-20 PROCEDURE — 500367 HCHG DRAIN KIT, HEMOVAC: Performed by: NEUROLOGICAL SURGERY

## 2019-03-20 PROCEDURE — 0NR60JZ REPLACEMENT OF LEFT TEMPORAL BONE WITH SYNTHETIC SUBSTITUTE, OPEN APPROACH: ICD-10-PCS | Performed by: NEUROLOGICAL SURGERY

## 2019-03-20 PROCEDURE — 500075 HCHG BLADE, CLIPPER NEURO: Performed by: NEUROLOGICAL SURGERY

## 2019-03-20 DEVICE — PLATE NC BURR HOLE COVER FOR SHUNT 14MM (6NCX6=36): Type: IMPLANTABLE DEVICE | Status: FUNCTIONAL

## 2019-03-20 DEVICE — SCREW STRYK NC 1.5X5MM (6NCX40=240) (80EA/PK) CONSIGNED QTY 240 PRE-LOAD: Type: IMPLANTABLE DEVICE | Status: FUNCTIONAL

## 2019-03-20 DEVICE — PLATE NC DBL 'Y' 6-HOLE W/ BAR (6NCX4=24): Type: IMPLANTABLE DEVICE | Status: FUNCTIONAL

## 2019-03-20 DEVICE — GRAFT DURAMATRIX ONLAY PLUS 3IN X 3IN OR 7.5CM X 7.5CM: Type: IMPLANTABLE DEVICE | Status: FUNCTIONAL

## 2019-03-20 RX ORDER — LEVETIRACETAM 100 MG/ML
500 SOLUTION ORAL 2 TIMES DAILY
Status: DISCONTINUED | OUTPATIENT
Start: 2019-03-21 | End: 2019-03-27 | Stop reason: HOSPADM

## 2019-03-20 RX ORDER — DEXAMETHASONE SODIUM PHOSPHATE 4 MG/ML
4 INJECTION, SOLUTION INTRA-ARTICULAR; INTRALESIONAL; INTRAMUSCULAR; INTRAVENOUS; SOFT TISSUE
Status: DISCONTINUED | OUTPATIENT
Start: 2019-03-20 | End: 2019-03-27 | Stop reason: HOSPADM

## 2019-03-20 RX ORDER — SODIUM CHLORIDE 9 MG/ML
INJECTION, SOLUTION INTRAVENOUS
Status: DISCONTINUED | OUTPATIENT
Start: 2019-03-20 | End: 2019-03-20 | Stop reason: SURG

## 2019-03-20 RX ORDER — FAMOTIDINE 20 MG/1
20 TABLET, FILM COATED ORAL 2 TIMES DAILY
Status: DISCONTINUED | OUTPATIENT
Start: 2019-03-20 | End: 2019-03-27 | Stop reason: HOSPADM

## 2019-03-20 RX ORDER — BUPIVACAINE HYDROCHLORIDE AND EPINEPHRINE 5; 5 MG/ML; UG/ML
INJECTION, SOLUTION EPIDURAL; INTRACAUDAL; PERINEURAL
Status: DISCONTINUED | OUTPATIENT
Start: 2019-03-20 | End: 2019-03-20 | Stop reason: HOSPADM

## 2019-03-20 RX ORDER — SCOLOPAMINE TRANSDERMAL SYSTEM 1 MG/1
1 PATCH, EXTENDED RELEASE TRANSDERMAL
Status: DISCONTINUED | OUTPATIENT
Start: 2019-03-20 | End: 2019-03-27 | Stop reason: HOSPADM

## 2019-03-20 RX ORDER — DOCUSATE SODIUM 50 MG/5ML
100 LIQUID ORAL 2 TIMES DAILY
Status: DISCONTINUED | OUTPATIENT
Start: 2019-03-21 | End: 2019-03-27 | Stop reason: HOSPADM

## 2019-03-20 RX ORDER — BISACODYL 10 MG
10 SUPPOSITORY, RECTAL RECTAL
Status: DISCONTINUED | OUTPATIENT
Start: 2019-03-20 | End: 2019-03-27 | Stop reason: HOSPADM

## 2019-03-20 RX ORDER — PROPRANOLOL HYDROCHLORIDE 10 MG/1
10 TABLET ORAL TWICE DAILY
Status: DISCONTINUED | OUTPATIENT
Start: 2019-03-20 | End: 2019-03-20

## 2019-03-20 RX ORDER — AMOXICILLIN 250 MG
1 CAPSULE ORAL
Status: DISCONTINUED | OUTPATIENT
Start: 2019-03-20 | End: 2019-03-27 | Stop reason: HOSPADM

## 2019-03-20 RX ORDER — CEFAZOLIN SODIUM 1 G/3ML
INJECTION, POWDER, FOR SOLUTION INTRAMUSCULAR; INTRAVENOUS PRN
Status: DISCONTINUED | OUTPATIENT
Start: 2019-03-20 | End: 2019-03-20 | Stop reason: SURG

## 2019-03-20 RX ORDER — POLYETHYLENE GLYCOL 3350 17 G/17G
1 POWDER, FOR SOLUTION ORAL 2 TIMES DAILY PRN
Status: DISCONTINUED | OUTPATIENT
Start: 2019-03-20 | End: 2019-03-27 | Stop reason: HOSPADM

## 2019-03-20 RX ORDER — SODIUM CHLORIDE, SODIUM LACTATE, POTASSIUM CHLORIDE, CALCIUM CHLORIDE 600; 310; 30; 20 MG/100ML; MG/100ML; MG/100ML; MG/100ML
INJECTION, SOLUTION INTRAVENOUS CONTINUOUS
Status: DISCONTINUED | OUTPATIENT
Start: 2019-03-20 | End: 2019-03-20 | Stop reason: HOSPADM

## 2019-03-20 RX ORDER — HYDRALAZINE HYDROCHLORIDE 20 MG/ML
10 INJECTION INTRAMUSCULAR; INTRAVENOUS
Status: DISCONTINUED | OUTPATIENT
Start: 2019-03-20 | End: 2019-03-27 | Stop reason: HOSPADM

## 2019-03-20 RX ORDER — OXYCODONE HCL 5 MG/5 ML
5 SOLUTION, ORAL ORAL
Status: DISCONTINUED | OUTPATIENT
Start: 2019-03-20 | End: 2019-03-20 | Stop reason: HOSPADM

## 2019-03-20 RX ORDER — MEPERIDINE HYDROCHLORIDE 25 MG/ML
6.25 INJECTION INTRAMUSCULAR; INTRAVENOUS; SUBCUTANEOUS
Status: DISCONTINUED | OUTPATIENT
Start: 2019-03-20 | End: 2019-03-20 | Stop reason: HOSPADM

## 2019-03-20 RX ORDER — HYDROMORPHONE HYDROCHLORIDE 1 MG/ML
0.2 INJECTION, SOLUTION INTRAMUSCULAR; INTRAVENOUS; SUBCUTANEOUS
Status: DISCONTINUED | OUTPATIENT
Start: 2019-03-20 | End: 2019-03-20 | Stop reason: HOSPADM

## 2019-03-20 RX ORDER — ENEMA 19; 7 G/133ML; G/133ML
1 ENEMA RECTAL
Status: DISCONTINUED | OUTPATIENT
Start: 2019-03-20 | End: 2019-03-27 | Stop reason: HOSPADM

## 2019-03-20 RX ORDER — MIDAZOLAM HYDROCHLORIDE 1 MG/ML
1 INJECTION INTRAMUSCULAR; INTRAVENOUS
Status: DISCONTINUED | OUTPATIENT
Start: 2019-03-20 | End: 2019-03-20 | Stop reason: HOSPADM

## 2019-03-20 RX ORDER — CEFAZOLIN SODIUM 2 G/100ML
2 INJECTION, SOLUTION INTRAVENOUS EVERY 8 HOURS
Status: COMPLETED | OUTPATIENT
Start: 2019-03-21 | End: 2019-03-21

## 2019-03-20 RX ORDER — HYDROMORPHONE HYDROCHLORIDE 1 MG/ML
0.1 INJECTION, SOLUTION INTRAMUSCULAR; INTRAVENOUS; SUBCUTANEOUS
Status: DISCONTINUED | OUTPATIENT
Start: 2019-03-20 | End: 2019-03-20 | Stop reason: HOSPADM

## 2019-03-20 RX ORDER — CEFAZOLIN SODIUM 1 G/3ML
INJECTION, POWDER, FOR SOLUTION INTRAMUSCULAR; INTRAVENOUS
Status: DISCONTINUED | OUTPATIENT
Start: 2019-03-20 | End: 2019-03-20 | Stop reason: HOSPADM

## 2019-03-20 RX ORDER — OXYCODONE HYDROCHLORIDE 5 MG/1
5 TABLET ORAL
Status: DISCONTINUED | OUTPATIENT
Start: 2019-03-20 | End: 2019-03-27 | Stop reason: HOSPADM

## 2019-03-20 RX ORDER — ACETAMINOPHEN 500 MG
1000 TABLET ORAL EVERY 6 HOURS
Status: DISPENSED | OUTPATIENT
Start: 2019-03-21 | End: 2019-03-25

## 2019-03-20 RX ORDER — AMANTADINE HYDROCHLORIDE 100 MG/1
100 CAPSULE, GELATIN COATED ORAL DAILY
Status: DISCONTINUED | OUTPATIENT
Start: 2019-03-20 | End: 2019-03-20

## 2019-03-20 RX ORDER — AMOXICILLIN 250 MG
1 CAPSULE ORAL NIGHTLY
Status: DISCONTINUED | OUTPATIENT
Start: 2019-03-21 | End: 2019-03-20

## 2019-03-20 RX ORDER — PROPRANOLOL HYDROCHLORIDE 10 MG/1
10 TABLET ORAL EVERY 8 HOURS
Status: DISCONTINUED | OUTPATIENT
Start: 2019-03-21 | End: 2019-03-22

## 2019-03-20 RX ORDER — LEVETIRACETAM 100 MG/ML
500 SOLUTION ORAL EVERY 12 HOURS
Status: DISCONTINUED | OUTPATIENT
Start: 2019-03-20 | End: 2019-03-20

## 2019-03-20 RX ORDER — DOCUSATE SODIUM 100 MG/1
100 CAPSULE, LIQUID FILLED ORAL 2 TIMES DAILY
Status: DISCONTINUED | OUTPATIENT
Start: 2019-03-21 | End: 2019-03-20

## 2019-03-20 RX ORDER — LABETALOL HYDROCHLORIDE 5 MG/ML
10 INJECTION, SOLUTION INTRAVENOUS
Status: DISCONTINUED | OUTPATIENT
Start: 2019-03-20 | End: 2019-03-27 | Stop reason: HOSPADM

## 2019-03-20 RX ORDER — DOCUSATE SODIUM 100 MG/1
100 CAPSULE, LIQUID FILLED ORAL 2 TIMES DAILY
Status: DISCONTINUED | OUTPATIENT
Start: 2019-03-20 | End: 2019-03-20

## 2019-03-20 RX ORDER — ONDANSETRON 2 MG/ML
4 INJECTION INTRAMUSCULAR; INTRAVENOUS
Status: DISCONTINUED | OUTPATIENT
Start: 2019-03-20 | End: 2019-03-20 | Stop reason: HOSPADM

## 2019-03-20 RX ORDER — DIPHENHYDRAMINE HYDROCHLORIDE 50 MG/ML
12.5 INJECTION INTRAMUSCULAR; INTRAVENOUS
Status: DISCONTINUED | OUTPATIENT
Start: 2019-03-20 | End: 2019-03-20 | Stop reason: HOSPADM

## 2019-03-20 RX ORDER — MORPHINE SULFATE 10 MG/ML
2 INJECTION, SOLUTION INTRAMUSCULAR; INTRAVENOUS
Status: DISCONTINUED | OUTPATIENT
Start: 2019-03-20 | End: 2019-03-27 | Stop reason: HOSPADM

## 2019-03-20 RX ORDER — AMOXICILLIN 250 MG
2 CAPSULE ORAL 2 TIMES DAILY
Status: DISCONTINUED | OUTPATIENT
Start: 2019-03-21 | End: 2019-03-27 | Stop reason: HOSPADM

## 2019-03-20 RX ORDER — SODIUM CHLORIDE AND POTASSIUM CHLORIDE 150; 900 MG/100ML; MG/100ML
INJECTION, SOLUTION INTRAVENOUS CONTINUOUS
Status: DISCONTINUED | OUTPATIENT
Start: 2019-03-21 | End: 2019-03-22

## 2019-03-20 RX ORDER — MANNITOL 20 G/100ML
INJECTION, SOLUTION INTRAVENOUS PRN
Status: DISCONTINUED | OUTPATIENT
Start: 2019-03-20 | End: 2019-03-20 | Stop reason: SURG

## 2019-03-20 RX ORDER — ONDANSETRON 2 MG/ML
4 INJECTION INTRAMUSCULAR; INTRAVENOUS EVERY 4 HOURS PRN
Status: DISCONTINUED | OUTPATIENT
Start: 2019-03-20 | End: 2019-03-27 | Stop reason: HOSPADM

## 2019-03-20 RX ORDER — HYDROMORPHONE HYDROCHLORIDE 1 MG/ML
0.4 INJECTION, SOLUTION INTRAMUSCULAR; INTRAVENOUS; SUBCUTANEOUS
Status: DISCONTINUED | OUTPATIENT
Start: 2019-03-20 | End: 2019-03-20 | Stop reason: HOSPADM

## 2019-03-20 RX ORDER — MIDAZOLAM HYDROCHLORIDE 1 MG/ML
INJECTION INTRAMUSCULAR; INTRAVENOUS
Status: COMPLETED
Start: 2019-03-20 | End: 2019-03-20

## 2019-03-20 RX ORDER — HALOPERIDOL 5 MG/ML
1 INJECTION INTRAMUSCULAR
Status: DISCONTINUED | OUTPATIENT
Start: 2019-03-20 | End: 2019-03-20 | Stop reason: HOSPADM

## 2019-03-20 RX ORDER — DEXAMETHASONE SODIUM PHOSPHATE 4 MG/ML
INJECTION, SOLUTION INTRA-ARTICULAR; INTRALESIONAL; INTRAMUSCULAR; INTRAVENOUS; SOFT TISSUE PRN
Status: DISCONTINUED | OUTPATIENT
Start: 2019-03-20 | End: 2019-03-20 | Stop reason: SURG

## 2019-03-20 RX ORDER — OXYCODONE HYDROCHLORIDE 5 MG/1
2.5 TABLET ORAL
Status: DISCONTINUED | OUTPATIENT
Start: 2019-03-20 | End: 2019-03-27 | Stop reason: HOSPADM

## 2019-03-20 RX ORDER — OXYCODONE HCL 5 MG/5 ML
10 SOLUTION, ORAL ORAL
Status: DISCONTINUED | OUTPATIENT
Start: 2019-03-20 | End: 2019-03-20 | Stop reason: HOSPADM

## 2019-03-20 RX ORDER — DIPHENHYDRAMINE HYDROCHLORIDE 50 MG/ML
25 INJECTION INTRAMUSCULAR; INTRAVENOUS EVERY 6 HOURS PRN
Status: DISCONTINUED | OUTPATIENT
Start: 2019-03-20 | End: 2019-03-27 | Stop reason: HOSPADM

## 2019-03-20 RX ADMIN — PROPOFOL 200 MG: 10 INJECTION, EMULSION INTRAVENOUS at 20:42

## 2019-03-20 RX ADMIN — PROPRANOLOL HYDROCHLORIDE 10 MG: 10 TABLET ORAL at 23:53

## 2019-03-20 RX ADMIN — FENTANYL CITRATE 50 MCG: 50 INJECTION, SOLUTION INTRAMUSCULAR; INTRAVENOUS at 22:00

## 2019-03-20 RX ADMIN — MIDAZOLAM HYDROCHLORIDE 2 MG: 1 INJECTION, SOLUTION INTRAMUSCULAR; INTRAVENOUS at 20:36

## 2019-03-20 RX ADMIN — FENTANYL CITRATE 25 MCG: 50 INJECTION, SOLUTION INTRAMUSCULAR; INTRAVENOUS at 23:14

## 2019-03-20 RX ADMIN — ROCURONIUM BROMIDE 30 MG: 10 INJECTION, SOLUTION INTRAVENOUS at 20:42

## 2019-03-20 RX ADMIN — FENTANYL CITRATE 100 MCG: 50 INJECTION, SOLUTION INTRAMUSCULAR; INTRAVENOUS at 21:10

## 2019-03-20 RX ADMIN — LIDOCAINE HYDROCHLORIDE 40 MG: 20 INJECTION, SOLUTION INFILTRATION; PERINEURAL at 20:42

## 2019-03-20 RX ADMIN — FENTANYL CITRATE 100 MCG: 50 INJECTION, SOLUTION INTRAMUSCULAR; INTRAVENOUS at 21:50

## 2019-03-20 RX ADMIN — ROCURONIUM BROMIDE 20 MG: 10 INJECTION, SOLUTION INTRAVENOUS at 21:10

## 2019-03-20 RX ADMIN — FENTANYL CITRATE 100 MCG: 50 INJECTION, SOLUTION INTRAMUSCULAR; INTRAVENOUS at 20:42

## 2019-03-20 RX ADMIN — MANNITOL 80 G: 20 INJECTION, SOLUTION INTRAVENOUS at 20:41

## 2019-03-20 RX ADMIN — PROPOFOL 200 MG: 10 INJECTION, EMULSION INTRAVENOUS at 21:46

## 2019-03-20 RX ADMIN — CEFAZOLIN 2 G: 330 INJECTION, POWDER, FOR SOLUTION INTRAMUSCULAR; INTRAVENOUS at 20:54

## 2019-03-20 RX ADMIN — SODIUM CHLORIDE: 9 INJECTION, SOLUTION INTRAVENOUS at 20:35

## 2019-03-20 RX ADMIN — POTASSIUM CHLORIDE AND SODIUM CHLORIDE: 900; 150 INJECTION, SOLUTION INTRAVENOUS at 23:43

## 2019-03-20 RX ADMIN — DEXAMETHASONE SODIUM PHOSPHATE 8 MG: 4 INJECTION, SOLUTION INTRAMUSCULAR; INTRAVENOUS at 20:50

## 2019-03-20 RX ADMIN — SUGAMMADEX 200 MG: 100 INJECTION, SOLUTION INTRAVENOUS at 21:46

## 2019-03-20 ASSESSMENT — ENCOUNTER SYMPTOMS
SPUTUM PRODUCTION: 0
COUGH: 0
FEVER: 0
HEADACHES: 0
DIARRHEA: 0
ABDOMINAL PAIN: 0
CHILLS: 0
DIAPHORESIS: 0
SHORTNESS OF BREATH: 0
WEAKNESS: 0
VOMITING: 0
CONSTIPATION: 0
NAUSEA: 0
PALPITATIONS: 0
WHEEZING: 0
DIZZINESS: 0
MYALGIAS: 0

## 2019-03-20 ASSESSMENT — COGNITIVE AND FUNCTIONAL STATUS - GENERAL
TOILETING: A LOT
SUGGESTED CMS G CODE MODIFIER MOBILITY: CL
MOVING TO AND FROM BED TO CHAIR: A LOT
DRESSING REGULAR UPPER BODY CLOTHING: A LOT
DRESSING REGULAR LOWER BODY CLOTHING: TOTAL
SUGGESTED CMS G CODE MODIFIER DAILY ACTIVITY: CK
MOBILITY SCORE: 11
WALKING IN HOSPITAL ROOM: A LOT
HELP NEEDED FOR BATHING: TOTAL
DAILY ACTIVITIY SCORE: 14
MOVING FROM LYING ON BACK TO SITTING ON SIDE OF FLAT BED: A LOT
CLIMB 3 TO 5 STEPS WITH RAILING: TOTAL
STANDING UP FROM CHAIR USING ARMS: A LOT
TURNING FROM BACK TO SIDE WHILE IN FLAT BAD: A LOT

## 2019-03-20 ASSESSMENT — PATIENT HEALTH QUESTIONNAIRE - PHQ9
2. FEELING DOWN, DEPRESSED, IRRITABLE, OR HOPELESS: NOT AT ALL
1. LITTLE INTEREST OR PLEASURE IN DOING THINGS: NOT AT ALL
SUM OF ALL RESPONSES TO PHQ9 QUESTIONS 1 AND 2: 0

## 2019-03-20 ASSESSMENT — LIFESTYLE VARIABLES
EVER_SMOKED: NEVER
ALCOHOL_USE: NO

## 2019-03-20 NOTE — ASSESSMENT & PLAN NOTE
No anticoagulation due to recent head bleed, patient has been cleared for low dose lovenox by neurosurgery but his is currently on hold for bone flap placement  IVC filter is in place

## 2019-03-20 NOTE — H&P
Hospital Medicine History & Physical Note    Date of Service  3/20/2019    Primary Care Physician  None, patient is from [place]    Consultants  Dr. Huntley neurosurgery    Code Status  full    Chief Complaint  Intracranial bleed after snowboarding head trauma    History of Presenting Illness  28 y.o. male who presented unresponsiveness after snowboarding. He was noted to have an intracranial bleed and had craniotomy with evacuation of the homatoma on Jan 26, 2019. He had acute respiratory failure requiring ventilator support due to his neurologic compromise and this slowly improved. He has a tracheostomy in place but it has been capped for weeks and he is tolerating a regular diet. He did walk with physical therapy for the first time on March 19, 2019 and his speech has been improving with speech therapy. He still answers mostly one word answers.    Review of Systems  Review of Systems   Constitutional: Negative for chills, diaphoresis and fever.   HENT: Negative for congestion.    Respiratory: Negative for cough, sputum production, shortness of breath and wheezing.    Cardiovascular: Negative for chest pain and palpitations.   Gastrointestinal: Negative for abdominal pain, constipation, diarrhea, nausea and vomiting.   Genitourinary: Negative for dysuria and urgency.   Musculoskeletal: Negative for myalgias.        Sore right shoulder   Skin: Negative for rash.   Neurological: Negative for dizziness, weakness and headaches.       Past Medical History   has a past medical history of Pulmonary embolus, right ([facility]) (2/5/2019). He also has no past medical history of Anginal syndrome ([facility]); Arrhythmia; Arthritis; Asthma; At risk for sleep apnea; Back pain; Bronchitis; Cancer ([facility]); Cataract; Congestive heart failure ([facility]); COPD (chronic obstructive pulmonary disease) ([facility]); Diabetes ([facility]); Dialysis patient ([facility]); Disorder of thyroid; Fall; Glaucoma; Gynecological disorder; Heart murmur; Heart valve disease;  Hemorrhagic disorder (HCC); Hypertension; Indigestion; Infectious disease; Jaundice; Myocardial infarct (HCC); Pacemaker; Pneumonia; Psychiatric problem; Renal disorder; Rheumatic fever; or Urinary incontinence.    Surgical History   has a past surgical history that includes craniotomy (Left, 1/26/2019).   PROCEDURES:  1. Procedure completed by Dr. Huntley, on January 26, 2019, craniotomy and evacuation of hematoma.  2.  Procedure completed by Dr. Nolasco, on February 3, 2019, percutaneous tracheostomy.  3.  Procedure completed by Dr. Joao Brown on 2/5/2018, therapeutic flexible fiberoptic bronchoscopy with bronchial lavage.  4.  Procedure completed on February 5, 2019 by Dr. Bakari Sweeney MD, right common femoral puncture with ultrasound-guided IVC Gram filter placement    Family History  No history of intracranial bleed    Social History   reports that he has never smoked. He has never used smokeless tobacco. He reports that he does not drink alcohol or use drugs.    Allergies  No Known Allergies    Medications  Amantadine 100mg bid  Calamine lotion to rash over upper arms bid  Diclofenac gel 1% apply to right shoulder every 8 hours prn soreness  Docusate 100mg bid  pepcid 20mg bid  keppra 500mg bid  Propranolol 10mg [id number]    Physical Exam   T98.3 /74 HR89 RR16 O2 sat 97% on room air    Physical Exam   HENT:   Mouth/Throat: Oropharynx is clear and moist. No oropharyngeal exudate.   Deformity over left scalp consistent with recent craniotomy   Eyes: Pupils are equal, round, and reactive to light. Conjunctivae and EOM are normal. No scleral icterus.   Neck: Neck supple.   Cardiovascular: Normal rate, regular rhythm and intact distal pulses.    No murmur heard.  Pulmonary/Chest: No respiratory distress. He has no wheezes. He has no rales.   Abdominal: Soft. Bowel sounds are normal. He exhibits no distension. There is no tenderness.   Musculoskeletal: He exhibits no edema.   Neurological: He is alert. He  displays no tremor. He displays no seizure activity. Coordination abnormal.   Skin: Skin is warm and dry. No rash noted. He is not diaphoretic. No erythema.   Psychiatric: His behavior is normal.   Nursing note and vitals reviewed.      Laboratory:          No results for input(s): ALTSGPT, ASTSGOT, ALKPHOSPHAT, TBILIRUBIN, DBILIRUBIN, GAMMAGT, AMYLASE, LIPASE, ALB, PREALBUMIN, GLUCOSE in the last 72 hours.  Recent Labs      03/19/19   0855   INR  1.05             No results for input(s): TROPONINI in the last 72 hours.        Imaging:  3/18/19 CT head shows New small amount of hyperdense subdural hemorrhage along the left occipital lobe and along the left tentorium cerebelli. No midline shift. Neurosurgery is aware      Assessment/Plan:  I anticipate this patient will require at least two midnights for appropriate medical management, necessitating inpatient admission.    Pulmonary embolus, right (HCC)- (present on admission)   Assessment & Plan    No anticoagulation due to recent head bleed, patient has been cleared for low dose lovenox by neurosurgery but his is currently on hold for bone flap placement  IVC filter is in place      Subdural hematoma (HCC)- (present on admission)   Assessment & Plan    Treated with craniotomy 1/26/19  Bone flap to be placed 3/20 with Dr. Huntley  Repeat CT of head done 3/18 showed a new small subdural bleed, neurosurgery was made aware   The patient will need rehab after bone flap placement and family lives in [place] and insurance is prepared to fly him there for rehab after bone flap is placed  Propranolol is to continue and taper as tolerated for tachycardia after his head bleed  Continue keppra for seizure prophylaxis     Acute respiratory failure following trauma and surgery (HCC)- (present on admission)   Assessment & Plan    This is resolved and the patient has a tracheostomy in place until bone flap is replaced then can progress to decanulation  His tracheostomy has been  capped for over 2 weeks now and he is tolerating a regular diet         VTE prophylaxis: none for surgery

## 2019-03-20 NOTE — PROGRESS NOTES
Patient seen and examined, please see H&P by Dr Campbell for specific information.  Family at bedside he is alert follows commands  Plan for bone flap today by neurosurgery.  All questions answered

## 2019-03-20 NOTE — ASSESSMENT & PLAN NOTE
This is resolved and the patient has a tracheostomy in place until bone flap is replaced then can progress to decanulation  His tracheostomy has been capped for over 2 weeks now and he is tolerating a regular diet

## 2019-03-20 NOTE — DISCHARGE PLANNING
Anticipated Discharge Disposition:   [facility] in [place]    Action:   Received notice about the pt being directly admitted to neuro surgery floor this pm.  Called PREMA Tapia to please follow up with [facility] at [facility] regarding referral.  Regina will fax the referral tomorrow.     Barriers to Discharge:   Surgical clearance  We will need PT/OT/ST eval and recommendation.    Plan:   Follow up with [facility] regarding referral to [facility].

## 2019-03-20 NOTE — ASSESSMENT & PLAN NOTE
Treated with craniotomy 1/26/19  Bone flap replaced on 3/20 by Dr. Huntley  The patient will need rehab after bone flap placement and family lives in [place] and insurance is prepared to fly him there for rehab after bone flap is placed; tentatively tomorrow  Propranolol is to continue and taper as tolerated for tachycardia after his head bleed  Continue keppra for seizure prophylaxis

## 2019-03-20 NOTE — PROGRESS NOTES
NSX  Alert and oriented  Denies pain  Has been NPO   Lovenox has been held  Some aphasia    RUE flacid  -motor exam intact in remaining extremities    Plan:  1. Surgery later tonight

## 2019-03-20 NOTE — ADDENDUM NOTE
Encounter addended by: Ortiz Nolasco M.D. on: 3/20/2019  4:04 PM<BR>    Actions taken: Sign clinical note

## 2019-03-20 NOTE — PROGRESS NOTES
Surgery today. I have met with family previously and discussed replacement of his left sided bone flap.    I discussed the surgical procedure, goals alternatives, risks and potential complications in detail. Risks of a general anaesthetic include but are not limited to death, cardiorespiratory compromise, MI, DVT, PE and potential anaesthetic related problems to be discussed with anaesthesiology preoperatively. It was explained the risks of surgery included but was not limited to the preceding list. Discussed no absolute guarantee of success and possible need of further surgery. I discussed risk brain injury transient or permanent, remote risk of paralysis, rebleeding, confusion, seizures, meningitis, death, neurological deficit, infection, CSF leak acute rebleed or swelling,  and possible need for blood tranfusion were discussed. We discussed possible need for further surgery. No guarantee was given or implied.I have answered all questions to the best of my ability.The patient was encouraged to contact me with questions if they did not understand everything.

## 2019-03-21 ENCOUNTER — APPOINTMENT (OUTPATIENT)
Dept: RADIOLOGY | Facility: MEDICAL CENTER | Age: 29
DRG: 515 | End: 2019-03-21
Attending: PHYSICIAN ASSISTANT
Payer: COMMERCIAL

## 2019-03-21 PROBLEM — G81.90 HEMIPLEGIA (HCC): Status: ACTIVE | Noted: 2019-03-21

## 2019-03-21 LAB
ANION GAP SERPL CALC-SCNC: 14 MMOL/L (ref 0–11.9)
APTT PPP: 28.8 SEC (ref 24.7–36)
BUN SERPL-MCNC: 11 MG/DL (ref 8–22)
CALCIUM SERPL-MCNC: 9.5 MG/DL (ref 8.5–10.5)
CHLORIDE SERPL-SCNC: 105 MMOL/L (ref 96–112)
CO2 SERPL-SCNC: 21 MMOL/L (ref 20–33)
CREAT SERPL-MCNC: 0.72 MG/DL (ref 0.5–1.4)
ERYTHROCYTE [DISTWIDTH] IN BLOOD BY AUTOMATED COUNT: 44 FL (ref 35.9–50)
GLUCOSE SERPL-MCNC: 147 MG/DL (ref 65–99)
HCT VFR BLD AUTO: 43.7 % (ref 42–52)
HGB BLD-MCNC: 14.4 G/DL (ref 14–18)
INR PPP: 1.11 (ref 0.87–1.13)
MCH RBC QN AUTO: 31 PG (ref 27–33)
MCHC RBC AUTO-ENTMCNC: 33 G/DL (ref 33.7–35.3)
MCV RBC AUTO: 94 FL (ref 81.4–97.8)
PLATELET # BLD AUTO: 349 K/UL (ref 164–446)
PMV BLD AUTO: 9.5 FL (ref 9–12.9)
POTASSIUM SERPL-SCNC: 4.3 MMOL/L (ref 3.6–5.5)
PROTHROMBIN TIME: 14.4 SEC (ref 12–14.6)
RBC # BLD AUTO: 4.65 M/UL (ref 4.7–6.1)
SODIUM SERPL-SCNC: 140 MMOL/L (ref 135–145)
WBC # BLD AUTO: 8.8 K/UL (ref 4.8–10.8)

## 2019-03-21 PROCEDURE — 92610 EVALUATE SWALLOWING FUNCTION: CPT

## 2019-03-21 PROCEDURE — 70450 CT HEAD/BRAIN W/O DYE: CPT

## 2019-03-21 PROCEDURE — 85730 THROMBOPLASTIN TIME PARTIAL: CPT

## 2019-03-21 PROCEDURE — 80048 BASIC METABOLIC PNL TOTAL CA: CPT

## 2019-03-21 PROCEDURE — 700102 HCHG RX REV CODE 250 W/ 637 OVERRIDE(OP): Performed by: PHYSICIAN ASSISTANT

## 2019-03-21 PROCEDURE — 700111 HCHG RX REV CODE 636 W/ 250 OVERRIDE (IP): Performed by: PHYSICIAN ASSISTANT

## 2019-03-21 PROCEDURE — 770001 HCHG ROOM/CARE - MED/SURG/GYN PRIV*

## 2019-03-21 PROCEDURE — A9270 NON-COVERED ITEM OR SERVICE: HCPCS | Performed by: PHYSICIAN ASSISTANT

## 2019-03-21 PROCEDURE — 97167 OT EVAL HIGH COMPLEX 60 MIN: CPT

## 2019-03-21 PROCEDURE — 97162 PT EVAL MOD COMPLEX 30 MIN: CPT

## 2019-03-21 PROCEDURE — 85610 PROTHROMBIN TIME: CPT

## 2019-03-21 PROCEDURE — 700102 HCHG RX REV CODE 250 W/ 637 OVERRIDE(OP): Performed by: INTERNAL MEDICINE

## 2019-03-21 PROCEDURE — A9270 NON-COVERED ITEM OR SERVICE: HCPCS | Performed by: INTERNAL MEDICINE

## 2019-03-21 PROCEDURE — 99233 SBSQ HOSP IP/OBS HIGH 50: CPT | Performed by: HOSPITALIST

## 2019-03-21 PROCEDURE — 85027 COMPLETE CBC AUTOMATED: CPT

## 2019-03-21 RX ADMIN — LEVETIRACETAM 500 MG: 100 SOLUTION ORAL at 20:20

## 2019-03-21 RX ADMIN — MORPHINE SULFATE 2 MG: 10 INJECTION INTRAVENOUS at 01:08

## 2019-03-21 RX ADMIN — FAMOTIDINE 20 MG: 20 TABLET ORAL at 18:00

## 2019-03-21 RX ADMIN — AMANTADINE HYDROCHLORIDE 200 MG: 50 SOLUTION ORAL at 06:13

## 2019-03-21 RX ADMIN — CEFAZOLIN SODIUM 2 G: 2 INJECTION, SOLUTION INTRAVENOUS at 13:43

## 2019-03-21 RX ADMIN — AMANTADINE HYDROCHLORIDE 200 MG: 50 SOLUTION ORAL at 18:01

## 2019-03-21 RX ADMIN — FAMOTIDINE 20 MG: 20 TABLET ORAL at 06:10

## 2019-03-21 RX ADMIN — ACETAMINOPHEN 1000 MG: 500 TABLET ORAL at 18:00

## 2019-03-21 RX ADMIN — OXYCODONE HYDROCHLORIDE 2.5 MG: 5 TABLET ORAL at 00:00

## 2019-03-21 RX ADMIN — MORPHINE SULFATE 2 MG: 10 INJECTION INTRAVENOUS at 04:18

## 2019-03-21 RX ADMIN — ACETAMINOPHEN 1000 MG: 500 TABLET ORAL at 12:40

## 2019-03-21 RX ADMIN — LEVETIRACETAM 500 MG: 100 SOLUTION ORAL at 08:26

## 2019-03-21 RX ADMIN — CEFAZOLIN SODIUM 2 G: 2 INJECTION, SOLUTION INTRAVENOUS at 06:10

## 2019-03-21 RX ADMIN — OXYCODONE HYDROCHLORIDE 5 MG: 5 TABLET ORAL at 13:39

## 2019-03-21 RX ADMIN — OXYCODONE HYDROCHLORIDE 5 MG: 5 TABLET ORAL at 20:20

## 2019-03-21 RX ADMIN — OXYCODONE HYDROCHLORIDE 5 MG: 5 TABLET ORAL at 08:43

## 2019-03-21 ASSESSMENT — COPD QUESTIONNAIRES
DURING THE PAST 4 WEEKS HOW MUCH DID YOU FEEL SHORT OF BREATH: NONE/LITTLE OF THE TIME
DO YOU EVER COUGH UP ANY MUCUS OR PHLEGM?: NO/ONLY WITH OCCASIONAL COLDS OR INFECTIONS
HAVE YOU SMOKED AT LEAST 100 CIGARETTES IN YOUR ENTIRE LIFE: NO/DON'T KNOW
COPD SCREENING SCORE: 0

## 2019-03-21 ASSESSMENT — ENCOUNTER SYMPTOMS
NERVOUS/ANXIOUS: 1
CARDIOVASCULAR NEGATIVE: 1
RESPIRATORY NEGATIVE: 1
SPEECH CHANGE: 1
FOCAL WEAKNESS: 1
CONSTITUTIONAL NEGATIVE: 1
EYES NEGATIVE: 1
MUSCULOSKELETAL NEGATIVE: 1
HEADACHES: 1
GASTROINTESTINAL NEGATIVE: 1

## 2019-03-21 ASSESSMENT — COGNITIVE AND FUNCTIONAL STATUS - GENERAL
SUGGESTED CMS G CODE MODIFIER MOBILITY: CM
MOVING FROM LYING ON BACK TO SITTING ON SIDE OF FLAT BED: UNABLE
TOILETING: TOTAL
TURNING FROM BACK TO SIDE WHILE IN FLAT BAD: A LOT
STANDING UP FROM CHAIR USING ARMS: A LOT
MOBILITY SCORE: 9
DAILY ACTIVITIY SCORE: 12
DRESSING REGULAR UPPER BODY CLOTHING: A LOT
DRESSING REGULAR LOWER BODY CLOTHING: TOTAL
SUGGESTED CMS G CODE MODIFIER DAILY ACTIVITY: CL
WALKING IN HOSPITAL ROOM: A LOT
HELP NEEDED FOR BATHING: TOTAL
MOVING TO AND FROM BED TO CHAIR: UNABLE
CLIMB 3 TO 5 STEPS WITH RAILING: TOTAL
PERSONAL GROOMING: A LITTLE

## 2019-03-21 ASSESSMENT — GAIT ASSESSMENTS
GAIT LEVEL OF ASSIST: MAXIMAL ASSIST
ASSISTIVE DEVICE: HAND HELD ASSIST
DEVIATION: STEP TO;DECREASED BASE OF SUPPORT;DECREASED HEEL STRIKE;DECREASED TOE OFF
DISTANCE (FEET): 30

## 2019-03-21 ASSESSMENT — LIFESTYLE VARIABLES: EVER_SMOKED: NEVER

## 2019-03-21 ASSESSMENT — PAIN SCALES - GENERAL: PAIN_LEVEL: 1

## 2019-03-21 ASSESSMENT — ACTIVITIES OF DAILY LIVING (ADL): TOILETING: INDEPENDENT

## 2019-03-21 NOTE — DISCHARGE PLANNING
Dr. Packer is recommending IRF.  Current documentation is for D/C to [facility].  [facility] will no longer follow [facility].  Please re-consult for further review.  Msg left for Eileen, ED [phone].

## 2019-03-21 NOTE — CARE PLAN
Problem: Communication  Goal: The ability to communicate needs accurately and effectively will improve  Use of Yes or No questions to accommodate expressive aphasia. Patient able to gesture and speak. Communication aids in place.    Problem: Knowledge Deficit  Goal: Knowledge of disease process/condition, treatment plan, diagnostic tests, and medications will improve  Outcome: PROGRESSING AS EXPECTED  Education provided regarding disease process and treatment plan.

## 2019-03-21 NOTE — ANESTHESIA PROCEDURE NOTES
Airway  Date/Time: 3/20/2019 9:07 PM  Performed by: TERESA MALDONADO  Authorized by: TERESA MALDONADO     Location:  OR  Urgency:  Elective  Difficult Airway: No    Indications for Airway Management:  Anesthesia  Spontaneous Ventilation: present    Sedation Level:  Deep  Preoxygenated: Yes    MILS Maintained Throughout: Yes    Mask Difficulty Assessment:  1 - vent by mask  Final Airway Type:  Endotracheal airway (existing trach)  Blade type: n/a.  Blade size: n/a.  Number of Attempts at Approach:  1  Number of Other Approaches Attempted:  0   Patient with existing trach capped.  Easy mask airway.  Trach removed and 6.5 reinforced ETT placed easily in trach.  BS=BS, ETCO2 present, tube secured with tape

## 2019-03-21 NOTE — PROGRESS NOTES
0300: Patient transported to CT on monitor. VSS    0320: Patient back to [facility]. VSS.

## 2019-03-21 NOTE — PROGRESS NOTES
Hospital Medicine Daily Progress Note    Date of Service  3/21/2019    Chief Complaint  28 y.o. male admitted 3/20/2019 with recent prolonged hospitalization secondary to TBI, status post craniectomy, status post tracheostomy, history of seizures.  The patient had IVC filter placed, diagnosed with pulmonary embolism.  The patient admitted for bone flap replacement and transition to his home location for ongoing rehab    Hospital Course   Admission for cranioplasty      Interval Problem Update  Patient seen and examined today. ICU Care  Care and plan discussed in IDT/Hot rounds.  Lines and assistive devices reviewed.    Patient tolerating treatment and therapies.  All Data, Medication data reviewed.  Case discussed with nursing as available.  Plan of Care reviewed with patient and notified of changes.  3/21 patient seems to have tolerated the procedure well, CT reviewed by neurosurgery, the patient is alert and oriented, family at the bedside.  Right upper and right lower extremity splints, helmet is at the bedside  Consultants/Specialty  Neurosurgery    Code Status  Full code    Disposition  TBD    Review of Systems  Review of Systems   Constitutional: Negative.    HENT: Negative.    Eyes: Negative.    Respiratory: Negative.    Cardiovascular: Negative.    Gastrointestinal: Negative.    Genitourinary: Negative.    Musculoskeletal: Negative.    Skin: Negative.    Neurological: Positive for speech change, focal weakness and headaches.   Endo/Heme/Allergies: Negative.    Psychiatric/Behavioral: The patient is nervous/anxious.    All other systems reviewed and are negative.       Physical Exam  Temp:  [36.2 °C (97.1 °F)-36.8 °C (98.2 °F)] 36.2 °C (97.1 °F)  Pulse:  [] 82  Resp:  [11-22] 18  BP: (115)/(77) 115/77  SpO2:  [95 %-99 %] 97 %    Physical Exam   Constitutional: He appears well-developed.   Pt seen and examined.   HENT:   Head: Normocephalic and atraumatic.   Status post bone flap replacement  surgery  Dressing in place   Eyes: Pupils are equal, round, and reactive to light.   Neck: Normal range of motion. Neck supple.   Cardiovascular: Normal rate, normal heart sounds and intact distal pulses.    Pulmonary/Chest: Effort normal and breath sounds normal.   Abdominal: Soft. Bowel sounds are normal.   Genitourinary: Penis normal.   Musculoskeletal: Normal range of motion.   Neurological: He is alert.   Right upper and right lower extremity weakness   Skin: Skin is warm and dry.   Psychiatric: He has a normal mood and affect. His behavior is normal.   Nursing note and vitals reviewed.      Fluids    Intake/Output Summary (Last 24 hours) at 03/21/19 0813  Last data filed at 03/21/19 0600   Gross per 24 hour   Intake          3603.33 ml   Output             1885 ml   Net          1718.33 ml       Laboratory  Recent Labs      03/21/19   0340   WBC  8.8   RBC  4.65*   HEMOGLOBIN  14.4   HEMATOCRIT  43.7   MCV  94.0   MCH  31.0   MCHC  33.0*   RDW  44.0   PLATELETCT  349   MPV  9.5     Recent Labs      03/21/19   0340   SODIUM  140   POTASSIUM  4.3   CHLORIDE  105   CO2  21   GLUCOSE  147*   BUN  11   CREATININE  0.72   CALCIUM  9.5     Recent Labs      03/19/19   0855  03/21/19   0340   APTT   --   28.8   INR  1.05  1.11               Imaging  CT-HEAD W/O   Final Result   Addendum 1 of 1   These findings were discussed with the patient's on-call clinician,    Ofelia Parham, on 3/21/2019 4:39 AM.      Final         1.  New area of parenchymal hemorrhage within the left frontal lobe laterally.   2.  Pneumocephalus and scattered subarachnoid hemorrhages along the cranioplasty site.   3.  Mixed density anterior and posterior extra axial fluid collections on the left, stable since prior.   4.  Asymmetric dilatation of the left ventricle with associated encephalomalacia changes of the left hemisphere.           Assessment/Plan  Pulmonary embolus, right (HCC)- (present on admission)   Assessment & Plan    No  anticoagulation due to recent head bleed, patient has been cleared for low dose lovenox by neurosurgery but his is currently on hold for bone flap placement  IVC filter is in place      Subdural hematoma (HCC)- (present on admission)   Assessment & Plan    Treated with craniotomy 1/26/19  Bone flap replaced on 3/20 by Dr. Huntley  The patient will need rehab after bone flap placement and family lives in [place] and insurance is prepared to fly him there for rehab after bone flap is placed  Propranolol is to continue and taper as tolerated for tachycardia after his head bleed  Continue keppra for seizure prophylaxis     Acute respiratory failure following trauma and surgery (HCC)- (present on admission)   Assessment & Plan    This is resolved and the patient has a tracheostomy in place until bone flap is replaced then can progress to decanulation  His tracheostomy has been capped for over 2 weeks now and he is tolerating a regular diet     Hemiplegia (HCC)   Assessment & Plan    On the right, resultant from his TBI     Seizures (HCC)   Assessment & Plan    History of, continue Keppra        Plan  Await surgical outcome  Patient appears to be stable for neurosurgical floor transfer  Okay to decannulate  Continue therapies  Continue conservative measures  Apparently the upper extremity splint was misplaced  See orders  VTE prophylaxis: Lovenox cleared for start date 3/25    I have performed a physical exam and reviewed and updated ROS and Plan today . In review of yesterday's note , there are no changes except as documented above.

## 2019-03-21 NOTE — ANESTHESIA PREPROCEDURE EVALUATION
Relevant Problems   (+) Seizures (HCC)      Within Normal Limits   ANESTHESIA       Physical Exam    Airway   Neck ROM: full  Patient is intubated/trached  Comments: Trach in place and capped, patient speaking and eating   Cardiovascular   Rhythm: regular     Dental - normal exam         Pulmonary   Breath sounds clear to auscultation     Abdominal - normal exam     Neurological - normal exam                 Anesthesia Plan    ASA 2       Plan - general       Airway plan will be Tracheostomy      Plan Factors:   Patient did not smoke on day of procedure.    Induction: intravenous      Pertinent diagnostic labs and testing reviewed    Informed Consent:

## 2019-03-21 NOTE — OR SURGEON
Immediate Post OP Note    PreOp Diagnosis: status post L craniectomy for severe CHI  PostOp Diagnosis: as above    Procedure(s):  CRANIOPLASTY - REPLACEMENT OF FRONTAL BONE FLAP - Wound Class: Clean    Surgeon(s):  Jazlyn Huntley M.D.    Anesthesiologist/Type of Anesthesia:  Anesthesiologist: Lucila Ivan M.D.; Colin Randhawa M.D./General    Surgical Staff:  Circulator: Clara Sosa R.N.  Relief Circulator: Bruce Banerjee R.N.  Scrub Person: Veronika Reyes Assist: Joseph Gardner P.A.-C.    Specimens removed if any:  * No specimens in log *      Assistants:  Joseph Gardner PA-C  ,  Specimen: nil    Estimated Blood Loss: 300 cc    Findings: n/a    Complications: nil  3/20/2019 10:12 PM Jazlyn Huntley M.D.

## 2019-03-21 NOTE — THERAPY
"Physical Therapy Evaluation completed.   Bed Mobility:  Supine to Sit:  (up in bedside chair)  Transfers: Sit to Stand: Moderate Assist  Gait: Level Of Assist: Maximal Assist (2 person one for trunk/ body other to progress/ WB right LE) with No Equipment Needed       Plan of Care: Will benefit from Physical Therapy 5 times per week  Discharge Recommendations: Equipment: Will Continue to Assess for Equipment Needs. Post-acute therapy Recommend inpatient transitional care services for continued physical therapy services.      See \"Rehab Therapy-Acute\" Patient Summary Report for complete documentation.     "

## 2019-03-21 NOTE — PROGRESS NOTES
Neurosurgery Progress Note    Subjective:  POD #1 seen with Dr. Yuko TORRES this am  -1cm area of intraparenchymal blood  -no midline shift/mass effect  Alert and oriented    Exam:  GCS: 14  -some aphasia remains  Labs stable: Na+ 140  VSS  Right hemiplegia  Left motor exam is intact    BP  Min: 115/77  Max: 115/77  Pulse  Av  Min: 75  Max: 109  Resp  Av  Min: 11  Max: 22  Temp  Av.5 °C (97.7 °F)  Min: 36.2 °C (97.1 °F)  Max: 36.8 °C (98.2 °F)  Monitored Temp 2  Av.4 °C (99.4 °F)  Min: 37.2 °C (99 °F)  Max: 37.6 °C (99.7 °F)  SpO2  Av.3 %  Min: 95 %  Max: 99 %    No Data Recorded    Recent Labs      19   0340   WBC  8.8   RBC  4.65*   HEMOGLOBIN  14.4   HEMATOCRIT  43.7   MCV  94.0   MCH  31.0   MCHC  33.0*   RDW  44.0   PLATELETCT  349   MPV  9.5     Recent Labs      19   0340   SODIUM  140   POTASSIUM  4.3   CHLORIDE  105   CO2  21   GLUCOSE  147*   BUN  11   CREATININE  0.72   CALCIUM  9.5     Recent Labs      19   0855  19   0340   APTT   --   28.8   INR  1.05  1.11           Intake/Output       19 0700 - 19 0659 19 0700 - 19 0659      1900-0659 Total 3491-0700 2885-6553 Total       Intake    P.O.  --  75 75  --  -- --    P.O. -- 75 75 -- -- --    I.V.  --  3528.3 3528.3  --  -- --    Volume (mL) (NS infusion) -- 1100 1100 -- -- --    Volume (mL) (lactated ringers infusion) -- 1800 1800 -- -- --    Volume (mL) (0.9 % NaCl with KCl 20 mEq infusion) -- 628.3 628.3 -- -- --    Total Intake -- 3603.3 3603.3 -- -- --       Output    Urine  --   1775  --  -- --    Urine -- 450 450 -- -- --    Output (mL) (Urethral Catheter Latex) -- 1325 1325 -- -- --    Drains  --  10 10  --  -- --    Output (mL) (Closed/Suction Drain 1 Left;Posterior Other (Comment) Hemovac) -- 10 10 -- -- --    Stool  --  0 0  --  -- --    Number of Times Stooled -- 0 x 0 x -- -- --    Measurable Stool (mL) -- 0 0 -- -- --    Blood  --  100 100  --  -- --    Est.  Blood Loss -- 100 100 -- -- --    Total Output -- 1885 1885 -- -- --       Net I/O     -- 1718.3 1718.3 -- -- --            Intake/Output Summary (Last 24 hours) at 03/21/19 0818  Last data filed at 03/21/19 0600   Gross per 24 hour   Intake          3603.33 ml   Output             1885 ml   Net          1718.33 ml            • famotidine  20 mg BID   • amantadine  200 mg BID   • bisacodyl  10 mg Q24HRS PRN   • docusate sodium 100mg/10mL  100 mg BID   • levETIRAcetam  500 mg BID   • magnesium hydroxide  30 mL QDAY PRN   • propranolol  10 mg Q8HRS   • senna-docusate  2 Tab BID   • Pharmacy Consult Request  1 Each PHARMACY TO DOSE   • MD ALERT...DO NOT ADMINISTER NSAIDS or ASPIRIN unless ORDERED By Neurosurgery  1 Each PRN   • ondansetron  4 mg Q4HRS PRN   • dexamethasone  4 mg Once PRN   • diphenhydrAMINE  25 mg Q6HRS PRN   • scopolamine  1 Patch Q72HRS PRN   • labetalol  10 mg Q HOUR PRN   • hydrALAZINE  10 mg Q HOUR PRN   • niCARdipine infusion  0-15 mg/hr Continuous   • senna-docusate  1 Tab Q24HRS PRN   • polyethylene glycol/lytes  1 Packet BID PRN   • magnesium hydroxide  30 mL QDAY PRN   • bisacodyl  10 mg Q24HRS PRN   • fleet  1 Each Once PRN   • Respiratory Care per Protocol   Continuous RT   • 0.9 % NaCl with KCl 20 mEq 1,000 mL   Continuous   • acetaminophen  1,000 mg Q6HRS   • oxyCODONE immediate-release  2.5 mg Q3HRS PRN   • oxyCODONE immediate-release  5 mg Q3HRS PRN   • morphine injection  2 mg Q3HRS PRN   • ceFAZolin  2 g Q8HR       Assessment and Plan:  Hospital day #2  POD #1  Prophylactic anticoagulation:no    Plan:  1. q4 neuro checks  2. Advance diet  3. Lovenox ok 3/25/19  4. Ok to t/f to floor from NSX standpoint

## 2019-03-21 NOTE — ANESTHESIA POSTPROCEDURE EVALUATION
Patient: Socrates Villeda    Procedure Summary     Date:  03/20/19 Room / Location:  [facility] OR 05 / SURGERY [facility]    Anesthesia Start:  2036 Anesthesia Stop:  2231    Procedure:  CRANIOPLASTY - REPLACEMENT OF FRONTAL BONE FLAP (Left Head) Diagnosis:  (STATUS POST CRANIECTOMY)    Surgeon:  Jazlyn Huntley M.D. Responsible Provider:  Colin Randhawa M.D.    Anesthesia Type:  general ASA Status:  2          Final Anesthesia Type: general  Last vitals  BP   Blood Pressure: 115/77, NIBP: 131/84    Temp   36.2 °C (97.1 °F)    Pulse   Pulse: (!) 102, Heart Rate (Monitored): (!) 101   Resp   (!) 11    SpO2   95 %      Anesthesia Post Evaluation    Patient location during evaluation: PACU  Patient participation: complete - patient participated  Level of consciousness: awake  Pain score: 1    Airway patency: patent  Anesthetic complications: no  Cardiovascular status: hemodynamically stable  Respiratory status: acceptable  Hydration status: euvolemic    PONV: none           Nurse Pain Score: 1 (NPRS)

## 2019-03-21 NOTE — PROGRESS NOTES
2 RN skin check completed    Surgical dressing to head, unable to remove.     No other areas of concern noted.

## 2019-03-21 NOTE — DISCHARGE PLANNING
Anticipated Discharge Disposition: [facility] Rehab     Action: LSW received tc from Christy at [facility] [phone] who will be the CM on his case. Christy stated that they do not open cases through insurance to obtain auth for Rehab and that this LSW would need to contact insurance and obtain auth. Christy provided the following information to utilize when calling pt's insurance:    Lars at [facility] [address]    Phone: [phone]  Fax: [phone]    Tax ID: [id number]  NPI ID: [id number]  Accepting MD: Dr. Deidra Cocroran    LSW called pt's insurance [employer] Pre Auth Line [phone] and provided [facility] information and [facility] information. [place] RN CM provided fax number and reference # for pt's case for medical infromation to be sent to.     [employer] Pre [employer]  Fax number: [phone]  Reference # [id number]    LSW spoke to UR RN who will send over initial clinicals to [employer] and then this LSW will f/u with Physiatry consult and therapy notes once in EPIC.     Barriers to Discharge: None    Plan: LSW to fax additional clinical documentation to [employer].

## 2019-03-21 NOTE — THERAPY
"Speech Language Therapy Clinical Swallow Evaluation completed.    Functional Status:  Pt admitted from [facility] for cranio-plasty with capped trach and is on room air.  Pt was on a regular diet with thin liquids at [facility], tolerating well per EMR and mother.  Pt noted to have expressive aphasia, however answered orientation questions appropriately with cueing.  He answers yes/no questions with 100% accuracy.  No gross deficits noted in oral exam.  Pt had a strong voice and strong cough.  He consumed PO trials of ice chips, puree, soft solids, crackers and thin liquids without any overt s/sx of aspiration.  Pt reported pain with chewing dry solids, pointing to left side of his head.  Recommend to start a dysphagia III diet with thin liquids.  Anticipate he will advance to a regular diet quickly as pain with chewing subsides.  Mother present at the end of evaluation and verbalized good understanding.  SLP continues to follow.      Recommendations - Diet: Thin Liquid, Dysphagia III                          Strategies: Monitor during meals, Assistance needed for meal tray set-up, Head of Bed at 90 Degrees                          Medication Administration: Whole with Liquid Wash    Plan of Care: Will benefit from Speech Therapy 3 times per week    Post-Acute Therapy: Recommend inpatient transitional care services for continued speech therapy services.      See \"Rehab Therapy-Acute\" Patient Summary Report for complete documentation. Thank you for the consult.  "

## 2019-03-21 NOTE — ANESTHESIA PROCEDURE NOTES
Peripheral IV  Date/Time: 3/20/2019 9:09 PM  Performed by: TERESA MALDONADO  Authorized by: TERESA MALDONADO     Size:  18 G  Laterality:  Right  Local Anesthetic:  None  Site Prep:  Alcohol  Technique:  Direct puncture  Attempts:  1   Patient requested PIV moved to R hand since his L hand is his usable hand.

## 2019-03-21 NOTE — PROGRESS NOTES
This gentleman may transfer out of the ICU.  [facility] will defer management to the hospitalist team.  Please call if we can be of any assistance.    Omero Houser MD  Pulmonary and Critical Care Medicine

## 2019-03-21 NOTE — PROGRESS NOTES
Patient arrived to [email or web address]], transported by PACU RNs. VSS. Complaining of mild headache.   Weight 60.1kg  Temp 99.1F    Patient AOx4, expressive aphasia present but can answer yes and no questions. Mom at bedside.

## 2019-03-21 NOTE — ANESTHESIA TIME REPORT
Anesthesia Start and Stop Event Times     Date Time Event    3/20/2019 2036 Anesthesia Start     2231 Anesthesia Stop        Responsible Staff  03/20/19    Name Role Begin End    Lucila Ivan M.D. Anesth 2036 2120    Colin Randhawa M.D. Anesth 2120 2231        Post op Diagnosis  Status post craniectomy    Premium Reason  B. 1st Call      Exception Times    Start Time             End Time                     #                         Name                                                Comments:

## 2019-03-21 NOTE — CONSULTS
Medical chart review completed.     Patient is a 28 y.o. year-old male with a past medical history significant for TBI s/p craniectomy, respiratory failure requiring tracheostomy, Seizures, and PE admitted to [facility] on 3/20/2019  3:01 PM with scheduled cranioplasty. Per report patient had a snowboarding accident in late January with a severe traumatic brain injury.  At that time patient had left sided craniectomy and evacuation of hematoma on 1/26/19 with Dr. Huntley.  Patient had percutaneous tracheostomy on 2/3/19.  Patient had IVC filter placed on 2/5/19.  Patient eventually transferred to [facility] on 2/21/19 for ongoing medical management. Pre-operative CT head on 3/18/19 showed new small area of SDH but per NSG could proceed with cranioplasty. Per [facility] notes, patient has started working on pre-gait with PT on 3/19/19 and his aphasia has been slowly improving.  Patient with scheduled cranioplasty performed on 3/20/19 with Dr. Huntley.  Patient tolerated procedure well. Post-operative CT showed new area of parenchymal hemorrhage with the left frontal lobe with pneumocephalus and scattered SAH along cranioplasty site. Patient to transfer to floor with plan for decannulation of tracheostomy once tolerated cranioplasty.     Prior to incident patient was independent for ADLs.  Per report patient's family plan is for him to transfer to [facility].     PMH:  Past Medical History:   Diagnosis Date   • Pulmonary embolus, right (HCC) 2/5/2019       PSH:  Past Surgical History:   Procedure Laterality Date   • CRANIOPLASTY Left 3/20/2019    Procedure: CRANIOPLASTY - REPLACEMENT OF FRONTAL BONE FLAP;  Surgeon: Jazlyn Huntley M.D.;  Location: SURGERY [facility];  Service: Neurosurgery   • CRANIOTOMY Left 1/26/2019    Procedure: CRANIOTOMY EVACUATION OF HEMATOMA;  Surgeon: Jazlyn Huntley M.D.;  Location: SURGERY [facility];  Service: Neurosurgery       FAMILY HISTORY:  History reviewed. No pertinent family  history.    MEDICATIONS:  Current Facility-Administered Medications   Medication Dose   • famotidine (PEPCID) tablet 20 mg  20 mg   • amantadine (SYMMETREL) 50 MG/5ML syrup 200 mg  200 mg   • bisacodyl (DULCOLAX) suppository 10 mg  10 mg   • docusate sodium 100mg/10mL (COLACE) solution 100 mg  100 mg   • levETIRAcetam (KEPPRA) 100 MG/ML solution 500 mg  500 mg   • magnesium hydroxide (MILK OF MAGNESIA) suspension 30 mL  30 mL   • propranolol (INDERAL) tablet 10 mg  10 mg   • senna-docusate (PERICOLACE or SENOKOT S) 8.6-50 MG per tablet 2 Tab  2 Tab   • Pharmacy Consult Request ...Pain Management Review 1 Each  1 Each   • MD ALERT...DO NOT ADMINISTER NSAIDS or ASPIRIN unless ORDERED By Neurosurgery 1 Each  1 Each   • ondansetron (ZOFRAN) syringe/vial injection 4 mg  4 mg   • dexamethasone (DECADRON) injection 4 mg  4 mg   • diphenhydrAMINE (BENADRYL) injection 25 mg  25 mg   • scopolamine (TRANSDERM-SCOP) patch 1 Patch  1 Patch   • labetalol (NORMODYNE,TRANDATE) injection 10 mg  10 mg   • hydrALAZINE (APRESOLINE) injection 10 mg  10 mg   • senna-docusate (PERICOLACE or SENOKOT S) 8.6-50 MG per tablet 1 Tab  1 Tab   • polyethylene glycol/lytes (MIRALAX) PACKET 1 Packet  1 Packet   • magnesium hydroxide (MILK OF MAGNESIA) suspension 30 mL  30 mL   • bisacodyl (DULCOLAX) suppository 10 mg  10 mg   • fleet enema 133 mL  1 Each   • Respiratory Care per Protocol     • 0.9 % NaCl with KCl 20 mEq infusion     • acetaminophen (TYLENOL) tablet 1,000 mg  1,000 mg   • oxyCODONE immediate-release (ROXICODONE) tablet 2.5 mg  2.5 mg   • oxyCODONE immediate-release (ROXICODONE) tablet 5 mg  5 mg   • morphine (pf) 10 mg/mL injection 2 mg  2 mg   • ceFAZolin in dextrose (ANCEF) IVPB premix 2 g  2 g       ALLERGIES:  Patient has no known allergies.    PSYCHOSOCIAL HISTORY:  Living Site:  Home  Living With:  family  Caregiver's availability:  Family  Number of stairs:  Unknown  Substance use history:  Unknown      The patient presents   with cognitive deficits and functional deficits in mobility/self-cares/swallowing/speech, and Severe  de-conditioning. This patietn will return to living with family at discharge.  Patient reportedly working on pre-gait at [facility] with PT and improving aphasia with 1 word answers. The patient's current diet is Dysphagia III with Thin liquids.     Patient with severe TBI from snow boarding accident in late January now s/p cranioplasty on 3/20/19.  Patient continues to make slow progress and would benefit from acute inpatient rehabilitation level of rehabilitation.  Please reschedule Amantadine to more appropriate timing to optimize sleep/wake cycle, e.g. 0800 and 1300 as currently scheduled at 1800.      The patient is   An Excellent candidate for an acute inpatient rehabilitation program with a coordinated program of care at an intensity and frequency not available at a lower level of care.     Note: This recommendation requires that patient has at least CGA/Minimal Assistance needs in at least two therapy disciplines.  If patient progresses to no longer need CGA/Karli with at least two therapy disciplines they may be more appropriate for Skilled nursing facility versus home with home health.      This recommendation is substantiated by the patient's current medical condition with intervention and assessment of medical issues requiring an acute level of care for patient's safety and maximum outcome. A coordinated program of care will be provided by an interdisciplinary team including physical therapy, occupational therapy, speech language pathology, hospitalist, physiatry, rehab nursing and rehab psychology. Rehab goals include improved cognition, speech and swallowing, mobility, self-care management, strength and conditioning/endurance, pain management, bowel and bladder management, mood and affect, and safety with independent home management including caregiver training. Estimated length of stay is approximately 28-35  days. Rehab potential: Excellent. Disposition: to pre-morbid independent living setting with supportive care of patient's family. We will continue to follow with you in anticipation of discharge to acute inpatient rehabilitation when medically stable to do so at the discretion of the attending physician. Thank you for allowing us to participate in this patient's care. Please call with any questions regarding this recommendation.    Dennys Packer M.D.

## 2019-03-21 NOTE — DISCHARGE PLANNING
Anticipated Discharge Disposition: [facility] Rehab    Action: LSW met with pt's mother who stated that the case was closed at [facility] and she would like to have a referral sent so that they can reopen pt's case. LSW has received therapy notes from [facility] where pt was prior. Pt's mother provided contact information for [facility] Admissions.     Phone number: [phone]  Fax: [phone]    LSW faxed over [facility] Therapies and post op notes to [facility]. LSW called and spoke with Mary Jane who stated that they will assign a  to the case once referral is received.     Barriers to Discharge: None    Plan: [facility].

## 2019-03-21 NOTE — CARE PLAN
Problem: Infection  Goal: Will remain free from infection  Outcome: PROGRESSING AS EXPECTED    Intervention: Implement standard precautions and perform hand washing before and after patient contact  Hand hygiene performed before and after assessing patient. Daily CHG bath completed. Scrub the hub prior to accessing IVs. Linens changed daily and PRN when soiled.       Problem: Pain Management  Goal: Pain level will decrease to patient's comfort goal  Outcome: PROGRESSING AS EXPECTED   03/20/19 2330 03/21/19 0000   OTHER   Pain Rating Scale (NPRS) 1 --    Non Verbal Scale  --  Restlessness;Unresolved Pain  (nodding yes to pain. points to head)   Patient medicated for pain per MAR. Non pharmacologic pain interventions in place to decrease pain; pillows used for support and repositioning. Blankets offered. Temperature adjusted per pt request.

## 2019-03-21 NOTE — DISCHARGE PLANNING
Faxed current OT/SLP notes and PT/OT notes from [facility] to Maryann at [facility] at [phone]. Reference # [id number] for the acute rehab case. PT notes will need to be sent once the patient has been seen by PT in house. Notified ED Arellano who will fax PT and physiatry consult to [facility] once charted.

## 2019-03-21 NOTE — ANESTHESIA QCDR
2019 [facility] Clinical Data Registry (for Quality Improvement)     Postoperative nausea/vomiting risk protocol (Adult = 18 yrs and Pediatric 3-17 yrs)- (430 and 463)  General inhalation anesthetic (NOT TIVA) with PONV risk factors: No  Provision of anti-emetic therapy with at least 2 different classes of agents: N/A  Patient DID NOT receive anti-emetic therapy and reason is documented in Medical Record: N/A    Multimodal Pain Management- (AQI59)  Patient undergoing Elective Surgery (i.e. Outpatient, or ASC, or Prescheduled Surgery prior to Hospital Admission): No  Use of Multimodal Pain Management, two or more drugs and/or interventions, NOT including systemic opioids: N/A  Exception: Documented allergy to multiple classes of analgesics: N/A    PACU assessment of acute postoperative pain prior to Anesthesia Care End- Applies to Patients Age = 18- (ABG7)  Initial PACU pain score is which of the following: < 7/10  Patient unable to report pain score: N/A    Post-anesthetic transfer of care checklist/protocol to PACU/ICU- (426 and 427)  Upon conclusion of case, patient transferred to which of the following locations: PACU/Non-ICU  Use of transfer checklist/protocol: Yes  Exclusion: Service Performed in Patient Hospital Room (and thus did not require transfer): N/A    PACU Reintubation- (AQI31)  General anesthesia requiring endotracheal intubation (ETT) along with subsequent extubation in OR or PACU: No  Required reintubation in the PACU: N/A  Extubation was a planned trial documented in the medical record prior to removal of the original airway device: N/A    Unplanned admission to ICU related to anesthesia service up through end of PACU care- (MD51)  Unplanned admission to ICU (not initially anticipated at anesthesia start time): No

## 2019-03-21 NOTE — PROGRESS NOTES
2 RN skin check complete.    - Cranioplasty site located on frontal left skull region. Dressing has old dried drainage, MG aware. Hemovac in place on top of skull.  - Sacrum is intact. No redness or discolored areas noted.  - Heels elbows and other bony prominences are intact and floated on pillows.   - Unable to visualize back of head due to new surgical dressing.

## 2019-03-21 NOTE — OP REPORT
DATE OF SERVICE:  03/20/2019    SURGEON:  Jazlyn Huntley MD    ASSISTANT:  Joseph Gardner PA-C    ANESTHESIOLOGIST:  Lucila Ivan MD    TYPE OF ANESTHESIA:  General anesthesia with endotracheal intubation.    PREOPERATIVE DIAGNOSIS:  Status post left frontotemporoparietal decompressive   craniectomy for severe closed head injury.    POSTOPERATIVE DIAGNOSIS:  Status post left frontotemporoparietal decompressive   craniectomy for severe closed head injury.    INDICATIONS:  The patient is a 28-year-old man.  He had a severe closed head   injury approximately 6 weeks.  He had a decompressive craniectomy for subdural   hematoma.  He had malignant brain swelling.  He was swollen for a significant   period of time.  He eventually worked up to the point that he is   communicating, but he had a right hemiparesis with a flaccid right arm.  We   serial CT scan him and we felt that we could replace his bone flap which ____.    I have met with his mother.  I discussed the nature of replacing the bone   flap.  The risks, benefits, alternatives outlined in the consultation note and   progress note.  She consented.    TITLE OF PROCEDURE:  Reopening of left frontotemporoparietal craniotomy and   replacement of bone flap.    OPERATIVE FINDINGS:  He was sufficiently sunken in order to replace the bone   flap to be given mannitol at this time of the case.  There was a Silastic   sheet and a Duragen patch that was in place.  Significantly, there was some   brain herniation where we done the durotomy to treat his malignant   hypertension.  This was stuck to the scalp flap.  Consequently, I have to use   combination of sharp and blunt dissection and diathermy in order to ____.    There were no complications.    ESTIMATED BLOOD LOSS:  300 mL.    OPERATIVE DETAILS:  After fully informed consent, the patient was brought to   the operating room at [facility].  General anesthesia was   administered.  The patient was  given intravenous antibiotic.  The head was   completely shaved and the patient was placed on a horseshoe headrest with the   head turned to the right and a wedge under the left shoulder.  Greer catheter   was placed.  He was given 300 mL of intravenous mannitol and an antibiotic.    The previous skin incision was prepped and draped in a standard fashion.  I   reopened the skin incision with a 22 blade.  I placed Trudy clips on the   edges.  I then carefully peeled the flap using sharp dissection and 2/3 of the   flap was evident and the Silastic sheet provided an excellent nonadhesive   surface anterosuperiorly.  The brain was evident that was attached to the   flap.  Consequently, I had to use a combination of diathermy and   microdissection in order to release this and opened up flap, identified all   the margins of the craniotomy.  The old Duragen flap was left in place.  I   placed further Duragen patches over the brain with Surgicel stamps.    Hemostasis obtained.  I then reapproximated the bone flap after making   drainage holes in it with an AM-8 drill bit using multiple Leibinger plates   and screws.  Once I placed the bone flap, I then placed a suction subfascial   Hemovac.  The scalp was closed with multiple Vicryl sutures with staples to   skin and then a dressing was applied.    COMPLICATIONS:  Nil.    ESTIMATED BLOOD LOSS:  300 mL.    We will watch him overnight in the intensive care.  We will do a CT scan in   the morning and go from there.       ____________________________________     MD JENNIFER LI / JW    DD:  03/20/2019 22:16:24  DT:  03/21/2019 03:06:50    D#:  [id number]  Job#:  [id number]

## 2019-03-22 LAB
ANION GAP SERPL CALC-SCNC: 9 MMOL/L (ref 0–11.9)
APTT PPP: 28.6 SEC (ref 24.7–36)
BUN SERPL-MCNC: 7 MG/DL (ref 8–22)
CALCIUM SERPL-MCNC: 8.8 MG/DL (ref 8.5–10.5)
CHLORIDE SERPL-SCNC: 105 MMOL/L (ref 96–112)
CO2 SERPL-SCNC: 26 MMOL/L (ref 20–33)
CREAT SERPL-MCNC: 0.7 MG/DL (ref 0.5–1.4)
ERYTHROCYTE [DISTWIDTH] IN BLOOD BY AUTOMATED COUNT: 45.1 FL (ref 35.9–50)
GLUCOSE SERPL-MCNC: 87 MG/DL (ref 65–99)
HCT VFR BLD AUTO: 39.7 % (ref 42–52)
HGB BLD-MCNC: 12.3 G/DL (ref 14–18)
INR PPP: 1.16 (ref 0.87–1.13)
MAGNESIUM SERPL-MCNC: 1.7 MG/DL (ref 1.5–2.5)
MCH RBC QN AUTO: 30 PG (ref 27–33)
MCHC RBC AUTO-ENTMCNC: 31 G/DL (ref 33.7–35.3)
MCV RBC AUTO: 96.8 FL (ref 81.4–97.8)
PHOSPHATE SERPL-MCNC: 2.9 MG/DL (ref 2.5–4.5)
PLATELET # BLD AUTO: 258 K/UL (ref 164–446)
PMV BLD AUTO: 9.3 FL (ref 9–12.9)
POTASSIUM SERPL-SCNC: 3.9 MMOL/L (ref 3.6–5.5)
PROTHROMBIN TIME: 14.9 SEC (ref 12–14.6)
RBC # BLD AUTO: 4.1 M/UL (ref 4.7–6.1)
SODIUM SERPL-SCNC: 140 MMOL/L (ref 135–145)
WBC # BLD AUTO: 7.7 K/UL (ref 4.8–10.8)

## 2019-03-22 PROCEDURE — A9270 NON-COVERED ITEM OR SERVICE: HCPCS | Performed by: PHYSICIAN ASSISTANT

## 2019-03-22 PROCEDURE — 85730 THROMBOPLASTIN TIME PARTIAL: CPT

## 2019-03-22 PROCEDURE — 83735 ASSAY OF MAGNESIUM: CPT

## 2019-03-22 PROCEDURE — 99232 SBSQ HOSP IP/OBS MODERATE 35: CPT | Performed by: HOSPITALIST

## 2019-03-22 PROCEDURE — 700102 HCHG RX REV CODE 250 W/ 637 OVERRIDE(OP): Performed by: PHYSICIAN ASSISTANT

## 2019-03-22 PROCEDURE — 97530 THERAPEUTIC ACTIVITIES: CPT

## 2019-03-22 PROCEDURE — 85027 COMPLETE CBC AUTOMATED: CPT

## 2019-03-22 PROCEDURE — 84100 ASSAY OF PHOSPHORUS: CPT

## 2019-03-22 PROCEDURE — 700101 HCHG RX REV CODE 250: Performed by: PHYSICIAN ASSISTANT

## 2019-03-22 PROCEDURE — 80048 BASIC METABOLIC PNL TOTAL CA: CPT

## 2019-03-22 PROCEDURE — 700111 HCHG RX REV CODE 636 W/ 250 OVERRIDE (IP): Performed by: INTERNAL MEDICINE

## 2019-03-22 PROCEDURE — 770001 HCHG ROOM/CARE - MED/SURG/GYN PRIV*

## 2019-03-22 PROCEDURE — 700102 HCHG RX REV CODE 250 W/ 637 OVERRIDE(OP): Performed by: INTERNAL MEDICINE

## 2019-03-22 PROCEDURE — 700112 HCHG RX REV CODE 229: Performed by: PHYSICIAN ASSISTANT

## 2019-03-22 PROCEDURE — A9270 NON-COVERED ITEM OR SERVICE: HCPCS | Performed by: INTERNAL MEDICINE

## 2019-03-22 PROCEDURE — 92526 ORAL FUNCTION THERAPY: CPT

## 2019-03-22 PROCEDURE — 85610 PROTHROMBIN TIME: CPT

## 2019-03-22 RX ORDER — POTASSIUM CHLORIDE 20 MEQ/1
40 TABLET, EXTENDED RELEASE ORAL ONCE
Status: COMPLETED | OUTPATIENT
Start: 2019-03-22 | End: 2019-03-22

## 2019-03-22 RX ORDER — MAGNESIUM SULFATE HEPTAHYDRATE 40 MG/ML
2 INJECTION, SOLUTION INTRAVENOUS ONCE
Status: COMPLETED | OUTPATIENT
Start: 2019-03-22 | End: 2019-03-22

## 2019-03-22 RX ADMIN — DOCUSATE SODIUM 100 MG: 50 LIQUID ORAL at 05:47

## 2019-03-22 RX ADMIN — DOCUSATE SODIUM 100 MG: 50 LIQUID ORAL at 20:32

## 2019-03-22 RX ADMIN — SENNOSIDES AND DOCUSATE SODIUM 2 TABLET: 8.6; 5 TABLET ORAL at 05:48

## 2019-03-22 RX ADMIN — POTASSIUM CHLORIDE 40 MEQ: 1500 TABLET, EXTENDED RELEASE ORAL at 09:55

## 2019-03-22 RX ADMIN — MAGNESIUM SULFATE IN WATER 2 G: 40 INJECTION, SOLUTION INTRAVENOUS at 11:37

## 2019-03-22 RX ADMIN — OXYCODONE HYDROCHLORIDE 5 MG: 5 TABLET ORAL at 02:49

## 2019-03-22 RX ADMIN — POTASSIUM CHLORIDE AND SODIUM CHLORIDE: 900; 150 INJECTION, SOLUTION INTRAVENOUS at 01:00

## 2019-03-22 RX ADMIN — LEVETIRACETAM 500 MG: 100 SOLUTION ORAL at 18:55

## 2019-03-22 RX ADMIN — SENNOSIDES AND DOCUSATE SODIUM 2 TABLET: 8.6; 5 TABLET ORAL at 18:00

## 2019-03-22 RX ADMIN — AMANTADINE HYDROCHLORIDE 200 MG: 50 SOLUTION ORAL at 05:47

## 2019-03-22 RX ADMIN — ACETAMINOPHEN 1000 MG: 500 TABLET ORAL at 18:52

## 2019-03-22 RX ADMIN — ACETAMINOPHEN 1000 MG: 500 TABLET ORAL at 11:37

## 2019-03-22 RX ADMIN — FAMOTIDINE 20 MG: 20 TABLET ORAL at 05:48

## 2019-03-22 RX ADMIN — OXYCODONE HYDROCHLORIDE 5 MG: 5 TABLET ORAL at 05:49

## 2019-03-22 RX ADMIN — OXYCODONE HYDROCHLORIDE 5 MG: 5 TABLET ORAL at 16:34

## 2019-03-22 RX ADMIN — FAMOTIDINE 20 MG: 20 TABLET ORAL at 18:53

## 2019-03-22 RX ADMIN — OXYCODONE HYDROCHLORIDE 5 MG: 5 TABLET ORAL at 09:55

## 2019-03-22 RX ADMIN — OXYCODONE HYDROCHLORIDE 5 MG: 5 TABLET ORAL at 21:12

## 2019-03-22 RX ADMIN — ACETAMINOPHEN 1000 MG: 500 TABLET ORAL at 05:48

## 2019-03-22 RX ADMIN — LEVETIRACETAM 500 MG: 100 SOLUTION ORAL at 05:47

## 2019-03-22 ASSESSMENT — ENCOUNTER SYMPTOMS
CONSTITUTIONAL NEGATIVE: 1
NERVOUS/ANXIOUS: 1
RESPIRATORY NEGATIVE: 1
FOCAL WEAKNESS: 1
EYES NEGATIVE: 1
MUSCULOSKELETAL NEGATIVE: 1
GASTROINTESTINAL NEGATIVE: 1
SPEECH CHANGE: 1
CARDIOVASCULAR NEGATIVE: 1

## 2019-03-22 ASSESSMENT — PATIENT HEALTH QUESTIONNAIRE - PHQ9
2. FEELING DOWN, DEPRESSED, IRRITABLE, OR HOPELESS: NOT AT ALL
SUM OF ALL RESPONSES TO PHQ9 QUESTIONS 1 AND 2: 0
1. LITTLE INTEREST OR PLEASURE IN DOING THINGS: NOT AT ALL

## 2019-03-22 NOTE — CARE PLAN
Problem: Safety  Goal: Will remain free from falls  Outcome: PROGRESSING AS EXPECTED    Intervention: Implement fall precautions  Patient and family educated on saftey precautions. Bed in low and locked position. Call light within reach. Room near nursing station. Patient encouraged to call staff for help. Two siderails up. Non slip socks in place during mobility.

## 2019-03-22 NOTE — THERAPY
"Speech Language Therapy dysphagia treatment completed.   Functional Status:  Patient seen this date for dysphagia treatment session. Patient was in bed and had finished D3/thins breakfast tray when this intern entered the room. Per RN and patient report, patient is tolerating current D3/thin diet, but was interested in advancing diet.  Patient was cooperative and pleasant, although speech was limited to yes/no responses as suspected expressive aphasia was noted. Patient consumed trials of mixed consistencies, solids, and thin liquids via cup sip and straw. No overt s/sx of aspiration noted on any PO trials. Patient did indicate that the solid food hurt his head, and he indicated his temporomandibular area as where the pain was. Patient and family instructed on dysphagia strategies such as keeping HOB at 90 degrees and taking small bites and sips. Patient and family verbalized understanding. Recommend patient remain on Dysphagia 3/thin liquid diet per patient preference. RN aware. SLP is following.     Recommendations:Recommend patient remain on Dysphagia 3/thin liquid diet per patient preference.  Plan of Care: Will benefit from Speech Therapy 3 times per week  Post-Acute Therapy: Recommend inpatient transitional care services for continued speech therapy services.      See \"Rehab Therapy-Acute\" Patient Summary Report for complete documentation.     "

## 2019-03-22 NOTE — PROGRESS NOTES
Hospital Medicine Daily Progress Note    Date of Service  3/22/2019    Chief Complaint  28 y.o. male admitted 3/20/2019 with recent prolonged hospitalization secondary to TBI, status post craniectomy, status post tracheostomy, history of seizures.  The patient had IVC filter placed, diagnosed with pulmonary embolism.  The patient admitted for bone flap replacement and transition to his home location for ongoing rehab    Hospital Course   Admission for cranioplasty      Interval Problem Update  Patient seen and examined today. ICU Care  Care and plan discussed in IDT/Hot rounds.  Lines and assistive devices reviewed.    Patient tolerating treatment and therapies.  All Data, Medication data reviewed.  Case discussed with nursing as available.  Plan of Care reviewed with patient and notified of changes.  3/21 patient seems to have tolerated the procedure well, CT reviewed by neurosurgery, the patient is alert and oriented, family at the bedside.  Right upper and right lower extremity splints, helmet is at the bedside  3/22 patient seems improved, is awake and alert, eating lunch, decannulated, some right lower extremity spasticity noted, family at the bedside, no pain, mild headache  Consultants/Specialty  Neurosurgery    Code Status  Full code    Disposition  TBD, neurosurgical unit and eventually rehab in his home town    Review of Systems  Review of Systems   Constitutional: Negative.    HENT: Negative.    Eyes: Negative.    Respiratory: Negative.    Cardiovascular: Negative.    Gastrointestinal: Negative.    Genitourinary: Negative.    Musculoskeletal: Negative.    Skin: Negative.    Neurological: Positive for speech change and focal weakness.   Endo/Heme/Allergies: Negative.    Psychiatric/Behavioral: The patient is nervous/anxious.    All other systems reviewed and are negative.       Physical Exam  Temp:  [36.7 °C (98 °F)-37.3 °C (99.2 °F)] 37.2 °C (99 °F)  Pulse:  [] 105  Resp:  [5-22] 12  BP:  ()/(42-72) 112/72  SpO2:  [90 %-99 %] 96 %    Physical Exam   Constitutional: He appears well-developed.   Pt seen and examined.   HENT:   Head: Normocephalic and atraumatic.   Status post bone flap replacement surgery  Dressing in place   Eyes: Pupils are equal, round, and reactive to light.   Neck: Normal range of motion. Neck supple.   Cardiovascular: Normal rate, normal heart sounds and intact distal pulses.    Pulmonary/Chest: Effort normal and breath sounds normal.   Abdominal: Soft. Bowel sounds are normal.   Genitourinary: Penis normal.   Musculoskeletal: Normal range of motion.   Neurological: He is alert.   Right upper and right lower extremity weakness   Skin: Skin is warm and dry.   Psychiatric: He has a normal mood and affect. His behavior is normal.   Nursing note and vitals reviewed.      Fluids    Intake/Output Summary (Last 24 hours) at 03/22/19 0744  Last data filed at 03/22/19 0600   Gross per 24 hour   Intake             3700 ml   Output             1335 ml   Net             2365 ml       Laboratory  Recent Labs      03/21/19   0340  03/22/19   0540   WBC  8.8  7.7   RBC  4.65*  4.10*   HEMOGLOBIN  14.4  12.3*   HEMATOCRIT  43.7  39.7*   MCV  94.0  96.8   MCH  31.0  30.0   MCHC  33.0*  31.0*   RDW  44.0  45.1   PLATELETCT  349  258   MPV  9.5  9.3     Recent Labs      03/21/19   0340  03/22/19   0540   SODIUM  140  140   POTASSIUM  4.3  3.9   CHLORIDE  105  105   CO2  21  26   GLUCOSE  147*  87   BUN  11  7*   CREATININE  0.72  0.70   CALCIUM  9.5  8.8     Recent Labs      03/19/19   0855  03/21/19   0340  03/22/19   0540   APTT   --   28.8  28.6   INR  1.05  1.11  1.16*               Imaging  CT-HEAD W/O   Final Result   Addendum 1 of 1   These findings were discussed with the patient's on-call clinician,    Ofelia Parham, on 3/21/2019 4:39 AM.      Final         1.  New area of parenchymal hemorrhage within the left frontal lobe laterally.   2.  Pneumocephalus and scattered subarachnoid  hemorrhages along the cranioplasty site.   3.  Mixed density anterior and posterior extra axial fluid collections on the left, stable since prior.   4.  Asymmetric dilatation of the left ventricle with associated encephalomalacia changes of the left hemisphere.           Assessment/Plan  Pulmonary embolus, right (HCC)- (present on admission)   Assessment & Plan    No anticoagulation due to recent head bleed, patient has been cleared for low dose lovenox by neurosurgery but his is currently on hold for bone flap placement  IVC filter is in place      Subdural hematoma (HCC)- (present on admission)   Assessment & Plan    Treated with craniotomy 1/26/19  Bone flap replaced on 3/20 by Dr. Huntley  The patient will need rehab after bone flap placement and family lives in [place] and insurance is prepared to fly him there for rehab after bone flap is placed  Propranolol is to continue and taper as tolerated for tachycardia after his head bleed  Continue keppra for seizure prophylaxis     Acute respiratory failure following trauma and surgery (HCC)- (present on admission)   Assessment & Plan    This is resolved and the patient has a tracheostomy in place until bone flap is replaced then can progress to decanulation  His tracheostomy has been capped for over 2 weeks now and he is tolerating a regular diet     Hemiplegia (HCC)   Assessment & Plan    On the right, resultant from his TBI     Seizures (HCC)   Assessment & Plan    History of, continue Keppra        Plan  Await surgical outcome  Patient appears to be stable for neurosurgical floor transfer  Continue therapies  Continue conservative measures  Apparently the upper extremity splint was misplaced  See orders  VTE prophylaxis: Lovenox cleared for start date 3/25    I have performed a physical exam and reviewed and updated ROS and Plan today . In review of yesterday's note , there are no changes except as documented above.

## 2019-03-22 NOTE — PROGRESS NOTES
Neurosurgery Progress Note    Subjective:  POD #2  Alert and oriented  Some surgical site pain  Hemovac 5cc    Exam:  GCS: 14  -some aphasia remains  Labs stable: Na+ 140  VSS  Right hemiplegia  Left motor exam is intact    BP  Min: 98/42  Max: 112/72  Pulse  Av.3  Min: 82  Max: 105  Resp  Av.9  Min: 5  Max: 22  Temp  Av.1 °C (98.7 °F)  Min: 36.7 °C (98 °F)  Max: 37.3 °C (99.2 °F)  Monitored Temp 2  Av.2 °C (99 °F)  Min: 37.1 °C (98.8 °F)  Max: 37.3 °C (99.1 °F)  SpO2  Av.3 %  Min: 90 %  Max: 99 %    No Data Recorded    Recent Labs      19   0340  19   0540   WBC  8.8  7.7   RBC  4.65*  4.10*   HEMOGLOBIN  14.4  12.3*   HEMATOCRIT  43.7  39.7*   MCV  94.0  96.8   MCH  31.0  30.0   MCHC  33.0*  31.0*   RDW  44.0  45.1   PLATELETCT  349  258   MPV  9.5  9.3     Recent Labs      19   0340  19   0540   SODIUM  140  140   POTASSIUM  4.3  3.9   CHLORIDE  105  105   CO2  21  26   GLUCOSE  147*  87   BUN  11  7*   CREATININE  0.72  0.70   CALCIUM  9.5  8.8     Recent Labs      19   0855  19   0340  19   0540   APTT   --   28.8  28.6   INR  1.05  1.11  1.16*           Intake/Output       19 0700 - 19 0659 19 0700 - 19 0659       Total  Total       Intake    P.O.  1000  100 1100  --  -- --    P.O. 3726 061 4175 -- -- --    I.V.  1200  1200 2400  --  -- --    Volume (mL) (0.9 % NaCl with KCl 20 mEq infusion) 1200 1200 2400 -- -- --    IV Piggyback  100  100 200  --  -- --    Volume (mL) (ceFAZolin in dextrose (ANCEF) IVPB premix 2 g) 100 100 200 -- -- --    Total Intake 2300 1400 3700 -- -- --       Output    Urine  400  870 1270  --  -- --    Number of Times Voided -- 3 x 3 x -- -- --    Urine Void (mL)  -- -- --    Output (mL) ([REMOVED] Urethral Catheter Latex) 150 -- 150 -- -- --    Drains  60  5 65  --  -- --    Output (mL) (Closed/Suction Drain 1 Left;Posterior Other (Comment)  [facility]) 60 5 65 -- -- --    Stool  --  0 0  --  -- --    Number of Times Stooled 0 x 0 x 0 x -- -- --    Measurable Stool (mL) -- 0 0 -- -- --    Total Output  -- -- --       Net I/O     3724 725 7028 -- -- --            Intake/Output Summary (Last 24 hours) at 03/22/19 0754  Last data filed at 03/22/19 0600   Gross per 24 hour   Intake             3700 ml   Output             1335 ml   Net             2365 ml            • famotidine  20 mg BID   • amantadine  200 mg BID   • bisacodyl  10 mg Q24HRS PRN   • docusate sodium 100mg/10mL  100 mg BID   • levETIRAcetam  500 mg BID   • magnesium hydroxide  30 mL QDAY PRN   • propranolol  10 mg Q8HRS   • senna-docusate  2 Tab BID   • Pharmacy Consult Request  1 Each PHARMACY TO DOSE   • MD ALERT...DO NOT ADMINISTER NSAIDS or ASPIRIN unless ORDERED By Neurosurgery  1 Each PRN   • ondansetron  4 mg Q4HRS PRN   • dexamethasone  4 mg Once PRN   • diphenhydrAMINE  25 mg Q6HRS PRN   • scopolamine  1 Patch Q72HRS PRN   • labetalol  10 mg Q HOUR PRN   • hydrALAZINE  10 mg Q HOUR PRN   • senna-docusate  1 Tab Q24HRS PRN   • polyethylene glycol/lytes  1 Packet BID PRN   • magnesium hydroxide  30 mL QDAY PRN   • bisacodyl  10 mg Q24HRS PRN   • fleet  1 Each Once PRN   • Respiratory Care per Protocol   Continuous RT   • 0.9 % NaCl with KCl 20 mEq 1,000 mL   Continuous   • acetaminophen  1,000 mg Q6HRS   • oxyCODONE immediate-release  2.5 mg Q3HRS PRN   • oxyCODONE immediate-release  5 mg Q3HRS PRN   • morphine injection  2 mg Q3HRS PRN       Assessment and Plan:  Hospital day #3  POD #2  Prophylactic anticoagulation:no    Plan:  1. q4 neuro checks  2. Advance diet  3. Lovenox ok 3/25/19  4. Ok to t/f to floor from NSX standpoint  5. Ok for t/f to Rehab from [facility] stanpoint  6. Staples out 4/3/19

## 2019-03-22 NOTE — DISCHARGE PLANNING
[facility] [place]    Action: LSW received tc from [facility] who stated that they have authorized pt to go to Rehab.     Auth Number: [id number]  [facility] CM: Maryann [phone]    LSW called and spoke with Christy HDZ CM at [facility] [phone] who stated that they will verify authorization and to begin working on [facility] for 2/26/19, they prefer early in the morning due to time change. Christy provided information listed below:     [place] @ [address] ([facility] of [address] [place] [address])  [place] [address]  Accepting MD: Dr. Deidra Corcoran  Unit: [address] [place] [address]  RN station: [phone]    LSW called [facility] at [phone] and spoke to Yari Talavera who stated that pt does have flight benefits and she will begin working on flight and will let this LSW know.     Barriers to Discharge: None    Plan: Waiting on confirmation of flight time from [facility].

## 2019-03-22 NOTE — CARE PLAN
Problem: Pain Management  Goal: Pain level will decrease to patient's comfort goal  Outcome: PROGRESSING AS EXPECTED   03/20/19 2330 03/21/19 0940 03/21/19 1800   OTHER   Pain Rating Scale (NPRS) 1 --  --    Non Verbal Scale  --  --  Calm;Sleeping   Therapist Pain Assessment --  During Activity;Post Activity Pain Same as Prior to Activity;Nurse Notified;3  (head, with chewing) --      Patient medicated for pain per MAR. Non pharmacologic pain interventions in place to decrease pain; pillows used for support and repositioning. Blankets offered. Temperature adjusted per pt request.

## 2019-03-22 NOTE — CARE PLAN
Problem: Safety  Goal: Will remain free from falls  Outcome: PROGRESSING AS EXPECTED  Instruct patient to notify staff of any needs by utilizing the call light system    Problem: Pain Management  Goal: Pain level will decrease to patient's comfort goal  Outcome: PROGRESSING AS EXPECTED  Assess patient's pain level Q2H and provide interventions as indicated

## 2019-03-22 NOTE — PROGRESS NOTES
2 RN skin check complete.     - Cranioplasty site located on frontal left skull region. Dressing has old dried drainage, MD aware. Hemovac in place on top of skull.  - Sacrum is intact. No redness or discolored areas noted.  - Heels elbows and other bony prominences are intact and floated on pillows.   - Unable to visualize back of head due to surgical dressing. Per MD do not change or remove dressing.

## 2019-03-23 PROBLEM — J95.821 ACUTE RESPIRATORY FAILURE FOLLOWING TRAUMA AND SURGERY (HCC): Status: RESOLVED | Noted: 2019-01-26 | Resolved: 2019-03-23

## 2019-03-23 PROCEDURE — 700102 HCHG RX REV CODE 250 W/ 637 OVERRIDE(OP): Performed by: HOSPITALIST

## 2019-03-23 PROCEDURE — A9270 NON-COVERED ITEM OR SERVICE: HCPCS | Performed by: PHYSICIAN ASSISTANT

## 2019-03-23 PROCEDURE — 99232 SBSQ HOSP IP/OBS MODERATE 35: CPT | Performed by: HOSPITALIST

## 2019-03-23 PROCEDURE — A9270 NON-COVERED ITEM OR SERVICE: HCPCS | Performed by: INTERNAL MEDICINE

## 2019-03-23 PROCEDURE — A9270 NON-COVERED ITEM OR SERVICE: HCPCS | Performed by: HOSPITALIST

## 2019-03-23 PROCEDURE — 700112 HCHG RX REV CODE 229: Performed by: PHYSICIAN ASSISTANT

## 2019-03-23 PROCEDURE — 770001 HCHG ROOM/CARE - MED/SURG/GYN PRIV*

## 2019-03-23 PROCEDURE — 700102 HCHG RX REV CODE 250 W/ 637 OVERRIDE(OP): Performed by: PHYSICIAN ASSISTANT

## 2019-03-23 PROCEDURE — 700102 HCHG RX REV CODE 250 W/ 637 OVERRIDE(OP): Performed by: INTERNAL MEDICINE

## 2019-03-23 RX ADMIN — FAMOTIDINE 20 MG: 20 TABLET ORAL at 06:02

## 2019-03-23 RX ADMIN — ACETAMINOPHEN 1000 MG: 500 TABLET ORAL at 16:55

## 2019-03-23 RX ADMIN — SENNOSIDES AND DOCUSATE SODIUM 2 TABLET: 8.6; 5 TABLET ORAL at 06:00

## 2019-03-23 RX ADMIN — ACETAMINOPHEN 1000 MG: 500 TABLET ORAL at 11:24

## 2019-03-23 RX ADMIN — ACETAMINOPHEN 1000 MG: 500 TABLET ORAL at 06:02

## 2019-03-23 RX ADMIN — AMANTADINE HYDROCHLORIDE 200 MG: 50 SOLUTION ORAL at 14:47

## 2019-03-23 RX ADMIN — DOCUSATE SODIUM 100 MG: 50 LIQUID ORAL at 06:02

## 2019-03-23 RX ADMIN — AMANTADINE HYDROCHLORIDE 200 MG: 50 SOLUTION ORAL at 09:40

## 2019-03-23 RX ADMIN — LEVETIRACETAM 500 MG: 100 SOLUTION ORAL at 17:47

## 2019-03-23 RX ADMIN — POLYETHYLENE GLYCOL 3350 1 PACKET: 17 POWDER, FOR SOLUTION ORAL at 09:39

## 2019-03-23 RX ADMIN — FAMOTIDINE 20 MG: 20 TABLET ORAL at 16:55

## 2019-03-23 RX ADMIN — LEVETIRACETAM 500 MG: 100 SOLUTION ORAL at 08:56

## 2019-03-23 ASSESSMENT — PATIENT HEALTH QUESTIONNAIRE - PHQ9
1. LITTLE INTEREST OR PLEASURE IN DOING THINGS: NOT AT ALL
2. FEELING DOWN, DEPRESSED, IRRITABLE, OR HOPELESS: NOT AT ALL
SUM OF ALL RESPONSES TO PHQ9 QUESTIONS 1 AND 2: 0
1. LITTLE INTEREST OR PLEASURE IN DOING THINGS: NOT AT ALL
SUM OF ALL RESPONSES TO PHQ9 QUESTIONS 1 AND 2: 0
2. FEELING DOWN, DEPRESSED, IRRITABLE, OR HOPELESS: NOT AT ALL

## 2019-03-23 ASSESSMENT — ENCOUNTER SYMPTOMS
EYES NEGATIVE: 1
FOCAL WEAKNESS: 1
GASTROINTESTINAL NEGATIVE: 1
RESPIRATORY NEGATIVE: 1
CARDIOVASCULAR NEGATIVE: 1
MUSCULOSKELETAL NEGATIVE: 1
NERVOUS/ANXIOUS: 1
CONSTITUTIONAL NEGATIVE: 1
SPEECH CHANGE: 1

## 2019-03-23 NOTE — CARE PLAN
Problem: Communication  Goal: The ability to communicate needs accurately and effectively will improve  Outcome: PROGRESSING AS EXPECTED      Problem: Knowledge Deficit  Goal: Knowledge of disease process/condition, treatment plan, diagnostic tests, and medications will improve  Outcome: PROGRESSING AS EXPECTED      Problem: Pain Management  Goal: Pain level will decrease to patient's comfort goal  Outcome: PROGRESSING AS EXPECTED

## 2019-03-23 NOTE — PROGRESS NOTES
Surgery patient?: Yes  Date of surgery:  3/20/19  Ambulated 50 ft on day of surgery? (N/A if today is not date of surgery): NA  Number of times ambulated 50 feet or greater today: 1  Patient has been up to chair, edge of bed or HOB 90 degrees for all meals?: yes  Goal met? (goal is ambulating at least 50 feet 2 times on day shift, one time on night shift): No  If patient did not meet mobility goal, why?: Pt is max assist, tires easily, working with PT

## 2019-03-23 NOTE — PROGRESS NOTES
Neurosurgery Progress Note    Subjective:  POD #3  Alert and oriented  Some surgical site pain  Mild swelling under left eye     Exam:  GCS: 14  -some aphasia remains  Patient can answer yes/no appropriately easier than giving the answer to orientation questions. He gets frustrated when he can't speak the word worsening the aphasia.   Labs stable: Na+ 140  VSS  Right hemiplegia  Left motor exam is intact  Denies blurry/double vision. No pain with left eye swelling  Incision open to air with staples intact. No drainage.     BP  Min: 109/75  Max: 126/80  Pulse  Av.6  Min: 88  Max: 123  Resp  Av.8  Min: 16  Max: 18  Temp  Av.3 °C (99.1 °F)  Min: 36.7 °C (98 °F)  Max: 37.8 °C (100 °F)  SpO2  Av.9 %  Min: 94 %  Max: 100 %    No Data Recorded    Recent Labs      19   0340  19   0540   WBC  8.8  7.7   RBC  4.65*  4.10*   HEMOGLOBIN  14.4  12.3*   HEMATOCRIT  43.7  39.7*   MCV  94.0  96.8   MCH  31.0  30.0   MCHC  33.0*  31.0*   RDW  44.0  45.1   PLATELETCT  349  258   MPV  9.5  9.3     Recent Labs      19   0340  19   0540   SODIUM  140  140   POTASSIUM  4.3  3.9   CHLORIDE  105  105   CO2  21  26   GLUCOSE  147*  87   BUN  11  7*   CREATININE  0.72  0.70   CALCIUM  9.5  8.8     Recent Labs      19   0340  19   0540   APTT  28.8  28.6   INR  1.11  1.16*           Intake/Output       19 0700 - 19 0659 19 07 - 19 0659       Total  Total       Intake    P.O.  720  -- 720  240  -- 240    P.O. 720 -- 720 240 -- 240    I.V.  250  -- 250  --  -- --    Magnesium Sulfate Volume 50 -- 50 -- -- --    Volume (mL) (0.9 % NaCl with KCl 20 mEq infusion) 200 -- 200 -- -- --    Total Intake 970 -- 970 240 -- 240       Output    Urine  1500  900 2400  400  -- 400    Number of Times Voided 5 x -- 5 x 1 x -- 1 x    Urine Void (mL) 5696 878 9845 400 -- 400    Total Output 9724 689 5393 400 -- 400       Net I/O     -714 -867  -1430 -160 -- -160            Intake/Output Summary (Last 24 hours) at 03/23/19 1112  Last data filed at 03/23/19 0830   Gross per 24 hour   Intake              650 ml   Output             2175 ml   Net            -1525 ml            • amantadine  200 mg BID   • famotidine  20 mg BID   • bisacodyl  10 mg Q24HRS PRN   • docusate sodium 100mg/10mL  100 mg BID   • levETIRAcetam  500 mg BID   • magnesium hydroxide  30 mL QDAY PRN   • senna-docusate  2 Tab BID   • Pharmacy Consult Request  1 Each PHARMACY TO DOSE   • MD ALERT...DO NOT ADMINISTER NSAIDS or ASPIRIN unless ORDERED By Neurosurgery  1 Each PRN   • ondansetron  4 mg Q4HRS PRN   • dexamethasone  4 mg Once PRN   • diphenhydrAMINE  25 mg Q6HRS PRN   • scopolamine  1 Patch Q72HRS PRN   • labetalol  10 mg Q HOUR PRN   • hydrALAZINE  10 mg Q HOUR PRN   • senna-docusate  1 Tab Q24HRS PRN   • polyethylene glycol/lytes  1 Packet BID PRN   • magnesium hydroxide  30 mL QDAY PRN   • bisacodyl  10 mg Q24HRS PRN   • fleet  1 Each Once PRN   • Respiratory Care per Protocol   Continuous RT   • acetaminophen  1,000 mg Q6HRS   • oxyCODONE immediate-release  2.5 mg Q3HRS PRN   • oxyCODONE immediate-release  5 mg Q3HRS PRN   • morphine injection  2 mg Q3HRS PRN       Assessment and Plan:  Hospital day #4  POD #3  Prophylactic anticoagulation: no    Plan:  1. q4 neuro checks  2. Advance diet  3. Lovenox ok 3/25/19  4. Ok for t/f to Rehab from [facility]  5. Staples out 4/3/19     Keep incision clean and dry. Ok to wash gently with baby shampoo, pat dry.   Swelling under left eye can occur post op, no need to worry. Recommend keep HOB elevated slightly to prevent worsening. Should improve in a few days on its own.

## 2019-03-23 NOTE — PROGRESS NOTES
[facility] Medicine Daily Progress Note    Date of Service  3/23/2019    Chief Complaint  28 y.o. male admitted 3/20/2019 with recent prolonged hospitalization secondary to TBI, status post craniectomy, status post tracheostomy, history of seizures.  The patient had IVC filter placed, diagnosed with pulmonary embolism.  The patient admitted for bone flap replacement and transition to his home location for ongoing rehab    Hospital Course   Admission for cranioplasty      Interval Problem Update  No acute events overnight  He has no new complaints  Gets frustrated as he has expressive aphasia    Consultants/Specialty  Neurosurgery    Code Status  Full code    Disposition  Tentatively scheduled to fly back to [place] on Tuesday    Review of Systems  Review of Systems   Constitutional: Negative.    HENT: Negative.    Eyes: Negative.    Respiratory: Negative.    Cardiovascular: Negative.    Gastrointestinal: Negative.    Genitourinary: Negative.    Musculoskeletal: Negative.    Skin: Negative.    Neurological: Positive for speech change and focal weakness.   Endo/Heme/Allergies: Negative.    Psychiatric/Behavioral: The patient is nervous/anxious.    All other systems reviewed and are negative.       Physical Exam  Temp:  [36.8 °C (98.2 °F)-37.8 °C (100 °F)] 36.8 °C (98.2 °F)  Pulse:  [] 92  Resp:  [16-18] 18  BP: (109-127)/(68-81) 127/74  SpO2:  [94 %-96 %] 96 %    Physical Exam   Constitutional: He appears well-developed and well-nourished.   Pt seen and examined.   HENT:   Head: Normocephalic and atraumatic.   Status post bone flap replacement surgery  Staples in place   Eyes: Pupils are equal, round, and reactive to light. Conjunctivae and EOM are normal.   Neck: Normal range of motion. Neck supple. No JVD present.   Cardiovascular: Normal rate.    No murmur heard.  Pulmonary/Chest: Effort normal and breath sounds normal. No respiratory distress.   Abdominal: Soft. Bowel sounds are normal. He exhibits no  distension.   Genitourinary: Penis normal.   Musculoskeletal: He exhibits no edema or tenderness.   Neurological: He is alert.   Right upper and right lower extremity weakness   Skin: Skin is warm and dry.   Psychiatric: He has a normal mood and affect. His behavior is normal.   Nursing note and vitals reviewed.      Fluids    Intake/Output Summary (Last 24 hours) at 03/23/19 1611  Last data filed at 03/23/19 1335   Gross per 24 hour   Intake              600 ml   Output             1750 ml   Net            -1150 ml       Laboratory  Recent Labs      03/21/19   0340  03/22/19   0540   WBC  8.8  7.7   RBC  4.65*  4.10*   HEMOGLOBIN  14.4  12.3*   HEMATOCRIT  43.7  39.7*   MCV  94.0  96.8   MCH  31.0  30.0   MCHC  33.0*  31.0*   RDW  44.0  45.1   PLATELETCT  349  258   MPV  9.5  9.3     Recent Labs      03/21/19   0340  03/22/19   0540   SODIUM  140  140   POTASSIUM  4.3  3.9   CHLORIDE  105  105   CO2  21  26   GLUCOSE  147*  87   BUN  11  7*   CREATININE  0.72  0.70   CALCIUM  9.5  8.8     Recent Labs      03/21/19   0340  03/22/19   0540   APTT  28.8  28.6   INR  1.11  1.16*               Imaging  CT-HEAD W/O   Final Result   Addendum 1 of 1   These findings were discussed with the patient's on-call clinician,    Ofelia Parham, on 3/21/2019 4:39 AM.      Final         1.  New area of parenchymal hemorrhage within the left frontal lobe laterally.   2.  Pneumocephalus and scattered subarachnoid hemorrhages along the cranioplasty site.   3.  Mixed density anterior and posterior extra axial fluid collections on the left, stable since prior.   4.  Asymmetric dilatation of the left ventricle with associated encephalomalacia changes of the left hemisphere.           Assessment/Plan  Pulmonary embolus, right (HCC)- (present on admission)   Assessment & Plan    No anticoagulation due to recent head bleed, patient has been cleared for low dose lovenox by neurosurgery but his is currently on hold for bone flap  placement  IVC filter is in place      Subdural hematoma (HCC)- (present on admission)   Assessment & Plan    Treated with craniotomy 1/26/19  Bone flap replaced on 3/20 by Dr. Huntley  The patient will need rehab after bone flap placement and family lives in [place] and insurance is prepared to fly him there for rehab after bone flap is placed  Propranolol is to continue and taper as tolerated for tachycardia after his head bleed  Continue keppra for seizure prophylaxis     Hemiplegia (HCC)- (present on admission)   Assessment & Plan    On the right, resultant from his TBI     Seizures (HCC)- (present on admission)   Assessment & Plan    History of, continue Keppra        Plan  To go to rehab in [place]

## 2019-03-23 NOTE — CARE PLAN
Problem: Safety  Goal: Will remain free from injury  Outcome: PROGRESSING AS EXPECTED  Safety precaution in effect. Call light within reach. Reminded patient to call for assist. Hourly rounds in effect. Discussed with patient & parents. Verbalized understanding.    Problem: Infection  Goal: Will remain free from infection  Outcome: PROGRESSING AS EXPECTED  Implement Standard precaution. Non skid socks in use. Call light within reach. reminded patient to call for assist. Hourly rounds in effect. Verbalized understanding.    Problem: Knowledge Deficit  Goal: Knowledge of disease process/condition, treatment plan, diagnostic tests, and medications will improve  Outcome: PROGRESSING AS EXPECTED  Discussed Plan of care with the ptaient & parents. Questions answered. Verbalized understanding.    Problem: Pain Management  Goal: Pain level will decrease to patient's comfort goal    Intervention: Follow pain managment plan developed in collaboration with patient and Interdisciplinary Team  Outcome ; Progressing as expected                     Educated on pain scale. Encouraged to verbalize pain. Will medicate as per MAR.

## 2019-03-23 NOTE — PROGRESS NOTES
0715 Patient's in bed. Bedside report received from NOC RN (Mikaela) at the beginning of the shift.  No distress noted.    0830 Patient's sitting up in bed, having Breakfast. Mother visiting. Patient has expressive aphasia. Declines pain medication at this time. Patient has right sided weakness, uses, splint for his right arm & boot for right foot (to prevent foot drop. Fall protocol in effect. Call light within reach. Reminded patient to call for assist. Assessment completed. No distress noted. Plan of care reviewed with the patient & mother. Questions answered. Verbalized understanding.    0939 Patient's sitting up in bed. Medicated with Miralax (see MAR - last BM was on 3/20/19). Father visiting. Fall protocol in effect.    1020 SHARLENE Suero visited. POC discussed with the patient & parents. New order received & acknowledged.    1040 Patient's in bed. Dr Wang visited. POC discussed with the patient & parents.    1245 Patient's sitting up in bed, having lunch. No c/o's at this time. Mother visiting.    1515 Patient's in bed. No c/o's at this time. No distress noted. Mother visiting.     1740 patient's sitting up in a chair, having Dinner. Parents at the bedside.

## 2019-03-23 NOTE — PROGRESS NOTES
Surgery patient?: yes  Date of surgery: 3/20/19  Ambulated 50 ft on day of surgery? (N/A if today is not date of surgery): n/a  Number of times ambulated 50 feet or greater today: 0  Patient has been up to chair, edge of bed or HOB 90 degrees for all meals?: yes  Goal met? (goal is ambulating at least 50 feet 2 times on day shift, one time on night shift): no  If patient did not meet mobility goal, why?: pt's max assist/2 person assist., works with PT, up in a chair

## 2019-03-24 PROCEDURE — A9270 NON-COVERED ITEM OR SERVICE: HCPCS | Performed by: PHYSICIAN ASSISTANT

## 2019-03-24 PROCEDURE — 99232 SBSQ HOSP IP/OBS MODERATE 35: CPT | Performed by: HOSPITALIST

## 2019-03-24 PROCEDURE — 700102 HCHG RX REV CODE 250 W/ 637 OVERRIDE(OP): Performed by: INTERNAL MEDICINE

## 2019-03-24 PROCEDURE — A9270 NON-COVERED ITEM OR SERVICE: HCPCS | Performed by: HOSPITALIST

## 2019-03-24 PROCEDURE — 700102 HCHG RX REV CODE 250 W/ 637 OVERRIDE(OP): Performed by: PHYSICIAN ASSISTANT

## 2019-03-24 PROCEDURE — 700102 HCHG RX REV CODE 250 W/ 637 OVERRIDE(OP): Performed by: HOSPITALIST

## 2019-03-24 PROCEDURE — 700112 HCHG RX REV CODE 229: Performed by: PHYSICIAN ASSISTANT

## 2019-03-24 PROCEDURE — A9270 NON-COVERED ITEM OR SERVICE: HCPCS | Performed by: INTERNAL MEDICINE

## 2019-03-24 PROCEDURE — 770001 HCHG ROOM/CARE - MED/SURG/GYN PRIV*

## 2019-03-24 RX ADMIN — GUAIFENESIN 100 MG: 100 SOLUTION ORAL at 20:54

## 2019-03-24 RX ADMIN — LEVETIRACETAM 500 MG: 100 SOLUTION ORAL at 17:05

## 2019-03-24 RX ADMIN — ACETAMINOPHEN 1000 MG: 500 TABLET ORAL at 06:13

## 2019-03-24 RX ADMIN — AMANTADINE HYDROCHLORIDE 200 MG: 50 SOLUTION ORAL at 08:17

## 2019-03-24 RX ADMIN — DOCUSATE SODIUM 100 MG: 50 LIQUID ORAL at 17:05

## 2019-03-24 RX ADMIN — SENNOSIDES AND DOCUSATE SODIUM 2 TABLET: 8.6; 5 TABLET ORAL at 17:06

## 2019-03-24 RX ADMIN — ACETAMINOPHEN 1000 MG: 500 TABLET ORAL at 11:47

## 2019-03-24 RX ADMIN — ACETAMINOPHEN 1000 MG: 500 TABLET ORAL at 17:05

## 2019-03-24 RX ADMIN — FAMOTIDINE 20 MG: 20 TABLET ORAL at 17:05

## 2019-03-24 RX ADMIN — AMANTADINE HYDROCHLORIDE 200 MG: 50 SOLUTION ORAL at 11:47

## 2019-03-24 RX ADMIN — FAMOTIDINE 20 MG: 20 TABLET ORAL at 06:14

## 2019-03-24 RX ADMIN — LEVETIRACETAM 500 MG: 100 SOLUTION ORAL at 06:14

## 2019-03-24 ASSESSMENT — PATIENT HEALTH QUESTIONNAIRE - PHQ9
1. LITTLE INTEREST OR PLEASURE IN DOING THINGS: NOT AT ALL
SUM OF ALL RESPONSES TO PHQ9 QUESTIONS 1 AND 2: 0
SUM OF ALL RESPONSES TO PHQ9 QUESTIONS 1 AND 2: 0
2. FEELING DOWN, DEPRESSED, IRRITABLE, OR HOPELESS: NOT AT ALL
2. FEELING DOWN, DEPRESSED, IRRITABLE, OR HOPELESS: NOT AT ALL
1. LITTLE INTEREST OR PLEASURE IN DOING THINGS: NOT AT ALL

## 2019-03-24 ASSESSMENT — ENCOUNTER SYMPTOMS
RESPIRATORY NEGATIVE: 1
EYES NEGATIVE: 1
FOCAL WEAKNESS: 1
SPEECH CHANGE: 1
NERVOUS/ANXIOUS: 1
MUSCULOSKELETAL NEGATIVE: 1
GASTROINTESTINAL NEGATIVE: 1
CARDIOVASCULAR NEGATIVE: 1

## 2019-03-24 NOTE — PROGRESS NOTES
[facility] Medicine Daily Progress Note    Date of Service  3/24/2019    Chief Complaint  28 y.o. male admitted 3/20/2019 with recent prolonged hospitalization secondary to TBI, status post craniectomy, status post tracheostomy, history of seizures.  The patient had IVC filter placed, diagnosed with pulmonary embolism.  The patient admitted for bone flap replacement and transition to his home location for ongoing rehab    Hospital Course   Admission for cranioplasty      Interval Problem Update  No acute events overnight  Left facial swelling has improved  He has no new complaints today    Consultants/Specialty  Neurosurgery    Code Status  Full code    Disposition  Tentatively scheduled to fly back to [place] on Tuesday    Review of Systems  Review of Systems   HENT: Negative.    Eyes: Negative.    Respiratory: Negative.    Cardiovascular: Negative.    Gastrointestinal: Negative.    Genitourinary: Negative.    Musculoskeletal: Negative.    Skin: Negative.    Neurological: Positive for speech change and focal weakness.   Endo/Heme/Allergies: Negative.    Psychiatric/Behavioral: The patient is nervous/anxious.    All other systems reviewed and are negative.       Physical Exam  Temp:  [36.8 °C (98.2 °F)-37.7 °C (99.8 °F)] 36.9 °C (98.5 °F)  Pulse:  [] 96  Resp:  [16-18] 18  BP: (113-127)/(73-86) 118/81  SpO2:  [94 %-96 %] 96 %    Physical Exam   Constitutional: He is oriented to person, place, and time. He appears well-developed and well-nourished.   HENT:   Head: Normocephalic and atraumatic.   Status post bone flap replacement surgery  Staples in place   Eyes: Pupils are equal, round, and reactive to light. Conjunctivae and EOM are normal.   Neck: Normal range of motion. Neck supple. No JVD present.   Cardiovascular: Normal rate and regular rhythm.    No murmur heard.  Pulmonary/Chest: Effort normal and breath sounds normal. No respiratory distress.   Abdominal: Soft. Bowel sounds are normal. He exhibits no  distension.   Genitourinary: Penis normal.   Musculoskeletal: He exhibits no edema or tenderness.   Neurological: He is alert and oriented to person, place, and time.   Right upper and right lower extremity weakness   Skin: Skin is warm and dry.   Psychiatric: He has a normal mood and affect. His behavior is normal.   Nursing note and vitals reviewed.      Fluids    Intake/Output Summary (Last 24 hours) at 03/24/19 1237  Last data filed at 03/24/19 0839   Gross per 24 hour   Intake              960 ml   Output              900 ml   Net               60 ml       Laboratory  Recent Labs      03/22/19   0540   WBC  7.7   RBC  4.10*   HEMOGLOBIN  12.3*   HEMATOCRIT  39.7*   MCV  96.8   MCH  30.0   MCHC  31.0*   RDW  45.1   PLATELETCT  258   MPV  9.3     Recent Labs      03/22/19   0540   SODIUM  140   POTASSIUM  3.9   CHLORIDE  105   CO2  26   GLUCOSE  87   BUN  7*   CREATININE  0.70   CALCIUM  8.8     Recent Labs      03/22/19   0540   APTT  28.6   INR  1.16*               Imaging  CT-HEAD W/O   Final Result   Addendum 1 of 1   These findings were discussed with the patient's on-call clinician,    Ofelia Parham, on 3/21/2019 4:39 AM.      Final         1.  New area of parenchymal hemorrhage within the left frontal lobe laterally.   2.  Pneumocephalus and scattered subarachnoid hemorrhages along the cranioplasty site.   3.  Mixed density anterior and posterior extra axial fluid collections on the left, stable since prior.   4.  Asymmetric dilatation of the left ventricle with associated encephalomalacia changes of the left hemisphere.           Assessment/Plan  Pulmonary embolus, right (HCC)- (present on admission)   Assessment & Plan    No anticoagulation due to recent head bleed, patient has been cleared for low dose lovenox by neurosurgery but his is currently on hold for bone flap placement  IVC filter is in place      Subdural hematoma (HCC)- (present on admission)   Assessment & Plan    Treated with craniotomy  1/26/19  Bone flap replaced on 3/20 by Dr. Huntley  The patient will need rehab after bone flap placement and family lives in [place] and insurance is prepared to fly him there for rehab after bone flap is placed  Propranolol is to continue and taper as tolerated for tachycardia after his head bleed  Continue keppra for seizure prophylaxis     Hemiplegia (HCC)- (present on admission)   Assessment & Plan    On the right, resultant from his TBI     Seizures (HCC)- (present on admission)   Assessment & Plan    History of, continue Keppra        Plan  To go to rehab in [facility] 4/3/2019

## 2019-03-24 NOTE — PROGRESS NOTES
0700 Patient's in bed. Bedside report received from NOC RN (Mikaela) at the beginning of the shift.    0815 Patient's sitting up in bed, having Breakfast. Mother visiting. Patient has expressive aphasia. Declines pain medication at this time. Patient has right side weakness, uses splint for his right arm & boot for right foot (to prevent foot drop). Noted left facial swelling has improved. Fall Protocol in effect. Call light within reach. Reminded patient to call for assist. Assessment completed. No distress noted. Plan of care reviewed with the patient & mother. Questions answered. Verbalized understanding. Ap Edwards PA-C visited. POC discussed with the patient & mother.     0952 Patient's sitting up in bed. Dr Tomlinson visited. PC discussed with the patient & mother.    1145 Patient's sitting up in bed. Father visiting.     1239 New order received & acknowledged from Dr Wang - Ok ofr father to wheelchair out to [facility].    1255 Patient's sitting up in bed, having lunch. Parents & friends visiting.    1400 Patient's sitting up in a wheelchair, wheeled around by father.    1530 Patient's sitting up in a wheelchair. No c/o's at this time. No distress noted.    1711  Placed a call to Dr Wang, awaiting for response.    1727 Received a call from Dr Wang, notified MD that patient c/o's cough,new order received & acknowledged (see MAR).

## 2019-03-24 NOTE — PROGRESS NOTES
Surgery patient?: yes  Date of surgery: 3/20/19  Ambulated 50 ft on day of surgery? (N/A if today is not date of surgery): n/a  Number of times ambulated 50 feet or greater today: 0  Patient has been up to chair, edge of bed or HOB 90 degrees for all meals?: yes  Goal met? (goal is ambulating at least 50 feet 2 times on day shift, one time on night shift): no  If patient did not meet mobility goal, why?: pt's x2 person assist, up in a chair

## 2019-03-24 NOTE — PROGRESS NOTES
1905 Patient's in bed. No changes in status. Bedside report given to Oncbebeto FUENTES RN (Davonte) .

## 2019-03-24 NOTE — CARE PLAN
Problem: Safety  Goal: Will remain free from injury  Outcome: PROGRESSING AS EXPECTED  Safety precaution in effect. Non skid socks use. Call light within reach. Reminded patient to call for assist. Hourly rounds in effect. Discussed with patient & parents. Verbalized understanding.    Problem: Infection  Goal: Will remain free from infection  Outcome: PROGRESSING AS EXPECTED  Implement Standard precaution. Hand washing every encounter & before & after patient care. Discussed with patient & parents. Verbalized understanding.    Problem: Knowledge Deficit  Goal: Knowledge of disease process/condition, treatment plan, diagnostic tests, and medications will improve  Outcome: PROGRESSING AS EXPECTED  Discussed plan of care with patient & parents. Questions answered. Verbalized understanding.

## 2019-03-24 NOTE — CARE PLAN
Problem: Communication  Goal: The ability to communicate needs accurately and effectively will improve  Outcome: PROGRESSING AS EXPECTED      Problem: Knowledge Deficit  Goal: Knowledge of disease process/condition, treatment plan, diagnostic tests, and medications will improve  Outcome: PROGRESSING AS EXPECTED      Problem: Respiratory:  Goal: Respiratory status will improve  Outcome: PROGRESSING AS EXPECTED

## 2019-03-24 NOTE — PROGRESS NOTES
Neurosurgery Progress Note    Subjective:  POD #4  Alert and oriented  Some surgical site pain  Mild swelling under left eye     Exam:  GCS: 14  -some aphasia remains  Patient can answer yes/no appropriately easier than giving the answer to orientation questions. He gets frustrated when he can't speak the word worsening the aphasia.   Labs stable: Na+ 140  VSS  Right hemiplegia  Left motor exam is intact  Denies blurry/double vision. No pain with left eye swelling  Incision open to air with staples intact. No drainage.     BP  Min: 113/73  Max: 127/74  Pulse  Av.5  Min: 84  Max: 100  Resp  Av  Min: 16  Max: 18  Temp  Av.2 °C (98.9 °F)  Min: 36.8 °C (98.2 °F)  Max: 37.7 °C (99.8 °F)  SpO2  Av.8 %  Min: 94 %  Max: 96 %    No Data Recorded    Recent Labs      19   0540   WBC  7.7   RBC  4.10*   HEMOGLOBIN  12.3*   HEMATOCRIT  39.7*   MCV  96.8   MCH  30.0   MCHC  31.0*   RDW  45.1   PLATELETCT  258   MPV  9.3     Recent Labs      19   0540   SODIUM  140   POTASSIUM  3.9   CHLORIDE  105   CO2  26   GLUCOSE  87   BUN  7*   CREATININE  0.70   CALCIUM  8.8     Recent Labs      19   0540   APTT  28.6   INR  1.16*           Intake/Output       19 0700 - 19 0659 19 0700 - 19 0659       1653-1274 Total  5876-6725 Total       Intake    P.O.  840  -- 840  --  -- --    P.O. 840 -- 840 -- -- --    Total Intake 840 -- 840 -- -- --       Output    Urine  850  900 1750  --  -- --    Urine -- 900 900 -- -- --    Number of Times Voided 3 x -- 3 x -- -- --    Urine Void (mL) 850 -- 850 -- -- --    Stool  --  -- --  --  -- --    Number of Times Stooled 1 x 3 x 4 x -- -- --    Total Output  -- -- --       Net I/O     -10 -900 -910 -- -- --            Intake/Output Summary (Last 24 hours) at 19 0825  Last data filed at 19 0600   Gross per 24 hour   Intake              840 ml   Output             1350 ml   Net             -510 ml             • amantadine  200 mg BID   • famotidine  20 mg BID   • bisacodyl  10 mg Q24HRS PRN   • docusate sodium 100mg/10mL  100 mg BID   • levETIRAcetam  500 mg BID   • magnesium hydroxide  30 mL QDAY PRN   • senna-docusate  2 Tab BID   • Pharmacy Consult Request  1 Each PHARMACY TO DOSE   • MD ALERT...DO NOT ADMINISTER NSAIDS or ASPIRIN unless ORDERED By Neurosurgery  1 Each PRN   • ondansetron  4 mg Q4HRS PRN   • dexamethasone  4 mg Once PRN   • diphenhydrAMINE  25 mg Q6HRS PRN   • scopolamine  1 Patch Q72HRS PRN   • labetalol  10 mg Q HOUR PRN   • hydrALAZINE  10 mg Q HOUR PRN   • senna-docusate  1 Tab Q24HRS PRN   • polyethylene glycol/lytes  1 Packet BID PRN   • magnesium hydroxide  30 mL QDAY PRN   • bisacodyl  10 mg Q24HRS PRN   • fleet  1 Each Once PRN   • Respiratory Care per Protocol   Continuous RT   • acetaminophen  1,000 mg Q6HRS   • oxyCODONE immediate-release  2.5 mg Q3HRS PRN   • oxyCODONE immediate-release  5 mg Q3HRS PRN   • morphine injection  2 mg Q3HRS PRN       Assessment and Plan:  Hospital day #5  POD #4  Prophylactic anticoagulation: no    Plan:  1. q4 neuro checks  2. Advance diet  3. Lovenox ok 3/25/19  4. Ok for t/f to Rehab from [facility]  5. Staples out 4/3/19     Keep incision clean and dry. Ok to wash gently with baby shampoo, pat dry.   Swelling under left eye can occur post op, no need to worry. Recommend keep HOB elevated slightly to prevent worsening. Should improve in a few days on its own.

## 2019-03-25 PROCEDURE — 99232 SBSQ HOSP IP/OBS MODERATE 35: CPT | Performed by: HOSPITALIST

## 2019-03-25 PROCEDURE — 770001 HCHG ROOM/CARE - MED/SURG/GYN PRIV*

## 2019-03-25 PROCEDURE — 97530 THERAPEUTIC ACTIVITIES: CPT

## 2019-03-25 PROCEDURE — A9270 NON-COVERED ITEM OR SERVICE: HCPCS | Performed by: HOSPITALIST

## 2019-03-25 PROCEDURE — 700102 HCHG RX REV CODE 250 W/ 637 OVERRIDE(OP): Performed by: HOSPITALIST

## 2019-03-25 PROCEDURE — A9270 NON-COVERED ITEM OR SERVICE: HCPCS | Performed by: INTERNAL MEDICINE

## 2019-03-25 PROCEDURE — 700111 HCHG RX REV CODE 636 W/ 250 OVERRIDE (IP): Performed by: PHYSICIAN ASSISTANT

## 2019-03-25 PROCEDURE — 700102 HCHG RX REV CODE 250 W/ 637 OVERRIDE(OP): Performed by: INTERNAL MEDICINE

## 2019-03-25 PROCEDURE — A9270 NON-COVERED ITEM OR SERVICE: HCPCS | Performed by: PHYSICIAN ASSISTANT

## 2019-03-25 PROCEDURE — 97535 SELF CARE MNGMENT TRAINING: CPT

## 2019-03-25 PROCEDURE — 700102 HCHG RX REV CODE 250 W/ 637 OVERRIDE(OP): Performed by: PHYSICIAN ASSISTANT

## 2019-03-25 RX ORDER — FAMOTIDINE 20 MG/1
20 TABLET, FILM COATED ORAL 2 TIMES DAILY
Qty: 60 TAB
Start: 2019-03-25

## 2019-03-25 RX ORDER — OXYCODONE HYDROCHLORIDE 5 MG/1
5 TABLET ORAL
Qty: 30 TAB | Refills: 0 | Status: SHIPPED | OUTPATIENT
Start: 2019-03-25 | End: 2019-04-04

## 2019-03-25 RX ORDER — ONDANSETRON 2 MG/ML
4 INJECTION INTRAMUSCULAR; INTRAVENOUS EVERY 4 HOURS PRN
Qty: 84 ML
Start: 2019-03-25

## 2019-03-25 RX ORDER — DIPHENHYDRAMINE HYDROCHLORIDE 50 MG/ML
25 INJECTION INTRAMUSCULAR; INTRAVENOUS EVERY 6 HOURS PRN
Refills: 0
Start: 2019-03-25

## 2019-03-25 RX ORDER — MORPHINE SULFATE 10 MG/ML
2 INJECTION, SOLUTION INTRAMUSCULAR; INTRAVENOUS
Qty: 180 ML | Refills: 0
Start: 2019-03-25 | End: 2019-04-04

## 2019-03-25 RX ORDER — POLYETHYLENE GLYCOL 3350 17 G/17G
17 POWDER, FOR SOLUTION ORAL 2 TIMES DAILY PRN
Refills: 3
Start: 2019-03-25

## 2019-03-25 RX ADMIN — AMANTADINE HYDROCHLORIDE 200 MG: 50 SOLUTION ORAL at 12:58

## 2019-03-25 RX ADMIN — FAMOTIDINE 20 MG: 20 TABLET ORAL at 17:11

## 2019-03-25 RX ADMIN — LEVETIRACETAM 500 MG: 100 SOLUTION ORAL at 05:01

## 2019-03-25 RX ADMIN — AMANTADINE HYDROCHLORIDE 200 MG: 50 SOLUTION ORAL at 09:18

## 2019-03-25 RX ADMIN — FAMOTIDINE 20 MG: 20 TABLET ORAL at 05:01

## 2019-03-25 RX ADMIN — ENOXAPARIN SODIUM 40 MG: 100 INJECTION SUBCUTANEOUS at 09:18

## 2019-03-25 RX ADMIN — LEVETIRACETAM 500 MG: 100 SOLUTION ORAL at 17:10

## 2019-03-25 RX ADMIN — GUAIFENESIN 100 MG: 100 SOLUTION ORAL at 20:29

## 2019-03-25 ASSESSMENT — ENCOUNTER SYMPTOMS
COUGH: 0
FEVER: 0
SPEECH CHANGE: 1
BLURRED VISION: 0
CHILLS: 0
DIZZINESS: 0
BACK PAIN: 0
VOMITING: 0
DEPRESSION: 0
MYALGIAS: 0
HEADACHES: 0
SHORTNESS OF BREATH: 0
NERVOUS/ANXIOUS: 1
NAUSEA: 0
PALPITATIONS: 0
SORE THROAT: 0
DOUBLE VISION: 0
FOCAL WEAKNESS: 1

## 2019-03-25 ASSESSMENT — COGNITIVE AND FUNCTIONAL STATUS - GENERAL
HELP NEEDED FOR BATHING: A LOT
DRESSING REGULAR UPPER BODY CLOTHING: A LITTLE
DRESSING REGULAR LOWER BODY CLOTHING: A LOT
EATING MEALS: A LITTLE
PERSONAL GROOMING: A LITTLE
DAILY ACTIVITIY SCORE: 15
SUGGESTED CMS G CODE MODIFIER DAILY ACTIVITY: CK
TOILETING: A LOT

## 2019-03-25 ASSESSMENT — PATIENT HEALTH QUESTIONNAIRE - PHQ9
SUM OF ALL RESPONSES TO PHQ9 QUESTIONS 1 AND 2: 0
2. FEELING DOWN, DEPRESSED, IRRITABLE, OR HOPELESS: NOT AT ALL
1. LITTLE INTEREST OR PLEASURE IN DOING THINGS: NOT AT ALL

## 2019-03-25 NOTE — THERAPY
"Occupational Therapy Treatment completed with focus on ADLs, ADL transfers, patient education and upper extremity function.  Functional Status:  CGA supine>sit, Karli scooting to EOB, ModA functional txf EOB>w/c, SPV grooming (wash cloth w/ R UE), Karli seated oral care with rinse/spit cups, hand-over-hand facilitation to release flexion in PID joints of R hand  Plan of Care: Will benefit from Occupational Therapy 4 times per week  Discharge Recommendations:  Equipment Will Continue to Assess for Equipment Needs. Post-acute therapy Recommend inpatient transitional care services for continued occupational therapy services - planned to be transferred to Rehab in [place].     See \"Rehab Therapy-Acute\" Patient Summary Report for complete documentation.     Pt seen for OT tx, motivated to get out of bed. Pt demonstrated good trunk strength and improved R hip flexor strength to move to EOB with CGA and cues. Pt required ModA for stand pivot txf to w/c with cues for R hand placement, demonstrated good L LE strength/control for pivot. Pt becomes very tense during transitional movements (supine>sit, sit>stand) and was educated to use his L hand to gently release flexor tone in PID joints of R hand in resting anti-contracture splint. Pt completed grooming with R UE, required Karli to manage spit/rinse cups for seated oral care. Pt with clear and consistent responses to yes/no questions, is able to clearly echo words when given options (ie: \"numb or normal sensation here?\" Pt responds \"numb\") but has difficulty word finding/expressing when asked open-ended questions.     "

## 2019-03-25 NOTE — DISCHARGE PLANNING
Anticipated Discharge Disposition:   [facility]     Action:    WILDER KIMBROUGH working with ED Arellano for patient transfer.    RN CM requested all patient radiology images placed onto a disc to be delivered to Neurosurgery nurses' station for transfer tomorrow.  Spoke with bedside RNShena to be watch for the disc.    Barriers to Discharge:    None    Plan:    Assist with disposition as needed.

## 2019-03-25 NOTE — PROGRESS NOTES
Neurosurgery Progress Note    Subjective:  POD #5  No changes overnight  Alert and oriented  Some surgical site pain  Mild swelling under left eye     Exam:  GCS: 14  -some aphasia remains  Patient can answer yes/no appropriately easier than giving the answer to orientation questions. He gets frustrated when he can't speak the word worsening the aphasia.   VSS  Right hemiplegia  Left motor exam is intact  Denies blurry/double vision. No pain with left eye swelling  Incision open to air with staples intact. No drainage.     BP  Min: 105/69  Max: 128/91  Pulse  Av.8  Min: 82  Max: 99  Resp  Av.2  Min: 16  Max: 18  Temp  Av.9 °C (98.4 °F)  Min: 36.7 °C (98.1 °F)  Max: 37.1 °C (98.8 °F)  SpO2  Av.4 %  Min: 92 %  Max: 98 %    No Data Recorded                      Intake/Output       19 0700 - 19 0659 19 07 - 19 0659      5694-5510 8835-1617 Total 0276-0008 8147-5593 Total       Intake    P.O.  1080  -- 1080  --  -- --    P.O. 1080 -- 1080 -- -- --    Total Intake 1080 -- 1080 -- -- --       Output    Urine  1100  300 1400  --  -- --    Number of Times Voided 5 x -- 5 x -- -- --    Urine Void (mL) 6550 699 4929 -- -- --    Stool  --  -- --  --  -- --    Number of Times Stooled -- 1 x 1 x -- -- --    Total Output 1074 081 5326 -- -- --       Net I/O     -20 -300 -320 -- -- --            Intake/Output Summary (Last 24 hours) at 19 0820  Last data filed at 19 0400   Gross per 24 hour   Intake             1080 ml   Output             1400 ml   Net             -320 ml            • guaiFENesin  5 mL Q6HRS PRN   • amantadine  200 mg BID   • famotidine  20 mg BID   • bisacodyl  10 mg Q24HRS PRN   • docusate sodium 100mg/10mL  100 mg BID   • levETIRAcetam  500 mg BID   • magnesium hydroxide  30 mL QDAY PRN   • senna-docusate  2 Tab BID   • Pharmacy Consult Request  1 Each PHARMACY TO DOSE   • MD ALERT...DO NOT ADMINISTER NSAIDS or ASPIRIN unless ORDERED By Neurosurgery  1  Each PRN   • ondansetron  4 mg Q4HRS PRN   • dexamethasone  4 mg Once PRN   • diphenhydrAMINE  25 mg Q6HRS PRN   • scopolamine  1 Patch Q72HRS PRN   • labetalol  10 mg Q HOUR PRN   • hydrALAZINE  10 mg Q HOUR PRN   • senna-docusate  1 Tab Q24HRS PRN   • polyethylene glycol/lytes  1 Packet BID PRN   • magnesium hydroxide  30 mL QDAY PRN   • bisacodyl  10 mg Q24HRS PRN   • fleet  1 Each Once PRN   • Respiratory Care per Protocol   Continuous RT   • acetaminophen  1,000 mg Q6HRS   • oxyCODONE immediate-release  2.5 mg Q3HRS PRN   • oxyCODONE immediate-release  5 mg Q3HRS PRN   • morphine injection  2 mg Q3HRS PRN       Assessment and Plan:  Hospital day #6  POD #5  Prophylactic anticoagulation: no    Plan:  1. q4 neuro checks  2. Advance diet  3. Lovenox ok today  4. Ok for t/f to Rehab from [facility]  5. Staples out 4/3/19     Keep incision clean and dry. Ok to wash gently with baby shampoo, pat dry.   Swelling under left eye can occur post op, no need to worry. Recommend keep HOB elevated slightly to prevent worsening. Should improve in a few days on its own.

## 2019-03-25 NOTE — DISCHARGE PLANNING
Anticipated Discharge Disposition: [facility]    Action: LSW received tc from Stephan [phone] with [facility] who stated that they are unable to transport pt until 3/27/19. LSW spoke with Christy at [facility] (Phone number: [phone]  Fax: [phone]) who stated Wednesday was fine for transfer. LSW called pt's mother and updated her on transfer for Wednesday. Pt's parents are fine with this and pt's mom is flying back to [facility]. LSW faxed over updated Progress notes to Christy at [facility].     [facility] @ [address] (corner of [address] [place] [address])  [place] [address]  Accepting MD: Dr. Deidra Corcoran  Unit: [address] [place] [address] station: [phone]    Barriers to Discharge: None    Plan: Leaving for [facility] 3/27/19 at 8am.

## 2019-03-25 NOTE — PROGRESS NOTES
[facility] Medicine Daily Progress Note    Date of Service  3/25/2019    Chief Complaint  28 y.o. male admitted 3/20/2019 with recent prolonged hospitalization secondary to TBI, status post craniectomy, status post tracheostomy, history of seizures.  The patient had IVC filter placed, diagnosed with pulmonary embolism.  The patient admitted for bone flap replacement and transition to his home location for ongoing rehab    Hospital Course   Admission for cranioplasty      Interval Problem Update  Doing well today  Has no complaints  Tentatively scheduled for transfer to [place] tomorrow    Consultants/Specialty  Neurosurgery    Code Status  Full code    Disposition  Tentatively scheduled to fly back to [place] tomorr    Review of Systems  Review of Systems   Constitutional: Negative for chills and fever.   HENT: Negative for hearing loss and sore throat.    Eyes: Negative for blurred vision and double vision.   Respiratory: Negative for cough and shortness of breath.    Cardiovascular: Negative for chest pain and palpitations.   Gastrointestinal: Negative for nausea and vomiting.   Genitourinary: Negative for dysuria and frequency.   Musculoskeletal: Negative for back pain and myalgias.   Skin: Negative for itching and rash.   Neurological: Positive for speech change and focal weakness. Negative for dizziness and headaches.   Psychiatric/Behavioral: Negative for depression. The patient is nervous/anxious.         Physical Exam  Temp:  [36.7 °C (98.1 °F)-37.1 °C (98.8 °F)] 36.9 °C (98.5 °F)  Pulse:  [82-99] 86  Resp:  [16-18] 17  BP: (105-128)/(69-91) 111/76  SpO2:  [92 %-98 %] 92 %    Physical Exam   Constitutional: He is oriented to person, place, and time. He appears well-developed and well-nourished.   HENT:   Head: Normocephalic and atraumatic.   Mouth/Throat: No oropharyngeal exudate.   Status post bone flap replacement surgery  Staples in place   Eyes: Pupils are equal, round, and reactive to light. EOM are  normal. No scleral icterus.   Neck: Normal range of motion. Neck supple. Thyromegaly present.   Cardiovascular: Normal rate and regular rhythm.    No murmur heard.  Pulmonary/Chest: Effort normal and breath sounds normal. No respiratory distress.   Abdominal: Soft. Bowel sounds are normal. He exhibits no distension.   Genitourinary: Penis normal.   Musculoskeletal: He exhibits no edema or tenderness.   Neurological: He is alert and oriented to person, place, and time.   Right upper and right lower extremity weakness   Skin: Skin is warm and dry.   Psychiatric: He has a normal mood and affect. His behavior is normal.   Nursing note and vitals reviewed.      Fluids    Intake/Output Summary (Last 24 hours) at 03/25/19 1023  Last data filed at 03/25/19 0400   Gross per 24 hour   Intake              720 ml   Output              900 ml   Net             -180 ml       Laboratory                        Imaging  CT-HEAD W/O   Final Result   Addendum 1 of 1   These findings were discussed with the patient's on-call clinician,    Ofelia Parham, on 3/21/2019 4:39 AM.      Final         1.  New area of parenchymal hemorrhage within the left frontal lobe laterally.   2.  Pneumocephalus and scattered subarachnoid hemorrhages along the cranioplasty site.   3.  Mixed density anterior and posterior extra axial fluid collections on the left, stable since prior.   4.  Asymmetric dilatation of the left ventricle with associated encephalomalacia changes of the left hemisphere.           Assessment/Plan  Pulmonary embolus, right (HCC)- (present on admission)   Assessment & Plan    No anticoagulation due to recent head bleed, patient has been cleared for low dose lovenox by neurosurgery but his is currently on hold for bone flap placement  IVC filter is in place      Subdural hematoma (HCC)- (present on admission)   Assessment & Plan    Treated with craniotomy 1/26/19  Bone flap replaced on 3/20 by Dr. Huntley  The patient will need rehab  after bone flap placement and family lives in [place] and insurance is prepared to fly him there for rehab after bone flap is placed  Propranolol is to continue and taper as tolerated for tachycardia after his head bleed  Continue keppra for seizure prophylaxis     Hemiplegia (HCC)- (present on admission)   Assessment & Plan    On the right, resultant from his TBI     Seizures (HCC)- (present on admission)   Assessment & Plan    History of, continue Keppra        Plan  To go to rehab in [place]  Staples out 4/3/2019

## 2019-03-25 NOTE — DISCHARGE PLANNING
Anticipated Discharge Disposition: [facility]    Action: LSW had pt's parents complete Flight application, LSW had MD sign Letter of Medical Necessity and faxed flight application to [facility], waiting confirmation on  time. LSW provided pt's fathers information who will be flying with pt john.     LSW spoke to Christy at [facility] [phone] who stated that they were able to verify insurance auth and they have a bed for pt for john. Pt's d/c information is listed below.     [facility] @ [facility] [address] ([facility] of [address] [place] [address])  [place] [address]  Accepting MD: Dr. Deidra Corcoran  Unit: [address] [place] [address] station: [phone]    Barriers to Discharge: none    Plan: Waiting confirmation on flight time from [facility].

## 2019-03-25 NOTE — PROGRESS NOTES
1905 Patient's sitting up in bed. No changes in status. Bedside report given to César FUENTES RN (Mikaela).

## 2019-03-25 NOTE — CARE PLAN
Problem: Communication  Goal: The ability to communicate needs accurately and effectively will improve  Outcome: PROGRESSING AS EXPECTED      Problem: Bowel/Gastric:  Goal: Normal bowel function is maintained or improved  Outcome: PROGRESSING AS EXPECTED      Problem: Mobility  Goal: Risk for activity intolerance will decrease  Outcome: PROGRESSING AS EXPECTED

## 2019-03-25 NOTE — DISCHARGE SUMMARY
Discharge Summary    CHIEF COMPLAINT ON ADMISSION  No chief complaint on file.      Reason for Admission  SDH s/p craniotomy     CODE STATUS  Full Code    HPI & HOSPITAL COURSE  This is a 28 y.o. male presents for a bone flap to be placed.  The patient has been at [facility].  He had presented in January after a snowboarding accident.  He is found to be unresponsive.  When he presented he was found to have a intracranial bleed and had to undergo craniotomy with evacuation of the hematoma on January 26, 2019.  At that time he was requiring ventilator support due to his acute respiratory failure with neurological compromise.  Over time the slowly improved.  He eventually had to have a tracheostomy but that has been capped.  He was participating with therapies and his speech has been improving as well.  He was transferred from [facility] to [facility] to have a bone flap placed.    As stated above, in January the patient underwent left frontotemporal parietal decompressive craniectomy for severe closed head injury.  He underwent a cranioplasty on 3/20/2019.  Postoperatively he did well.  He has surgical site pain but it has been improving.  There is mild swelling under his left eye which is been improving as well.  He is able to follow most commands and answer with one-word answers.  He also has right-sided hemiplegia and traumatic brain injury.  Neurosurgery was okay with Lovenox being started as a prophylactic dose only.  His diet will be advanced.  He should continue to undergo every 4-hour neuro checks.  Staples need to come out on April 3, 2019.  The incision needs to be clean and dry.  It is okay to wash gently with baby shampoo and then pat dry.  Head of bed needs to be elevated slightly.  Patient is cleared for transfer to [place] for rehabilitation.    He has a right-sided pulmonary embolism.  No anticoagulation due to his recent head bleed/injury.  Neurosurgery has cleared him  for low-dose Lovenox.  Patient does have an IVC filter that is in place.    Therefore, he is discharged in guarded and stable condition to an inpatient rehabilitation hospital.    The patient met 2-midnight criteria for an inpatient stay at the time of discharge.      FOLLOW UP ITEMS POST DISCHARGE     Discharge date  3/26/2019    DISCHARGE DIAGNOSES  Active Problems:    Subdural hematoma (HCC) POA: Yes    Pulmonary embolus, right (HCC) POA: Yes    Seizures (HCC) POA: Yes      Overview: Since the head injury    Hemiplegia (HCC) POA: Yes  Resolved Problems:    Acute respiratory failure following trauma and surgery (HCC) POA: Yes      FOLLOW UP  No future appointments.  No follow-up provider specified.    MEDICATIONS ON DISCHARGE     Medication List      START taking these medications      Instructions   diphenhydrAMINE 50 MG/ML Soln  Commonly known as:  BENADRYL   0.5 mL by Intravenous route every 6 hours as needed (Nausea/Vomiting if ondansetron and/or dexamethasone ineffective or not ordered).  Dose:  25 mg     famotidine 20 MG Tabs  Commonly known as:  PEPCID   Take 1 Tab by mouth 2 Times a Day.  Dose:  20 mg     guaiFENesin 100 MG/5ML Soln  Commonly known as:  ROBITUSSIN   Take 5 mL by mouth every 6 hours as needed for Cough.  Dose:  5 mL     morphine (pf) 10 mg/mL Soln   0.2 mL by Intravenous route every 3 hours as needed (If pain persists one hour post oral opiate dose, patient NPO, or patient inability to use PCA) for up to 10 days.  Dose:  2 mg     ondansetron 4 MG/2ML Soln injection  Commonly known as:  ZOFRAN   2 mL by Intravenous route every four hours as needed for Nausea (Nausea).  Dose:  4 mg     oxyCODONE immediate-release 5 MG Tabs  Commonly known as:  ROXICODONE   Take 1 Tab by mouth every 3 hours as needed for up to 10 days.  Dose:  5 mg     polyethylene glycol/lytes Pack  Commonly known as:  MIRALAX   Take 1 Packet by mouth 2 times a day as needed (if sennosides and/or docusate ineffective or not  ordered).  Dose:  17 g        CONTINUE taking these medications      Instructions   acetaminophen 325 MG Tabs  Commonly known as:  TYLENOL   Take 2 Tabs by mouth every four hours as needed.  Dose:  650 mg     amantadine 50 MG/5ML Syrp  Commonly known as:  SYMMETREL   Take 20 mL by mouth 2 Times a Day.  Dose:  200 mg     bisacodyl 10 MG Supp  Commonly known as:  DULCOLAX   Insert 1 Suppository in rectum every 24 hours as needed (if sennosides, docusate, polyethylene glycol 3350, and/or magnesium hydroxide ineffective or not ordered).  Dose:  10 mg     docusate sodium 100mg/10mL 150 MG/15ML Liqd  Commonly known as:  COLACE   Take 10 mL by mouth 2 Times a Day.  Dose:  100 mg     enoxaparin 40 MG/0.4ML Soln inj  Commonly known as:  LOVENOX   Inject 40 mg as instructed every day.  Dose:  40 mg     levETIRAcetam 500 MG Tabs  Commonly known as:  KEPPRA   Take 1 Tab by mouth 2 Times a Day.  Dose:  500 mg     magnesium hydroxide 400 MG/5ML Susp  Commonly known as:  MILK OF MAGNESIA   Take 30 mL by mouth 1 time daily as needed (if sennosides, docusate, and/or polyethylene glycol 3350 ineffective or not ordered).  Dose:  30 mL     senna-docusate 8.6-50 MG Tabs  Commonly known as:  PERICOLACE or SENOKOT S   Take 2 Tabs by mouth 2 times a day.  Dose:  2 Tab        STOP taking these medications    propranolol 10 MG Tabs  Commonly known as:  INDERAL            Allergies  No Known Allergies    DIET  Orders Placed This Encounter   Procedures   • Diet Order Regular     Standing Status:   Standing     Number of Occurrences:   1     Order Specific Question:   Diet:     Answer:   Regular [1]     Order Specific Question:   Texture/Fiber modifications:     Answer:   Dysphagia 3(Mechanical Soft)specify fluid consistency(question 6) [3]       ACTIVITY  As tolerated.  Weight bearing as tolerated    LINES, DRAINS, AND WOUNDS  This is an automated list. Peripheral IVs will be removed prior to discharge.  Peripheral IV 03/22/19 22 G Right  Antecubital (Active)   Site Assessment Clean;Dry;Intact 3/25/2019  9:15 AM   Dressing Type Transparent 3/25/2019  9:15 AM   Line Status Flushed;Scrubbed the hub prior to access;Saline locked 3/25/2019  9:15 AM   Dressing Status Clean;Dry;Intact 3/25/2019  9:15 AM   Dressing Intervention N/A 3/24/2019  8:15 AM   Infiltration Grading ([facility], CV) 0 3/25/2019  9:15 AM   Phlebitis Scale ([facility] Only) 0 3/25/2019  9:15 AM          Peripheral IV 03/22/19 22 G Right Antecubital (Active)   Site Assessment Clean;Dry;Intact 3/25/2019  9:15 AM   Dressing Type Transparent 3/25/2019  9:15 AM   Line Status Flushed;Scrubbed the hub prior to access;Saline locked 3/25/2019  9:15 AM   Dressing Status Clean;Dry;Intact 3/25/2019  9:15 AM   Dressing Intervention N/A 3/24/2019  8:15 AM   Infiltration Grading ([facility], CV) 0 3/25/2019  9:15 AM   Phlebitis Scale ([facility] Only) 0 3/25/2019  9:15 AM               MENTAL STATUS ON TRANSFER  Level of Consciousness: Alert  Orientation : Oriented x 4  Speech: Expressive Aphasia    CONSULTATIONS  Neurosurgery  Pulmonology    PROCEDURES  Cranioplasty    LABORATORY  Lab Results   Component Value Date    SODIUM 140 03/22/2019    POTASSIUM 3.9 03/22/2019    CHLORIDE 105 03/22/2019    CO2 26 03/22/2019    GLUCOSE 87 03/22/2019    BUN 7 (L) 03/22/2019    CREATININE 0.70 03/22/2019        Lab Results   Component Value Date    WBC 7.7 03/22/2019    HEMOGLOBIN 12.3 (L) 03/22/2019    HEMATOCRIT 39.7 (L) 03/22/2019    PLATELETCT 258 03/22/2019        Total time of the discharge process exceeds 50 minutes.

## 2019-03-26 PROBLEM — R56.9 SEIZURES (HCC): Status: RESOLVED | Noted: 2019-03-20 | Resolved: 2019-03-26

## 2019-03-26 PROCEDURE — 700111 HCHG RX REV CODE 636 W/ 250 OVERRIDE (IP): Performed by: PHYSICIAN ASSISTANT

## 2019-03-26 PROCEDURE — 770001 HCHG ROOM/CARE - MED/SURG/GYN PRIV*

## 2019-03-26 PROCEDURE — 99232 SBSQ HOSP IP/OBS MODERATE 35: CPT | Performed by: HOSPITALIST

## 2019-03-26 PROCEDURE — A9270 NON-COVERED ITEM OR SERVICE: HCPCS | Performed by: PHYSICIAN ASSISTANT

## 2019-03-26 PROCEDURE — 51798 US URINE CAPACITY MEASURE: CPT

## 2019-03-26 PROCEDURE — A9270 NON-COVERED ITEM OR SERVICE: HCPCS | Performed by: HOSPITALIST

## 2019-03-26 PROCEDURE — A9270 NON-COVERED ITEM OR SERVICE: HCPCS | Performed by: INTERNAL MEDICINE

## 2019-03-26 PROCEDURE — 700102 HCHG RX REV CODE 250 W/ 637 OVERRIDE(OP): Performed by: HOSPITALIST

## 2019-03-26 PROCEDURE — 700102 HCHG RX REV CODE 250 W/ 637 OVERRIDE(OP): Performed by: INTERNAL MEDICINE

## 2019-03-26 PROCEDURE — 700102 HCHG RX REV CODE 250 W/ 637 OVERRIDE(OP): Performed by: PHYSICIAN ASSISTANT

## 2019-03-26 RX ADMIN — ENOXAPARIN SODIUM 40 MG: 100 INJECTION SUBCUTANEOUS at 06:56

## 2019-03-26 RX ADMIN — FAMOTIDINE 20 MG: 20 TABLET ORAL at 18:35

## 2019-03-26 RX ADMIN — AMANTADINE HYDROCHLORIDE 200 MG: 50 SOLUTION ORAL at 13:03

## 2019-03-26 RX ADMIN — AMANTADINE HYDROCHLORIDE 200 MG: 50 SOLUTION ORAL at 09:41

## 2019-03-26 RX ADMIN — LEVETIRACETAM 500 MG: 100 SOLUTION ORAL at 06:55

## 2019-03-26 RX ADMIN — FAMOTIDINE 20 MG: 20 TABLET ORAL at 06:55

## 2019-03-26 RX ADMIN — LEVETIRACETAM 500 MG: 100 SOLUTION ORAL at 18:34

## 2019-03-26 ASSESSMENT — ENCOUNTER SYMPTOMS
MYALGIAS: 0
NERVOUS/ANXIOUS: 1
BACK PAIN: 0
VOMITING: 0
FOCAL WEAKNESS: 1
COUGH: 0
NAUSEA: 0
PALPITATIONS: 0
SPEECH CHANGE: 1
CHILLS: 0
DEPRESSION: 0
SHORTNESS OF BREATH: 0
DOUBLE VISION: 0
BLURRED VISION: 0
FEVER: 0
HEADACHES: 0
SORE THROAT: 0
DIZZINESS: 0

## 2019-03-26 ASSESSMENT — LIFESTYLE VARIABLES: SUBSTANCE_ABUSE: 0

## 2019-03-26 NOTE — PROGRESS NOTES
[facility] Medicine Daily Progress Note    Date of Service  3/26/2019    Chief Complaint  28 y.o. male admitted 3/20/2019 with recent prolonged hospitalization secondary to TBI, status post craniectomy, status post tracheostomy, history of seizures.  The patient had IVC filter placed, diagnosed with pulmonary embolism.  The patient admitted for bone flap replacement and transition to his home location for ongoing rehab    Hospital Course   Admission for cranioplasty      Interval Problem Update  Doing well today, father at bedside.  Only complaint patient has this morning is to Band-Aid over his tracheostomy tube.  No drainage pain or erythema seen around the site.  Tentatively scheduled for transfer to [place] tomorrow    Consultants/Specialty  Neurosurgery    Code Status  Full code    Disposition  Tentatively scheduled to fly back to [place] tomorr    Review of Systems  Review of Systems   Constitutional: Negative for chills and fever.   HENT: Negative for hearing loss and sore throat.    Eyes: Negative for blurred vision and double vision.   Respiratory: Negative for cough and shortness of breath.    Cardiovascular: Negative for chest pain and palpitations.   Gastrointestinal: Negative for nausea and vomiting.   Genitourinary: Negative for dysuria and frequency.   Musculoskeletal: Negative for back pain and myalgias.   Skin: Negative for itching and rash.   Neurological: Positive for speech change and focal weakness. Negative for dizziness and headaches.   Psychiatric/Behavioral: Negative for depression, substance abuse and suicidal ideas. The patient is nervous/anxious.         Physical Exam  Temp:  [36.4 °C (97.5 °F)-36.9 °C (98.4 °F)] 36.7 °C (98.1 °F)  Pulse:  [69-98] 88  Resp:  [16-17] 17  BP: (107-120)/(72-79) 107/74  SpO2:  [95 %-98 %] 98 %    Physical Exam   Constitutional: He is oriented to person, place, and time. He appears well-developed and well-nourished.   HENT:   Head: Normocephalic and  atraumatic.   Mouth/Throat: No oropharyngeal exudate.   Status post bone flap replacement surgery  Staples in place   Eyes: Pupils are equal, round, and reactive to light. EOM are normal. No scleral icterus.   Neck: Normal range of motion. Neck supple. Thyromegaly present.   Cardiovascular: Normal rate and regular rhythm.    No murmur heard.  Pulmonary/Chest: Effort normal and breath sounds normal. No respiratory distress.   Abdominal: Soft. Bowel sounds are normal. He exhibits no distension. There is no tenderness.   Genitourinary: Penis normal.   Musculoskeletal: He exhibits no edema or tenderness.   Neurological: He is alert and oriented to person, place, and time. No cranial nerve deficit.   Right upper and right lower extremity weakness   Skin: Skin is warm and dry.   Psychiatric: He has a normal mood and affect. His behavior is normal.   Nursing note and vitals reviewed.      Fluids    Intake/Output Summary (Last 24 hours) at 03/26/19 1313  Last data filed at 03/26/19 0659   Gross per 24 hour   Intake                0 ml   Output              225 ml   Net             -225 ml       Laboratory                        Imaging  CT-HEAD W/O   Final Result   Addendum 1 of 1   These findings were discussed with the patient's on-call clinician,    Ofelia Parham, on 3/21/2019 4:39 AM.      Final         1.  New area of parenchymal hemorrhage within the left frontal lobe laterally.   2.  Pneumocephalus and scattered subarachnoid hemorrhages along the cranioplasty site.   3.  Mixed density anterior and posterior extra axial fluid collections on the left, stable since prior.   4.  Asymmetric dilatation of the left ventricle with associated encephalomalacia changes of the left hemisphere.           Assessment/Plan  Pulmonary embolus, right (HCC)- (present on admission)   Assessment & Plan    No anticoagulation due to recent head bleed, patient has been cleared for low dose lovenox by neurosurgery but his is currently on  hold for bone flap placement  IVC filter is in place      Subdural hematoma (HCC)- (present on admission)   Assessment & Plan    Treated with craniotomy 1/26/19  Bone flap replaced on 3/20 by Dr. Huntley  The patient will need rehab after bone flap placement and family lives in [place] and insurance is prepared to fly him there for rehab after bone flap is placed; tentatively tomorrow  Propranolol is to continue and taper as tolerated for tachycardia after his head bleed  Continue keppra for seizure prophylaxis     Hemiplegia (HCC)- (present on admission)   Assessment & Plan    On the right, resultant from his TBI        Plan  To go to rehab in [place] 3/27/19  Staples out 4/3/2019

## 2019-03-26 NOTE — DISCHARGE PLANNING
Anticipated Discharge Disposition:   Inpatient Rehab Hospital/[place]      Action:    Spoke to patient and patient's father, Eliazar.  Both agreeable to transfer tomorrow.  Cobra signed by patient's father.  They are asking about flight details.    [facility] stated that they are set to  pt tomorrow at 0800.  She will telephone patient's father with flight details.    Spoke to Dr. Raman and Benjamin signed.  DC summary to follow.      Barriers to Discharge:    None     Plan:    Complete packet for dc.

## 2019-03-26 NOTE — PROGRESS NOTES
Neurosurgery Progress Note    Subjective:  POD #6  No changes overnight  Alert and oriented  Some surgical site pain  Mild swelling under left eye     Exam:  GCS: 14  -some aphasia remains  Patient can answer yes/no appropriately easier than giving the answer to orientation questions. He gets frustrated when he can't speak the word worsening the aphasia.   VSS  Right hemiplegia  Left motor exam is intact  Denies blurry/double vision. No pain with left eye swelling  Incision open to air with staples intact. No drainage.     BP  Min: 107/74  Max: 120/72  Pulse  Av.3  Min: 69  Max: 98  Resp  Av.3  Min: 16  Max: 17  Temp  Av.7 °C (98.1 °F)  Min: 36.4 °C (97.5 °F)  Max: 36.9 °C (98.4 °F)  SpO2  Av.8 %  Min: 95 %  Max: 98 %    No Data Recorded                      Intake/Output       19 0700 - 19 0659 19 0700 - 19 0659      [id number] 1650-1029 Total 5368-7244 1932-4883 Total       Intake    Total Intake -- -- -- -- -- --       Output    Urine  --  225 225  --  -- --    Number of Times Voided -- 1 x 1 x -- -- --    Urine Void (mL) -- 225 225 -- -- --    Total Output -- 225 225 -- -- --       Net I/O     -- -225 -225 -- -- --            Intake/Output Summary (Last 24 hours) at 19 0753  Last data filed at 19 0659   Gross per 24 hour   Intake                0 ml   Output              225 ml   Net             -225 ml       $ Bladder Scan Results (mL): 256    • enoxaparin (LOVENOX) injection  40 mg DAILY   • guaiFENesin  5 mL Q6HRS PRN   • amantadine  200 mg BID   • famotidine  20 mg BID   • bisacodyl  10 mg Q24HRS PRN   • docusate sodium 100mg/10mL  100 mg BID   • levETIRAcetam  500 mg BID   • magnesium hydroxide  30 mL QDAY PRN   • senna-docusate  2 Tab BID   • Pharmacy Consult Request  1 Each PHARMACY TO DOSE   • MD ALERT...DO NOT ADMINISTER NSAIDS or ASPIRIN unless ORDERED By Neurosurgery  1 Each PRN   • ondansetron  4 mg Q4HRS PRN   • dexamethasone  4 mg Once PRN   •  diphenhydrAMINE  25 mg Q6HRS PRN   • scopolamine  1 Patch Q72HRS PRN   • labetalol  10 mg Q HOUR PRN   • hydrALAZINE  10 mg Q HOUR PRN   • senna-docusate  1 Tab Q24HRS PRN   • polyethylene glycol/lytes  1 Packet BID PRN   • magnesium hydroxide  30 mL QDAY PRN   • bisacodyl  10 mg Q24HRS PRN   • fleet  1 Each Once PRN   • Respiratory Care per Protocol   Continuous RT   • oxyCODONE immediate-release  2.5 mg Q3HRS PRN   • oxyCODONE immediate-release  5 mg Q3HRS PRN   • morphine injection  2 mg Q3HRS PRN       Assessment and Plan:  Hospital day #7  POD #6  Prophylactic anticoagulation: no    Plan:  1. q4 neuro checks  2. Advance diet  3. Lovenox ok   4. Ok for t/f to Rehab from [facility]  5. Staples out 4/3/19   6. No need for helmet     Keep incision clean and dry. Ok to wash gently with baby shampoo, pat dry.   Swelling under left eye can occur post op, no need to worry. Recommend keep HOB elevated slightly to prevent worsening.

## 2019-03-26 NOTE — DISCHARGE PLANNING
Anticipated Discharge Disposition:   Inpatient Rehab Hospital/[place]      Action:    Disc delivered to neurosurgery and placed in patient's cubby with hard chart.  Transfer to [place] on Wednesday, 3-27-19/0800 per Elieen.  Bedside RNShena informed of change in transfer and that disc with hard chart in pts cubby.    Tiger text to Dr. Wang to notify him of transfer to [place] now Wednesday, 3-27-19/0800.     Barriers to Discharge:    None     Plan:    Assist with disposition as needed.

## 2019-03-27 VITALS
HEART RATE: 102 BPM | HEIGHT: 67 IN | DIASTOLIC BLOOD PRESSURE: 85 MMHG | RESPIRATION RATE: 16 BRPM | BODY MASS INDEX: 20.93 KG/M2 | OXYGEN SATURATION: 95 % | TEMPERATURE: 98.2 F | WEIGHT: 133.38 LBS | SYSTOLIC BLOOD PRESSURE: 121 MMHG

## 2019-03-27 PROCEDURE — 700102 HCHG RX REV CODE 250 W/ 637 OVERRIDE(OP): Performed by: PHYSICIAN ASSISTANT

## 2019-03-27 PROCEDURE — A9270 NON-COVERED ITEM OR SERVICE: HCPCS | Performed by: INTERNAL MEDICINE

## 2019-03-27 PROCEDURE — 99239 HOSP IP/OBS DSCHRG MGMT >30: CPT | Performed by: HOSPITALIST

## 2019-03-27 PROCEDURE — 700102 HCHG RX REV CODE 250 W/ 637 OVERRIDE(OP): Performed by: INTERNAL MEDICINE

## 2019-03-27 PROCEDURE — A9270 NON-COVERED ITEM OR SERVICE: HCPCS | Performed by: PHYSICIAN ASSISTANT

## 2019-03-27 PROCEDURE — 700111 HCHG RX REV CODE 636 W/ 250 OVERRIDE (IP): Performed by: PHYSICIAN ASSISTANT

## 2019-03-27 RX ADMIN — ENOXAPARIN SODIUM 40 MG: 100 INJECTION SUBCUTANEOUS at 04:33

## 2019-03-27 RX ADMIN — FAMOTIDINE 20 MG: 20 TABLET ORAL at 04:32

## 2019-03-27 RX ADMIN — LEVETIRACETAM 500 MG: 100 SOLUTION ORAL at 04:33

## 2019-03-27 NOTE — CARE PLAN
Problem: Communication  Goal: The ability to communicate needs accurately and effectively will improve  Pt educated on use of call light and white board for communication, pt verbalizes understanding of white board communication and times of rounding.      Problem: Safety  Goal: Will remain free from injury  Fall precaution in place, mobility assessed and signs placed outside pt door.  Bed alarm turned on and in place.  Reminded pt to call for assistance when he needs to get OOB/assitance.  Pt verbalizes understanding.  Bed in the lowest locked position. Treaded socks on pt. Call light within reach.

## 2019-03-27 NOTE — DISCHARGE PLANNING
Anticipated Discharge Disposition:   Inpatient Rehab Hospital/[place]      Action:    Spoke to Inhouse Sup Basilia WARD to check for dc summary, print and place it in packet and give to bedside RN.        Barriers to Discharge:    None     Plan:    Complete packet for dc.

## 2019-03-27 NOTE — DISCHARGE PLANNING
Anticipated Discharge Disposition:   Inpatient Rehab Hospital/[place]      Action:    Spoke to Dr. Raman and Aron gauthier to request updated dc summary.  Thank you      Barriers to Discharge:    DC summary     Plan:    Complete packet for dc.

## 2019-03-27 NOTE — DISCHARGE SUMMARY
Discharge Summary    CHIEF COMPLAINT ON ADMISSION  No chief complaint on file.      Reason for Admission  SDH s/p craniotomy     CODE STATUS  Full Code    HPI & HOSPITAL COURSE  This is a 28 y.o. male presents for a bone flap to be placed.  The patient has been at [facility].  He had presented in January after a snowboarding accident.  He is found to be unresponsive.  When he presented he was found to have a intracranial bleed and had to undergo craniotomy with evacuation of the hematoma on January 26, 2019.  At that time he was requiring ventilator support due to his acute respiratory failure with neurological compromise.  Over time the slowly improved.  He eventually had to have a tracheostomy but that has been capped.  He was participating with therapies and his speech has been improving as well.  He was transferred from [facility] to [facility] to have a bone flap placed.    As stated above, in January the patient underwent left frontotemporal parietal decompressive craniectomy for severe closed head injury.  He underwent a cranioplasty on 3/20/2019.  Postoperatively he did well.  He has surgical site pain but it has been improving.  There is mild swelling under his left eye which is been improving as well.  He is able to follow most commands and answer with one-word answers.  He also has right-sided hemiplegia and traumatic brain injury.  Neurosurgery was okay with Lovenox being started as a prophylactic dose only.  His diet will be advanced.  He should continue to undergo every 4-hour neuro checks.  Staples need to come out on April 3, 2019.  The incision needs to be clean and dry.  It is okay to wash gently with baby shampoo and then pat dry.  Head of bed needs to be elevated slightly.  Patient is cleared for transfer to [place] for rehabilitation.    He has a right-sided pulmonary embolism.  No anticoagulation due to his recent head bleed/injury.  Neurosurgery has cleared him  for low-dose Lovenox.  Patient does have an IVC filter that is in place. At this time VSS and patient is medical stable to be transferred to [place] rehabilitation. Patient and father understand and agree with the transfer    Therefore, he is discharged in guarded and stable condition to an inpatient rehabilitation hospital.    The patient met 2-midnight criteria for an inpatient stay at the time of discharge.      FOLLOW UP ITEMS POST DISCHARGE     Discharge date  3/27/2019    DISCHARGE DIAGNOSES  Active Problems:    Subdural hematoma (HCC) POA: Yes    Pulmonary embolus, right (HCC) POA: Yes    Hemiplegia (HCC) POA: Yes  Resolved Problems:    Acute respiratory failure following trauma and surgery (HCC) POA: Yes    Seizures (HCC) POA: Yes      Overview: Since the head injury      FOLLOW UP  No future appointments.  No follow-up provider specified.    MEDICATIONS ON DISCHARGE     Medication List      START taking these medications      Instructions   diphenhydrAMINE 50 MG/ML Soln  Commonly known as:  BENADRYL   0.5 mL by Intravenous route every 6 hours as needed (Nausea/Vomiting if ondansetron and/or dexamethasone ineffective or not ordered).  Dose:  25 mg     famotidine 20 MG Tabs  Commonly known as:  PEPCID   Take 1 Tab by mouth 2 Times a Day.  Dose:  20 mg     guaiFENesin 100 MG/5ML Soln  Commonly known as:  ROBITUSSIN   Take 5 mL by mouth every 6 hours as needed for Cough.  Dose:  5 mL     morphine (pf) 10 mg/mL Soln   0.2 mL by Intravenous route every 3 hours as needed (If pain persists one hour post oral opiate dose, patient NPO, or patient inability to use PCA) for up to 10 days.  Dose:  2 mg     ondansetron 4 MG/2ML Soln injection  Commonly known as:  ZOFRAN   2 mL by Intravenous route every four hours as needed for Nausea (Nausea).  Dose:  4 mg     oxyCODONE immediate-release 5 MG Tabs  Commonly known as:  ROXICODONE   Take 1 Tab by mouth every 3 hours as needed for up to 10 days.  Dose:  5 mg     polyethylene  glycol/lytes Pack  Commonly known as:  MIRALAX   Take 1 Packet by mouth 2 times a day as needed (if sennosides and/or docusate ineffective or not ordered).  Dose:  17 g        CONTINUE taking these medications      Instructions   acetaminophen 325 MG Tabs  Commonly known as:  TYLENOL   Take 2 Tabs by mouth every four hours as needed.  Dose:  650 mg     amantadine 50 MG/5ML Syrp  Commonly known as:  SYMMETREL   Take 20 mL by mouth 2 Times a Day.  Dose:  200 mg     bisacodyl 10 MG Supp  Commonly known as:  DULCOLAX   Insert 1 Suppository in rectum every 24 hours as needed (if sennosides, docusate, polyethylene glycol 3350, and/or magnesium hydroxide ineffective or not ordered).  Dose:  10 mg     docusate sodium 100mg/10mL 150 MG/15ML Liqd  Commonly known as:  COLACE   Take 10 mL by mouth 2 Times a Day.  Dose:  100 mg     enoxaparin 40 MG/0.4ML Soln inj  Commonly known as:  LOVENOX   Inject 40 mg as instructed every day.  Dose:  40 mg     levETIRAcetam 500 MG Tabs  Commonly known as:  KEPPRA   Take 1 Tab by mouth 2 Times a Day.  Dose:  500 mg     magnesium hydroxide 400 MG/5ML Susp  Commonly known as:  MILK OF MAGNESIA   Take 30 mL by mouth 1 time daily as needed (if sennosides, docusate, and/or polyethylene glycol 3350 ineffective or not ordered).  Dose:  30 mL     senna-docusate 8.6-50 MG Tabs  Commonly known as:  PERICOLACE or SENOKOT S   Take 2 Tabs by mouth 2 times a day.  Dose:  2 Tab        STOP taking these medications    propranolol 10 MG Tabs  Commonly known as:  INDERAL            Allergies  No Known Allergies    DIET  Orders Placed This Encounter   Procedures   • Diet Order Regular     Standing Status:   Standing     Number of Occurrences:   1     Order Specific Question:   Diet:     Answer:   Regular [1]     Order Specific Question:   Texture/Fiber modifications:     Answer:   Dysphagia 3(Mechanical Soft)specify fluid consistency(question 6) [3]       ACTIVITY  As tolerated.  Weight bearing as  tolerated    LINES, DRAINS, AND WOUNDS  This is an automated list. Peripheral IVs will be removed prior to discharge.  Peripheral IV 03/22/19 22 G Right Antecubital (Active)   Site Assessment Clean;Dry;Intact 3/25/2019  9:15 AM   Dressing Type Transparent 3/25/2019  9:15 AM   Line Status Flushed;Scrubbed the hub prior to access;Saline locked 3/25/2019  9:15 AM   Dressing Status Clean;Dry;Intact 3/25/2019  9:15 AM   Dressing Intervention N/A 3/24/2019  8:15 AM   Infiltration Grading ([facility]) 0 3/25/2019  9:15 AM   Phlebitis Scale ([facility] Only) 0 3/25/2019  9:15 AM          Peripheral IV 03/22/19 22 G Right Antecubital (Active)   Site Assessment Clean;Dry;Intact 3/25/2019  9:15 AM   Dressing Type Transparent 3/25/2019  9:15 AM   Line Status Flushed;Scrubbed the hub prior to access;Saline locked 3/25/2019  9:15 AM   Dressing Status Clean;Dry;Intact 3/25/2019  9:15 AM   Dressing Intervention N/A 3/24/2019  8:15 AM   Infiltration Grading ([facility]) 0 3/25/2019  9:15 AM   Phlebitis Scale ([facility] Only) 0 3/25/2019  9:15 AM               MENTAL STATUS ON TRANSFER  Level of Consciousness: Alert  Orientation : Oriented x 4  Speech: Expressive Aphasia    CONSULTATIONS  Neurosurgery  Pulmonology    PROCEDURES  Cranioplasty    LABORATORY  Lab Results   Component Value Date    SODIUM 140 03/22/2019    POTASSIUM 3.9 03/22/2019    CHLORIDE 105 03/22/2019    CO2 26 03/22/2019    GLUCOSE 87 03/22/2019    BUN 7 (L) 03/22/2019    CREATININE 0.70 03/22/2019        Lab Results   Component Value Date    WBC 7.7 03/22/2019    HEMOGLOBIN 12.3 (L) 03/22/2019    HEMATOCRIT 39.7 (L) 03/22/2019    PLATELETCT 258 03/22/2019        Total time of the discharge process exceeds 50 minutes.

## 2019-03-27 NOTE — PROGRESS NOTES
Pt is awake and alert, YOLANDA orientation d/t expressive aphasia but pt appropiate, follows commands and cooperates.  Father is at bedside. VSS.  Denies pain.  LE 5/5, RE 3/5 w/fine motor deficits on RE.  RUE in brace.  RLE has foot drop brace in place.  Max assist up to BSC.  Slight numbness to RE but this is not new.  CMS is intact.  Incision to head is JODY/ CDI.  Pt updated on POC, needs met and questions answered.  Pt and family requesting bed bath in AM before they are d/c'd.  Call light within reach and working properly.

## 2019-03-27 NOTE — DISCHARGE PLANNING
LSW reached out via tigertext to provider regarding need for discharge summary to send to transferring facility and prep for early morning transfer. LSW informed ED LSW regarding transfer scheduled for Wednesday morning at 8am.     Awaiting updated discharge summary for transfer packet

## 2019-03-27 NOTE — DISCHARGE PLANNING
Medical Social Work    SW printed 72 hour transfer report and placed in transfer packet along w/ dc summary and COBRA. SW handed bedside RN completed transfer packet.

## 2019-03-27 NOTE — DISCHARGE INSTRUCTIONS
Discharge Instructions    Discharged to other by medical transportation with escort. Discharged via ambulance, hospital escort: Yes.  Special equipment needed: Foot drop and hand brace.    Be sure to schedule a follow-up appointment with your primary care doctor or any specialists as instructed.     Discharge Plan:   Influenza Vaccine Indication: Not indicated: Previously immunized this influenza season and > 8 years of age    I understand that a diet low in cholesterol, fat, and sodium is recommended for good health. Unless I have been given specific instructions below for another diet, I accept this instruction as my diet prescription.   Other diet: regular    Special Instructions: None    · Is patient discharged on Warfarin / Coumadin?   No       Subdural Hematoma  A subdural hematoma is a collection of blood between the brain and its tough outermost membrane covering (the dura).  Blood clots that form in this area push down on the brain and cause irritation. A subdural hematoma may cause parts of the brain to stop working and eventually cause death.   CAUSES  A subdural hematoma is caused by bleeding from a ruptured blood vessel (hemorrhage). The bleeding results from trauma to the head, such as from a fall or motor vehicle accident.  There are two types of subdural hemorrhages:  · Acute. This type develops shortly after a serious blow to the head and causes blood to collect very quickly. If not diagnosed and treated promptly, severe brain injury or death can occur.  · Chronic. This is when bleeding develops more slowly, over weeks or months.  RISK FACTORS  People at risk for subdural hematoma include older persons, infants, and alcoholics.  SYMPTOMS  An acute subdural hemorrhage develops over minutes to hours. Symptoms can include:  · Temporary loss of consciousness.  · Weakness of arms or legs on one side of the body.  · Changes in vision or speech.  · A severe headache.  · Seizures.  · Nausea and  vomiting.  · Increased sleepiness.  A chronic subdural hemorrhage develops over weeks to months. Symptoms may develop slowly and produce less noticeable problems or changes. Symptoms include:  · A mild headache.  · A change in personality.  · Loss of balance or difficulty walking.  · Weakness, numbness, or tingling in the arms or legs.  · Nausea or vomiting.  · Memory loss.  · Double vision.  · Increased sleepiness.  DIAGNOSIS  Your health care provider will perform a thorough physical and neurological exam. A CT scan or MRI may also be done. If there is blood on the scan, its color will help your health care provider determine how long the hemorrhage has been there.  TREATMENT  If the cause is an acute subdural hemorrhage, immediate treatment is needed. In many cases an emergency surgery is performed to drain accumulated blood or to remove the blood clot. Sometimes steroid or diuretic medicines or controlled breathing through a ventilator is needed to decrease pressure in the brain. This is especially true if there is any swelling of the brain.  If the cause is a chronic subdural hemorrhage, treatment depends on a variety of factors. Sometimes no treatment is needed. If the subdural hematoma is small and causes minimal or no symptoms, you may be treated with bed rest, medicines, and observation. If the hemorrhage is large or if you have neurological symptoms, an emergency surgery is usually needed to remove the blood clot.  People who develop a subdural hemorrhage are at risk of developing seizures, even after the subdural hematoma has been treated. You may be prescribed an anti-seizure (anticonvulsant) medicine for a year or longer.  HOME CARE INSTRUCTIONS  · Only take medicines as directed by your health care provider.  · Rest if directed by your health care provider.  · Keep all follow-up appointments with your health care provider.  · If you play a contact sport such as football, hockey or soccer and you  experienced a significant head injury, allow enough time for healing (up to 15 days) before you start playing again. A repeated injury that occurs during this fragile repair period is likely to result in hemorrhage. This is called the second impact syndrome.  SEEK IMMEDIATE MEDICAL CARE IF:  · You fall or experience minor trauma to your head and you are taking blood thinners. If you are on any blood thinners even a very small injury can cause a subdural hematoma. You should not hesitate to seek medical attention regardless of how minor you think your symptoms are.  · You experience a head injury and have:  ¨ Drowsiness or a decrease in alertness.  ¨ Confusion or forgetfulness.  ¨ Slurred speech.  ¨ Irrational or aggressive behavior.  ¨ Numbness or paralysis in any part of the body.  ¨ A feeling of being sick to your stomach (nauseous) or you throw up (vomit).  ¨ Difficulty walking or poor coordination.  ¨ Double vision.  ¨ Seizures.  ¨ A bleeding disorder.  ¨ A history of heavy alcohol use.  ¨ Clear fluid draining from your nose or ears.  ¨ Personality changes.  ¨ Difficulty thinking.  ¨ Worsening symptoms.  MAKE SURE YOU:  · Understand these instructions.  · Will watch your condition.  · Will get help right away if you are not doing well or get worse.  FOR MORE INFORMATION  National [place] of Neurological Disorders and Stroke: www.ninds.nih.gov  American Association of Neurological Surgeons: www.neurosurgerytoday.org  American Academy of Neurology (AAN): www.aan.com  Brain Injury Association of [place]: www.biausa.org  This information is not intended to replace advice given to you by your health care provider. Make sure you discuss any questions you have with your health care provider.  Document Released: 11/04/2005 Document Revised: 10/08/2014 Document Reviewed: 06/20/2014  Elsevier Interactive Patient Education © 2017 Elsevier Inc.      Craniotomy  A craniotomy is a surgical procedure in which part of the  skull is temporarily removed (bone flap) to access the brain for surgery. The amount of skull that needs to be removed depends on the type of surgery being performed. The bone flap is put back in place after the surgery. It is usually fastened back in place with metal plates and screws. The plates and screws can often be removed later after healing.  Brain surgery that involves craniotomy may be done for various reasons, including:  · Cancer.  · Traumatic brain injury (TBI).  · Active bleeding inside the skull.  · Removal of tumors or growths.  · Swelling of the brain.  · Removal of a collection of blood (hematoma).  · Infections or abscesses.  · Placing deep brain stimulators on the brain for the treatment of Parkinson's disease, epilepsy, or cerebellar tremors.  LET YOUR HEALTH CARE PROVIDER KNOW ABOUT:  · Any allergies you have.  · All medicines you are taking, including vitamins, herbs, eye drops, creams, and over-the-counter medicines.  · Previous problems you or members of your family have had with the use of anesthetics.  · Any blood disorders you have.  · Previous surgeries you have had.  · Medical conditions you have.  RISKS AND COMPLICATIONS   Generally, this is a safe procedure. However, as with any procedure, problems can occur. Possible problems include:  · Infection.  · An allergic reaction to the anesthetic or other medicine given.  · Injury from the pins.  · Brain damage.  · Bleeding.  · Injury to facial muscles or to sinuses.  · Seizures from brain irritability.  · Brain swelling.  BEFORE THE PROCEDURE  · Ask your health care provider about changing or stopping your regular medicines.  · Do not eat or drink anything after midnight the night before the procedure or as directed by your health care provider.  · You may need to wash your hair with a special type of germ-killing shampoo the night before the surgery.  · Make plans to have someone drive you home after your hospital stay. Also, arrange to  have someone help you with activities during recovery.  PROCEDURE   A craniotomy may take 2½ hours or longer depending on the problems being addressed in the surgery.  · You will likely be given a medicine to make you sleep (general anesthetic). This will be given through an IV access tube in one of your veins.  · The site that is chosen for the craniotomy will be prepped (prepared). Hair may be removed from your scalp in this area.  · Your head will be held in place with a device. Pins will go into your skull so your head cannot move.  · A flap will then be cut in the scalp, and small holes (tomasz holes) will be drilled in your skull.  · A bone saw will be used to connect the tomasz holes and to cut out the bone flap.    · After the bone flap is removed, work is performed on your brain.  · A drain may be placed inside your head to remove blood or fluids that might collect after surgery.  · The bone will then be put back in place and attached using plates, wires, or sutures. Your scalp will then be closed with stitches or staples.    AFTER THE PROCEDURE  · You will be taken to a recovery area where your progress will be closely watched.  · Once you are awake, stable, and taking fluids well, you will be taken to your hospital room.  · You may stay in the hospital up to 3 weeks depending on the type of surgery done and whether any problems arise.  · Your scalp may feel spongy for a while because of fluid under it. This will gradually get better. Numbness may persist for a while in some areas of your scalp.  This information is not intended to replace advice given to you by your health care provider. Make sure you discuss any questions you have with your health care provider.  Document Released: 03/20/2007 Document Revised: 04/10/2017 Document Reviewed: 07/30/2014  Elsevier Interactive Patient Education © 2017 Elsevier Inc.      Depression / Suicide Risk    As you are discharged from this [facility], it is  important to learn how to keep safe from harming yourself.    Recognize the warning signs:  · Abrupt changes in personality, positive or negative- including increase in energy   · Giving away possessions  · Change in eating patterns- significant weight changes-  positive or negative  · Change in sleeping patterns- unable to sleep or sleeping all the time   · Unwillingness or inability to communicate  · Depression  · Unusual sadness, discouragement and loneliness  · Talk of wanting to die  · Neglect of personal appearance   · Rebelliousness- reckless behavior  · Withdrawal from people/activities they love  · Confusion- inability to concentrate     If you or a loved one observes any of these behaviors or has concerns about self-harm, here's what you can do:  · Talk about it- your feelings and reasons for harming yourself  · Remove any means that you might use to hurt yourself (examples: pills, rope, extension cords, firearm)  · Get professional help from the community (Mental Health, Substance Abuse, psychological counseling)  · Do not be alone:Call your Safe Contact- someone whom you trust who will be there for you.  · Call your local CRISIS HOTLINE 273-8226 or 235-797-7134  · Call your local Children's Mobile Crisis Response Team [place] (386) 814-3864 or www.ZeroFOX  · Call the toll free National Suicide Prevention Hotlines   · National Suicide Prevention Lifeline 039-430-AUDF (3302)  · National Hope Line Network 800-SUICIDE (865-7051)

## 2019-03-27 NOTE — DISCHARGE PLANNING
Agency/Facility Name: [facility]   Plan or Request: Discharge Summary faxed to [facility] at [phone].

## 2019-03-27 NOTE — PROGRESS NOTES
Pt discharged with [facility] to [facility] in [place]. IV left in place per request from [facility]. All belongings taken. Called to give report to the RN at [facility]. Was informed that they would have the RN call me back. Provided phone number.     Received phone call from Carleen HDZ @ [phone].  Report given to Carleen HDZ. All question answered.

## 2019-04-10 ENCOUNTER — TELEPHONE (OUTPATIENT)
Dept: VASCULAR LAB | Facility: MEDICAL CENTER | Age: 29
End: 2019-04-10

## 2019-04-10 NOTE — TELEPHONE ENCOUNTER
Chart reviewed in accordance with IVC filter.  Pt was a trauma from a snowboarding accident in January.  He was transferred to a rehab facility in [place] at the end of March.  Called emergency contact regarding IVC filter disposition.  Asked to return call to discuss.    Fatou HARTLEY  [facility]

## 2019-04-15 PROCEDURE — 302244 HCHG LTACH STAT

## 2019-10-03 ENCOUNTER — TELEPHONE (OUTPATIENT)
Dept: VASCULAR LAB | Facility: MEDICAL CENTER | Age: 29
End: 2019-10-03

## 2019-10-03 NOTE — TELEPHONE ENCOUNTER
Certified letter sent to address on chart as have not been able to reach anyone regarding IVC filter disposition    Fatou HARTLEY  [facility]

## 2019-10-29 ENCOUNTER — TELEPHONE (OUTPATIENT)
Dept: VASCULAR LAB | Facility: MEDICAL CENTER | Age: 29
End: 2019-10-29

## 2019-10-29 NOTE — TELEPHONE ENCOUNTER
Pt family member called indicating the IVC filter is being removed at his rehab facility at [facility].    Fatou Schwab APRROBERTO  [facility]

## 2021-08-20 NOTE — PROGRESS NOTES
12 hour chart check   34% RV paced  HR's generally in the 90bpm range.   Stable finding for this patient.

## 2022-03-24 NOTE — PROGRESS NOTES
"As discussed, will take over your Klonipin refills. Please do the lab work , continue Salt tablets.     ====================  Patient Education     Benzodiazepines (Urine)  Does this test have other names?  Benzodiazepine urine toxicology screen   What is this test?  This is a urine test to check for a type of medicine called benzodiazepine. Benzodiazepines (behn-zoh-di-AZ-uh-peens) are medicines that depress the central nervous system. They are used to sedate patients, help them sleep, prevent seizures, ease anxiety, and relax muscle spasms. You may also hear these medicines called tranquilizers, sleeping pills, and muscle relaxants.      Examples of common medicines for anxiety, muscle tension, and seizures include:     Alprazolam    Chlordiazepoxide    Clonazepam    Clorazepate    Diazepam    Lorazepam    Oxazepam  Examples of common medicines for calmness and sleep include:     Temazepam    Triazolam    Flurazepam    Estazolam  These medicines are also sometimes used illegally. Street names for these medicines include \"downers,\" \"benzos,\" \"nerve pills,\" \"candy,\" and \"tranks.\" Chronic abuse of benzodiazepines can lead to addiction. Using these medicines with other depressants like alcohol can be fatal.   Why do I need this test?  Even if you have been prescribed one of these medicines, you may need this test if you have signs or symptoms of an overdose. Signs and symptoms of overdose can include:     Confusion    Slurred speech    Loss of muscle control    Trouble thinking or talking    Low blood pressure    Slow or shallow breathing    Seizure    Unconsciousness    Cardiac arrest  You may also have this test if a healthcare provider thinks you are abusing these medicines or using them illegally.   If you have signs as above, you may also have this test as part of a drug screen to check for other commonly abused medicines. These screens often include tests for: " Trauma / Surgical Daily Progress Note    Date of Service  2/7/2019    Chief Complaint  28 y.o. male admitted 1/26/2019 with Trauma    Interval Events    Flolan weaned off yesterday  Start APRV wean today  Transfuse 1 unit PRBC for low H/H  Ongoing iron replacement  BAL from 2/5 growing Klebsiella pneumoniae - change abx to rocephin    Review of Systems  Review of Systems   Unable to perform ROS: Intubated        Vital Signs for last 24 hours  Temp:  [38.1 °C (100.6 °F)] 38.1 °C (100.6 °F)  Pulse:  [84-96] 87  Resp:  [12-33] 32  BP: (121)/(61-66) 121/66  SpO2:  [98 %-100 %] 100 %    Hemodynamic parameters for last 24 hours       Respiratory Data     Respiration: (!) 32, Pulse Oximetry: 100 %     Work Of Breathing / Effort: Vented  RUL Breath Sounds: Clear, RML Breath Sounds: Diminished, RLL Breath Sounds: Diminished, STACI Breath Sounds: Clear, LLL Breath Sounds: Diminished    Physical Exam  Physical Exam   Constitutional: He appears well-developed and well-nourished.   HENT:   Left craniotomy with swelling - incision clean   Eyes: Pupils are equal, round, and reactive to light.   Neck: No JVD present. No tracheal deviation present. No thyromegaly present.   Cardiovascular: Normal rate, regular rhythm and intact distal pulses.    Pulmonary/Chest: Effort normal. No respiratory distress. He has no wheezes. He has no rales.   Full vent    Abdominal: Soft. He exhibits no distension. There is no tenderness. There is no rebound.   Genitourinary:   Genitourinary Comments: Greer to gravity.   Musculoskeletal: He exhibits edema.   Lymphadenopathy:     He has no cervical adenopathy.   Neurological: No cranial nerve deficit. GCS eye subscore is 2. GCS verbal subscore is 1. GCS motor subscore is 4.   Intubated and sedated   Skin: Skin is warm and dry. He is not diaphoretic.   Nursing note and vitals reviewed.      Laboratory  Recent Results (from the past 24 hour(s))   Magnesium: Every Monday and Thursday AM    Collection Time:  "    Cocaine    Opioids    Amphetamines    Barbiturates    Marijuana    Phencyclidine  If you are conscious and able to talk, you can give information to help your healthcare providers figure out the right test for you. For example, if you are a victim of sexual assault, you may have this test to see if someone put a benzodiazepine date rape drug, such as flunitrazepam (Rohypnol or \"roofie\"), into your drink.   You might also be tested if providers think you have taken benzodiazepines accidentally or in a suicide attempt.    Different benzodiazepines have different doses, ranging from 0.5 to 50 milligrams (mg). Overdoses of 10 to 20 times the prescribed dose of some benzodiazepines can cause a mild coma but don't cause slow or shallow breathing. Most people recover. But overdoses of fast-acting benzodiazepines such as triazolam are more likely to cause breathing problems and even death.   What other tests might I have along with this test?  You may also have a glucose test to check your blood sugar.   A benzodiazepine overdose alone is unlikely to cause coma or severe heart or lung function problems. If you have those symptoms, a healthcare provider may screen for other drugs and test for causes of central nervous system problems that are not caused by medicine or drugs.   You may also have a blood test for benzodiazepines. In some cases, it may be more practical to take a blood sample than a urine sample. Blood tests are also harder for a person to alter to hide drug abuse.   Which tests you have depends on your exam and information that you are able to provide.    What do my test results mean?  Test results may vary depending on your age, gender, health history, the method used for the test, and other things. Your test results may not mean you have a problem. Ask your healthcare provider what your test results mean for you.   A typical benzodiazepine urine test can detect benzodiazepines or their break-down " 02/07/19 12:05 AM   Result Value Ref Range    Magnesium 2.4 1.5 - 2.5 mg/dL   Phosphorus: Every Monday and Thursday AM    Collection Time: 02/07/19 12:05 AM   Result Value Ref Range    Phosphorus 1.6 (L) 2.5 - 4.5 mg/dL   Comp Metabolic Panel    Collection Time: 02/07/19 12:05 AM   Result Value Ref Range    Sodium 153 (H) 135 - 145 mmol/L    Potassium 3.5 (L) 3.6 - 5.5 mmol/L    Chloride 117 (H) 96 - 112 mmol/L    Co2 29 20 - 33 mmol/L    Anion Gap 7.0 0.0 - 11.9    Glucose 118 (H) 65 - 99 mg/dL    Bun 29 (H) 8 - 22 mg/dL    Creatinine 0.64 0.50 - 1.40 mg/dL    Calcium 7.5 (L) 8.5 - 10.5 mg/dL    AST(SGOT) 33 12 - 45 U/L    ALT(SGPT) 35 2 - 50 U/L    Alkaline Phosphatase 50 30 - 99 U/L    Total Bilirubin 0.4 0.1 - 1.5 mg/dL    Albumin 2.7 (L) 3.2 - 4.9 g/dL    Total Protein 5.6 (L) 6.0 - 8.2 g/dL    Globulin 2.9 1.9 - 3.5 g/dL    A-G Ratio 0.9 g/dL   ESTIMATED GFR    Collection Time: 02/07/19 12:05 AM   Result Value Ref Range    GFR If African American >60 >60 mL/min/1.73 m 2    GFR If Non African American >60 >60 mL/min/1.73 m 2   ISTAT ARTERIAL BLOOD GAS    Collection Time: 02/07/19  3:53 AM   Result Value Ref Range    Ph 7.421 7.400 - 7.500    Pco2 40.9 (H) 26.0 - 37.0 mmHg    Po2 122 (H) 64 - 87 mmHg    Tco2 28 20 - 33 mmol/L    S02 99 93 - 99 %    Hco3 26.6 (H) 17.0 - 25.0 mmol/L    BE 2 -4 - 3 mmol/L    Body Temp 99.9 F degrees    O2 Therapy 40 %    iPF Ratio 305     Ph Temp Sri 7.410 7.400 - 7.500    Pco2 Temp Co 42.2 (H) 26.0 - 37.0 mmHg    Po2 Temp Cor 127 (H) 64 - 87 mmHg    Specimen Arterial     Action Range Triggered NO     Inst. Qualified Patient YES    CBC WITH DIFFERENTIAL    Collection Time: 02/07/19  6:00 AM   Result Value Ref Range    WBC 14.9 (H) 4.8 - 10.8 K/uL    RBC 2.27 (L) 4.70 - 6.10 M/uL    Hemoglobin 6.8 (L) 14.0 - 18.0 g/dL    Hematocrit 23.0 (L) 42.0 - 52.0 %    .3 (H) 81.4 - 97.8 fL    MCH 30.0 27.0 - 33.0 pg    MCHC 29.6 (L) 33.7 - 35.3 g/dL    RDW 50.2 (H) 35.9 - 50.0 fL     Platelet Count 400 164 - 446 K/uL    MPV 10.8 9.0 - 12.9 fL    Neutrophils-Polys 78.00 (H) 44.00 - 72.00 %    Lymphocytes 11.70 (L) 22.00 - 41.00 %    Monocytes 4.60 0.00 - 13.40 %    Eosinophils 2.70 0.00 - 6.90 %    Basophils 0.30 0.00 - 1.80 %    Immature Granulocytes 2.70 (H) 0.00 - 0.90 %    Nucleated RBC 0.00 /100 WBC    Neutrophils (Absolute) 11.64 (H) 1.82 - 7.42 K/uL    Lymphs (Absolute) 1.74 1.00 - 4.80 K/uL    Monos (Absolute) 0.68 0.00 - 0.85 K/uL    Eos (Absolute) 0.41 0.00 - 0.51 K/uL    Baso (Absolute) 0.04 0.00 - 0.12 K/uL    Immature Granulocytes (abs) 0.40 (H) 0.00 - 0.11 K/uL    NRBC (Absolute) 0.00 K/uL   COMP METABOLIC PANEL    Collection Time: 02/07/19  6:00 AM   Result Value Ref Range    Sodium 153 (H) 135 - 145 mmol/L    Potassium 3.5 (L) 3.6 - 5.5 mmol/L    Chloride 118 (H) 96 - 112 mmol/L    Co2 29 20 - 33 mmol/L    Anion Gap 6.0 0.0 - 11.9    Glucose 135 (H) 65 - 99 mg/dL    Bun 29 (H) 8 - 22 mg/dL    Creatinine 0.61 0.50 - 1.40 mg/dL    Calcium 7.7 (L) 8.5 - 10.5 mg/dL    AST(SGOT) 43 12 - 45 U/L    ALT(SGPT) 49 2 - 50 U/L    Alkaline Phosphatase 55 30 - 99 U/L    Total Bilirubin 0.4 0.1 - 1.5 mg/dL    Albumin 2.7 (L) 3.2 - 4.9 g/dL    Total Protein 5.4 (L) 6.0 - 8.2 g/dL    Globulin 2.7 1.9 - 3.5 g/dL    A-G Ratio 1.0 g/dL   ESTIMATED GFR    Collection Time: 02/07/19  6:00 AM   Result Value Ref Range    GFR If African American >60 >60 mL/min/1.73 m 2    GFR If Non African American >60 >60 mL/min/1.73 m 2   PERIPHERAL SMEAR REVIEW    Collection Time: 02/07/19  6:00 AM   Result Value Ref Range    Peripheral Smear Review see below    PLATELET ESTIMATE    Collection Time: 02/07/19  6:00 AM   Result Value Ref Range    Plt Estimation Normal    MORPHOLOGY    Collection Time: 02/07/19  6:00 AM   Result Value Ref Range    RBC Morphology Normal    DIFFERENTIAL COMMENT    Collection Time: 02/07/19  6:00 AM   Result Value Ref Range    Comments-Diff see below    COD - Adult (Type and Screen)     products, called metabolites. But this is a very complex test.   A positive test result means that the test found the medicine's metabolite in your urine at the time the urine sample was taken. The amount found is called the threshold concentration. This means there was enough metabolite to measure. It does not mean the amount was enough to show you are actively using the medicine. The time it takes for a substance to show up in the urine varies by medicine. It can show up within minutes of taking the medicine, and it can last for days.   The presence of benzodiazepines varies a lot by each medicine's half-life. Half-life means the amount of time it takes for half of the medicine to be eliminated from the body. Diazepam, for example, can be found for weeks after the last dose.   Although most benzodiazepines show up in standard urine tests, some don't. Alprazolam, clonazepam, temazepam, and triazolam may not be found in many of the common tests. Many benzodiazepine tests can find whether the medicine is present, but they can't give the amount.   A medicine called flumazenil may be used as an antidote to the sedative effects of benzodiazepines. It shouldn't be used in people who have been taking benzodiazepines over a long period to control seizures. In these cases, flumazenil could cause withdrawal that could lead to death.    How is this test done?  This test is done with a urine sample. Follow your healthcare provider's instructions for collecting and storing the sample. In some cases, you may have to provide a sample in the presence of a lab employee.   Does this test pose any risks?  This test poses no known risks.  What might affect my test results?  Some other medicines can cause a false positive result in benzodiazepine urine tests. These medicines include:     Tolmetin    Naproxen    Etodolac    Fenoprofen    Oxaprozin    Sertraline  How do I get ready for this test?  You do not need to prepare for this  Collection Time: 02/07/19  6:00 AM   Result Value Ref Range    ABO Grouping Only O     Rh Grouping Only POS     Antibody Screen-Cod NEG     Component R       R7                  Red Blood Cells7    [id number]   transfused   02/07/19   12:31      Product Type Red Blood Cells LR Pheresis     Dispense Status Transfused     Unit Number (Barcoded) [id number]     Product Code (Barcoded) A0075V09     Blood Type (Barcoded) 5100    Basic Metabolic Panel    Collection Time: 02/07/19  4:40 PM   Result Value Ref Range    Sodium 150 (H) 135 - 145 mmol/L    Potassium 3.4 (L) 3.6 - 5.5 mmol/L    Chloride 117 (H) 96 - 112 mmol/L    Co2 29 20 - 33 mmol/L    Glucose 123 (H) 65 - 99 mg/dL    Bun 28 (H) 8 - 22 mg/dL    Creatinine 0.64 0.50 - 1.40 mg/dL    Calcium 8.3 (L) 8.5 - 10.5 mg/dL    Anion Gap 4.0 0.0 - 11.9   ESTIMATED GFR    Collection Time: 02/07/19  4:40 PM   Result Value Ref Range    GFR If African American >60 >60 mL/min/1.73 m 2    GFR If Non African American >60 >60 mL/min/1.73 m 2       Fluids    Intake/Output Summary (Last 24 hours) at 02/07/19 2209  Last data filed at 02/07/19 1800   Gross per 24 hour   Intake           3051.5 ml   Output             1675 ml   Net           1376.5 ml       Core Measures & Quality Metrics  Medications reviewed and Radiology images reviewed  Greer catheter: Critically Ill - Requiring Accurate Measurement of Urinary Output  Central line in place: Need for access    DVT Prophylaxis: Enoxaparin (Lovenox)  DVT prophylaxis - mechanical: SCDs  Ulcer prophylaxis: Yes  Antibiotics: Treating active infection/contamination beyond 24 hours perioperative coverage      JOHN Score  ETOH Screening    Assessment/Plan  Leukocytosis   Assessment & Plan    2/5 - Multifocal pneumonia and new small bilateral pleural effusions.  Bronchoscopy with BAL and blood cultures for purulent secretions, fever, leukocytosis and chest x-ray infiltrate. Empiric linezolid and cefepime initiated.  2/7 - Antibiotic  test. But be sure the  and your healthcare provider know all the medicines, herbs, vitamins, and supplements you are taking. This includes medicines that don t need a prescription and any illegal drugs you may use.    Kyle last reviewed this educational content on 11/1/2020 2000-2021 [employer]. All rights reserved. This information is not intended as a substitute for professional medical care. Always follow your healthcare professional's instructions.            day 3 of 7      Acute respiratory failure following trauma and surgery (HCC)- (present on admission)   Assessment & Plan    Intubated in field  1/27 aggressive management of a head injury /ICP- precludes weaning protocol  Continue full mechanical ventilatory support.   Ventilator bundle  2/3  Perc trach / APRV mode  / flolan  2/6 - wean flolan off  2/7 - start APRV wean     Subdural hematoma (HCC)- (present on admission)   Assessment & Plan    Left sided holohemispheric subdural hematoma measuring approximately 9.4 mm in maximum thickness.   There is 10 mm of left-to-right midline shift. There is probably mild left sided hemispheric edema.  1/26 To OR for emergent craniotomy and evacuation of hematoma.   1/28 CT x 2 stable  Continue Keppra  2/2  MRI brainstem OK  2/4  not following commands, moves left upper spontaneously, withdraws on other extremities, EO  Jazlyn Huntley MD. Neurosurgery.     Hypokalemia   Assessment & Plan    K low - replace and follow     Anemia associated with acute blood loss   Assessment & Plan    Critical anemia  Transfuse 1 unit PRBC if Hb < 7.0  2/6 - Iron studies low - replace per protocol.  2/7 - transfuse 1 PRBC for Hb 6.8     Aspiration into airway- (present on admission)   Assessment & Plan    Admission imaging with bilateral, left greater than right medial dependent consolidation could be due to aspiration pneumonitis or atelectasis.  Aggressive pulmonary hygiene and serial chest radiography.     Contraindication to deep vein thrombosis (DVT) prophylaxis- (present on admission)   Assessment & Plan    Systemic anticoagulation contraindicated secondary to elevated bleeding risk.  1/27 - Trauma BLE duplex negative   1/29 - initiate lovenox   2/4 - Trauma BLE duplex negative   2/5 - CT shows PE     Pneumothorax, traumatic- (present on admission)   Assessment & Plan    Small right apical  Observation   Resolved     Trauma- (present on admission)   Assessment & Plan    [place]. Witnessed seizure like activity. GCS 3. No evidence of acute trauma.  Continuing kera  Trauma Red Activation.  Mor Roa MD, Trauma/General Surgery.         Discussed patient condition with Family, RN, RT, Pharmacy and neurosurgery and trauma surgery.  CRITICAL CARE TIME EXCLUDING PROCEDURES: 38 minutes

## 2024-04-19 NOTE — CARE PLAN
Problem: Venous Thromboembolism (VTW)/Deep Vein Thrombosis (DVT) Prevention:  Goal: Patient will participate in Venous Thrombosis (VTE)/Deep Vein Thrombosis (DVT)Prevention Measures    Intervention: Ensure patient wears graduated elastic stockings (CLAU hose) and/or SCDs, if ordered, when in bed or chair (Remove at least once per shift for skin check)  Pt wearing SCDs. SCDs applied appropriately. Skin check performed under SCDs. SCD machine turned on.      Problem: Pain Management  Goal: Pain level will decrease to patient's comfort goal    Intervention: Educate and implement non-pharmacologic comfort measures. Examples: relaxation, distration, play therapy, activity therapy, massage, etc.  Pt family provided education on use of non-pharmacologic modes of comfort such as rest, repositioning, and environmental changes. Pt family vocalized understanding. Will continue to reinforce teaching and monitor for learning.         100.6

## 2025-06-10 NOTE — CARE PLAN
Problem: Skin Integrity  Goal: Risk for impaired skin integrity will decrease  Richard skin risk assessment and complete skin assessment completed at beginning of shift. Pt is turned and repositioned q2h and PRN. Appropriate wound protocols in place.        Problem: Traumatic Brain Injury  Goal: Optimal care of the traumatic brain injury patient  Baseline neuro assessment completed with day shift RN. Q1 hour neuro checks in place. Continuous ICP monitoring in place. HOB elevated 30 degrees. Tube feeding at goal.   Patient

## (undated) DEVICE — SCALP CLIP RANEY 20-1037 (10EA/PK 20PK/CA)

## (undated) DEVICE — GLOVE BIOGEL INDICATOR SZ 8 SURGICAL PF LTX - (50/BX 4BX/CA)

## (undated) DEVICE — SUTURE 3-0 VICRYL PLUS SH - 8X 18 INCH (12/BX)

## (undated) DEVICE — BATTERY QUICKDRIVE

## (undated) DEVICE — PACK CRANI - (1EA/CA)

## (undated) DEVICE — SURGIFOAM (12X7) - (12EA/CA)

## (undated) DEVICE — SLEEVE, VASO, THIGH, MED

## (undated) DEVICE — ELECTRODE DUAL RETURN W/ CORD - (50/PK)

## (undated) DEVICE — KIT EVACUATER 3 SPRING PVC LF 1/8 DRAIN SIZE (10EA/CA)"

## (undated) DEVICE — SUTURE GENERAL

## (undated) DEVICE — SET EXTENSION WITH 2 PORTS (48EA/CA) ***PART #2C8610 IS A SUBSTITUTE*****

## (undated) DEVICE — SUCTION INSTRUMENT YANKAUER BULBOUS TIP W/O VENT (50EA/CA)

## (undated) DEVICE — KIT SURGIFLO W/OUT THROMBIN - (6EA/CA)

## (undated) DEVICE — LACTATED RINGERS INJ. 500 ML - (24EA/CA)

## (undated) DEVICE — DRESSING XEROFORM 1X8 - (50/BX 4BX/CA)

## (undated) DEVICE — SUTURE 2-0 VICRYL PLUS CT-1 - 8 X 18 INCH(12/BX)

## (undated) DEVICE — GLOVE BIOGEL ECLIPSE PF LATEX SIZE 7.5

## (undated) DEVICE — CHLORAPREP 26 ML APPLICATOR - ORANGE TINT(25/CA)

## (undated) DEVICE — GOWN SURGEONS LARGE - (32/CA)

## (undated) DEVICE — BANDAGE ROLL STERILE BULKEE 4.5 IN X 4 YD (100EA/CA)

## (undated) DEVICE — STAPLER SKIN DISP - (6/BX 10BX/CA) VISISTAT

## (undated) DEVICE — TUBING CLEARLINK DUO-VENT - C-FLO (48EA/CA)

## (undated) DEVICE — SENSOR SPO2 NEO LNCS ADHESIVE (20/BX) SEE USER NOTES

## (undated) DEVICE — PROTECTOR ULNA NERVE - (36PR/CA)

## (undated) DEVICE — SET LEADWIRE 5 LEAD BEDSIDE DISPOSABLE ECG (1SET OF 5/EA)

## (undated) DEVICE — KIT ROOM DECONTAMINATION

## (undated) DEVICE — CANISTER SUCTION 3000ML MECHANICAL FILTER AUTO SHUTOFF MEDI-VAC NONSTERILE LF DISP  (40EA/CA)

## (undated) DEVICE — LACTATED RINGERS INJ 1000 ML - (14EA/CA 60CA/PF)

## (undated) DEVICE — BLADE CLIPPER FITS 2501 CLIPPER (BLUE)  (20EA/CA)

## (undated) DEVICE — TUBING C&T SET FLYING LEADS DRAIN TUBING (10EA/BX)

## (undated) DEVICE — SUTURE 2-0 VICRYL PLUS CTX - 8 X 18 INCH (12/BX)

## (undated) DEVICE — WATER IRRIG. STER. 1500 ML - (9/CA)

## (undated) DEVICE — HEMOSTAT SURG ABSORBABLE - 4 X 8 IN SURGICEL (24EA/CA)

## (undated) DEVICE — MIDAS LUBRICATOR DIFFUSER PACK (4EA/CA)

## (undated) DEVICE — MASK ANESTHESIA ADULT  - (100/CA)

## (undated) DEVICE — ELECTRODE 850 FOAM ADHESIVE - HYDROGEL RADIOTRNSPRNT (50/PK)

## (undated) DEVICE — DRAPE MEDI-SLUSH STERILE  - (40/CA)

## (undated) DEVICE — GLOVE BIOGEL SZ 8 SURGICAL PF LTX - (50PR/BX 4BX/CA)

## (undated) DEVICE — BLANKET WARMING LOWER BODY - (10/CA) INACTIVE USE #8585

## (undated) DEVICE — KIT ANESTHESIA W/CIRCUIT & 3/LT BAG W/FILTER (20EA/CA)

## (undated) DEVICE — GOWN WARMING STANDARD FLEX - (30/CA)

## (undated) DEVICE — SUTURE 4-0 NUROLON CR/8 TF - (12/BX) ETHICON

## (undated) DEVICE — SUTURE 3-0 VICRYL PLUS RB-1 - 8 X 18 INCH (12/BX)

## (undated) DEVICE — SPONGE GAUZE STER 4X4 8-PL - (2/PK 50PK/BX 12BX/CS)

## (undated) DEVICE — TOOL DISSECT MATCH HEAD

## (undated) DEVICE — SODIUM CHL IRRIGATION 0.9% 1000ML (12EA/CA)

## (undated) DEVICE — NEPTUNE 4 PORT MANIFOLD - (20/PK)

## (undated) DEVICE — TOWELS CLOTH SURGICAL - (4/PK 20PK/CA)

## (undated) DEVICE — SPONGE GAUZESTER 4 X 4 4PLY - (128PK/CA)

## (undated) DEVICE — PATTIES SURG NEURO X-RAY 1X1 (10EA/PK 20PK/CA)

## (undated) DEVICE — KIT ICP SUBDURAL 110-4G

## (undated) DEVICE — Device